# Patient Record
Sex: FEMALE | Race: WHITE | HISPANIC OR LATINO | Employment: FULL TIME | URBAN - METROPOLITAN AREA
[De-identification: names, ages, dates, MRNs, and addresses within clinical notes are randomized per-mention and may not be internally consistent; named-entity substitution may affect disease eponyms.]

---

## 2017-01-10 ENCOUNTER — TRANSCRIBE ORDERS (OUTPATIENT)
Dept: URGENT CARE | Facility: MEDICAL CENTER | Age: 27
End: 2017-01-10

## 2017-01-10 ENCOUNTER — APPOINTMENT (OUTPATIENT)
Dept: URGENT CARE | Facility: MEDICAL CENTER | Age: 27
End: 2017-01-10
Payer: OTHER MISCELLANEOUS

## 2017-01-10 ENCOUNTER — HOSPITAL ENCOUNTER (OUTPATIENT)
Dept: RADIOLOGY | Facility: MEDICAL CENTER | Age: 27
Discharge: HOME | End: 2017-01-10
Payer: OTHER MISCELLANEOUS

## 2017-01-10 DIAGNOSIS — M25.521 RIGHT ELBOW PAIN: ICD-10-CM

## 2017-01-10 DIAGNOSIS — M25.521 RIGHT ELBOW PAIN: Primary | ICD-10-CM

## 2017-01-10 PROCEDURE — 73080 X-RAY EXAM OF ELBOW: CPT

## 2017-01-10 PROCEDURE — 99283 EMERGENCY DEPT VISIT LOW MDM: CPT

## 2017-01-10 PROCEDURE — G0382 LEV 3 HOSP TYPE B ED VISIT: HCPCS

## 2017-01-12 ENCOUNTER — APPOINTMENT (OUTPATIENT)
Dept: URGENT CARE | Facility: MEDICAL CENTER | Age: 27
End: 2017-01-12
Payer: OTHER MISCELLANEOUS

## 2017-01-12 PROCEDURE — 99213 OFFICE O/P EST LOW 20 MIN: CPT

## 2017-01-19 ENCOUNTER — APPOINTMENT (OUTPATIENT)
Dept: URGENT CARE | Facility: MEDICAL CENTER | Age: 27
End: 2017-01-19
Payer: OTHER MISCELLANEOUS

## 2017-01-19 PROCEDURE — 99213 OFFICE O/P EST LOW 20 MIN: CPT

## 2018-06-26 PROBLEM — F17.210 MODERATE SMOKER (20 OR LESS PER DAY): Status: ACTIVE | Noted: 2017-03-06

## 2018-06-28 ENCOUNTER — OFFICE VISIT (OUTPATIENT)
Dept: FAMILY MEDICINE CLINIC | Facility: CLINIC | Age: 28
End: 2018-06-28
Payer: COMMERCIAL

## 2018-06-28 VITALS
RESPIRATION RATE: 16 BRPM | HEIGHT: 64 IN | WEIGHT: 98 LBS | SYSTOLIC BLOOD PRESSURE: 98 MMHG | BODY MASS INDEX: 16.73 KG/M2 | HEART RATE: 82 BPM | DIASTOLIC BLOOD PRESSURE: 78 MMHG | OXYGEN SATURATION: 98 % | TEMPERATURE: 99.3 F

## 2018-06-28 DIAGNOSIS — Z00.00 WELL ADULT EXAM: Primary | ICD-10-CM

## 2018-06-28 DIAGNOSIS — F17.210 MODERATE SMOKER (20 OR LESS PER DAY): ICD-10-CM

## 2018-06-28 PROCEDURE — 3725F SCREEN DEPRESSION PERFORMED: CPT | Performed by: FAMILY MEDICINE

## 2018-06-28 PROCEDURE — 99395 PREV VISIT EST AGE 18-39: CPT | Performed by: FAMILY MEDICINE

## 2018-06-28 RX ORDER — NORETHINDRONE ACETATE AND ETHINYL ESTRADIOL AND FERROUS FUMARATE 1MG-20(24)
20 KIT ORAL DAILY
Refills: 9 | COMMUNITY
Start: 2018-06-12 | End: 2018-10-16 | Stop reason: SDUPTHER

## 2018-06-28 RX ORDER — QUETIAPINE FUMARATE 100 MG/1
100 TABLET, FILM COATED ORAL DAILY
Refills: 1 | COMMUNITY
Start: 2018-05-29 | End: 2019-07-11 | Stop reason: ALTCHOICE

## 2018-06-28 NOTE — PROGRESS NOTES
FAMILY PRACTICE HEALTH MAINTENANCE OFFICE VISIT  [facility] [place] [facility]    NAME: Oliver Brito  AGE: 32 y o  SEX: female  : 1990     DATE: 2018    Assessment and Plan     Problem List Items Addressed This Visit     Moderate smoker (20 or less per day)      Other Visit Diagnoses     Well adult exam    -  Primary            · Patient Counseling:   · Nutrition: Stressed importance of a well balanced diet, moderation of sodium/saturated fat, caloric balance and sufficient intake of fiber  · Exercise: Stressed the importance of regular exercise with a goal of 150 minutes per week  · Dental Health: Discussed daily flossing and brushing and regular dental visits     · Immunizations reviewed patient is up-to-date with immunization  · Discussed benefits of screening patient is up-to-date with her Pap smear  · Discussed the patient's BMI with her  The BMI is in the acceptable range   · Patient spoke and a half to 1 pack a day for more than 5 years we discussed with the patient smoking cessation on patient been offered patch and she declined     No Follow-up on file          Chief Complaint     Chief Complaint   Patient presents with    Physical Exam       History of Present Illness     HPI    Well Adult Physical   Patient here for a comprehensive physical exam   Patient needs a PPD placement for her work will come back tomorrow for place PPD and under id this month a      Diet and Physical Activity  Diet: well balanced diet  Weight concerns: None, patient's BMI is between 18 5-24 9  Exercise: daily      Depression Screen  PHQ-9 Depression Screening    PHQ-9:    Frequency of the following problems over the past two weeks:       Little interest or pleasure in doing things:  0 - not at all  Feeling down, depressed, or hopeless:  0 - not at all  PHQ-2 Score:  0          General Health  Dental: regular dental visits    Reproductive Health  The patient is up-to-date with Pap smear follow-up by gyn      The following portions of the patient's history were reviewed and updated as appropriate: allergies, current medications, past family history, past medical history, past social history, past surgical history and problem list     Review of Systems     Review of Systems   Constitutional: Negative for chills, fatigue and fever  HENT: Negative for ear pain and sore throat  Respiratory: Negative for cough and shortness of breath  Cardiovascular: Negative for chest pain, palpitations and leg swelling  Gastrointestinal: Negative for abdominal pain, constipation, diarrhea, nausea and vomiting  Genitourinary: Negative for dysuria, frequency and hematuria  Musculoskeletal: Negative for back pain, gait problem and joint swelling  Neurological: Negative for dizziness         Past Medical History     Past Medical History:   Diagnosis Date    Depression        Past Surgical History     Past Surgical History:   Procedure Laterality Date    HERNIA REPAIR  2016    TONSILLECTOMY Bilateral 1994       Social History     Social History     Social History    Marital status: Single     Spouse name: N/A    Number of children: N/A    Years of education: N/A     Social History Main Topics    Smoking status: Current Every Day Smoker     Packs/day: 0 25     Years: 9 00     Types: Cigarettes    Smokeless tobacco: Never Used    Alcohol use No    Drug use: No    Sexual activity: Yes     Partners: Male     Birth control/ protection: OCP     Other Topics Concern    Not on file     Social History Narrative    No narrative on file       Family History     Family History   Problem Relation Age of Onset    Coronary artery disease Paternal Grandmother     Hypertension Paternal Grandfather         TYPE 2    Diabetes Paternal Grandfather     Pancreatic cancer Paternal Grandfather     Lung cancer Paternal Grandfather        Current Medications       Current Outpatient Prescriptions:    JUNEL FE 24 1-20 MG-MCG(24) per tablet, Take 20 mg by mouth daily, Disp: , Rfl: 9    QUEtiapine (SEROquel) 100 mg tablet, Take 100 mg by mouth daily, Disp: , Rfl: 1     Allergies     Allergies   Allergen Reactions    No Active Allergies        Objective     BP 98/78 (BP Location: Left arm, Patient Position: Sitting, Cuff Size: Standard)   Pulse 82   Temp 99 3 °F (37 4 °C) (Oral)   Resp 16   Ht 5' 4" (1 626 m)   Wt 44 5 kg (98 lb)   LMP 05/18/2018 (Approximate)   SpO2 98%   BMI 16 82 kg/m²      Physical Exam   Constitutional: She is oriented to person, place, and time  She appears well-developed and well-nourished  No distress  HENT:   Head: Normocephalic and atraumatic  Neck: No JVD present  Cardiovascular: Normal rate, regular rhythm and normal heart sounds  Exam reveals no gallop and no friction rub  No murmur heard  Pulmonary/Chest: Effort normal and breath sounds normal  No respiratory distress  She has no wheezes  She has no rales  She exhibits no tenderness  Abdominal: Soft  Bowel sounds are normal  There is no tenderness  Musculoskeletal: She exhibits no edema  Neurological: She is oriented to person, place, and time           No exam data present    Health Maintenance     Health Maintenance   Topic Date Due    HIV SCREENING  1990    Depression Screening PHQ-9  1990    DTaP,Tdap,and Td Vaccines (1 - Tdap) 12/20/2011    INFLUENZA VACCINE  09/01/2018     Immunization History   Administered Date(s) Administered    DTaP 02/18/1991, 03/19/1991, 05/19/1991, 06/17/1992, 12/09/2000    HPV Quadrivalent 03/14/2007    Hep B, Adolescent or Pediatric 12/09/2000, 01/20/2001, 02/03/2001    IPV 04/09/1991, 09/12/1991, 06/17/1992, 03/14/1995    Influenza 11/16/1996, 03/06/2017, 03/06/2017    MMR 03/06/1992, 03/14/1995    Td (adult), adsorbed 03/14/1995    Tdap 03/14/2007    Varicella 12/18/2006, 03/14/2007       Mitchel Blanco MD  [address] HEART

## 2018-06-28 NOTE — PATIENT INSTRUCTIONS

## 2018-06-29 ENCOUNTER — CLINICAL SUPPORT (OUTPATIENT)
Dept: FAMILY MEDICINE CLINIC | Facility: CLINIC | Age: 28
End: 2018-06-29
Payer: COMMERCIAL

## 2018-06-29 DIAGNOSIS — Z11.1 SCREENING EXAMINATION FOR PULMONARY TUBERCULOSIS: Primary | ICD-10-CM

## 2018-06-29 PROCEDURE — 86580 TB INTRADERMAL TEST: CPT | Performed by: FAMILY MEDICINE

## 2018-07-02 LAB
INDURATION: 0 MM
TB SKIN TEST: NEGATIVE

## 2018-08-08 ENCOUNTER — HOSPITAL ENCOUNTER (EMERGENCY)
Facility: HOSPITAL | Age: 28
Discharge: HOME | End: 2018-08-08
Attending: EMERGENCY MEDICINE
Payer: COMMERCIAL

## 2018-08-08 VITALS
OXYGEN SATURATION: 99 % | SYSTOLIC BLOOD PRESSURE: 121 MMHG | RESPIRATION RATE: 16 BRPM | DIASTOLIC BLOOD PRESSURE: 80 MMHG | WEIGHT: 99 LBS | TEMPERATURE: 98.1 F | HEART RATE: 84 BPM | BODY MASS INDEX: 16.99 KG/M2

## 2018-08-08 DIAGNOSIS — M62.838 MUSCLE SPASM: Primary | ICD-10-CM

## 2018-08-08 PROCEDURE — 99283 EMERGENCY DEPT VISIT LOW MDM: CPT

## 2018-08-08 PROCEDURE — 96372 THER/PROPH/DIAG INJ SC/IM: CPT

## 2018-08-08 RX ORDER — KETOROLAC TROMETHAMINE 30 MG/ML
30 INJECTION, SOLUTION INTRAMUSCULAR; INTRAVENOUS ONCE
Status: COMPLETED | OUTPATIENT
Start: 2018-08-08 | End: 2018-08-08

## 2018-08-08 RX ORDER — KETOROLAC TROMETHAMINE 30 MG/ML
INJECTION, SOLUTION INTRAMUSCULAR; INTRAVENOUS
Status: COMPLETED
Start: 2018-08-08 | End: 2018-08-08

## 2018-08-08 RX ORDER — IBUPROFEN 600 MG/1
600 TABLET ORAL EVERY 6 HOURS PRN
Qty: 30 TABLET | Refills: 0 | Status: SHIPPED | OUTPATIENT
Start: 2018-08-08 | End: 2019-02-05 | Stop reason: ALTCHOICE

## 2018-08-08 RX ORDER — METHOCARBAMOL 500 MG/1
500 TABLET, FILM COATED ORAL ONCE
Status: DISCONTINUED | OUTPATIENT
Start: 2018-08-08 | End: 2018-08-08 | Stop reason: HOSPADM

## 2018-08-08 RX ORDER — METHOCARBAMOL 500 MG/1
500 TABLET, FILM COATED ORAL 2 TIMES DAILY
Qty: 20 TABLET | Refills: 0 | Status: SHIPPED | OUTPATIENT
Start: 2018-08-08 | End: 2019-02-05 | Stop reason: ALTCHOICE

## 2018-08-08 RX ADMIN — KETOROLAC TROMETHAMINE 30 MG: 30 INJECTION, SOLUTION INTRAMUSCULAR; INTRAVENOUS at 08:55

## 2018-08-08 NOTE — ED PROVIDER NOTES
History  Chief Complaint   Patient presents with    Back Pain     had back injury on 7/30/18 - work related injury - has been following with [facility] - [employer] works - is scheduled to return for a recheck in August 15- requesting pain medication - has paper work from [employer] - was told it was muscle spasms and placed on light duty        History provided by:  Patient   used: No    Medical Problem   Location:  Pt with neck and back pain since end of july since moving a piano   pt seeing worker's comp doc for same ,  meds are not helping  Severity:  Mild  Onset quality:  Gradual  Duration:  2 weeks  Timing:  Constant  Progression:  Unchanged  Chronicity:  New  Associated symptoms: no abdominal pain, no chest pain, no congestion, no cough, no diarrhea, no ear pain, no fatigue, no fever, no headaches, no loss of consciousness, no myalgias, no nausea, no rash, no rhinorrhea, no shortness of breath, no sore throat, no vomiting and no wheezing        Prior to Admission Medications   Prescriptions Last Dose Informant Patient Reported? Taking? JUNEL FE 24 1-20 MG-MCG(24) per tablet   Yes No   Sig: Take 20 mg by mouth daily   QUEtiapine (SEROquel) 100 mg tablet   Yes Yes   Sig: Take 100 mg by mouth daily      Facility-Administered Medications: None       Past Medical History:   Diagnosis Date    Depression        Past Surgical History:   Procedure Laterality Date    HERNIA REPAIR  2016    TONSILLECTOMY Bilateral 1994       Family History   Problem Relation Age of Onset    Coronary artery disease Paternal Grandmother     Hypertension Paternal Grandfather         TYPE 2    Diabetes Paternal Grandfather     Pancreatic cancer Paternal Grandfather     Lung cancer Paternal Grandfather      I have reviewed and agree with the history as documented      Social History   Substance Use Topics    Smoking status: Current Every Day Smoker     Packs/day: 0 25     Years: 9 00     Types: Cigarettes    Smokeless tobacco: Never Used    Alcohol use No        Review of Systems   Constitutional: Negative  Negative for fatigue and fever  HENT: Negative  Negative for congestion, ear pain, rhinorrhea and sore throat  Eyes: Negative  Respiratory: Negative  Negative for cough, shortness of breath and wheezing  Cardiovascular: Negative  Negative for chest pain  Gastrointestinal: Negative  Negative for abdominal pain, diarrhea, nausea and vomiting  Endocrine: Negative  Genitourinary: Negative  Musculoskeletal: Positive for back pain  Negative for myalgias  Skin: Negative  Negative for rash  Allergic/Immunologic: Negative  Neurological: Negative  Negative for loss of consciousness and headaches  Hematological: Negative  Psychiatric/Behavioral: Negative  All other systems reviewed and are negative  Physical Exam  Physical Exam   Constitutional: She is oriented to person, place, and time  She appears well-developed and well-nourished  Pt declines xrays  Of neck upper and lower back    HENT:   Head: Normocephalic and atraumatic  Right Ear: External ear normal    Left Ear: External ear normal    Nose: Nose normal    Mouth/Throat: Oropharynx is clear and moist    Eyes: Conjunctivae and EOM are normal  Pupils are equal, round, and reactive to light  Neck: Normal range of motion  Neck supple  bilat trapezius tender  No erythema no swelling  No warmth    Cardiovascular: Normal rate, regular rhythm, normal heart sounds and intact distal pulses  Pulmonary/Chest: Effort normal and breath sounds normal    Abdominal: Soft  Bowel sounds are normal    Musculoskeletal: Normal range of motion  bilat paraspinal thoracic and lumbar tender bilat sciatic notch pain     Neurological: She is alert and oriented to person, place, and time  Skin: Skin is warm  Psychiatric: She has a normal mood and affect   Her behavior is normal  Judgment and thought content normal    Nursing note and vitals reviewed  Vital Signs  ED Triage Vitals [08/08/18 0829]   Temperature Pulse Respirations Blood Pressure SpO2   98 1 °F (36 7 °C) 84 16 121/80 99 %      Temp Source Heart Rate Source Patient Position - Orthostatic VS BP Location FiO2 (%)   Temporal Monitor Sitting Left arm --      Pain Score       --           Vitals:    08/08/18 0829   BP: 121/80   Pulse: 84   Patient Position - Orthostatic VS: Sitting       Visual Acuity      ED Medications  Medications   ketorolac (TORADOL) injection 30 mg (30 mg Intramuscular Given 8/8/18 0855)       Diagnostic Studies  Results Reviewed     None                 No orders to display              Procedures  Procedures       Phone Contacts  ED Phone Contact    ED Course                               MDM  CritCare Time    Disposition  Final diagnoses:   Muscle spasm     Time reflects when diagnosis was documented in both MDM as applicable and the Disposition within this note     Time User Action Codes Description Comment    8/8/2018  8:54 AM Susan April Add [G81 [id number]] Muscle spasm       ED Disposition     ED Disposition Condition Comment    Discharge  Lacie Zepeda 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  Condition at discharge: Good        Follow-up Information     Follow up With Specialties Details Why [address]   [address], [facility] [place] [id number] [phone]          Discharge Medication List as of 8/8/2018  8:56 AM      CONTINUE these medications which have NOT CHANGED    Details   QUEtiapine (SEROquel) 100 mg tablet Take 100 mg by mouth daily, Starting Tue 5/29/2018, Historical Med      JUNEL FE 24 1-20 MG-MCG(24) per tablet Take 20 mg by mouth daily, Starting Tue 6/12/2018, Historical Med           No discharge procedures on file      ED Provider  Electronically Signed by           Araseli Shukla PA-C  08/08/18 7333

## 2018-10-16 DIAGNOSIS — Z30.41 ENCOUNTER FOR SURVEILLANCE OF CONTRACEPTIVE PILLS: Primary | ICD-10-CM

## 2018-10-16 RX ORDER — NORETHINDRONE ACETATE AND ETHINYL ESTRADIOL AND FERROUS FUMARATE 1MG-20(24)
1 KIT ORAL DAILY
Qty: 28 TABLET | Refills: 3 | Status: SHIPPED | OUTPATIENT
Start: 2018-10-16 | End: 2019-02-05 | Stop reason: SDUPTHER

## 2018-10-30 ENCOUNTER — TELEPHONE (OUTPATIENT)
Dept: FAMILY MEDICINE CLINIC | Facility: CLINIC | Age: 28
End: 2018-10-30

## 2018-10-30 DIAGNOSIS — Z11.1 SCREENING FOR TUBERCULOSIS: Primary | ICD-10-CM

## 2018-10-30 NOTE — TELEPHONE ENCOUNTER
Spoke with patient had a pe pn 6/28 and had a 1 step ppd done her new employer is remonse either a 2 step or the lab to be done script placed

## 2018-12-09 ENCOUNTER — HOSPITAL ENCOUNTER (EMERGENCY)
Facility: HOSPITAL | Age: 28
Discharge: HOME | End: 2018-12-09
Attending: EMERGENCY MEDICINE | Admitting: EMERGENCY MEDICINE
Payer: COMMERCIAL

## 2018-12-09 VITALS
DIASTOLIC BLOOD PRESSURE: 88 MMHG | RESPIRATION RATE: 18 BRPM | SYSTOLIC BLOOD PRESSURE: 123 MMHG | BODY MASS INDEX: 17.52 KG/M2 | WEIGHT: 102.06 LBS | TEMPERATURE: 96.5 F | HEART RATE: 108 BPM | OXYGEN SATURATION: 98 %

## 2018-12-09 DIAGNOSIS — R19.7 NAUSEA VOMITING AND DIARRHEA: Primary | ICD-10-CM

## 2018-12-09 DIAGNOSIS — K42.9 UMBILICAL HERNIA: ICD-10-CM

## 2018-12-09 DIAGNOSIS — J06.9 URI (UPPER RESPIRATORY INFECTION): ICD-10-CM

## 2018-12-09 DIAGNOSIS — R11.2 NAUSEA VOMITING AND DIARRHEA: Primary | ICD-10-CM

## 2018-12-09 PROCEDURE — 99283 EMERGENCY DEPT VISIT LOW MDM: CPT

## 2018-12-09 RX ORDER — FLUTICASONE PROPIONATE 50 MCG
2 SPRAY, SUSPENSION (ML) NASAL DAILY
Qty: 16 G | Refills: 0 | Status: SHIPPED | OUTPATIENT
Start: 2018-12-09 | End: 2019-02-05 | Stop reason: ALTCHOICE

## 2018-12-09 RX ORDER — DICYCLOMINE HCL 20 MG
20 TABLET ORAL EVERY 6 HOURS PRN
Qty: 20 TABLET | Refills: 0 | Status: SHIPPED | OUTPATIENT
Start: 2018-12-09 | End: 2019-02-05 | Stop reason: ALTCHOICE

## 2018-12-09 RX ORDER — DICYCLOMINE HCL 20 MG
20 TABLET ORAL ONCE
Status: COMPLETED | OUTPATIENT
Start: 2018-12-09 | End: 2018-12-09

## 2018-12-09 RX ADMIN — DICYCLOMINE HYDROCHLORIDE 20 MG: 20 TABLET ORAL at 12:04

## 2018-12-09 NOTE — DISCHARGE INSTRUCTIONS
Acute Nausea and Vomiting   WHAT YOU NEED TO KNOW:   Acute nausea and vomiting start suddenly, worsen quickly, and last a short time  DISCHARGE INSTRUCTIONS:   Return to the emergency department if:   · You see blood in your vomit or your bowel movements  · You have sudden, severe pain in your chest and upper abdomen after hard vomiting or retching  · You have swelling in your neck and chest      · You are dizzy, cold, and thirsty and your eyes and mouth are dry  · You are urinating very little or not at all  · You have muscle weakness, leg cramps, and trouble breathing  · Your heart is beating much faster than normal      · You continue to vomit for more than 48 hours  Contact your healthcare provider if:   · You have frequent dry heaves (vomiting but nothing comes out)  · Your nausea and vomiting does not get better or go away after you use medicine  · You have questions or concerns about your condition or treatment  Medicines: You may need any of the following:  · Medicines  may be given to calm your stomach and stop your vomiting  You may also need medicines to help you feel more relaxed or to stop nausea and vomiting caused by motion sickness  · Gastrointestinal stimulants  are used to help empty your stomach and bowels  This may help decrease nausea and vomiting  · Take your medicine as directed  Contact your healthcare provider if you think your medicine is not helping or if you have side effects  Tell him or her if you are allergic to any medicine  Keep a list of the medicines, vitamins, and herbs you take  Include the amounts, and when and why you take them  Bring the list or the pill bottles to follow-up visits  Carry your medicine list with you in case of an emergency  Prevent or manage acute nausea and vomiting:   · Do not drink alcohol  Alcohol may upset or irritate your stomach  Too much alcohol can also cause acute nausea and vomiting  · Control stress  Headaches due to stress may cause nausea and vomiting  Find ways to relax and manage your stress  Get more rest and sleep  · Drink more liquids as directed  Vomiting can lead to dehydration  It is important to drink more liquids to help replace lost body fluids  Ask your healthcare provider how much liquid to drink each day and which liquids are best for you  Your provider may recommend that you drink an oral rehydration solution (ORS)  ORS contains water, salts, and sugar that are needed to replace the lost body fluids  Ask what kind of ORS to use, how much to drink, and where to get it  · Eat smaller meals, more often  Eat small amounts of food every 2 to 3 hours, even if you are not hungry  Food in your stomach may decrease your nausea  · Talk to your healthcare provider before you take over-the-counter (OTC) medicines  These medicines can cause serious problems if you use certain other medicines, or you have a medical condition  You may have problems if you use too much or use them for longer than the label says  Follow directions on the label carefully  Follow up with your healthcare provider as directed:  Write down your questions so you remember to ask them during your follow-up visits  © 2017 [address] [employer] Information is for End User's use only and may not be sold, redistributed or otherwise used for commercial purposes  All illustrations and images included in CareNotes® are the copyrighted property of [employer]  or [employer]atch  The above information is an  only  It is not intended as medical advice for individual conditions or treatments  Talk to your doctor, nurse or pharmacist before following any medical regimen to see if it is safe and effective for you  Umbilical Hernia   WHAT YOU NEED TO KNOW:   An umbilical hernia is a bulge through the abdominal muscles in the area of the navel (belly button)   The hernia may contain tissue from the abdomen, part of an organ (such as the intestine), or fluid  Umbilical hernias usually happen because of a hole or a weak area in the muscles of the abdominal wall  DISCHARGE INSTRUCTIONS:   Medicines:  · Ibuprofen or acetaminophen:  These medicines decrease pain  They are available without a doctor's order  Ask your healthcare provider which medicine is right for you  Ask how much to take and how often to take it  Follow directions  These medicines can cause stomach bleeding if not taken correctly  Ibuprofen can cause kidney damage  Do not take ibuprofen if you have kidney disease, an ulcer, or allergies to aspirin  Acetaminophen can cause liver damage  Do not drink alcohol if you take acetaminophen  · Take your medicine as directed  Contact your healthcare provider if you think your medicine is not helping or if you have side effects  Tell him of her if you are allergic to any medicine  Keep a list of the medicines, vitamins, and herbs you take  Include the amounts, and when and why you take them  Bring the list or the pill bottles to follow-up visits  Carry your medicine list with you in case of an emergency  Follow up with your healthcare provider as directed:  Write down your questions so you remember to ask them during your visits  Self care:   · Activity:  Avoid lifting, bending, and straining  Try not to stand for long periods of time  If you have to cough, try to cough gently  Support the hernia by holding your hand or a pillow over it when coughing  Ask your healthcare provider if it is okay for you to have sexual intercourse  · Prevent constipation:  Straining to have a bowel movement may make your hernia worse  Eat foods that are high in fiber and drink at least 8 glasses of water a day  A high-fiber diet includes whole grains, bran, cereals, and uncooked fruit and vegetables  Walking or other exercise can also help   Your healthcare provider may give you fiber medicine or a stool softener to help make your bowel movements softer and more regular  Do not use an enema or a laxative unless your healthcare provider says it is okay  · What to wear:  Do not wear anything tight over your hernia  Ask your healthcare provider if you should wear support belt or girdle to keep the hernia in place  · Ask your healthcare provider how to reduce your hernia:  Sometimes your hernia will slip back into your abdomen if you lie flat for a while  Ask your healthcare provider if you should try to gently push your hernia back into place if lying flat does not work  If your hernia does not slide back into your abdomen easily, stop pushing on it and call your healthcare provider  Do not try to push the hernia back into place if it is painful or tender  Contact your healthcare provider if:   · You have nausea or vomiting  · You cannot gently push your hernia back into your abdomen  (Do this only if your healthcare provider has shown you how to do it )     · You are constipated or have blood in your bowel movements  · Your hernia is getting bigger  · You have questions or concerns about your condition or care  Return to the emergency department if:   · You have a fever  · Your hernia is stuck outside your abdomen and is painful, swollen, or feels hard  · You completely stop having bowel movements and stop passing gas  · Your abdominal pain is bad or getting worse  © 2017 [employer]  Information is for End User's use only and may not be sold, redistributed or otherwise used for commercial purposes  All illustrations and images included in CareNotes® are the copyrighted property of [employer]  or [employer]atch  The above information is an  only  It is not intended as medical advice for individual conditions or treatments  Talk to your doctor, nurse or pharmacist before following any medical regimen to see if it is safe and effective for you

## 2018-12-09 NOTE — ED PROVIDER NOTES
History  Chief Complaint   Patient presents with    Vomiting     vomiting and diarrhea since last ngiht; daughter also has the vomiting  History provided by:  Patient  Vomiting   Severity:  Moderate  Duration:  12 hours  Timing:  Constant  Number of daily episodes:  3  Quality:  Stomach contents  Progression:  Unchanged  Chronicity:  New  Recent urination:  Normal  Context: not post-tussive and not self-induced    Relieved by:  Nothing  Worsened by:  Nothing  Ineffective treatments:  None tried  Associated symptoms: abdominal pain, diarrhea and fever    Associated symptoms: no arthralgias, no chills, no headaches and no URI        Prior to Admission Medications   Prescriptions Last Dose Informant Patient Reported? Taking? JUNEL FE 24 1-20 MG-MCG(24) per tablet   No No   Sig: Take 1 tablet by mouth daily   QUEtiapine (SEROquel) 100 mg tablet   Yes No   Sig: Take 100 mg by mouth daily   ibuprofen (MOTRIN) 600 mg tablet   No No   Sig: Take 1 tablet (600 mg total) by mouth every 6 (six) hours as needed (pain)   methocarbamol (ROBAXIN) 500 mg tablet   No No   Sig: Take 1 tablet (500 mg total) by mouth 2 (two) times a day      Facility-Administered Medications: None       Past Medical History:   Diagnosis Date    Asthma     Depression        Past Surgical History:   Procedure Laterality Date    HERNIA REPAIR  2016    TONSILLECTOMY Bilateral 1994       Family History   Problem Relation Age of Onset    Coronary artery disease Paternal Grandmother     Hypertension Paternal Grandfather         TYPE 2    Diabetes Paternal Grandfather     Pancreatic cancer Paternal Grandfather     Lung cancer Paternal Grandfather      I have reviewed and agree with the history as documented      Social History   Substance Use Topics    Smoking status: Current Every Day Smoker     Packs/day: 0 25     Years: 9 00     Types: Cigarettes    Smokeless tobacco: Never Used    Alcohol use No        Review of Systems Constitutional: Positive for fever  Negative for chills  Gastrointestinal: Positive for abdominal pain, diarrhea and vomiting  Recurrent umbilical hernia   Musculoskeletal: Negative for arthralgias  Skin: Negative for rash  Neurological: Negative for dizziness and headaches  All other systems reviewed and are negative  Physical Exam  Physical Exam   Constitutional: She is oriented to person, place, and time  She appears well-developed and well-nourished  No distress  HENT:   Head: Normocephalic  Right Ear: External ear normal    Left Ear: External ear normal    Eyes: Pupils are equal, round, and reactive to light  Conjunctivae are normal    Neck: Normal range of motion  Cardiovascular: Normal rate, regular rhythm, normal heart sounds and intact distal pulses  Exam reveals no friction rub  No murmur heard  Pulmonary/Chest: Effort normal and breath sounds normal    Abdominal: Soft  Bowel sounds are normal  There is tenderness  Small umbilical hernia noted  No redness  No bowel sounds appreciated   Musculoskeletal: Normal range of motion  Neurological: She is alert and oriented to person, place, and time  Skin: She is not diaphoretic  Nursing note and vitals reviewed        Vital Signs  ED Triage Vitals [12/09/18 1115]   Temperature Pulse Respirations Blood Pressure SpO2   (!) 96 5 °F (35 8 °C) (!) 108 18 123/88 98 %      Temp Source Heart Rate Source Patient Position - Orthostatic VS BP Location FiO2 (%)   Tymp Core Monitor Standing Left arm --      Pain Score       No Pain           Vitals:    12/09/18 1115   BP: 123/88   Pulse: (!) 108   Patient Position - Orthostatic VS: Standing       Visual Acuity      ED Medications  Medications   dicyclomine (BENTYL) tablet 20 mg (20 mg Oral Given 12/9/18 1204)       Diagnostic Studies  Results Reviewed     None                 No orders to display              Procedures  Procedures       Phone Contacts  ED Phone Contact    ED Course MDM  Number of Diagnoses or Management Options  Nausea vomiting and diarrhea: new and does not require workup  Umbilical hernia: new and does not require workup  URI (upper respiratory infection): new and does not require workup  Diagnosis management comments: Patient is a 26-year-old female with history of umbilical hernia repair presents emergency department for evaluation of reason history of fevers, vomiting, possible hernia  Patient states that last night her younger daughter started having fever and vomiting  She states 3 hours later she started having very similar symptoms  States that also overnight she started with diarrhea  States that vomiting has improved mostly resolved this morning  States that she still had persistent diarrhea with abdominal cramping  Patient also states that she her  Started this morning as well which could be leading to cramping symptoms  Patient mostly concerned because she also started coughing last night and states that she might have developed a new hernia  On exam patient does have a small umbilical hernia noted  Easily reduced by myself  Otherwise no other abdominal tenderness  Patient reports much improved abdominal pain/shortness once hernia was reduced  Lungs clear throughout  Plan will be to give dose of Bentyl help with abdominal cramping    Otherwise Flonase for congestion, surgery follow-up for recurrent hernia, PCP follow-up    Risk of Complications, Morbidity, and/or Mortality  Presenting problems: low  Diagnostic procedures: minimal  Management options: low    Patient Progress  Patient progress: stable    CritCare Time    Disposition  Final diagnoses:   Nausea vomiting and diarrhea   Umbilical hernia   URI (upper respiratory infection)     Time reflects when diagnosis was documented in both MDM as applicable and the Disposition within this note     Time User Action Codes Description Comment    12/9/2018 12:25 PM Carmel Adelita Schmidt [R11 2,  R19 7] Nausea vomiting and diarrhea     12/9/2018 12:25 PM Radha Alves Add [G53 3] Umbilical hernia     14/7/4606 12:25 PM [email or web address] ro Add [J06 9] URI (upper respiratory infection)       ED Disposition     ED Disposition Condition Comment    Discharge  Verline Reasons 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  Condition at discharge: Stable        Follow-up Information     Follow up With Specialties Details Why Donnetta Fleischer, MD [facility] Medicine Schedule an appointment as soon as possible for a visit  [address] [facility] [address] [facility]      Mariella Cardoza MD General Surgery Call As needed [address] [place] [address]  [phone]      [facility] Emergency Department Emergency Medicine  If symptoms worsen [address] [address] [phone]          Patient's Medications   Discharge Prescriptions    DICYCLOMINE (BENTYL) 20 MG TABLET    Take 1 tablet (20 mg total) by mouth every 6 (six) hours as needed (cramping)       Start Date: 12/9/2018 End Date: --       Order Dose: 20 mg       Quantity: 20 tablet    Refills: 0    FLUTICASONE (FLONASE) 50 MCG/ACT NASAL SPRAY    2 sprays into each nostril daily       Start Date: 12/9/2018 End Date: --       Order Dose: 2 sprays       Quantity: 16 g    Refills: 0     No discharge procedures on file      ED Provider  Electronically Signed by           Cinthya Moyer PA-C  12/09/18 5903

## 2018-12-13 ENCOUNTER — APPOINTMENT (OUTPATIENT)
Dept: LAB | Facility: HOSPITAL | Age: 28
End: 2018-12-13
Payer: COMMERCIAL

## 2018-12-13 DIAGNOSIS — Z11.1 SCREENING FOR TUBERCULOSIS: ICD-10-CM

## 2018-12-13 PROCEDURE — 86480 TB TEST CELL IMMUN MEASURE: CPT

## 2018-12-13 PROCEDURE — 36415 COLL VENOUS BLD VENIPUNCTURE: CPT

## 2018-12-14 LAB
GAMMA INTERFERON BACKGROUND BLD IA-ACNC: 0.04 IU/ML
M TB IFN-G BLD-IMP: NEGATIVE
M TB IFN-G CD4+ BCKGRND COR BLD-ACNC: 0.01 IU/ML
M TB IFN-G CD4+ BCKGRND COR BLD-ACNC: 0.01 IU/ML
MITOGEN IGNF BCKGRD COR BLD-ACNC: >10 IU/ML

## 2019-01-09 ENCOUNTER — HOSPITAL ENCOUNTER (EMERGENCY)
Facility: HOSPITAL | Age: 29
Discharge: HOME | End: 2019-01-09
Attending: EMERGENCY MEDICINE
Payer: COMMERCIAL

## 2019-01-09 ENCOUNTER — TELEPHONE (OUTPATIENT)
Dept: FAMILY MEDICINE CLINIC | Facility: CLINIC | Age: 29
End: 2019-01-09

## 2019-01-09 ENCOUNTER — APPOINTMENT (EMERGENCY)
Dept: CT IMAGING | Facility: HOSPITAL | Age: 29
End: 2019-01-09
Payer: COMMERCIAL

## 2019-01-09 ENCOUNTER — HOSPITAL ENCOUNTER (EMERGENCY)
Facility: HOSPITAL | Age: 29
Discharge: LEFT BEFORE DISCHARGE | End: 2019-01-09
Attending: EMERGENCY MEDICINE
Payer: COMMERCIAL

## 2019-01-09 VITALS
WEIGHT: 105 LBS | DIASTOLIC BLOOD PRESSURE: 82 MMHG | HEIGHT: 64 IN | HEART RATE: 97 BPM | BODY MASS INDEX: 17.93 KG/M2 | SYSTOLIC BLOOD PRESSURE: 134 MMHG | OXYGEN SATURATION: 97 % | TEMPERATURE: 98.4 F | RESPIRATION RATE: 16 BRPM

## 2019-01-09 VITALS
DIASTOLIC BLOOD PRESSURE: 85 MMHG | RESPIRATION RATE: 20 BRPM | SYSTOLIC BLOOD PRESSURE: 122 MMHG | HEART RATE: 71 BPM | OXYGEN SATURATION: 100 % | BODY MASS INDEX: 17.75 KG/M2 | WEIGHT: 103.4 LBS | TEMPERATURE: 97.8 F

## 2019-01-09 DIAGNOSIS — R55 NEAR SYNCOPE: Primary | ICD-10-CM

## 2019-01-09 DIAGNOSIS — E16.2 HYPOGLYCEMIA: Primary | ICD-10-CM

## 2019-01-09 DIAGNOSIS — R53.1 WEAKNESS: ICD-10-CM

## 2019-01-09 DIAGNOSIS — J40 BRONCHITIS: ICD-10-CM

## 2019-01-09 DIAGNOSIS — R42 DIZZINESS: ICD-10-CM

## 2019-01-09 LAB
ALBUMIN SERPL BCP-MCNC: 4.1 G/DL (ref 3–5.2)
ALBUMIN SERPL BCP-MCNC: 4.2 G/DL (ref 3–5.2)
ALP SERPL-CCNC: 55 U/L (ref 43–122)
ALP SERPL-CCNC: 55 U/L (ref 43–122)
ALT SERPL W P-5'-P-CCNC: 22 U/L (ref 9–52)
ALT SERPL W P-5'-P-CCNC: 23 U/L (ref 9–52)
AMPHETAMINES SERPL QL SCN: NEGATIVE
ANION GAP SERPL CALCULATED.3IONS-SCNC: 10 MMOL/L (ref 5–14)
ANION GAP SERPL CALCULATED.3IONS-SCNC: 9 MMOL/L (ref 5–14)
AST SERPL W P-5'-P-CCNC: 18 U/L (ref 14–36)
AST SERPL W P-5'-P-CCNC: 34 U/L (ref 14–36)
ATRIAL RATE: 71 BPM
ATRIAL RATE: 81 BPM
BACTERIA UR QL AUTO: ABNORMAL /HPF
BARBITURATES UR QL: NEGATIVE
BASOPHILS # BLD AUTO: 0.1 THOUSANDS/ΜL (ref 0–0.1)
BASOPHILS # BLD AUTO: 0.1 THOUSANDS/ΜL (ref 0–0.1)
BASOPHILS NFR BLD AUTO: 1 % (ref 0–1)
BASOPHILS NFR BLD AUTO: 1 % (ref 0–1)
BENZODIAZ UR QL: NEGATIVE
BILIRUB SERPL-MCNC: 0.2 MG/DL
BILIRUB SERPL-MCNC: 0.6 MG/DL
BILIRUB UR QL STRIP: NEGATIVE
BUN SERPL-MCNC: 13 MG/DL (ref 5–25)
BUN SERPL-MCNC: 14 MG/DL (ref 5–25)
CALCIUM SERPL-MCNC: 8.8 MG/DL (ref 8.4–10.2)
CALCIUM SERPL-MCNC: 9.4 MG/DL (ref 8.4–10.2)
CHLORIDE SERPL-SCNC: 105 MMOL/L (ref 97–108)
CHLORIDE SERPL-SCNC: 106 MMOL/L (ref 97–108)
CK SERPL-CCNC: 57 U/L (ref 30–135)
CLARITY UR: CLEAR
CO2 SERPL-SCNC: 22 MMOL/L (ref 22–30)
CO2 SERPL-SCNC: 23 MMOL/L (ref 22–30)
COCAINE UR QL: NEGATIVE
COLOR UR: YELLOW
CREAT SERPL-MCNC: 0.66 MG/DL (ref 0.6–1.2)
CREAT SERPL-MCNC: 0.7 MG/DL (ref 0.6–1.2)
EOSINOPHIL # BLD AUTO: 0.1 THOUSAND/ΜL (ref 0–0.4)
EOSINOPHIL # BLD AUTO: 0.1 THOUSAND/ΜL (ref 0–0.4)
EOSINOPHIL NFR BLD AUTO: 2 % (ref 0–6)
EOSINOPHIL NFR BLD AUTO: 2 % (ref 0–6)
ERYTHROCYTE [DISTWIDTH] IN BLOOD BY AUTOMATED COUNT: 12.4 %
ERYTHROCYTE [DISTWIDTH] IN BLOOD BY AUTOMATED COUNT: 12.5 %
EXT PREG TEST URINE: NEGATIVE
GFR SERPL CREATININE-BSD FRML MDRD: 118 ML/MIN/1.73SQ M
GFR SERPL CREATININE-BSD FRML MDRD: 121 ML/MIN/1.73SQ M
GLUCOSE SERPL-MCNC: 79 MG/DL (ref 70–99)
GLUCOSE SERPL-MCNC: 80 MG/DL (ref 70–99)
GLUCOSE SERPL-MCNC: 81 MG/DL (ref 70–99)
GLUCOSE SERPL-MCNC: 82 MG/DL (ref 70–99)
GLUCOSE UR STRIP-MCNC: NEGATIVE MG/DL
HCT VFR BLD AUTO: 42.3 % (ref 36–46)
HCT VFR BLD AUTO: 42.4 % (ref 36–46)
HGB BLD-MCNC: 14.1 G/DL (ref 12–16)
HGB BLD-MCNC: 14.1 G/DL (ref 12–16)
HGB UR QL STRIP.AUTO: 10
KETONES UR STRIP-MCNC: NEGATIVE MG/DL
LEUKOCYTE ESTERASE UR QL STRIP: 100
LIPASE SERPL-CCNC: 54 U/L (ref 23–300)
LYMPHOCYTES # BLD AUTO: 2.3 THOUSANDS/ΜL (ref 0.5–4)
LYMPHOCYTES # BLD AUTO: 3.1 THOUSANDS/ΜL (ref 0.5–4)
LYMPHOCYTES NFR BLD AUTO: 28 % (ref 25–45)
LYMPHOCYTES NFR BLD AUTO: 36 % (ref 25–45)
MAGNESIUM SERPL-MCNC: 2 MG/DL (ref 1.6–2.3)
MCH RBC QN AUTO: 30.9 PG (ref 26–34)
MCH RBC QN AUTO: 31.5 PG (ref 26–34)
MCHC RBC AUTO-ENTMCNC: 33.3 G/DL (ref 31–36)
MCHC RBC AUTO-ENTMCNC: 33.5 G/DL (ref 31–36)
MCV RBC AUTO: 93 FL (ref 80–100)
MCV RBC AUTO: 94 FL (ref 80–100)
METHADONE UR QL: NEGATIVE
MONOCYTES # BLD AUTO: 0.6 THOUSAND/ΜL (ref 0.2–0.9)
MONOCYTES # BLD AUTO: 0.7 THOUSAND/ΜL (ref 0.2–0.9)
MONOCYTES NFR BLD AUTO: 8 % (ref 1–10)
MONOCYTES NFR BLD AUTO: 8 % (ref 1–10)
MUCOUS THREADS UR QL AUTO: ABNORMAL
NEUTROPHILS # BLD AUTO: 4.7 THOUSANDS/ΜL (ref 1.8–7.8)
NEUTROPHILS # BLD AUTO: 5 THOUSANDS/ΜL (ref 1.8–7.8)
NEUTS SEG NFR BLD AUTO: 54 % (ref 45–65)
NEUTS SEG NFR BLD AUTO: 62 % (ref 45–65)
NITRITE UR QL STRIP: NEGATIVE
NON-SQ EPI CELLS URNS QL MICRO: ABNORMAL /HPF
OPIATES UR QL SCN: NEGATIVE
P AXIS: 74 DEGREES
P AXIS: 79 DEGREES
PCP UR QL: NEGATIVE
PH UR STRIP.AUTO: 6 [PH] (ref 4.5–8)
PLATELET # BLD AUTO: 235 THOUSANDS/UL (ref 150–450)
PLATELET # BLD AUTO: 235 THOUSANDS/UL (ref 150–450)
PMV BLD AUTO: 7.7 FL (ref 8.9–12.7)
PMV BLD AUTO: 7.8 FL (ref 8.9–12.7)
POTASSIUM SERPL-SCNC: 3.4 MMOL/L (ref 3.6–5)
POTASSIUM SERPL-SCNC: 4 MMOL/L (ref 3.6–5)
PR INTERVAL: 130 MS
PR INTERVAL: 144 MS
PROT SERPL-MCNC: 7.4 G/DL (ref 5.9–8.4)
PROT SERPL-MCNC: 7.5 G/DL (ref 5.9–8.4)
PROT UR STRIP-MCNC: NEGATIVE MG/DL
QRS AXIS: 69 DEGREES
QRS AXIS: 78 DEGREES
QRSD INTERVAL: 106 MS
QRSD INTERVAL: 94 MS
QT INTERVAL: 388 MS
QT INTERVAL: 422 MS
QTC INTERVAL: 450 MS
QTC INTERVAL: 458 MS
RBC # BLD AUTO: 4.49 MILLION/UL (ref 4–5.2)
RBC # BLD AUTO: 4.57 MILLION/UL (ref 4–5.2)
RBC #/AREA URNS AUTO: ABNORMAL /HPF
SODIUM SERPL-SCNC: 137 MMOL/L (ref 137–147)
SODIUM SERPL-SCNC: 138 MMOL/L (ref 137–147)
SP GR UR STRIP.AUTO: 1.02 (ref 1–1.04)
T WAVE AXIS: 30 DEGREES
T WAVE AXIS: 8 DEGREES
THC UR QL: NEGATIVE
TROPONIN I SERPL-MCNC: <0.01 NG/ML (ref 0–0.03)
UROBILINOGEN UA: NEGATIVE MG/DL
VENTRICULAR RATE: 71 BPM
VENTRICULAR RATE: 81 BPM
WBC # BLD AUTO: 8.1 THOUSAND/UL (ref 4.5–11)
WBC # BLD AUTO: 8.7 THOUSAND/UL (ref 4.5–11)
WBC #/AREA URNS AUTO: ABNORMAL /HPF

## 2019-01-09 PROCEDURE — 80307 DRUG TEST PRSMV CHEM ANLYZR: CPT | Performed by: PHYSICIAN ASSISTANT

## 2019-01-09 PROCEDURE — 93005 ELECTROCARDIOGRAM TRACING: CPT

## 2019-01-09 PROCEDURE — 83690 ASSAY OF LIPASE: CPT | Performed by: PHYSICIAN ASSISTANT

## 2019-01-09 PROCEDURE — 99285 EMERGENCY DEPT VISIT HI MDM: CPT

## 2019-01-09 PROCEDURE — 81003 URINALYSIS AUTO W/O SCOPE: CPT | Performed by: PHYSICIAN ASSISTANT

## 2019-01-09 PROCEDURE — 81025 URINE PREGNANCY TEST: CPT | Performed by: PHYSICIAN ASSISTANT

## 2019-01-09 PROCEDURE — 82948 REAGENT STRIP/BLOOD GLUCOSE: CPT

## 2019-01-09 PROCEDURE — 82550 ASSAY OF CK (CPK): CPT | Performed by: PHYSICIAN ASSISTANT

## 2019-01-09 PROCEDURE — 70450 CT HEAD/BRAIN W/O DYE: CPT

## 2019-01-09 PROCEDURE — 96361 HYDRATE IV INFUSION ADD-ON: CPT

## 2019-01-09 PROCEDURE — 81001 URINALYSIS AUTO W/SCOPE: CPT | Performed by: PHYSICIAN ASSISTANT

## 2019-01-09 PROCEDURE — 96360 HYDRATION IV INFUSION INIT: CPT

## 2019-01-09 PROCEDURE — 85025 COMPLETE CBC W/AUTO DIFF WBC: CPT | Performed by: EMERGENCY MEDICINE

## 2019-01-09 PROCEDURE — 85025 COMPLETE CBC W/AUTO DIFF WBC: CPT | Performed by: PHYSICIAN ASSISTANT

## 2019-01-09 PROCEDURE — 84484 ASSAY OF TROPONIN QUANT: CPT | Performed by: PHYSICIAN ASSISTANT

## 2019-01-09 PROCEDURE — 36415 COLL VENOUS BLD VENIPUNCTURE: CPT | Performed by: EMERGENCY MEDICINE

## 2019-01-09 PROCEDURE — 80053 COMPREHEN METABOLIC PANEL: CPT | Performed by: PHYSICIAN ASSISTANT

## 2019-01-09 PROCEDURE — 93010 ELECTROCARDIOGRAM REPORT: CPT | Performed by: INTERNAL MEDICINE

## 2019-01-09 PROCEDURE — 80053 COMPREHEN METABOLIC PANEL: CPT | Performed by: EMERGENCY MEDICINE

## 2019-01-09 PROCEDURE — 83735 ASSAY OF MAGNESIUM: CPT | Performed by: EMERGENCY MEDICINE

## 2019-01-09 RX ORDER — MECLIZINE HYDROCHLORIDE 25 MG/1
25 TABLET ORAL 3 TIMES DAILY PRN
Qty: 30 TABLET | Refills: 0 | Status: SHIPPED | OUTPATIENT
Start: 2019-01-09 | End: 2019-07-11 | Stop reason: ALTCHOICE

## 2019-01-09 RX ORDER — MECLIZINE HCL 12.5 MG/1
25 TABLET ORAL ONCE
Status: COMPLETED | OUTPATIENT
Start: 2019-01-09 | End: 2019-01-09

## 2019-01-09 RX ORDER — SODIUM CHLORIDE 9 MG/ML
250 INJECTION, SOLUTION INTRAVENOUS CONTINUOUS
Status: DISCONTINUED | OUTPATIENT
Start: 2019-01-09 | End: 2019-01-09 | Stop reason: HOSPADM

## 2019-01-09 RX ORDER — AZITHROMYCIN 250 MG/1
TABLET, FILM COATED ORAL
Qty: 6 TABLET | Refills: 0 | Status: SHIPPED | OUTPATIENT
Start: 2019-01-09 | End: 2019-01-13

## 2019-01-09 RX ORDER — BENZONATATE 100 MG/1
100 CAPSULE ORAL EVERY 8 HOURS
Qty: 21 CAPSULE | Refills: 0 | Status: SHIPPED | OUTPATIENT
Start: 2019-01-09 | End: 2019-02-05 | Stop reason: ALTCHOICE

## 2019-01-09 RX ADMIN — SODIUM CHLORIDE 1000 ML: 9 INJECTION, SOLUTION INTRAVENOUS at 00:48

## 2019-01-09 RX ADMIN — SODIUM CHLORIDE 250 ML/HR: 9 INJECTION, SOLUTION INTRAVENOUS at 10:00

## 2019-01-09 RX ADMIN — MECLIZINE HYDROCHLORIDE 25 MG: 12.5 TABLET ORAL at 10:04

## 2019-01-09 NOTE — ED PROVIDER NOTES
History  Chief Complaint   Patient presents with    Weakness - Generalized     generalized weakness  pt here for weakness/seizure last night       History provided by:  Patient   used: No    Medical Problem   Location:  Pt returns from [address] last maura  pt with episode last maura of dizziness confusion pt with witnessed total body shaking and not responsive x 5-10 mins  ambulance called last maura    now pt with dizzy   Quality:  No incontinence last maura  Severity:  Mild  Onset quality:  Gradual  Duration:  10 minutes      Prior to Admission Medications   Prescriptions Last Dose Informant Patient Reported? Taking? Pili Finely FE 24 1-20 MG-MCG(24) per tablet   No No   Sig: Take 1 tablet by mouth daily   QUEtiapine (SEROquel) 100 mg tablet   Yes No   Sig: Take 100 mg by mouth daily   dicyclomine (BENTYL) 20 mg tablet   No No   Sig: Take 1 tablet (20 mg total) by mouth every 6 (six) hours as needed (cramping)   fluticasone (FLONASE) 50 mcg/act nasal spray   No No   Si sprays into each nostril daily   ibuprofen (MOTRIN) 600 mg tablet   No No   Sig: Take 1 tablet (600 mg total) by mouth every 6 (six) hours as needed (pain)   methocarbamol (ROBAXIN) 500 mg tablet   No No   Sig: Take 1 tablet (500 mg total) by mouth 2 (two) times a day      Facility-Administered Medications: None       Past Medical History:   Diagnosis Date    Asthma     Depression        Past Surgical History:   Procedure Laterality Date    HERNIA REPAIR  2016    TONSILLECTOMY Bilateral        Family History   Problem Relation Age of Onset    Coronary artery disease Paternal Grandmother     Hypertension Paternal Grandfather         TYPE 2    Diabetes Paternal Grandfather     Pancreatic cancer Paternal Grandfather     Lung cancer Paternal Grandfather      I have reviewed and agree with the history as documented      Social History   Substance Use Topics    Smoking status: Current Every Day Smoker     Packs/day: 0 25 Years: 9 00     Types: Cigarettes    Smokeless tobacco: Never Used    Alcohol use No        Review of Systems   Constitutional: Negative  HENT: Negative  Eyes: Negative  Respiratory: Negative  Cardiovascular: Negative  Gastrointestinal: Negative  Endocrine: Negative  Genitourinary: Negative  Musculoskeletal: Negative  Skin: Negative  Allergic/Immunologic: Negative  Neurological: Negative  Hematological: Negative  Psychiatric/Behavioral: Negative  Physical Exam  Physical Exam   Constitutional: She is oriented to person, place, and time  She appears well-developed and well-nourished  1145am pt dizziness much improved    HENT:   Head: Normocephalic  Right Ear: External ear normal    Left Ear: External ear normal    Nose: Nose normal    Mouth/Throat: Oropharynx is clear and moist    Eyes: Pupils are equal, round, and reactive to light  Conjunctivae and EOM are normal    Neck: Normal range of motion  Neck supple  Cardiovascular: Normal rate, regular rhythm and normal heart sounds  Pulmonary/Chest: Effort normal and breath sounds normal    Abdominal: Soft  Bowel sounds are normal    Musculoskeletal: Normal range of motion  Neurological: She is alert and oriented to person, place, and time  Skin: Skin is warm  Psychiatric: She has a normal mood and affect  Her behavior is normal    Nursing note and vitals reviewed        Vital Signs  ED Triage Vitals [01/09/19 0911]   Temperature Pulse Respirations Blood Pressure SpO2   97 8 °F (36 6 °C) 94 14 137/91 99 %      Temp Source Heart Rate Source Patient Position - Orthostatic VS BP Location FiO2 (%)   Tympanic Monitor Sitting Left arm --      Pain Score       No Pain           Vitals:    01/09/19 0911 01/09/19 1128 01/09/19 1155   BP: 137/91 138/95 122/85   Pulse: 94 98 71   Patient Position - Orthostatic VS: Sitting Lying Lying       Visual Acuity      ED Medications  Medications   meclizine (ANTIVERT) tablet 25 mg (25 mg Oral Given 1/9/19 1004)       Diagnostic Studies  Results Reviewed     Procedure Component Value Units Date/Time    Troponin I [[id number]]  (Normal) Collected:  01/09/19 0959    Lab Status:  Final result Specimen:  Blood from Arm, Left Updated:  01/09/19 1039     Troponin I <0 01 ng/mL     Urine Microscopic [[id number]]  (Abnormal) Collected:  01/09/19 0947    Lab Status:  Final result Specimen:  Urine from Urine, Clean Catch Updated:  01/09/19 1031     RBC, UA 2-4 (A) /hpf      WBC, UA 0-1 (A) /hpf      Epithelial Cells Moderate (A) /hpf      Bacteria, UA Occasional /hpf      MUCUS THREADS Occasional (A)    Rapid drug screen, urine [[id number]]  (Normal) Collected:  01/09/19 0947    Lab Status:  Final result Specimen:  Urine from Urine, Clean Catch Updated:  01/09/19 1028     Amph/Meth UR Negative     Barbiturate Ur Negative     Benzodiazepine Urine Negative     Cocaine Urine Negative     Methadone Urine Negative     Opiate Urine Negative     PCP Ur Negative     THC Urine Negative    Narrative:         FOR MEDICAL PURPOSES ONLY  IF CONFIRMATION NEEDED PLEASE CONTACT THE LAB WITHIN 5 DAYS      Drug Screen Cutoff Levels:  AMPHETAMINE/METHAMPHETAMINES  1000 ng/mL  BARBITURATES     200 ng/mL  BENZODIAZEPINES     200 ng/mL  COCAINE      300 ng/mL  METHADONE      300 ng/mL  OPIATES      300 ng/mL  PHENCYCLIDINE     25 ng/mL  THC       50 ng/mL    CK (with reflex to MB) [[id number]]  (Normal) Collected:  01/09/19 0959    Lab Status:  Final result Specimen:  Blood from Arm, Left Updated:  01/09/19 1026     Total CK 57 U/L     Lipase [[id number]]  (Normal) Collected:  01/09/19 0959    Lab Status:  Final result Specimen:  Blood from Arm, Left Updated:  01/09/19 1023     Lipase 54 u/L     Comprehensive metabolic panel [[id number]]  (Normal) Collected:  01/09/19 0959    Lab Status:  Final result Specimen:  Blood from Arm, Left Updated:  01/09/19 1023     Sodium 138 mmol/L      Potassium 4 0 mmol/L      Chloride 106 mmol/L CO2 23 mmol/L      ANION GAP 9 mmol/L      BUN 13 mg/dL      Creatinine 0 66 mg/dL      Glucose 81 mg/dL      Calcium 8 8 mg/dL      AST 18 U/L      ALT 23 U/L      Alkaline Phosphatase 55 U/L      Total Protein 7 5 g/dL      Albumin 4 1 g/dL      Total Bilirubin 0 60 mg/dL      eGFR 121 ml/min/1 73sq m     Narrative:         National Kidney Disease Education Program recommendations are as follows:  GFR calculation is accurate only with a steady state creatinine  Chronic Kidney disease less than 60 ml/min/1 73 sq  meters  Kidney failure less than 15 ml/min/1 73 sq  meters      UA w Reflex to Microscopic w Reflex to Culture [[id number]]  (Abnormal) Collected:  01/09/19 0947    Lab Status:  Final result Specimen:  Urine from Urine, Clean Catch Updated:  01/09/19 1021     Color, UA Yellow     Clarity, UA Clear     Specific [place], UA 1 020     pH, UA 6 0     Leukocytes,  0 (A)     Nitrite, UA Negative     Protein, UA Negative mg/dl      Glucose, UA Negative mg/dl      Ketones, UA Negative mg/dl      Bilirubin, UA Negative     Blood, UA 10 0 (A)     UROBILINOGEN UA Negative mg/dL     CBC and differential [[id number]]  (Abnormal) Collected:  01/09/19 0959    Lab Status:  Final result Specimen:  Blood from Arm, Left Updated:  01/09/19 1017     WBC 8 10 Thousand/uL      RBC 4 57 Million/uL      Hemoglobin 14 1 g/dL      Hematocrit 42 4 %      MCV 93 fL      MCH 30 9 pg      MCHC 33 3 g/dL      RDW 12 4 %      MPV 7 8 (L) fL      Platelets 438 Thousands/uL      Neutrophils Relative 62 %      Lymphocytes Relative 28 %      Monocytes Relative 8 %      Eosinophils Relative 2 %      Basophils Relative 1 %      Neutrophils Absolute 5 00 Thousands/µL      Lymphocytes Absolute 2 30 Thousands/µL      Monocytes Absolute 0 60 Thousand/µL      Eosinophils Absolute 0 10 Thousand/µL      Basophils Absolute 0 10 Thousands/µL     Fingerstick Glucose (POCT) [[id number]]  (Normal) Collected:  01/09/19 0939    Lab Status:  Final result Updated:  01/09/19 0956     POC Glucose 79 mg/dl     POCT pregnancy, urine [[id number]]  (Normal) Resulted:  01/09/19 0951    Lab Status:  Final result Updated:  01/09/19 0952     EXT PREG TEST UR (Ref: Negative) negative                 CT head without contrast   Final Result by Milvia Soriano MD (01/09 7384)      No acute intracranial abnormality  Workstation performed: [id number]                    Procedures  Procedures       Phone Contacts  ED Phone Contact    ED Course                               MDM  CritCare Time    Disposition  Final diagnoses:   Near syncope   Dizziness   Bronchitis     Time reflects when diagnosis was documented in both MDM as applicable and the Disposition within this note     Time User Action Codes Description Comment    1/9/2019 11:46 AM Madariel Pace  Add [R55] Near syncope     1/9/2019 11:46 AM Ban Rea  Add [R42] Dizziness     1/9/2019 11:47 AM Enriqueta Delgado  Add [J40] Bronchitis       ED Disposition     ED Disposition Condition Comment    Discharge  Tere Mathis 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      Condition at discharge: Good        Follow-up Information     Follow up With Specialties Details Why Contact Info    Mekhi Hallman MD Family Medicine Schedule an appointment as soon as possible for a visit  [address] [place] [address]  [phone]            Discharge Medication List as of 1/9/2019 11:48 AM      START taking these medications    Details   azithromycin (ZITHROMAX Z-PASCUAL) 250 mg tablet Take 2 tablets today then 1 tablet daily x 4 days, Print      benzonatate (TESSALON PERLES) 100 mg capsule Take 1 capsule (100 mg total) by mouth every 8 (eight) hours, Starting Wed 1/9/2019, Print      meclizine (ANTIVERT) 25 mg tablet Take 1 tablet (25 mg total) by mouth 3 (three) times a day as needed for dizziness, Starting Wed 1/9/2019, Print         CONTINUE these medications which have NOT CHANGED    Details   dicyclomine (BENTYL) 20 mg tablet Take 1 tablet (20 mg total) by mouth every 6 (six) hours as needed (cramping), Starting Sun 12/9/2018, Print      fluticasone (FLONASE) 50 mcg/act nasal spray 2 sprays into each nostril daily, Starting Sun 12/9/2018, Print      ibuprofen (MOTRIN) 600 mg tablet Take 1 tablet (600 mg total) by mouth every 6 (six) hours as needed (pain), Starting Wed 8/8/2018, Print      JUNEL FE 24 1-20 MG-MCG(24) per tablet Take 1 tablet by mouth daily, Starting Tue 10/16/2018, Normal      methocarbamol (ROBAXIN) 500 mg tablet Take 1 tablet (500 mg total) by mouth 2 (two) times a day, Starting Wed 8/8/2018, Print      QUEtiapine (SEROquel) 100 mg tablet Take 100 mg by mouth daily, Starting Tue 5/29/2018, Historical Med           No discharge procedures on file      ED Provider  Electronically Signed by           Erasmo Ramos PA-C  01/09/19 1229

## 2019-01-09 NOTE — DISCHARGE INSTRUCTIONS
You choosing to leave against medical advise, please call your primary care provider in the morning for evaluation and follow up with endocrinology for further care if symptoms worsen please return to the emergency department  Non-diabetic Hypoglycemia   WHAT YOU NEED TO KNOW:   Non-diabetic hypoglycemia is a condition that causes the sugar (glucose) in your blood to drop too low  This can happen in people who do not have diabetes  The 2 types of non-diabetic hypoglycemia are fasting hypoglycemia and reactive hypoglycemia  Fasting hypoglycemia often happens after a person goes without food for 8 hours or longer  Reactive hypoglycemia usually happens about 2 to 4 hours after a meal  When your blood sugar level is low, your muscles and brain cells do not have enough energy to work well  DISCHARGE INSTRUCTIONS:   Follow up with your healthcare provider as directed:  Write down your questions so you remember to ask them during your visits  Keep carbohydrates with you:  Carbohydrates will raise your blood sugar level when you have symptoms of hypoglycemia  Carbohydrates are found in bread, rice, cereal, fruits, juice, and milk  Prevent hypoglycemia:  You may need to change what and when you eat to prevent low blood sugar levels  Follow the meal plan that you and the dietitian have created  The following guidelines may help you keep your blood sugar levels under control  · Eat 5 to 6 small meals each day instead of 3 large meals  Eat the same amount of carbohydrate at meals and snacks each day  Most people need about 3 to 4 servings of carbohydrate at meals and 1 to 2 servings for snacks  Do not skip meals  Carbohydrate counting can be used plan your meals  Ask your healthcare provider or dietitian for information about carbohydrate counting  · Limit refined carbohydrates  Examples are white bread, pastries (pies and cakes), regular sodas, syrups, and candy      · Do not have drinks or foods that contain caffeine  Examples are coffee, tea, and certain types of sodas  Caffeine may cause you to have the same symptoms as hypoglycemia, and may cause you to feel worse  · Limit or do not drink alcohol  Women should limit alcohol to 1 drink a day  Men should limit alcohol to 2 drinks a day  A drink of alcohol is 12 ounces of beer, 5 ounces of wine, or 1½ ounces of liquor  Do not drink alcohol on an empty stomach  Drink alcohol with meals to prevent hypoglycemia  · Include protein foods and vegetables in your meals  Some foods that are high in protein include beef, pork, fish, poultry (chicken and turkey), beans, and nuts  Eat a variety of vegetables with your meals  Contact your healthcare provider if:   · You have blurred vision or vision changes  · You feel very tired and weak  · You are sweating more than usual for you  · You have a fast heartbeat  · You feel dizzy, lightheaded, and shaky  · You have questions about your condition or care  Return to the emergency department if:   · You have symptoms of hypoglycemia and cannot eat  · You have trouble thinking clearly  · You have a seizure or faint  © 2017 [address]  Information is for End User's use only and may not be sold, redistributed or otherwise used for commercial purposes  All illustrations and images included in CareNotes® are the copyrighted property of A [employer]  or [employer]  The above information is an  only  It is not intended as medical advice for individual conditions or treatments  Talk to your doctor, nurse or pharmacist before following any medical regimen to see if it is safe and effective for you

## 2019-01-09 NOTE — ED NOTES
Pt wished to sign out AMA, she felt she was spoken to as if she was a child by physician       Lex Rivera, FREDRICK  01/09/19 8566

## 2019-01-09 NOTE — DISCHARGE INSTRUCTIONS
Acute Bronchitis   WHAT YOU NEED TO KNOW:   What is acute bronchitis? Acute bronchitis is swelling and irritation in the air passages of your lungs  This irritation may cause you to cough or have other breathing problems  Acute bronchitis often starts because of another illness, such as a cold or the flu  The illness spreads from your nose and throat to your windpipe and airways  Bronchitis is often called a chest cold  Acute bronchitis lasts about 3 to 6 weeks and is usually not a serious illness  What causes acute bronchitis? · Infection  caused by a virus, bacteria, or a fungus    · Polluted air  caused by chemical fumes, dust, smoke, allergens, or pollution  What increases my risk for acute bronchitis? · Age, usually older adults    · Smoking cigarettes or being around cigarette smoke    · Chronic lung diseases or chronic sinus infections    · Weakened immune system    · Gastroesophageal reflux disease    · Allergies and environmental changes  What are the signs and symptoms of acute bronchitis? · A cough with sputum that may be clear, yellow, or green    · Feeling more tired than usual, and body aches    · A fever and chills    · Wheezing when you breathe    · A tight chest or pain when you breathe or cough  How is acute bronchitis diagnosed? Your healthcare provider may diagnose bronchitis by your symptoms  If he is not sure, you may need the following:  · Blood tests  will be done to see if your symptoms are caused by an infection  · X-ray  pictures of your lungs and heart may show signs of infection, such as pneumonia  Chest x-rays may also show fluid around your heart and lungs  How is acute bronchitis treated? Your healthcare provider will treat any condition that has caused your acute bronchitis  He may also give you any of the following:  · Ibuprofen or acetaminophen  are medicines that help lower your fever  They are available without a doctor's order   Ask your healthcare provider which medicine is right for you  Ask how much to take and how often to take it  Follow directions  These medicines can cause stomach bleeding if not taken correctly  Ibuprofen can cause kidney damage  Do not take ibuprofen if you have kidney disease, an ulcer, or allergies to aspirin  Acetaminophen can cause liver damage  Do not take more than 4,000 milligrams in 24 hours  · Decongestants  help loosen mucus in your lungs and make it easier to cough up  This can help you breathe easier  · Cough suppressants  decrease your urge to cough  If your cough produces mucus, do not take a cough suppressant unless your healthcare provider tells you to  Your healthcare provider may suggest that you take a cough suppressant at night so you can rest     · Inhalers  may be given  Your healthcare provider may give you one or more inhalers to help you breathe easier and cough less  An inhaler gives your medicine to open your airways  Ask your healthcare provider to show you how to use your inhaler correctly  How can I care for myself when I have acute bronchitis? · Get more rest   Rest helps your body to heal  Slowly start to do more each day  Rest when you feel it is needed  · Avoid irritants in the air  Avoid chemicals, fumes, and dust  Wear a face mask if you must work around dust or fumes  Stay inside on days when air pollution levels are high  If you have allergies, stay inside when pollen counts are high  Do not use aerosol products, such as spray-on deodorant, bug spray, and hair spray  · Do not smoke or be around others who smoke  Nicotine and other chemicals in cigarettes and cigars damages the cilia that move mucus out of your lungs  Ask your healthcare provider for information if you currently smoke and need help to quit  E-cigarettes or smokeless tobacco still contain nicotine  Talk to your healthcare provider before you use these products  · Drink liquids as directed    Liquids help keep your air passages moist and help you cough up mucus  You may need to drink more liquids when you have acute bronchitis  Ask how much liquid to drink each day and which liquids are best for you  · Use a humidifier or vaporizer  Use a cool mist humidifier or a vaporizer to increase air moisture in your home  This may make it easier for you to breathe and help decrease your cough  How can I decrease my risk for acute bronchitis? · Get the vaccinations you need  Ask your healthcare provider if you should get vaccinated against the flu or pneumonia  · Prevent the spread of germs  You can decrease your risk of acute bronchitis and other illnesses by doing the following:     [facility] your hands often with soap and water  Carry germ-killing hand lotion or gel with you  You can use the lotion or gel to clean your hands when soap and water are not available  ¨ Do not touch your eyes, nose, or mouth unless you have washed your hands first     ¨ Always cover your mouth when you cough to prevent the spread of germs  It is best to cough into a tissue or your shirt sleeve instead of into your hand  Ask those around you cover their mouths when they cough  ¨ Try to avoid people who have a cold or the flu  If you are sick, stay away from others as much as possible  When should I seek immediate care? · You cough up blood  · Your lips or fingernails turn blue  · You feel like you are not getting enough air when you breathe  When should I contact my healthcare provider? · You have a fever  · Your breathing problems do not go away or get worse  · Your cough does not get better within 4 weeks  · You have questions or concerns about your condition or care  CARE AGREEMENT:   You have the right to help plan your care  Learn about your health condition and how it may be treated  Discuss treatment options with your caregivers to decide what care you want to receive  You always have the right to refuse treatment  The above information is an  only  It is not intended as medical advice for individual conditions or treatments  Talk to your doctor, nurse or pharmacist before following any medical regimen to see if it is safe and effective for you  © 2017 [address] Information is for End User's use only and may not be sold, redistributed or otherwise used for commercial purposes  All illustrations and images included in CareNotes® are the copyrighted property of [email or web address]  or [employer] Hatch  Dizziness   AMBULATORY CARE:   Dizziness  is a feeling of being off balance or unsteady  Common causes of dizziness are an inner ear fluid imbalance or a lack of oxygen in your blood  Dizziness may be acute (lasts 3 days or less) or chronic (lasts longer than 3 days)  You may have dizzy spells that last from seconds to a few hours  Common symptoms that may happen with dizziness:   · A feeling that your surroundings are moving even though you are standing still    · Ringing in your ears or hearing loss     · Feeling faint or lightheaded     · Weakness or unsteadiness     · Double vision or eye movements you cannot control    · Nausea or vomiting     · Confusion  Seek care immediately if:   · You have a headache and a stiff neck  · You have shaking chills and a fever  · You vomit over and over with no relief  · Your vomit or bowel movements are red or black  · You have pain in your chest, back, or abdomen  · You have numbness, especially in your face, arms, or legs  · You have trouble moving your arms or legs  · You are confused  Contact your healthcare provider if:   · You have a fever  · Your symptoms do not get better with treatment  · You have questions or concerns about your condition or care  Treatment for dizziness  depends on the cause  Your healthcare provider may give you oxygen or medicines to decrease your dizziness and nausea   He may also refer you to a specialist  Clau Berry may need to be admitted to the hospital for treatment  Manage your symptoms:   · Do not drive  or operate heavy machinery when you are dizzy  · Get up slowly  from sitting or lying down  · Drink plenty of liquids  Liquids help prevent dehydration  Ask how much liquid to drink each day and which liquids are best for you  Follow up with your healthcare provider as directed:  Write down your questions so you remember to ask them during your visits  © 2017 [facility] Information is for End User's use only and may not be sold, redistributed or otherwise used for commercial purposes  All illustrations and images included in CareNotes® are the copyrighted property of [employer]  or [employer]atch  The above information is an  only  It is not intended as medical advice for individual conditions or treatments  Talk to your doctor, nurse or pharmacist before following any medical regimen to see if it is safe and effective for you  Syncope   WHAT YOU NEED TO KNOW:   What is syncope? Syncope is also called fainting or passing out  Syncope is a sudden, temporary loss of consciousness, followed by a fall from a standing or sitting position  A syncope episode is usually short  What causes syncope? Syncope is caused by a decrease in blood flow to the brain  When blood flow to the brain decreases, oxygen to the brain also decreases   Any of the following conditions may cause syncope:  · A heart condition, such as a narrow artery or an irregular heartbeat    · A medical condition such as severe anemia, uncontrolled diabetes, or a nerve disorder    · Dehydration    · Certain medicines, such as blood pressure medicines, heart medicines, or antidepressants    · Problems with the blood vessels of your brain    · A rapid drop in blood pressure after a body position change, such as moving from lying to sitting or standing    · Straining during bowel movements, a cough or sneeze, or a stressful or fearful situation    · A medical condition that affects your lungs, such as pneumonia or asthma, or hyperventilation (breathing too quickly)  What signs and symptoms may occur before syncope? · Cold, clammy, and sweaty skin     · Fast breathing and a racing, pounding heartbeat     · Feeling more tired than usual     · Nausea, a warm feeling, and sweating    · A headache, or feeling lightheaded or dizzy    · Tingling sensation or numbness     · Spots in front of your eyes, blurred vision, or double vision  How is the cause of syncope diagnosed? Your healthcare provider will examine you  He or she will ask if you have other medical conditions  He or she may order the following tests to find out what is causing your symptoms:  · Blood tests  may be done to check your electrolyte levels, such as sodium  A problem with your electrolytes can lead to syncope  · Telemetry  is continuous monitoring of your heart rhythm  Sticky pads placed on your skin connect to an EKG machine that records your heart rhythm  · An echocardiogram  is a type of ultrasound  Sound waves are used to show the structure and function of your heart  · A stress test  may show the changes that take place in your heart while it is under stress  Stress may be placed on your heart with exercise or medicine  Ask for more information about this test     · A tilt table test  is used to check your heart and your blood pressure when you change positions  You will lie on a table during this test  The table will move in different positions  The strength and rhythm of your heartbeat will be measured as the table moves  How is syncope treated? Treatment depends on the cause of your syncope  To prevent syncope from happening again, you may  need any of the following:  · Medicines  may be needed to help your heart pump strongly and regularly   Your healthcare provider may also make changes to any medicines that are causing syncope  · Tilt training  involves training yourself to stand for 10 to 30 minutes each day against a wall  This helps your body decrease the effects of posture changes and reduces the number of fainting spells  What can I do to manage syncope? · Keep a record of your syncope episodes  Include your symptoms and your activity before and after the episode  The record can help your healthcare provider find the cause of your syncope and help you manage episodes  · Sit or lie down when needed  This includes when you feel dizzy, your throat is getting tight, and your vision changes  Raise your legs above the level of your heart  · Take slow, deep breaths if you start to breathe faster with anxiety or fear  This can help decrease dizziness and the feeling that you might faint  · Check your blood pressure often  This is important if you take medicine to lower your blood pressure  Check your blood pressure when you are lying down and when you are standing  Ask how often to check during the day  Keep a record of your blood pressure numbers  Your healthcare provider may use the record to help plan your treatment  What can I do to prevent a syncope episode? · Move slowly and let yourself get used to one position before you move to another position  This is very important when you change from a lying or sitting position to a standing position  Take some deep breaths before you stand up from a lying position  Stand up slowly  Sudden movements may cause a fainting spell  Sit on the side of the bed or couch for a few minutes before you stand up  If you are on bedrest, try to be upright for about 2 hours each day, or as directed  Do not lock your legs if you are standing for a long period of time  Move your legs and bend your knees to keep blood flowing  · Follow your healthcare provider's recommendations    Your provider may  recommend that you drink more liquids to prevent dehydration  You may also need to have more salt to keep your blood pressure from dropping too low and causing syncope  Your provider will tell you how much liquid and sodium to have each day  · Watch for signs of low blood sugar  These include hunger, nervousness, sweating, and fast or fluttery heartbeats  Talk with your healthcare provider about ways to keep your blood sugar level steady  · Do not strain if you are constipated  You may faint if you strain to have a bowel movement  Walking is the best way to get your bowels moving  Eat foods high in fiber to make it easier to have a bowel movement  Good examples are high-fiber cereals, beans, vegetables, and whole-grain breads  Prune juice may help make bowel movements softer  · Be careful in hot weather  Heat can cause a syncope episode  Limit activity done outside on hot days  Physical activity in hot weather can lead to dehydration  This can cause an episode  When should I seek immediate care? · You are bleeding because you hit your head when you fainted  · You suddenly have double vision, difficulty speaking, numbness, and cannot move your arms or legs  · You have chest pain and trouble breathing  · You vomit blood or material that looks like coffee grounds  · You see blood in your bowel movement  When should I contact my healthcare provider? · You have new or worsening symptoms  · You have another syncope episode  · You have a headache, fast heartbeat, or feel too dizzy to stand up  · You have questions or concerns about your condition or care  CARE AGREEMENT:   You have the right to help plan your care  Learn about your health condition and how it may be treated  Discuss treatment options with your caregivers to decide what care you want to receive  You always have the right to refuse treatment  The above information is an  only   It is not intended as medical advice for individual conditions or treatments  Talk to your doctor, nurse or pharmacist before following any medical regimen to see if it is safe and effective for you  © 2017 [address] Information is for End User's use only and may not be sold, redistributed or otherwise used for commercial purposes  All illustrations and images included in CareNotes® are the copyrighted property of [employer]  or Mike Hatch

## 2019-01-09 NOTE — ED PROVIDER NOTES
History  Chief Complaint   Patient presents with    Hypoglycemia - Symptomatic     Patient was at home and did not feel well tonight, felt dizzy and week and threw herself on the sofa, ate some peanut butter, EMS did bring patient to the ED, pre-hospital accu-check was 81  Patient still feels weak and her eyes and head hurt  70-year-old female with past medical history of depression well-controlled with seroquel presents for evaluation of hypoglycemic episode  Patient states that approximately 3 weeks ago she started having symptoms of hypoglycemia which she describes as weakness, tingling bilateral hands and face, she did check her sugar at work and was in the 40s during these episodes, she ate some peanut butter with resolution of her symptoms  This evening she once again started feeling that way and ate some peanut butter however when her  came up to check on her she was laying on the ground out of it with some foaming around the mouth  There was no seizure activity and she was awake during this episodes just very slow to answer questions  However patient felt better prior to their arrival and on their arrival her blood sugar was 81  Current day she has no complaints  She states that she has never had issues with blood sugar prior to 3 weeks ago  Denies any recent infections symptoms, no new medications  She does have a family history of type 1 diabetes in her father but no personal endocrine issues  She initially accepted blood work but during initial evaluation stated that she is not planning on staying in the hospital does not want any further evaluation or admission  Patient currently GCS 15, understands risks of leaving against medical advice and does not wish to stay for further evaluation  She denies any suicidal ideation and her significant other feels that she is safe to go home at this time    Discussed with the patient that she will need to follow up with endocrinology and the number will be provided for her discharge instructions  Prior to Admission Medications   Prescriptions Last Dose Informant Patient Reported? Taking? Regina Maciel FE 24 1-20 MG-MCG(24) per tablet   No No   Sig: Take 1 tablet by mouth daily   QUEtiapine (SEROquel) 100 mg tablet   Yes No   Sig: Take 100 mg by mouth daily   dicyclomine (BENTYL) 20 mg tablet   No No   Sig: Take 1 tablet (20 mg total) by mouth every 6 (six) hours as needed (cramping)   fluticasone (FLONASE) 50 mcg/act nasal spray   No No   Si sprays into each nostril daily   ibuprofen (MOTRIN) 600 mg tablet   No No   Sig: Take 1 tablet (600 mg total) by mouth every 6 (six) hours as needed (pain)   methocarbamol (ROBAXIN) 500 mg tablet   No No   Sig: Take 1 tablet (500 mg total) by mouth 2 (two) times a day      Facility-Administered Medications: None       Past Medical History:   Diagnosis Date    Asthma     Depression        Past Surgical History:   Procedure Laterality Date    HERNIA REPAIR  2016    TONSILLECTOMY Bilateral 1994       Family History   Problem Relation Age of Onset    Coronary artery disease Paternal Grandmother     Hypertension Paternal Grandfather         TYPE 2    Diabetes Paternal Grandfather     Pancreatic cancer Paternal Grandfather     Lung cancer Paternal Grandfather      I have reviewed and agree with the history as documented  Social History   Substance Use Topics    Smoking status: Current Every Day Smoker     Packs/day: 0 25     Years: 9 00     Types: Cigarettes    Smokeless tobacco: Never Used    Alcohol use No        Review of Systems   Constitutional: Negative for appetite change and fever  HENT: Negative for rhinorrhea and sore throat  Eyes: Negative for photophobia and visual disturbance  Respiratory: Negative for cough, chest tightness and wheezing  Cardiovascular: Negative for chest pain, palpitations and leg swelling     Gastrointestinal: Negative for abdominal distention, abdominal pain, blood in stool, constipation and diarrhea  Genitourinary: Negative for dysuria, flank pain, frequency, hematuria and urgency  Musculoskeletal: Negative for back pain  Skin: Negative for rash  Neurological: Positive for weakness  Negative for dizziness and headaches  All other systems reviewed and are negative  Physical Exam  Physical Exam   Constitutional: She is oriented to person, place, and time  She appears well-developed and well-nourished  HENT:   Head: Normocephalic and atraumatic  Eyes: Pupils are equal, round, and reactive to light  EOM are normal    Neck: Normal range of motion  Neck supple  Cardiovascular: Normal rate and regular rhythm  Exam reveals no gallop and no friction rub  No murmur heard  Pulmonary/Chest: Effort normal  She has no wheezes  She has no rales  She exhibits no tenderness  Abdominal: Soft  She exhibits no distension and no mass  There is no rebound and no guarding  Neurological: She is alert and oriented to person, place, and time  Skin: Skin is warm and dry  Psychiatric: She has a normal mood and affect  Nursing note and vitals reviewed        Vital Signs  ED Triage Vitals [01/09/19 0021]   Temperature Pulse Respirations Blood Pressure SpO2   98 4 °F (36 9 °C) 97 16 134/82 97 %      Temp Source Heart Rate Source Patient Position - Orthostatic VS BP Location FiO2 (%)   Tympanic Monitor Lying Left arm --      Pain Score       --           Vitals:    01/09/19 0021   BP: 134/82   Pulse: 97   Patient Position - Orthostatic VS: Lying       Visual Acuity      ED Medications  Medications   sodium chloride 0 9 % bolus 1,000 mL (1,000 mL Intravenous New Bag 1/9/19 0048)       Diagnostic Studies  Results Reviewed     Procedure Component Value Units Date/Time    Fingerstick Glucose (POCT) [[id number]]  (Normal) Collected:  01/09/19 0113    Lab Status:  Final result Updated:  01/09/19 0124     POC Glucose 82 mg/dl     Magnesium [[id number]]  (Normal) Collected:  01/09/19 0049    Lab Status:  Final result Specimen:  Blood from Arm, Left Updated:  01/09/19 0113     Magnesium 2 0 mg/dL     Comprehensive metabolic panel [[id number]]  (Abnormal) Collected:  01/09/19 0049    Lab Status:  Final result Specimen:  Blood from Arm, Left Updated:  01/09/19 0113     Sodium 137 mmol/L      Potassium 3 4 (L) mmol/L      Chloride 105 mmol/L      CO2 22 mmol/L      ANION GAP 10 mmol/L      BUN 14 mg/dL      Creatinine 0 70 mg/dL      Glucose 80 mg/dL      Calcium 9 4 mg/dL      AST 34 U/L      ALT 22 U/L      Alkaline Phosphatase 55 U/L      Total Protein 7 4 g/dL      Albumin 4 2 g/dL      Total Bilirubin 0 20 mg/dL      eGFR 118 ml/min/1 73sq m     Narrative:         National Kidney Disease Education Program recommendations are as follows:  GFR calculation is accurate only with a steady state creatinine  Chronic Kidney disease less than 60 ml/min/1 73 sq  meters  Kidney failure less than 15 ml/min/1 73 sq  meters      CBC and differential [[id number]]  (Abnormal) Collected:  01/09/19 0049    Lab Status:  Final result Specimen:  Blood from Arm, Left Updated:  01/09/19 0106     WBC 8 70 Thousand/uL      RBC 4 49 Million/uL      Hemoglobin 14 1 g/dL      Hematocrit 42 3 %      MCV 94 fL      MCH 31 5 pg      MCHC 33 5 g/dL      RDW 12 5 %      MPV 7 7 (L) fL      Platelets 626 Thousands/uL      Neutrophils Relative 54 %      Lymphocytes Relative 36 %      Monocytes Relative 8 %      Eosinophils Relative 2 %      Basophils Relative 1 %      Neutrophils Absolute 4 70 Thousands/µL      Lymphocytes Absolute 3 10 Thousands/µL      Monocytes Absolute 0 70 Thousand/µL      Eosinophils Absolute 0 10 Thousand/µL      Basophils Absolute 0 10 Thousands/µL     TSH [[id number]]     Lab Status:  No result Specimen:  Blood     POCT pregnancy, urine [[id number]]     Lab Status:  No result     UA w Reflex to Microscopic w Reflex to Culture [[id number]]     Lab Status:  No result Specimen:  Urine No orders to display              Procedures  Procedures       Phone Contacts  ED Phone Contact    ED Course  ED Course as of Jan 09 0130 Wed Jan 09, 2019   [id number] Procedure Note: EKG  Date/Time: 01/09/19 12:47 AM   Performed by: Graciela Frausto  Authorized by: Graciela Frausto  Indications / Diagnosis: CP  ECG reviewed by me, the ED Provider: yes   The EKG demonstrates:  Rhythm: normal sinus  Intervals: normal intervals  Axis: normal axis  QRS/Blocks: Incomplete RBBB  ST Changes: No acute ST Changes, no STD/JETHRO                                     MDM  Number of Diagnoses or Management Options  Hypoglycemia:   Weakness:   Diagnosis management comments: 25-year-old female presents for evaluation of hypoglycemia, blood work ordered and patient was offered admission however she declined and chose to leave against medical advise  Paperwork signed and patient walked out of the emergency department without any assistance  CritCare Time    Disposition  Final diagnoses:   Hypoglycemia   Weakness     Time reflects when diagnosis was documented in both MDM as applicable and the Disposition within this note     Time User Action Codes Description Comment    1/9/2019  1:04 AM Aminta Pierre Add [E16 2] Hypoglycemia     1/9/2019  1:04 AM Aminta Pierer Add [R53 1] Weakness       ED Disposition     ED Disposition Condition Comment    AMA  Date: 1/9/2019  Patient: Florian Barrera  Admitted: 1/9/2019 12:12 AM  Attending Provider: Myles Tovar MD    [place] or her authorized caregiver has made the decision for the patient to leave the emergency department against the advice of her attending  physician  She or her authorized caregiver has been informed and understands the inherent risks, including death, worsening condition, loss of quality of life, coma  She or her authorized caregiver has decided to accept the responsibility for this deci lyudmila   Florian Barrera and all necessary parties have been advised that she may return for further evaluation or treatment  Her condition at time of discharge was Stable  Carlita Valente had current vital signs as follows:  /82 (BP Location: Left arm)    Pulse 97   Temp 98 4 °F (36 9 °C) (Tympanic)   Resp 16   Ht 5' 4" (1 626 m)   Wt 47 6 kg (105 lb)         Follow-up Information     Follow up With Specialties Details Why Contact Info Additional [address] [facility] Emergency Department Emergency Medicine  If symptoms worsen [address] [address]  [address] [facility], [place] Family Medicine Call  [address] [facility]  [address]  [facility] ER [address] [facility] ([facility]) Endocrinology Call  [address], [facility] [address] [phone] [facility] ([facility]), [address], [address] [place] [address], [phone]          Patient's Medications   Discharge Prescriptions    No medications on file     No discharge procedures on file      ED Provider  Electronically Signed by           Ismael Feliciano MD  01/09/19 0138

## 2019-01-09 NOTE — ED ATTENDING ATTESTATION
Randi Holden MD, saw and evaluated the patient  I have discussed the patient with the resident/non-physician practitioner and agree with the resident's/non-physician practitioner's findings, Plan of Care, and MDM as documented in the resident's/non-physician practitioner's note, except where noted  All available labs and Radiology studies were reviewed  At this point I agree with the current assessment done in the Emergency Department  I have conducted an independent evaluation of this patient a history and physical is as follows: This is a 28 y/o female that presents to the ED after syncopal episode last night  Patient felt lightheaded, felt as though she would pass out  Mother states that the patient had several seconds of shaking and then went limp  Woke up shortly afterward  Does not describe post-ictal period  No fevers or chills  No nausea or vomiting  Symptoms moderate in severity  No further episodes since then  Heart RRR, lungs CTA, abdomen soft, nontender  CN II-XII intact  5/5 strength all extremities        Critical Care Time  CritCare Time    Procedures

## 2019-01-31 ENCOUNTER — TELEPHONE (OUTPATIENT)
Dept: OBGYN CLINIC | Facility: CLINIC | Age: 29
End: 2019-01-31

## 2019-02-05 ENCOUNTER — ANNUAL EXAM (OUTPATIENT)
Dept: OBGYN CLINIC | Facility: CLINIC | Age: 29
End: 2019-02-05

## 2019-02-05 VITALS
HEIGHT: 64 IN | DIASTOLIC BLOOD PRESSURE: 80 MMHG | WEIGHT: 102 LBS | BODY MASS INDEX: 17.42 KG/M2 | SYSTOLIC BLOOD PRESSURE: 130 MMHG

## 2019-02-05 DIAGNOSIS — Z30.41 ENCOUNTER FOR SURVEILLANCE OF CONTRACEPTIVE PILLS: ICD-10-CM

## 2019-02-05 DIAGNOSIS — Z01.419 ENCOUNTER FOR GYNECOLOGICAL EXAMINATION (GENERAL) (ROUTINE) WITHOUT ABNORMAL FINDINGS: Primary | ICD-10-CM

## 2019-02-05 DIAGNOSIS — Z11.3 SCREENING EXAMINATION FOR SEXUALLY TRANSMITTED DISEASE: ICD-10-CM

## 2019-02-05 PROCEDURE — 87491 CHLMYD TRACH DNA AMP PROBE: CPT | Performed by: OBSTETRICS & GYNECOLOGY

## 2019-02-05 PROCEDURE — 99395 PREV VISIT EST AGE 18-39: CPT | Performed by: OBSTETRICS & GYNECOLOGY

## 2019-02-05 PROCEDURE — 87591 N.GONORRHOEAE DNA AMP PROB: CPT | Performed by: OBSTETRICS & GYNECOLOGY

## 2019-02-05 RX ORDER — NORETHINDRONE ACETATE AND ETHINYL ESTRADIOL AND FERROUS FUMARATE 1MG-20(24)
1 KIT ORAL DAILY
Qty: 28 TABLET | Refills: 11 | Status: SHIPPED | OUTPATIENT
Start: 2019-02-05 | End: 2019-10-08

## 2019-02-05 NOTE — PROGRESS NOTES
Assessment/Plan:             Diagnoses and all orders for this visit:    Encounter for gynecological examination (general) (routine) without abnormal findings    Screening examination for sexually transmitted disease  -     Cancel: Chlamydia/GC amplified DNA by PCR  -     Chlamydia/GC amplified DNA by PCR; Future    Encounter for surveillance of contraceptive pills  -     JUNEL FE 24 1-20 MG-MCG(24) per tablet; Take 1 tablet by mouth daily    Other orders  -     Cancel: Liquid-based pap, screening              Subjective:      Patient ID: Thompson Preciado is a 29 y o  female presents for annual exam and STD testing  She would like to continue on OCPs  She offers no complaints today  HPI    The following portions of the patient's history were reviewed and updated as appropriate: allergies, current medications, past family history, past medical history, past social history, past surgical history and problem list     Review of Systems      Objective:      /80   Ht 5' 4" (1 626 m)   Wt 46 3 kg (102 lb)   LMP 01/18/2019 (Exact Date)   BMI 17 51 kg/m²          Physical Exam   Constitutional: She is oriented to person, place, and time  She appears well-developed and well-nourished  No distress  HENT:   Head: Normocephalic  Neck: No thyromegaly present  Cardiovascular: Normal rate, regular rhythm and normal heart sounds  No murmur heard  Pulmonary/Chest: Effort normal and breath sounds normal  No respiratory distress  She has no wheezes  She has no rales  She exhibits no tenderness  Abdominal: Soft  Bowel sounds are normal  She exhibits no distension and no mass  There is no tenderness  There is no rebound and no guarding  Genitourinary: Uterus normal  Rectal exam shows no external hemorrhoid  No breast swelling, tenderness, discharge or bleeding  No labial fusion  There is no rash, tenderness, lesion or injury on the right labia  There is no rash, tenderness, lesion or injury on the left labia  Cervix exhibits no motion tenderness, no discharge and no friability  Right adnexum displays no mass, no tenderness and no fullness  Left adnexum displays no mass, no tenderness and no fullness  No erythema, tenderness or bleeding in the vagina  No foreign body in the vagina  No signs of injury around the vagina  No vaginal discharge found  Musculoskeletal: She exhibits no edema  Lymphadenopathy:        Right: No inguinal adenopathy present  Left: No inguinal adenopathy present  Neurological: She is alert and oriented to person, place, and time  Skin: Skin is warm and dry  Psychiatric: She has a normal mood and affect  Her behavior is normal  Judgment and thought content normal    Nursing note and vitals reviewed

## 2019-02-06 LAB
C TRACH DNA SPEC QL NAA+PROBE: NEGATIVE
N GONORRHOEA DNA SPEC QL NAA+PROBE: NEGATIVE

## 2019-02-07 ENCOUNTER — TELEPHONE (OUTPATIENT)
Dept: OBGYN CLINIC | Facility: CLINIC | Age: 29
End: 2019-02-07

## 2019-02-07 NOTE — TELEPHONE ENCOUNTER
----- Message from Idalmis Zamora MD sent at 2/7/2019 11:09 AM EST -----  Please inform STD cultures negative      -Marquise Calixto  ----- Message -----  From: Lab, Background User  Sent: 2/6/2019  12:40 PM  To: Idalmis Zamora MD

## 2019-05-18 ENCOUNTER — HOSPITAL ENCOUNTER (EMERGENCY)
Facility: HOSPITAL | Age: 29
Discharge: HOME | End: 2019-05-18
Attending: EMERGENCY MEDICINE
Payer: COMMERCIAL

## 2019-05-18 VITALS
RESPIRATION RATE: 16 BRPM | BODY MASS INDEX: 17.03 KG/M2 | OXYGEN SATURATION: 99 % | WEIGHT: 99.21 LBS | HEART RATE: 110 BPM | SYSTOLIC BLOOD PRESSURE: 117 MMHG | TEMPERATURE: 99.2 F | DIASTOLIC BLOOD PRESSURE: 73 MMHG

## 2019-05-18 DIAGNOSIS — J02.9 ACUTE PHARYNGITIS: Primary | ICD-10-CM

## 2019-05-18 PROCEDURE — 99282 EMERGENCY DEPT VISIT SF MDM: CPT

## 2019-05-18 PROCEDURE — 99282 EMERGENCY DEPT VISIT SF MDM: CPT | Performed by: PHYSICIAN ASSISTANT

## 2019-05-18 RX ORDER — AMOXICILLIN 500 MG/1
500 CAPSULE ORAL EVERY 12 HOURS SCHEDULED
Qty: 20 CAPSULE | Refills: 0 | Status: SHIPPED | OUTPATIENT
Start: 2019-05-18 | End: 2019-05-28

## 2019-05-18 RX ORDER — IBUPROFEN 400 MG/1
400 TABLET ORAL EVERY 6 HOURS PRN
Qty: 12 TABLET | Refills: 0 | Status: SHIPPED | OUTPATIENT
Start: 2019-05-18 | End: 2019-06-13 | Stop reason: ALTCHOICE

## 2019-06-13 ENCOUNTER — OFFICE VISIT (OUTPATIENT)
Dept: FAMILY MEDICINE CLINIC | Facility: CLINIC | Age: 29
End: 2019-06-13
Payer: COMMERCIAL

## 2019-06-13 VITALS
BODY MASS INDEX: 17.24 KG/M2 | WEIGHT: 101 LBS | HEIGHT: 64 IN | OXYGEN SATURATION: 99 % | HEART RATE: 83 BPM | DIASTOLIC BLOOD PRESSURE: 90 MMHG | SYSTOLIC BLOOD PRESSURE: 132 MMHG | RESPIRATION RATE: 16 BRPM | TEMPERATURE: 98.9 F

## 2019-06-13 DIAGNOSIS — Z13.0 SCREENING FOR IRON DEFICIENCY ANEMIA: ICD-10-CM

## 2019-06-13 DIAGNOSIS — G44.89 OTHER HEADACHE SYNDROME: ICD-10-CM

## 2019-06-13 DIAGNOSIS — Z13.1 SCREENING FOR DIABETES MELLITUS: ICD-10-CM

## 2019-06-13 DIAGNOSIS — Z13.29 SCREENING FOR THYROID DISORDER: ICD-10-CM

## 2019-06-13 DIAGNOSIS — Z13.220 SCREENING FOR LIPOID DISORDERS: ICD-10-CM

## 2019-06-13 DIAGNOSIS — F17.210 MODERATE SMOKER (20 OR LESS PER DAY): ICD-10-CM

## 2019-06-13 DIAGNOSIS — Z11.1 SCREENING FOR TUBERCULOSIS: ICD-10-CM

## 2019-06-13 DIAGNOSIS — K43.9 HERNIA OF ABDOMINAL WALL: Primary | ICD-10-CM

## 2019-06-13 DIAGNOSIS — Z13.6 SCREENING FOR HYPERTENSION: ICD-10-CM

## 2019-06-13 PROCEDURE — 3008F BODY MASS INDEX DOCD: CPT | Performed by: FAMILY MEDICINE

## 2019-06-13 PROCEDURE — 99406 BEHAV CHNG SMOKING 3-10 MIN: CPT | Performed by: FAMILY MEDICINE

## 2019-06-13 PROCEDURE — 4004F PT TOBACCO SCREEN RCVD TLK: CPT | Performed by: FAMILY MEDICINE

## 2019-06-13 PROCEDURE — 3725F SCREEN DEPRESSION PERFORMED: CPT | Performed by: FAMILY MEDICINE

## 2019-06-13 PROCEDURE — 99214 OFFICE O/P EST MOD 30 MIN: CPT | Performed by: FAMILY MEDICINE

## 2019-06-13 RX ORDER — NICOTINE 21 MG/24HR
1 PATCH, TRANSDERMAL 24 HOURS TRANSDERMAL EVERY 24 HOURS
Qty: 28 PATCH | Refills: 0 | Status: SHIPPED | OUTPATIENT
Start: 2019-06-13 | End: 2019-07-11

## 2019-06-17 ENCOUNTER — TELEPHONE (OUTPATIENT)
Dept: FAMILY MEDICINE CLINIC | Facility: CLINIC | Age: 29
End: 2019-06-17

## 2019-06-17 DIAGNOSIS — G44.89 OTHER HEADACHE SYNDROME: ICD-10-CM

## 2019-06-17 DIAGNOSIS — K42.9 UMBILICAL HERNIA WITHOUT OBSTRUCTION AND WITHOUT GANGRENE: Primary | ICD-10-CM

## 2019-06-20 ENCOUNTER — HOSPITAL ENCOUNTER (OUTPATIENT)
Dept: ULTRASOUND IMAGING | Facility: HOSPITAL | Age: 29
Discharge: HOME | End: 2019-06-20
Payer: COMMERCIAL

## 2019-06-20 ENCOUNTER — APPOINTMENT (OUTPATIENT)
Dept: LAB | Facility: HOSPITAL | Age: 29
End: 2019-06-20
Payer: COMMERCIAL

## 2019-06-20 DIAGNOSIS — Z13.0 SCREENING FOR IRON DEFICIENCY ANEMIA: ICD-10-CM

## 2019-06-20 DIAGNOSIS — Z13.29 SCREENING FOR THYROID DISORDER: ICD-10-CM

## 2019-06-20 DIAGNOSIS — Z13.1 SCREENING FOR DIABETES MELLITUS: ICD-10-CM

## 2019-06-20 DIAGNOSIS — Z13.6 SCREENING FOR HYPERTENSION: ICD-10-CM

## 2019-06-20 DIAGNOSIS — G44.89 OTHER HEADACHE SYNDROME: ICD-10-CM

## 2019-06-20 DIAGNOSIS — K42.9 UMBILICAL HERNIA WITHOUT OBSTRUCTION AND WITHOUT GANGRENE: ICD-10-CM

## 2019-06-20 DIAGNOSIS — Z13.220 SCREENING FOR LIPOID DISORDERS: ICD-10-CM

## 2019-06-20 LAB
ANION GAP SERPL CALCULATED.3IONS-SCNC: 10 MMOL/L (ref 5–14)
BASOPHILS # BLD AUTO: 0.1 THOUSANDS/ΜL (ref 0–0.1)
BASOPHILS NFR BLD AUTO: 1 % (ref 0–1)
BUN SERPL-MCNC: 11 MG/DL (ref 5–25)
CALCIUM SERPL-MCNC: 9 MG/DL (ref 8.4–10.2)
CHLORIDE SERPL-SCNC: 106 MMOL/L (ref 97–108)
CHOLEST SERPL-MCNC: 168 MG/DL
CO2 SERPL-SCNC: 22 MMOL/L (ref 22–30)
CREAT SERPL-MCNC: 0.69 MG/DL (ref 0.6–1.2)
EOSINOPHIL # BLD AUTO: 0.2 THOUSAND/ΜL (ref 0–0.4)
EOSINOPHIL NFR BLD AUTO: 3 % (ref 0–6)
ERYTHROCYTE [DISTWIDTH] IN BLOOD BY AUTOMATED COUNT: 12.7 %
GFR SERPL CREATININE-BSD FRML MDRD: 119 ML/MIN/1.73SQ M
GLUCOSE P FAST SERPL-MCNC: 79 MG/DL (ref 70–99)
HCT VFR BLD AUTO: 40.8 % (ref 36–46)
HDLC SERPL-MCNC: 55 MG/DL (ref 40–59)
HGB BLD-MCNC: 13.7 G/DL (ref 12–16)
LDLC SERPL CALC-MCNC: 87 MG/DL
LYMPHOCYTES # BLD AUTO: 2.4 THOUSANDS/ΜL (ref 0.5–4)
LYMPHOCYTES NFR BLD AUTO: 29 % (ref 25–45)
MCH RBC QN AUTO: 31.9 PG (ref 26–34)
MCHC RBC AUTO-ENTMCNC: 33.4 G/DL (ref 31–36)
MCV RBC AUTO: 95 FL (ref 80–100)
MONOCYTES # BLD AUTO: 0.5 THOUSAND/ΜL (ref 0.2–0.9)
MONOCYTES NFR BLD AUTO: 7 % (ref 1–10)
NEUTROPHILS # BLD AUTO: 5 THOUSANDS/ΜL (ref 1.8–7.8)
NEUTS SEG NFR BLD AUTO: 61 % (ref 45–65)
PLATELET # BLD AUTO: 230 THOUSANDS/UL (ref 150–450)
PMV BLD AUTO: 8.4 FL (ref 8.9–12.7)
POTASSIUM SERPL-SCNC: 3.8 MMOL/L (ref 3.6–5)
RBC # BLD AUTO: 4.28 MILLION/UL (ref 4–5.2)
SODIUM SERPL-SCNC: 138 MMOL/L (ref 137–147)
TRIGL SERPL-MCNC: 130 MG/DL
TSH SERPL DL<=0.05 MIU/L-ACNC: 3.26 UIU/ML (ref 0.47–4.68)
WBC # BLD AUTO: 8.2 THOUSAND/UL (ref 4.5–11)

## 2019-06-20 PROCEDURE — 84443 ASSAY THYROID STIM HORMONE: CPT

## 2019-06-20 PROCEDURE — 76705 ECHO EXAM OF ABDOMEN: CPT

## 2019-06-20 PROCEDURE — 85025 COMPLETE CBC W/AUTO DIFF WBC: CPT

## 2019-06-20 PROCEDURE — 80048 BASIC METABOLIC PNL TOTAL CA: CPT

## 2019-06-20 PROCEDURE — 80061 LIPID PANEL: CPT

## 2019-06-20 PROCEDURE — 36415 COLL VENOUS BLD VENIPUNCTURE: CPT

## 2019-07-11 ENCOUNTER — OFFICE VISIT (OUTPATIENT)
Dept: FAMILY MEDICINE CLINIC | Facility: CLINIC | Age: 29
End: 2019-07-11
Payer: COMMERCIAL

## 2019-07-11 VITALS
WEIGHT: 100 LBS | OXYGEN SATURATION: 97 % | HEART RATE: 90 BPM | RESPIRATION RATE: 16 BRPM | HEIGHT: 64 IN | BODY MASS INDEX: 17.07 KG/M2 | SYSTOLIC BLOOD PRESSURE: 118 MMHG | DIASTOLIC BLOOD PRESSURE: 80 MMHG | TEMPERATURE: 98 F

## 2019-07-11 DIAGNOSIS — Z00.01 ENCOUNTER FOR WELL ADULT EXAM WITH ABNORMAL FINDINGS: Primary | ICD-10-CM

## 2019-07-11 DIAGNOSIS — Z23 NEED FOR PNEUMOCOCCAL VACCINATION: ICD-10-CM

## 2019-07-11 DIAGNOSIS — K43.9 VENTRAL HERNIA WITHOUT OBSTRUCTION OR GANGRENE: ICD-10-CM

## 2019-07-11 DIAGNOSIS — F17.210 MODERATE SMOKER (20 OR LESS PER DAY): ICD-10-CM

## 2019-07-11 PROCEDURE — 99395 PREV VISIT EST AGE 18-39: CPT | Performed by: FAMILY MEDICINE

## 2019-07-11 PROCEDURE — 3725F SCREEN DEPRESSION PERFORMED: CPT | Performed by: FAMILY MEDICINE

## 2019-07-11 PROCEDURE — 90471 IMMUNIZATION ADMIN: CPT | Performed by: FAMILY MEDICINE

## 2019-07-11 PROCEDURE — 90732 PPSV23 VACC 2 YRS+ SUBQ/IM: CPT | Performed by: FAMILY MEDICINE

## 2019-07-11 NOTE — PROGRESS NOTES
FAMILY PRACTICE HEALTH MAINTENANCE OFFICE VISIT  [facility] [place] [facility] [address]  [facility]    NAME: Micheal Farah  AGE: 29 y o  SEX: female  : 1990     DATE: 2019    Assessment and Plan     Problem List Items Addressed This Visit        Other    Moderate smoker (20 or less per day)     A chronic patient had the reaction to the nicotine patch will try nicotine inhaler proper use of medication possible side effect discussed with the patient         Relevant Medications    nicotine (NICOTROL) 10 MG inhaler    Ventral hernia without obstruction or gangrene     Finding on the ultrasound of the abdomen per patient symptomatic plan to refer her to surgeon         Relevant Orders    Ambulatory referral to General Surgery    Encounter for well adult exam with abnormal findings - Primary     Advice and education were given regarding nutrition, aerobic exercises, weight bearing exercises, cardiovascular risk reduction, fall risk reduction, and age appropriate supplements  The patient was counseled regarding instructions for management, risk factor reductions, prognosis, risks and benefits of treatment options, patient and family education, and importance of compliance with treatment       Patient is due for Pap smear she will call her gyn patient is due for to see the dentist she will call for appointment  With patient history of smoking will give her PICC Pneumovax 23 possible side effect discussed with the patient           Other Visit Diagnoses     Need for pneumococcal vaccination        Relevant Orders    PNEUMOCOCCAL POLYSACCHARIDE VACCINE 23-VALENT =>1YO SQ IM (Completed)            · Patient Counseling:   · Nutrition: Stressed importance of a well balanced diet, moderation of sodium/saturated fat, caloric balance and sufficient intake of fiber  · Exercise: Stressed the importance of regular exercise with a goal of 150 minutes per week  · Dental Health: Discussed daily flossing and brushing and regular dental visits     · Immunizations reviewed due for Pneumovax 23  · Discussed benefits of screening    BMI Counseling: Body mass index is 17 16 kg/m²  Discussed with patient's BMI with her  Chief Complaint     Chief Complaint   Patient presents with    Physical Exam     Yearly physical Exam     Follow-up     review US of abdomen        History of Present Illness     Patient here for her annual physical exam deny any chest pain short of breath no palpitation no dizziness no headache no blurred vision no weakness or lateralized of the symptom no abdomen pain nausea vomiting or diarrhea no renal problem no rash no fever no change in the weight and no change in the mood patient continued to smoke and she did try the nicotine patch and she developed rash  Her recent ultrasound of the abdomen discussed with the patient there is a tree ventral hernia fat hernia was size 5 mm 6 mm and 3 mm      Well Adult Physical   Patient here for a comprehensive physical exam       Diet and Physical Activity  Diet: Regular diet  Exercise: never      Depression Screen  PHQ-9 Depression Screening    PHQ-9:    Frequency of the following problems over the past two weeks:       Little interest or pleasure in doing things:  0 - not at all  Feeling down, depressed, or hopeless:  0 - not at all  PHQ-2 Score:  0          General Health  Hearing: Normal:  bilateral  Vision: wears glasses and wears contacts  Dental: brushes teeth twice daily    Reproductive Health  LMP was 06/18/19,due for PAP       The following portions of the patient's history were reviewed and updated as appropriate: allergies, current medications, past family history, past medical history, past social history, past surgical history and problem list     Review of Systems     Review of Systems   Constitutional: Negative for fatigue and fever  HENT: Negative for ear pain, sinus pressure, sinus pain and sore throat      Eyes: Negative for pain and redness  Respiratory: Negative for cough, chest tightness and shortness of breath  Cardiovascular: Negative for chest pain, palpitations and leg swelling  Gastrointestinal: Negative for abdominal pain, blood in stool, constipation, diarrhea and nausea  Endocrine: Negative for heat intolerance and polydipsia  Genitourinary: Negative for flank pain, frequency and hematuria  Musculoskeletal: Negative for back pain and joint swelling  Skin: Negative for rash  Neurological: Negative for dizziness, numbness and headaches  Hematological: Does not bruise/bleed easily  Psychiatric/Behavioral: Negative for agitation and behavioral problems         Past Medical History     Past Medical History:   Diagnosis Date    Asthma     Depression        Past Surgical History     Past Surgical History:   Procedure Laterality Date    HERNIA REPAIR  2016    TONSILLECTOMY Bilateral 1994       Social History     Social History     Socioeconomic History    Marital status: Single     Spouse name: None    Number of children: None    Years of education: None    Highest education level: None   Occupational History    None   Social Needs    Financial resource strain: Not hard at all   [place] insecurity:     Worry: Never true     Inability: Never true    Transportation needs:     Medical: No     Non-medical: No   Tobacco Use    Smoking status: Current Every Day Smoker     Packs/day: 1 00     Years: 9 00     Pack years: 9 00     Types: Cigarettes    Smokeless tobacco: Never Used   Substance and Sexual Activity    Alcohol use: No     Frequency: Never     Binge frequency: Never    Drug use: No    Sexual activity: Yes     Partners: Male   Lifestyle    Physical activity:     Days per week: 0 days     Minutes per session: 0 min    Stress: Not at all   Relationships    Social connections:     Talks on phone: More than three times a week     Gets together: More than three times a week     Attends [employer] service: Never     Active member of club or organization: No     Attends meetings of clubs or organizations: Never     Relationship status: Never     Intimate partner violence:     Fear of current or ex partner: No     Emotionally abused: No     Physically abused: No     Forced sexual activity: No   Other Topics Concern    None   Social History Narrative    None       Family History     Family History   Problem Relation Age of Onset    Coronary artery disease Paternal Grandmother     Hypertension Paternal Grandfather         TYPE 2    Diabetes Paternal Grandfather     Pancreatic cancer Paternal Grandfather     Lung cancer Paternal Grandfather        Current Medications       Current Outpatient Medications:     JUNEL FE 24 1-20 MG-MCG(24) per tablet, Take 1 tablet by mouth daily, Disp: 28 tablet, Rfl: 11    nicotine (NICOTROL) 10 MG inhaler, Inhale 1 puff as needed for smoking cessation, Disp: 42 each, Rfl: 0     Allergies     Allergies   Allergen Reactions    Onion Anaphylaxis and Swelling     Swelling of throat   No Active Allergies     Sumatriptan Swelling       Objective     /80 (BP Location: Left arm, Patient Position: Sitting, Cuff Size: Adult)   Pulse 90   Temp 98 °F (36 7 °C)   Resp 16   Ht 5' 4" (1 626 m)   Wt 45 4 kg (100 lb)   LMP 06/18/2019 (Approximate)   SpO2 97%   BMI 17 16 kg/m²      Physical Exam   Constitutional: She is oriented to person, place, and time  She appears well-developed and well-nourished  HENT:   Head: Normocephalic  Right Ear: External ear normal    Left Ear: External ear normal    Eyes: Conjunctivae and EOM are normal  Right eye exhibits no discharge  Left eye exhibits no discharge  Neck: No JVD present  Cardiovascular: Normal rate, regular rhythm and normal heart sounds  Exam reveals no gallop  No murmur heard  Pulmonary/Chest: Effort normal  No respiratory distress  She has no wheezes  She has no rales  She exhibits no tenderness  Abdominal: She exhibits no mass  There is no tenderness  There is no rebound  Musculoskeletal: She exhibits no edema or tenderness  Neurological: She is alert and oriented to person, place, and time  Skin: No rash noted  No erythema  Psychiatric: She has a normal mood and affect   Her behavior is normal            Ulysses Chatters, MD  [address] [place] [address]

## 2019-07-11 NOTE — ASSESSMENT & PLAN NOTE
A chronic patient had the reaction to the nicotine patch will try nicotine inhaler proper use of medication possible side effect discussed with the patient

## 2019-07-11 NOTE — ASSESSMENT & PLAN NOTE
Advice and education were given regarding nutrition, aerobic exercises, weight bearing exercises, cardiovascular risk reduction, fall risk reduction, and age appropriate supplements  The patient was counseled regarding instructions for management, risk factor reductions, prognosis, risks and benefits of treatment options, patient and family education, and importance of compliance with treatment       Patient is due for Pap smear she will call her gyn patient is due for to see the dentist she will call for appointment  With patient history of smoking will give her PICC Pneumovax 23 possible side effect discussed with the patient

## 2019-10-03 ENCOUNTER — CONSULT (OUTPATIENT)
Dept: NEUROLOGY | Facility: CLINIC | Age: 29
End: 2019-10-03
Payer: COMMERCIAL

## 2019-10-03 ENCOUNTER — TELEPHONE (OUTPATIENT)
Dept: OBGYN CLINIC | Facility: CLINIC | Age: 29
End: 2019-10-03

## 2019-10-03 VITALS
HEIGHT: 64 IN | SYSTOLIC BLOOD PRESSURE: 137 MMHG | HEART RATE: 86 BPM | DIASTOLIC BLOOD PRESSURE: 90 MMHG | WEIGHT: 104.2 LBS | BODY MASS INDEX: 17.79 KG/M2 | RESPIRATION RATE: 18 BRPM

## 2019-10-03 DIAGNOSIS — R55 SYNCOPE, UNSPECIFIED SYNCOPE TYPE: ICD-10-CM

## 2019-10-03 DIAGNOSIS — G43.009 MIGRAINE WITHOUT AURA AND WITHOUT STATUS MIGRAINOSUS, NOT INTRACTABLE: Primary | ICD-10-CM

## 2019-10-03 DIAGNOSIS — G44.89 OTHER HEADACHE SYNDROME: ICD-10-CM

## 2019-10-03 PROCEDURE — 99243 OFF/OP CNSLTJ NEW/EST LOW 30: CPT | Performed by: PSYCHIATRY & NEUROLOGY

## 2019-10-03 RX ORDER — TOPIRAMATE 25 MG/1
TABLET ORAL
Qty: 60 TABLET | Refills: 2 | Status: SHIPPED | OUTPATIENT
Start: 2019-10-03 | End: 2019-10-08

## 2019-10-03 NOTE — PROGRESS NOTES
Patient ID: Karolina Mercado is a 29 y o  female  Assessment/Plan:    Migraine without aura and without status migrainosus, not intractable  Patient with history of headaches dating back to her early teen years, consistent with a diagnosis of migraine without aura  Unfortunately, although experiencing only 4-5 headaches monthly, is having headaches because of the extended nature present 8-10 days per month  Suspect her ongoing use of an estrogen based birth control pill is very likely creating an aggravation  Given the number of days during the month with headache, I do believe it appropriate to initiate an interval schedule on a daily preventative medication  --patient to contact her gynecologist regarding an alternative means of birth control   --begin topiramate (no history of kidney stones or glaucoma) using 25 mg tablets taking 1 tablet at bedtime nightly  Advised to drink extra fluids during the day while on topiramate  --also advised of the appropriateness of being very judicious in the use of Excedrin migraine and other over-the-counter analgesics so as to avoid medication overuse headaches  --asked to keep a headache diary  --given a series of handouts detail in dietary non dietary migraine triggers, over-the-counter agents with migraine, along with information regarding medication overuse headache   --asked to call the office in 3-4 weeks with a status update and to discuss possible further changes in medication  She will call before then should she have questions or concerns  --to complete her recent blood work, hepatic function profile and serum magnesium  Syncopal episode  Episode occurred on January 9th of this year  Syncopal episode versus a possible provoked seizure (attention hypoglycemia), unclear at this juncture  No further episodes reported  No prior history of seizures  Will need additional baseline study    --sleep-deprived EEG   --MRI brain, pre and post contrast       She will follow up in 10-12 weeks  Subjective:    HPI  Patient, a 29years of age and right-handed, presents for further evaluation regarding headaches and an episode which occurred in early January of this year  With regard to her headaches, these date back to her early teen years  She describes the headaches as holocranial and retro-orbital and of a pressure and pulsatile quality  Her headaches are associated with lightheadedness, light sensitivity and sound sensitivity but fortunately no nausea or vomiting  No aura  Unfortunately, her headaches when they do occur generally will persist for upwards of 2 days  In the recent past her headaches have been occurring 4-5 times per month, relating to 8-10 days per month with headache present  She does use over-the-counter Excedrin migraine for symptomatic purposes  She also reports an episode which occurred on January 9th of this year  She relates no recall for the event itself  She was taken to the emergency department  Records reviewed  She was apparently poorly responsive for an interval of time with as per records no reported overt seizure-like activity although was described as having some foaming at the mouth  CT brain at that time, without contrast, revealed no acute change  Consideration was given to a possible hypoglycemic are origin  She has had no further events  She has no past history of documented seizures      Past Medical History:   Diagnosis Date    Asthma     Depression      Past Surgical History:   Procedure Laterality Date    HERNIA REPAIR  2016    TONSILLECTOMY Bilateral 1994     Social History     Socioeconomic History    Marital status: Single     Spouse name: None    Number of children: None    Years of education: None    Highest education level: None   Occupational History    None   Social Needs    Financial resource strain: Not hard at all   Zilliant insecurity:     Worry: Never true     Inability: Never true   "[employer] needs: Medical: No     Non-medical: No   Tobacco Use    Smoking status: Current Every Day Smoker     Packs/day: 1 00     Years: 9 00     Pack years: 9 00     Types: Cigarettes    Smokeless tobacco: Never Used   Substance and Sexual Activity    Alcohol use: No     Frequency: Never     Binge frequency: Never    Drug use: No    Sexual activity: Yes     Partners: Male   Lifestyle    Physical activity:     Days per week: 0 days     Minutes per session: 0 min    Stress: Not at all   Relationships    Social connections:     Talks on phone: More than three times a week     Gets together: More than three times a week     Attends Episcopal service: Never     Active member of club or organization: No     Attends meetings of clubs or organizations: Never     Relationship status: Never     Intimate partner violence:     Fear of current or ex partner: No     Emotionally abused: No     Physically abused: No     Forced sexual activity: No   Other Topics Concern    None   Social History Narrative    None     Family History   Problem Relation Age of Onset    Coronary artery disease Paternal Grandmother     Hypertension Paternal Grandfather         TYPE 2    Diabetes Paternal Grandfather     Pancreatic cancer Paternal Grandfather     Lung cancer Paternal Grandfather      Allergies   Allergen Reactions    Onion Anaphylaxis and Swelling     Swelling of throat   Sumatriptan Swelling       Current Outpatient Medications:     JUNEL FE 24 1-20 MG-MCG(24) per tablet, Take 1 tablet by mouth daily, Disp: 28 tablet, Rfl: 11    nicotine (NICOTROL) 10 MG inhaler, Inhale 1 puff as needed for smoking cessation (Patient not taking: Reported on 10/3/2019), Disp: 42 each, Rfl: 0    topiramate (TOPAMAX) 25 mg tablet, By mouth take 1 tablet twice daily or as directed , Disp: 60 tablet, Rfl: 2    Objective:    Blood pressure 137/90, pulse 86, resp   rate 18, height 5' 4" (1 626 m), weight 47 3 kg (104 lb 3 2 oz), not currently breastfeeding  Physical Exam  Head normocephalic  Eyes nonicteric  No audible head, ocular or anterior neck bruits  Lungs clear to auscultation  Rhythm regular  GI (abdomen) soft nontender  Bowel sounds present  No significant lower extremity edema  Neurological Exam  Alert  Pleasantly interactive  Fully oriented  No dysarthria  Unremarkable spontaneous gait  Able to tandem walk  Romberg maneuver performed unremarkably  Cranial Nerves:   I: Olfactory sense intact bilaterally  II: Visual fields full to confrontation  Pupils equal, round, reactive to light with normal accomodation  Fundus: with bilaterally marginated discs and spontaneous venous pulsations  III,IV,VI: Extraocular muscles EOMI, no nystagmus  Mild bilateral ptosis  V: Masseter and pterygoid strength  Sensation in the V1 through V3 distributions intact to pinprick and light touch bilaterally  VII:  Mild left lower facial asymmetry present  VIII: Audition intact to finger rub bilaterally  IX/X: Uvula midline  Soft palate elevation symmetric  XI: Trapezius and SCM strength 5/5 bilaterally  XII: Tongue midline with no atrophy or fasciculations with appropriate movement  Accurate with finger-to-nose and heel-to-shin maneuvers bilaterally  Full symmetrical strength throughout the 4 extremities with no upper extremity drift  Sensory testing grossly intact to pin, position and vibration throughout  Muscle stretch reflexes bilaterally 2 throughout the upper extremities, at the knees and at the ankles  Toe response downgoing bilaterally  ROS:    Review of Systems   Constitutional: Negative  Negative for appetite change and fever  HENT: Negative  Negative for hearing loss, tinnitus, trouble swallowing and voice change  Eyes: Negative  Negative for photophobia and pain  Respiratory: Positive for cough, chest tightness, shortness of breath and wheezing  Cardiovascular: Positive for palpitations  Gastrointestinal: Negative  Negative for nausea and vomiting  Endocrine: Negative  Negative for cold intolerance and heat intolerance  Genitourinary: Positive for frequency and urgency  Negative for dysuria  Musculoskeletal: Positive for back pain (lower back)  Negative for myalgias and neck pain  Skin: Negative  Negative for rash  Neurological: Positive for light-headedness and headaches  Negative for dizziness, tremors, seizures, syncope, facial asymmetry, speech difficulty, weakness and numbness  Hematological: Negative  Does not bruise/bleed easily  Psychiatric/Behavioral: Negative for confusion, hallucinations and sleep disturbance  The patient is nervous/anxious  I personally reviewed the ROS that was entered by the medical assistant  *Please note this document was created using voice recognition software and may contain sound-alike word errors  *

## 2019-10-03 NOTE — PATIENT INSTRUCTIONS
Please contact her gynecologist regarding alternative means of birth control  Begin topiramate using 25 mg tablets taking 1 tablet at bedtime nightly  While on topiramate drink extra fluids during the day  Please be very judicious in using your Excedrin migraine so as not to end up with overuse headaches  Please keep a headache diary  Please review the handouts detail a dietary and non dietary migraine triggers, over-the-counter assist measures, and medication overuse headache  Call in 3-4 weeks with a status update and to discuss possible further change in the topiramate  Call before then if you have any questions or concerns

## 2019-10-03 NOTE — ASSESSMENT & PLAN NOTE
Episode occurred on January 9th of this year  Syncopal episode versus a possible provoked seizure (attention hypoglycemia), unclear at this juncture  No further episodes reported  No prior history of seizures  Will need additional baseline study    --sleep-deprived EEG   --MRI brain, pre and post contrast

## 2019-10-03 NOTE — ASSESSMENT & PLAN NOTE
Patient with history of headaches dating back to her early teen years, consistent with a diagnosis of migraine without aura  Unfortunately, although experiencing only 4-5 headaches monthly, is having headaches because of the extended nature present 8-10 days per month  Suspect her ongoing use of an estrogen based birth control pill is very likely creating an aggravation  Given the number of days during the month with headache, I do believe it appropriate to initiate an interval schedule on a daily preventative medication  --patient to contact her gynecologist regarding an alternative means of birth control   --begin topiramate (no history of kidney stones or glaucoma) using 25 mg tablets taking 1 tablet at bedtime nightly  Advised to drink extra fluids during the day while on topiramate  --also advised of the appropriateness of being very judicious in the use of Excedrin migraine and other over-the-counter analgesics so as to avoid medication overuse headaches  --asked to keep a headache diary  --given a series of handouts detail in dietary non dietary migraine triggers, over-the-counter agents with migraine, along with information regarding medication overuse headache   --asked to call the office in 3-4 weeks with a status update and to discuss possible further changes in medication  She will call before then should she have questions or concerns  --to complete her recent blood work, hepatic function profile and serum magnesium

## 2019-10-03 NOTE — LETTER
October 3, 2019     Janes Garcia MD  [address] [facility]  [address]    Patient: Jimmie Rees   YOB: 1990   Date of Visit: 10/3/2019       Dear Dr Alfonso Galvez: Thank you for referring Jimmie Rees to me for evaluation  Below are my notes for this consultation  If you have questions, please do not hesitate to call me  I look forward to following your patient along with you  Sincerely,        Duane Marley MD        CC: No Recipients  Duane Marley MD  10/3/2019  9:48 AM  Sign at close encounter  Patient ID: Jimmie Rees is a 29 y o  female  Assessment/Plan:    Migraine without aura and without status migrainosus, not intractable  Patient with history of headaches dating back to her early teen years, consistent with a diagnosis of migraine without aura  Unfortunately, although experiencing only 4-5 headaches monthly, is having headaches because of the extended nature present 8-10 days per month  Suspect her ongoing use of an estrogen based birth control pill is very likely creating an aggravation  Given the number of days during the month with headache, I do believe it appropriate to initiate an interval schedule on a daily preventative medication  --patient to contact her gynecologist regarding an alternative means of birth control   --begin topiramate (no history of kidney stones or glaucoma) using 25 mg tablets taking 1 tablet at bedtime nightly  Advised to drink extra fluids during the day while on topiramate  --also advised of the appropriateness of being very judicious in the use of Excedrin migraine and other over-the-counter analgesics so as to avoid medication overuse headaches  --asked to keep a headache diary    --given a series of handouts detail in dietary non dietary migraine triggers, over-the-counter agents with migraine, along with information regarding medication overuse headache   --asked to call the office in 3-4 weeks with a status update and to discuss possible further changes in medication  She will call before then should she have questions or concerns  --to complete her recent blood work, hepatic function profile and serum magnesium  Syncopal episode  Episode occurred on January 9th of this year  Syncopal episode versus a possible provoked seizure (attention hypoglycemia), unclear at this juncture  No further episodes reported  No prior history of seizures  Will need additional baseline study  --sleep-deprived EEG   --MRI brain, pre and post contrast       She will follow up in 10-12 weeks  Subjective:    HPI  Patient, a 29years of age and right-handed, presents for further evaluation regarding headaches and an episode which occurred in early January of this year  With regard to her headaches, these date back to her early teen years  She describes the headaches as holocranial and retro-orbital and of a pressure and pulsatile quality  Her headaches are associated with lightheadedness, light sensitivity and sound sensitivity but fortunately no nausea or vomiting  No aura  Unfortunately, her headaches when they do occur generally will persist for upwards of 2 days  In the recent past her headaches have been occurring 4-5 times per month, relating to 8-10 days per month with headache present  She does use over-the-counter Excedrin migraine for symptomatic purposes  She also reports an episode which occurred on January 9th of this year  She relates no recall for the event itself  She was taken to the emergency department  Records reviewed  She was apparently poorly responsive for an interval of time with as per records no reported overt seizure-like activity although was described as having some foaming at the mouth  CT brain at that time, without contrast, revealed no acute change  Consideration was given to a possible hypoglycemic are origin  She has had no further events    She has no past history of documented seizures      Past Medical History:   Diagnosis Date    Asthma     Depression      Past Surgical History:   Procedure Laterality Date    HERNIA REPAIR  2016    TONSILLECTOMY Bilateral 1994     Social History     Socioeconomic History    Marital status: Single     Spouse name: None    Number of children: None    Years of education: None    Highest education level: None   Occupational History    None   Social Needs    Financial resource strain: Not hard at all   [place] insecurity:     Worry: Never true     Inability: Never true    Transportation needs:     Medical: No     Non-medical: No   Tobacco Use    Smoking status: Current Every Day Smoker     Packs/day: 1 00     Years: 9 00     Pack years: 9 00     Types: Cigarettes    Smokeless tobacco: Never Used   Substance and Sexual Activity    Alcohol use: No     Frequency: Never     Binge frequency: Never    Drug use: No    Sexual activity: Yes     Partners: Male   Lifestyle    Physical activity:     Days per week: 0 days     Minutes per session: 0 min    Stress: Not at all   Relationships    Social connections:     Talks on phone: More than three times a week     Gets together: More than three times a week     Attends Lutheran service: Never     Active member of club or organization: No     Attends meetings of clubs or organizations: Never     Relationship status: Never     Intimate partner violence:     Fear of current or ex partner: No     Emotionally abused: No     Physically abused: No     Forced sexual activity: No   Other Topics Concern    None   Social History Narrative    None     Family History   Problem Relation Age of Onset    Coronary artery disease Paternal Grandmother     Hypertension Paternal Grandfather         TYPE 2    Diabetes Paternal Grandfather     Pancreatic cancer Paternal Grandfather     Lung cancer Paternal Grandfather      Allergies   Allergen Reactions    Onion Anaphylaxis and Swelling     Swelling of throat   Sumatriptan Swelling       Current Outpatient Medications:     JUNEL FE 24 1-20 MG-MCG(24) per tablet, Take 1 tablet by mouth daily, Disp: 28 tablet, Rfl: 11    nicotine (NICOTROL) 10 MG inhaler, Inhale 1 puff as needed for smoking cessation (Patient not taking: Reported on 10/3/2019), Disp: 42 each, Rfl: 0    topiramate (TOPAMAX) 25 mg tablet, By mouth take 1 tablet twice daily or as directed , Disp: 60 tablet, Rfl: 2    Objective:    Blood pressure 137/90, pulse 86, resp  rate 18, height 5' 4" (1 626 m), weight 47 3 kg (104 lb 3 2 oz), not currently breastfeeding  Physical Exam  Head normocephalic  Eyes nonicteric  No audible head, ocular or anterior neck bruits  Lungs clear to auscultation  Rhythm regular  GI (abdomen) soft nontender  Bowel sounds present  No significant lower extremity edema  Neurological Exam  Alert  Pleasantly interactive  Fully oriented  No dysarthria  Unremarkable spontaneous gait  Able to tandem walk  Romberg maneuver performed unremarkably  Cranial Nerves:   I: Olfactory sense intact bilaterally  II: Visual fields full to confrontation  Pupils equal, round, reactive to light with normal accomodation  Fundus: with bilaterally marginated discs and spontaneous venous pulsations  III,IV,VI: Extraocular muscles EOMI, no nystagmus  Mild bilateral ptosis  V: Masseter and pterygoid strength  Sensation in the V1 through V3 distributions intact to pinprick and light touch bilaterally  VII:  Mild left lower facial asymmetry present  VIII: Audition intact to finger rub bilaterally  IX/X: Uvula midline  Soft palate elevation symmetric  XI: Trapezius and SCM strength 5/5 bilaterally  XII: Tongue midline with no atrophy or fasciculations with appropriate movement  Accurate with finger-to-nose and heel-to-shin maneuvers bilaterally  Full symmetrical strength throughout the 4 extremities with no upper extremity drift    Sensory testing grossly intact to pin, position and vibration throughout  Muscle stretch reflexes bilaterally 2 throughout the upper extremities, at the knees and at the ankles  Toe response downgoing bilaterally  ROS:    Review of Systems   Constitutional: Negative  Negative for appetite change and fever  HENT: Negative  Negative for hearing loss, tinnitus, trouble swallowing and voice change  Eyes: Negative  Negative for photophobia and pain  Respiratory: Positive for cough, chest tightness, shortness of breath and wheezing  Cardiovascular: Positive for palpitations  Gastrointestinal: Negative  Negative for nausea and vomiting  Endocrine: Negative  Negative for cold intolerance and heat intolerance  Genitourinary: Positive for frequency and urgency  Negative for dysuria  Musculoskeletal: Positive for back pain (lower back)  Negative for myalgias and neck pain  Skin: Negative  Negative for rash  Neurological: Positive for light-headedness and headaches  Negative for dizziness, tremors, seizures, syncope, facial asymmetry, speech difficulty, weakness and numbness  Hematological: Negative  Does not bruise/bleed easily  Psychiatric/Behavioral: Negative for confusion, hallucinations and sleep disturbance  The patient is nervous/anxious  I personally reviewed the ROS that was entered by the medical assistant  *Please note this document was created using voice recognition software and may contain sound-alike word errors  *

## 2019-10-05 ENCOUNTER — TRANSCRIBE ORDERS (OUTPATIENT)
Dept: ADMINISTRATIVE | Facility: HOSPITAL | Age: 29
End: 2019-10-05

## 2019-10-05 ENCOUNTER — APPOINTMENT (OUTPATIENT)
Dept: LAB | Facility: HOSPITAL | Age: 29
End: 2019-10-05
Payer: COMMERCIAL

## 2019-10-05 DIAGNOSIS — G43.009 MIGRAINE WITHOUT AURA AND WITHOUT STATUS MIGRAINOSUS, NOT INTRACTABLE: ICD-10-CM

## 2019-10-05 LAB
ALBUMIN SERPL BCP-MCNC: 4.2 G/DL (ref 3–5.2)
ALP SERPL-CCNC: 65 U/L (ref 43–122)
ALT SERPL W P-5'-P-CCNC: 18 U/L (ref 9–52)
AST SERPL W P-5'-P-CCNC: 16 U/L (ref 14–36)
BILIRUB DIRECT SERPL-MCNC: 0.1 MG/DL
BILIRUB SERPL-MCNC: 0.6 MG/DL
MAGNESIUM SERPL-MCNC: 2 MG/DL (ref 1.6–2.3)
PROT SERPL-MCNC: 7.6 G/DL (ref 5.9–8.4)

## 2019-10-05 PROCEDURE — 36415 COLL VENOUS BLD VENIPUNCTURE: CPT

## 2019-10-05 PROCEDURE — 83735 ASSAY OF MAGNESIUM: CPT

## 2019-10-05 PROCEDURE — 80076 HEPATIC FUNCTION PANEL: CPT

## 2019-10-08 ENCOUNTER — OFFICE VISIT (OUTPATIENT)
Dept: OBGYN CLINIC | Facility: CLINIC | Age: 29
End: 2019-10-08

## 2019-10-08 VITALS
HEIGHT: 64 IN | DIASTOLIC BLOOD PRESSURE: 70 MMHG | BODY MASS INDEX: 17.83 KG/M2 | WEIGHT: 104.4 LBS | SYSTOLIC BLOOD PRESSURE: 120 MMHG

## 2019-10-08 DIAGNOSIS — G43.009 MIGRAINE WITHOUT AURA AND WITHOUT STATUS MIGRAINOSUS, NOT INTRACTABLE: ICD-10-CM

## 2019-10-08 DIAGNOSIS — Z30.013 ENCOUNTER FOR INITIAL PRESCRIPTION OF INJECTABLE CONTRACEPTIVE: Primary | ICD-10-CM

## 2019-10-08 PROCEDURE — 96372 THER/PROPH/DIAG INJ SC/IM: CPT

## 2019-10-08 PROCEDURE — 99213 OFFICE O/P EST LOW 20 MIN: CPT | Performed by: OBSTETRICS & GYNECOLOGY

## 2019-10-08 RX ORDER — TOPIRAMATE 25 MG/1
25 CAPSULE, COATED PELLETS ORAL DAILY
Qty: 30 CAPSULE | Refills: 2 | Status: SHIPPED | OUTPATIENT
Start: 2019-10-08 | End: 2019-12-19 | Stop reason: ALTCHOICE

## 2019-10-08 RX ORDER — MEDROXYPROGESTERONE ACETATE 150 MG/ML
150 INJECTION, SUSPENSION INTRAMUSCULAR
Qty: 1 ML | Refills: 3 | Status: SHIPPED | OUTPATIENT
Start: 2019-10-08 | End: 2020-06-19 | Stop reason: SDUPTHER

## 2019-10-08 RX ORDER — MEDROXYPROGESTERONE ACETATE 150 MG/ML
150 INJECTION, SUSPENSION INTRAMUSCULAR ONCE
Status: COMPLETED | OUTPATIENT
Start: 2019-10-08 | End: 2019-10-08

## 2019-10-08 RX ADMIN — MEDROXYPROGESTERONE ACETATE 150 MG: 150 INJECTION, SUSPENSION INTRAMUSCULAR at 10:13

## 2019-10-08 NOTE — PROGRESS NOTES
Assessment/Plan:     No problem-specific Assessment & Plan notes found for this encounter  Diagnoses and all orders for this visit:    Encounter for initial prescription of injectable contraceptive  -     medroxyPROGESTERone (DEPO-PROVERA) 150 mg/mL injection; Inject 1 mL (150 mg total) into a muscle every 3 (three) months    Migraine without aura and without status migrainosus, not intractable  -     topiramate (TOPAMAX) 25 mg sprinkle capsule; Take 1 capsule (25 mg total) by mouth daily    RTO for DMPA today  Subjective:      Patient ID: Aileen Crawford is a 29 y o  female presents to change birth control  She is experiencing migraines and her neurologist feels combined OCPs are exacerbating the problem  She has been on Depo Provera in the past and would like to resume this  HPI    The following portions of the patient's history were reviewed and updated as appropriate: allergies, current medications, past family history, past medical history, past social history, past surgical history and problem list     Review of Systems      Objective:      /70   Ht 5' 4" (1 626 m)   Wt 47 4 kg (104 lb 6 4 oz)   LMP 09/18/2019 (Exact Date)   BMI 17 92 kg/m²          Physical Exam   Constitutional: She is oriented to person, place, and time  She appears well-developed and well-nourished  No distress  Pulmonary/Chest: Effort normal    Neurological: She is alert and oriented to person, place, and time  Psychiatric: She has a normal mood and affect  Her behavior is normal    Nursing note and vitals reviewed

## 2019-10-17 ENCOUNTER — HOSPITAL ENCOUNTER (OUTPATIENT)
Dept: MRI IMAGING | Facility: HOSPITAL | Age: 29
Discharge: HOME | End: 2019-10-17
Payer: COMMERCIAL

## 2019-10-17 ENCOUNTER — HOSPITAL ENCOUNTER (OUTPATIENT)
Dept: NEUROLOGY | Facility: CLINIC | Age: 29
Discharge: HOME | End: 2019-10-17
Payer: COMMERCIAL

## 2019-10-17 DIAGNOSIS — R55 SYNCOPE, UNSPECIFIED SYNCOPE TYPE: ICD-10-CM

## 2019-10-17 DIAGNOSIS — G43.009 MIGRAINE WITHOUT AURA AND WITHOUT STATUS MIGRAINOSUS, NOT INTRACTABLE: ICD-10-CM

## 2019-10-17 PROCEDURE — 95816 EEG AWAKE AND DROWSY: CPT | Performed by: PSYCHIATRY & NEUROLOGY

## 2019-10-17 PROCEDURE — 95816 EEG AWAKE AND DROWSY: CPT

## 2019-10-17 PROCEDURE — 70553 MRI BRAIN STEM W/O & W/DYE: CPT

## 2019-10-17 PROCEDURE — A9585 GADOBUTROL INJECTION: HCPCS | Performed by: PSYCHIATRY & NEUROLOGY

## 2019-10-17 RX ADMIN — GADOBUTROL 5 ML: 604.72 INJECTION INTRAVENOUS at 12:57

## 2019-10-18 ENCOUNTER — TELEPHONE (OUTPATIENT)
Dept: NEUROLOGY | Facility: CLINIC | Age: 29
End: 2019-10-18

## 2019-10-23 ENCOUNTER — TELEPHONE (OUTPATIENT)
Dept: NEUROLOGY | Facility: CLINIC | Age: 29
End: 2019-10-23

## 2019-10-23 NOTE — TELEPHONE ENCOUNTER
Left message for patient to call back so we can move her appointment up sooner  If patient calls back please transfer call to me

## 2019-10-24 NOTE — TELEPHONE ENCOUNTER
Spoke to patient to move appointment sooner to review results of her MRI and she advised she needed two weeks notice to provide to her employer  Was able to move her appointment from Dec to Monday 11/11/19 at 8 am at the [facility] office

## 2019-10-25 ENCOUNTER — TELEPHONE (OUTPATIENT)
Dept: NEUROLOGY | Facility: CLINIC | Age: 29
End: 2019-10-25

## 2019-10-25 NOTE — TELEPHONE ENCOUNTER
Spoke by phone with patient  Reviewed the findings on her brain MRI scan and made patient aware that she will be seen in follow-up by Dr Edward Daigle

## 2019-10-25 NOTE — TELEPHONE ENCOUNTER
Yes   Cancel the appointment with me  I already spoke with patient by phone and she is aware that she will be seeing Dr Silvia Stuart  Thanks

## 2019-10-25 NOTE — TELEPHONE ENCOUNTER
Appointment with Dr Asmita Garcia has been cancelled by Junie Sal- patient was scheduled for 11/29, see below

## 2019-10-25 NOTE — TELEPHONE ENCOUNTER
Patient could not make today's (10/25) appt with Dr Marci Ferguson as she was already at work - I scheduled her in for the next available which was 11/29 in [place]  Reminder card and directions to the office sent

## 2019-10-25 NOTE — TELEPHONE ENCOUNTER
Patient transferred to [facility] to schedule appointment with Dr Harrold Corti Dr Pearlie Peabody- patient would like to confirm that she should cancel her appointment with you as well  Please advise

## 2019-10-25 NOTE — TELEPHONE ENCOUNTER
Please help with KINSEY from Dr Luis Blair to me for CNS demyelination as case was personally reviewed and accepted  NP 60 min required

## 2019-10-31 NOTE — TELEPHONE ENCOUNTER
LMOM for patient to call back, We have 2 NEW PT appts for 11/1 @ 2 and 4 - please schedule pt in if she calls back

## 2019-11-20 ENCOUNTER — TELEPHONE (OUTPATIENT)
Dept: FAMILY MEDICINE CLINIC | Facility: CLINIC | Age: 29
End: 2019-11-20

## 2019-11-20 DIAGNOSIS — Z11.1 SCREENING-PULMONARY TB: Primary | ICD-10-CM

## 2019-11-20 NOTE — TELEPHONE ENCOUNTER
Pt needs lab order for Lorena Rush for tb screen for her employment not due til 7/11/2020 for physical she is scheduled

## 2019-11-21 ENCOUNTER — APPOINTMENT (OUTPATIENT)
Dept: LAB | Facility: HOSPITAL | Age: 29
End: 2019-11-21
Payer: COMMERCIAL

## 2019-11-21 DIAGNOSIS — Z11.1 SCREENING-PULMONARY TB: ICD-10-CM

## 2019-11-21 PROCEDURE — 36415 COLL VENOUS BLD VENIPUNCTURE: CPT

## 2019-11-21 PROCEDURE — 86480 TB TEST CELL IMMUN MEASURE: CPT

## 2019-11-22 LAB
GAMMA INTERFERON BACKGROUND BLD IA-ACNC: 0.4 IU/ML
M TB IFN-G BLD-IMP: NEGATIVE
M TB IFN-G CD4+ BCKGRND COR BLD-ACNC: -0.33 IU/ML
M TB IFN-G CD4+ BCKGRND COR BLD-ACNC: -0.33 IU/ML
MITOGEN IGNF BCKGRD COR BLD-ACNC: >10 IU/ML

## 2019-11-25 ENCOUNTER — TELEPHONE (OUTPATIENT)
Dept: NEUROLOGY | Facility: CLINIC | Age: 29
End: 2019-11-25

## 2019-11-27 ENCOUNTER — TELEPHONE (OUTPATIENT)
Dept: FAMILY MEDICINE CLINIC | Facility: CLINIC | Age: 29
End: 2019-11-27

## 2019-11-27 NOTE — TELEPHONE ENCOUNTER
----- Message from Akila Moore sent at 11/27/2019  9:21 AM EST -----  Regarding: FW: Non-Urgent Medical Question  Contact: [phone]      ----- Message -----  From: Yesy Campbell MD  Sent: 11/27/2019   9:16 AM EST  To: Akila Moore  Subject: FW: Non-Urgent Medical Question                  patietn need to be evaluated for Palpitation  ----- Message -----  From: Andrea Pedroza MA  Sent: 11/27/2019   7:32 AM EST  To: Yesy Campbell MD  Subject: FW: Non-Urgent Medical Question                      ----- Message -----  From: Natalie Penn  Sent: 11/26/2019   6:45 PM EST  To: [facility] Manri[facility]  Subject: Non-Urgent Medical Question                      I am currently waiting to see another neurologist because of the swelling in my brain and some white spots that were seen  I was wondering other then my migraines and vision problems, is it normal that my heart rate is very fast for the past four days my heart has been racing and it doesnt feel to good

## 2019-11-29 ENCOUNTER — OFFICE VISIT (OUTPATIENT)
Dept: NEUROLOGY | Facility: CLINIC | Age: 29
End: 2019-11-29
Payer: COMMERCIAL

## 2019-11-29 VITALS
DIASTOLIC BLOOD PRESSURE: 94 MMHG | HEART RATE: 80 BPM | WEIGHT: 105.8 LBS | BODY MASS INDEX: 18.16 KG/M2 | SYSTOLIC BLOOD PRESSURE: 138 MMHG

## 2019-11-29 DIAGNOSIS — R55 SYNCOPE, UNSPECIFIED SYNCOPE TYPE: ICD-10-CM

## 2019-11-29 DIAGNOSIS — R42 VERTIGO: ICD-10-CM

## 2019-11-29 DIAGNOSIS — G37.9 CNS DEMYELINATION (HCC): ICD-10-CM

## 2019-11-29 DIAGNOSIS — N31.9 NEUROGENIC BLADDER: ICD-10-CM

## 2019-11-29 DIAGNOSIS — G43.009 MIGRAINE WITHOUT AURA AND WITHOUT STATUS MIGRAINOSUS, NOT INTRACTABLE: Primary | ICD-10-CM

## 2019-11-29 PROCEDURE — 99215 OFFICE O/P EST HI 40 MIN: CPT | Performed by: PSYCHIATRY & NEUROLOGY

## 2019-11-29 RX ORDER — PROCHLORPERAZINE MALEATE 10 MG
TABLET ORAL
Qty: 30 TABLET | Refills: 0 | Status: SHIPPED | OUTPATIENT
Start: 2019-11-29 | End: 2019-12-10

## 2019-11-29 RX ORDER — ERGOCALCIFEROL 1.25 MG/1
50000 CAPSULE ORAL WEEKLY
Qty: 12 CAPSULE | Refills: 0 | Status: SHIPPED | OUTPATIENT
Start: 2019-11-29 | End: 2019-12-19 | Stop reason: ALTCHOICE

## 2019-11-29 RX ORDER — DEXAMETHASONE 2 MG/1
2 TABLET ORAL
Qty: 6 TABLET | Refills: 0 | Status: SHIPPED | OUTPATIENT
Start: 2019-11-29 | End: 2019-12-10

## 2019-11-29 RX ORDER — MECLIZINE HYDROCHLORIDE 25 MG/1
25 TABLET ORAL EVERY 8 HOURS PRN
Qty: 90 TABLET | Refills: 1 | Status: SHIPPED | OUTPATIENT
Start: 2019-11-29 | End: 2020-07-06 | Stop reason: ALTCHOICE

## 2019-11-29 NOTE — PROGRESS NOTES
[facility]  PATIENT:  Yokasta Cisneros  MRN:  [id number]  :  1990  DATE OF SERVICE:  2019    Assessment/Plan:     Problem List Items Addressed This Visit        Cardiovascular and Mediastinum    Migraine without aura and without status migrainosus, not intractable - Primary    Relevant Medications    meclizine (ANTIVERT) 25 mg tablet    dexamethasone (DECADRON) 2 mg tablet    prochlorperazine (COMPAZINE) 10 mg tablet    Other Relevant Orders    MRI cervical spine with and without contrast    MRI thoracic spine with and without contrast    Syncopal episode       Nervous and Auditory    CNS demyelination (HCC)    Relevant Medications    ergocalciferol (VITAMIN D2) 50,000 units    Other Relevant Orders    MRI cervical spine with and without contrast    MRI thoracic spine with and without contrast    TSH, 3rd generation with Free T4 reflex (Completed)    JESSE Screen w/ Reflex to Titer/Pattern    Sedimentation rate, automated (Completed)    MISCELLANEOUS LAB TEST    BUN (Completed)    Creatinine, serum (Completed)    Vitamin B12    FL lumbar puncture    STRATIFY JCV ANTIBODY    Varicella zoster antibody, IgG       Other    Neurogenic bladder    Vertigo    Relevant Medications    meclizine (ANTIVERT) 25 mg tablet         Mrs Floresita Borrego has presented for evaluation of abnormal brain imaging in the setting of longstanding disbalance and migraine headaches  Patient MRI was noted to have multiple classic MS demyelinating plaques, involving periventricular white matter and left cerebellum with most of them has corresponding T1W black holes  Patient has no focal neurologic deficit, but vertigo due to cerebellar lesion and bladder urgency/incontinence has been reported  MRI cervical and thoracic region as well as LP were advised; blood work was also recommended       Patient was advised Decadron 2 mg with compazine 10 mg for severe headaches, and to continue topamax 25 mg for headache prevention with meclizine 10 mg for her disbalance and vertigo  EEG was done and normal - patient does have single are of cortical demyelination     Patient will start Vit D 2 50,000 IU weekly  If patient is JCV negative- would favor Tysabri  Patient is to follow within 6-8 weeks  Greater than 50% of the 60 minutes evaluation was a face-to-face discussion regarding  the pathophysiology of her current symptoms and further plan, as well as counseling, educating, and coordinating the patient's care  Subjective: CNS demyelination and migraines     HPI History of Present Illness  Mrs Amin Stage was referred to [address] [place] [address] by Dr Jason Diaz for evaluation of abnormal brain MRI findings with concern for CNS demyelination  Patient has no focal neurologic deficit on today's evaluation  Patient had single event of vertigo in January 2019 with meclizine provides a good response  Patient had 1 single event of convulsion, with hypoglycemia reported  Patient started having migraines more then 12 years, with mother ha severe vertigo  Patient has migraines twice a  Months but last up to 1 week, with photo- and phono- phobia, with severe headaches and pressure in vertex and occipital headaches  Patient described bladder urgency  The following portions of the patient's history were reviewed and updated as appropriate:   She  has a past medical history of Asthma and Depression    She   Patient Active Problem List    Diagnosis Date Noted    Neurogenic bladder 11/29/2019    CNS demyelination ([facility] [address] ) 11/29/2019    Vertigo 11/29/2019    Migraine without aura and without status migrainosus, not intractable 10/03/2019    Syncopal episode 10/03/2019    Encounter for well adult exam with abnormal findings 07/11/2019    Ventral hernia without obstruction or gangrene 06/13/2019    Other headache syndrome 06/13/2019    Moderate smoker (20 or less per day) 39/08/7489    Umbilical hernia without obstruction and without gangrene 03/28/2016    Asthma, mild intermittent 04/25/2012     She  has a past surgical history that includes Tonsillectomy (Bilateral, 1994) and Hernia repair (2016)  Her family history includes Coronary artery disease in her paternal grandmother; Diabetes in her paternal grandfather; Hypertension in her paternal grandfather; Lung cancer in her paternal grandfather; Pancreatic cancer in her paternal grandfather  She  reports that she has been smoking cigarettes  She has a 9 00 pack-year smoking history  She has never used smokeless tobacco  She reports that she does not drink alcohol or use drugs  Current Outpatient Medications   Medication Sig Dispense Refill    medroxyPROGESTERone (DEPO-PROVERA) 150 mg/mL injection Inject 1 mL (150 mg total) into a muscle every 3 (three) months 1 mL 3    topiramate (TOPAMAX) 25 mg sprinkle capsule Take 1 capsule (25 mg total) by mouth daily 30 capsule 2    dexamethasone (DECADRON) 2 mg tablet Take 1 tablet (2 mg total) by mouth daily with breakfast 6 tablet 0    ergocalciferol (VITAMIN D2) 50,000 units Take 1 capsule (50,000 Units total) by mouth once a week 12 capsule 0    meclizine (ANTIVERT) 25 mg tablet Take 1 tablet (25 mg total) by mouth every 8 (eight) hours as needed for dizziness 90 tablet 1    prochlorperazine (COMPAZINE) 10 mg tablet Take 1 tab with Decadron for severe headaches, q 12h 30 tablet 0     No current facility-administered medications for this visit  Current Outpatient Medications on File Prior to Visit   Medication Sig    medroxyPROGESTERone (DEPO-PROVERA) 150 mg/mL injection Inject 1 mL (150 mg total) into a muscle every 3 (three) months    topiramate (TOPAMAX) 25 mg sprinkle capsule Take 1 capsule (25 mg total) by mouth daily     No current facility-administered medications on file prior to visit  She is allergic to onion and sumatriptan            Objective:    Blood pressure 138/94, pulse 80, weight 48 kg (105 lb 12 8 oz), not currently breastfeeding  Physical Exam/Neurological Exam  CONSTITUTIONAL: NAD, pleasant  NECK: supple, no lymphadenopathy, no thyromegaly, no JVD  CARDIOVASCULAR: RRR, normal S1S2, no murmurs, no rubs  RESP: clear to auscultation bilaterally, no wheezes/rhonchi/rales  ABDOMEN: soft, non tender, non distended  SKIN: no rash or skin lesions  EXTREMITIES: no edema, pulses 2+bilaterally  PSYCH: appropriate mood and affect  NEUROLOGIC COMPREHENSIVE EXAM: Patient is oriented to person, place and time, NAD; appropriate affect  CN II, III, IV, V, VI, VII,VIII,IX,X,XI-XII intact with EOMI, PERRLA, OKN intact, VF grossly intact, fundi poorly visualized secondary to pupillary constriction; symmetric face noted  Motor: 5/5 UE/LE bilateral symmetric; Sensory: intact to light touch and pinprick bilaterally; normal vibration sensation feet bilaterally; Coordination within normal limits on FTN and BENJI testing; DTR: 2/4 through, no Babinski, no clonus  Tandem gait is intact  Romberg: negative  ROS:  12 points of review of system was reviewed with the patient and was unremarkable with exception: see HPI  Review of Systems   Constitutional: Positive for fatigue and unexpected weight change  Negative for appetite change and fever  HENT: Negative  Negative for hearing loss, tinnitus, trouble swallowing and voice change  Eyes: Positive for visual disturbance  Negative for photophobia and pain  Respiratory: Positive for cough and shortness of breath  Cardiovascular: Positive for palpitations  Gastrointestinal: Negative  Negative for nausea and vomiting  Endocrine: Negative  Negative for cold intolerance and heat intolerance  Genitourinary: Positive for frequency and urgency  Negative for dysuria  Loss of bladder control   Musculoskeletal: Positive for gait problem  Negative for myalgias and neck pain  Skin: Negative  Negative for rash     Neurological: Positive for dizziness, tremors, light-headedness and headaches  Negative for seizures, syncope, facial asymmetry, speech difficulty, weakness and numbness  Hematological: Negative  Does not bruise/bleed easily  Psychiatric/Behavioral: Positive for confusion  Negative for hallucinations and sleep disturbance  The patient is nervous/anxious

## 2019-11-30 ENCOUNTER — APPOINTMENT (OUTPATIENT)
Dept: LAB | Facility: HOSPITAL | Age: 29
End: 2019-11-30
Attending: PSYCHIATRY & NEUROLOGY
Payer: COMMERCIAL

## 2019-11-30 DIAGNOSIS — E53.8 LOW SERUM VITAMIN B12: Primary | ICD-10-CM

## 2019-11-30 DIAGNOSIS — G37.9 CNS DEMYELINATION (HCC): ICD-10-CM

## 2019-11-30 LAB
BUN SERPL-MCNC: 11 MG/DL (ref 5–25)
CREAT SERPL-MCNC: 0.86 MG/DL (ref 0.6–1.2)
ERYTHROCYTE [SEDIMENTATION RATE] IN BLOOD: 28 MM/HOUR (ref 1–20)
GFR SERPL CREATININE-BSD FRML MDRD: 92 ML/MIN/1.73SQ M
TSH SERPL DL<=0.05 MIU/L-ACNC: 2.47 UIU/ML (ref 0.47–4.68)

## 2019-11-30 PROCEDURE — 84520 ASSAY OF UREA NITROGEN: CPT

## 2019-11-30 PROCEDURE — 82607 VITAMIN B-12: CPT

## 2019-11-30 PROCEDURE — 86038 ANTINUCLEAR ANTIBODIES: CPT

## 2019-11-30 PROCEDURE — 86255 FLUORESCENT ANTIBODY SCREEN: CPT

## 2019-11-30 PROCEDURE — 85652 RBC SED RATE AUTOMATED: CPT

## 2019-11-30 PROCEDURE — 86711 JOHN CUNNINGHAM ANTIBODY: CPT | Performed by: PSYCHIATRY & NEUROLOGY

## 2019-11-30 PROCEDURE — 84443 ASSAY THYROID STIM HORMONE: CPT

## 2019-11-30 PROCEDURE — 86787 VARICELLA-ZOSTER ANTIBODY: CPT | Performed by: PSYCHIATRY & NEUROLOGY

## 2019-11-30 PROCEDURE — 36415 COLL VENOUS BLD VENIPUNCTURE: CPT | Performed by: PSYCHIATRY & NEUROLOGY

## 2019-11-30 PROCEDURE — 82565 ASSAY OF CREATININE: CPT

## 2019-12-01 LAB — VIT B12 SERPL-MCNC: 277 PG/ML (ref 100–900)

## 2019-12-02 ENCOUNTER — TELEPHONE (OUTPATIENT)
Dept: NEUROLOGY | Facility: CLINIC | Age: 29
End: 2019-12-02

## 2019-12-02 ENCOUNTER — OFFICE VISIT (OUTPATIENT)
Dept: FAMILY MEDICINE CLINIC | Facility: CLINIC | Age: 29
End: 2019-12-02
Payer: COMMERCIAL

## 2019-12-02 VITALS
TEMPERATURE: 98.4 F | HEIGHT: 64 IN | WEIGHT: 103 LBS | BODY MASS INDEX: 17.58 KG/M2 | OXYGEN SATURATION: 97 % | RESPIRATION RATE: 16 BRPM | HEART RATE: 98 BPM | DIASTOLIC BLOOD PRESSURE: 90 MMHG | SYSTOLIC BLOOD PRESSURE: 118 MMHG

## 2019-12-02 DIAGNOSIS — R00.2 PALPITATION: Primary | ICD-10-CM

## 2019-12-02 LAB — RYE IGE QN: NEGATIVE

## 2019-12-02 PROCEDURE — 99214 OFFICE O/P EST MOD 30 MIN: CPT | Performed by: FAMILY MEDICINE

## 2019-12-02 PROCEDURE — 93000 ELECTROCARDIOGRAM COMPLETE: CPT | Performed by: FAMILY MEDICINE

## 2019-12-02 NOTE — ASSESSMENT & PLAN NOTE
New diagnosis symptomatic EKG in the office no significant  finding recently had a blood work TSH is normal no anemia plan to do echocardiogram to rule out valvular disease and plan to do Holter monitor to rule out arrhythmia a discussed with the patient

## 2019-12-02 NOTE — TELEPHONE ENCOUNTER
----- Message from Fatmata Alonzo RN sent at 12/2/2019  7:32 AM EST -----      ----- Message -----  From: Denise Parker MD  Sent: 12/1/2019   5:23 PM EST  To: [facility] [place] [facility]    Please review abnormal low B12 with the patient- she is to start B12 1000 mcg sent to her pharmacy

## 2019-12-02 NOTE — PROGRESS NOTES
Tobacco Cessation Counseling: Tobacco cessation counseling was provided  The patient is sincerely urged to quit consumption of tobacco  She is not ready to quit tobacco        Subjective:   Chief Complaint   Patient presents with    Palpitations     onging 2 weeks  states she feels her heart racing like she has been running ofelia         Patient ID: Ariadna Cantrell is a 29 y o  female  Palpitations   This is a new problem  The current episode started 1 to 4 weeks ago  The problem occurs intermittently  The problem has been unchanged  Pertinent negatives include no abdominal pain, anorexia, arthralgias, chest pain, chills, congestion, coughing, diaphoresis, fatigue, fever, headaches, joint swelling, nausea, numbness, rash, sore throat, urinary symptoms, vertigo, visual change, vomiting or weakness  Nothing aggravates the symptoms  She has tried nothing for the symptoms  The following portions of the patient's history were reviewed and updated as appropriate: allergies, current medications, past family history, past medical history, past social history, past surgical history and problem list     Review of Systems   Constitutional: Negative for chills, diaphoresis, fatigue and fever  HENT: Negative for congestion, ear pain, sinus pressure, sinus pain and sore throat  Eyes: Negative for pain and redness  Respiratory: Negative for cough, chest tightness and shortness of breath  Cardiovascular: Positive for palpitations  Negative for chest pain and leg swelling  Gastrointestinal: Negative for abdominal pain, anorexia, blood in stool, constipation, diarrhea, nausea and vomiting  Genitourinary: Negative for flank pain, frequency and hematuria  Musculoskeletal: Negative for arthralgias, back pain and joint swelling  Skin: Negative for rash  Neurological: Negative for dizziness, vertigo, weakness, numbness and headaches  Hematological: Does not bruise/bleed easily  Objective:  Vitals:    12/02/19 0817   BP: 118/90   BP Location: Left arm   Patient Position: Sitting   Cuff Size: Adult   Pulse: 98   Resp: 16   Temp: 98 4 °F (36 9 °C)   TempSrc: Tympanic   SpO2: 97%   Weight: 46 7 kg (103 lb)   Height: 5' 4" (1 626 m)      Physical Exam   Constitutional: She is oriented to person, place, and time  She appears well-developed and well-nourished  HENT:   Head: Normocephalic  Right Ear: External ear normal    Left Ear: External ear normal    Eyes: Conjunctivae and EOM are normal  Right eye exhibits no discharge  Left eye exhibits no discharge  Neck: No JVD present  Cardiovascular: Normal rate, regular rhythm and normal heart sounds  Exam reveals no gallop  No murmur heard  Pulmonary/Chest: Effort normal  No respiratory distress  She has no wheezes  She has no rales  She exhibits no tenderness  Abdominal: She exhibits no mass  There is no tenderness  There is no rebound  Musculoskeletal: She exhibits no edema or tenderness  Neurological: She is alert and oriented to person, place, and time  Skin: No rash noted  No erythema  Assessment/Plan:    Palpitation  New diagnosis symptomatic EKG in the office no significant  finding recently had a blood work TSH is normal no anemia plan to do echocardiogram to rule out valvular disease and plan to do Holter monitor to rule out arrhythmia a discussed with the patient       Diagnoses and all orders for this visit:    Palpitation  -     Holter monitor - 48 hour; Future  -     Echo complete with contrast if indicated; Future  -     POCT ECG    Body mass index (BMI) less than 19 in adult          BMI Counseling: Body mass index is 17 68 kg/m²  The BMI is below normal  Patient was advised to gain weight

## 2019-12-02 NOTE — TELEPHONE ENCOUNTER
Patient returned call  Informed her of B12 results and that script was sent to the pharmacy  No further questions

## 2019-12-03 DIAGNOSIS — G37.9 CNS DEMYELINATION (HCC): Primary | ICD-10-CM

## 2019-12-03 LAB — VZV IGG SER IA-ACNC: NORMAL

## 2019-12-03 RX ORDER — LORAZEPAM 1 MG/1
TABLET ORAL
Qty: 2 TABLET | Refills: 0 | Status: SHIPPED | OUTPATIENT
Start: 2019-12-03 | End: 2019-12-10

## 2019-12-04 ENCOUNTER — TELEPHONE (OUTPATIENT)
Dept: NEUROLOGY | Facility: CLINIC | Age: 29
End: 2019-12-04

## 2019-12-04 NOTE — TELEPHONE ENCOUNTER
Letter generated and sent to the patient via [address]  Left message for patient to call office to make aware

## 2019-12-04 NOTE — LETTER
December 4, 2019     Patient: Yokasta Cisneros   YOB: 1990       To Whom It May Concern:    Yokasta Cisneros is under my care for her medical condition  She will be undergoing an outpatient procedure for her newly diagnosed neurologic condition on 12/05/2019  Please excuse Rosalia Myers from work on 12/05/2019 for the procedure and also 12/06/2019 in order to adequately recover  Please feel free to call the office if you have any questions or concerns at [phone]       Sincerely,    Ezequiel Mendez MD

## 2019-12-04 NOTE — TELEPHONE ENCOUNTER
----- Message from Tej Banerjee MD sent at 12/3/2019  3:47 PM EST -----  Regarding: FW: Pre-Op/Post-Op Question  Contact: [phone]  Please help with a formal letter for 2 following days she should be excused, as she is having LP scheduled for her newly diagnosed neurologic condition  Thank you  ----- Message -----  From: Indra Barragan MA  Sent: 12/3/2019   2:45 PM EST  To: Tej Banerjee MD  Subject: FW: Pre-Op/Post-Op Question                          ----- Message -----  From: Sonia Bernal  Sent: 12/3/2019   2:41 PM EST  To: [facility] [place] [facility]  Subject: RE: Pre-Op/Post-Op Question                      Okay great thank you yes i will need an excuse note and a note stating what is being done for me and why i need the day after the procedure is done off  If i can get that emailed to myself ahead of hands so that way it can be forwarded to my company   ----- Message -----  From: Tej Banerjee MD  Sent: 12/3/2019  1:08 PM EST  To: Sonia Bernal  Subject: RE: Pre-Op/Post-Op Question  Mrs Radha De La Rosa -    For anxiety which comes with spinal tap - I sent lorazepam 1 mg 2 tabs, with instruction on how to take it  For you work related question - you would preferably be off on the day of the procedure and following day after the procedure  Please let us know if you need any supporting documents from our office  Thank you,    BENJAMIN Schuler         ----- Message -----     From: Sonia Bernal     Sent: 12/2/2019 11:48 PM EST       To: Tej Banerjee MD  Subject: Pre-Op/Post-Op Question    Is there any way possible that i can go under for the procedure because i been thinking about it and i am kind of scared for it and after this will i be able to go to work the next day i know the day of i can not drive  Thank you an answer asap to both my questions would be helpful for myself and my job   Thank you

## 2019-12-05 ENCOUNTER — HOSPITAL ENCOUNTER (OUTPATIENT)
Dept: RADIOLOGY | Facility: HOSPITAL | Age: 29
Discharge: HOME | End: 2019-12-05
Attending: PSYCHIATRY & NEUROLOGY
Payer: COMMERCIAL

## 2019-12-05 ENCOUNTER — PREP FOR PROCEDURE (OUTPATIENT)
Dept: NEUROLOGY | Facility: CLINIC | Age: 29
End: 2019-12-05

## 2019-12-05 VITALS
OXYGEN SATURATION: 98 % | BODY MASS INDEX: 17.68 KG/M2 | RESPIRATION RATE: 16 BRPM | DIASTOLIC BLOOD PRESSURE: 99 MMHG | SYSTOLIC BLOOD PRESSURE: 140 MMHG | HEIGHT: 64 IN | TEMPERATURE: 98.6 F | HEART RATE: 109 BPM

## 2019-12-05 DIAGNOSIS — G37.9 CNS DEMYELINATION (HCC): ICD-10-CM

## 2019-12-05 LAB
APPEARANCE CSF: CLEAR
C GATTII+NEOFOR DNA CSF QL NAA+NON-PROBE: NOT DETECTED
CMV DNA CSF QL NAA+NON-PROBE: NOT DETECTED
E COLI K1 DNA CSF QL NAA+NON-PROBE: NOT DETECTED
EV RNA CSF QL NAA+NON-PROBE: NOT DETECTED
EXT PREGNANCY TEST URINE: NEGATIVE
EXT. CONTROL: NORMAL
GLUCOSE CSF-MCNC: 60 MG/DL (ref 50–80)
GP B STREP DNA CSF QL NAA+NON-PROBE: NOT DETECTED
HAEM INFLU DNA CSF QL NAA+NON-PROBE: NOT DETECTED
HHV6 DNA CSF QL NAA+NON-PROBE: NOT DETECTED
HSV1 DNA CSF QL NAA+NON-PROBE: NOT DETECTED
HSV2 DNA CSF QL NAA+NON-PROBE: NOT DETECTED
INR PPP: 1.01 (ref 0.84–1.19)
L MONOCYTOG DNA CSF QL NAA+NON-PROBE: NOT DETECTED
LYMPHOCYTES NFR CSF MANUAL: 93 %
MONOS+MACROS CSF MANUAL: 2 %
N MEN DNA CSF QL NAA+NON-PROBE: NOT DETECTED
NEUTROPHILS NFR CSF MANUAL: 5 %
PARECHOVIRUS A RNA CSF QL NAA+NON-PROBE: NOT DETECTED
PLATELET # BLD AUTO: 265 THOUSANDS/UL (ref 149–390)
PMV BLD AUTO: 9.6 FL (ref 8.9–12.7)
PROT CSF-MCNC: 42 MG/DL (ref 15–45)
PROTHROMBIN TIME: 12.9 SECONDS (ref 11.6–14.5)
RBC # CSF MANUAL: 118 UL (ref 0–10)
S PNEUM DNA CSF QL NAA+NON-PROBE: NOT DETECTED
TOTAL CELLS COUNTED BLD: NO
TOTAL CELLS COUNTED SPEC: 58
TUBE # CSF: 4
VZV DNA CSF QL NAA+NON-PROBE: NOT DETECTED
WBC # CSF AUTO: 5 /UL (ref 0–5)

## 2019-12-05 PROCEDURE — 84157 ASSAY OF PROTEIN OTHER: CPT | Performed by: PSYCHIATRY & NEUROLOGY

## 2019-12-05 PROCEDURE — 85049 AUTOMATED PLATELET COUNT: CPT | Performed by: PSYCHIATRY & NEUROLOGY

## 2019-12-05 PROCEDURE — 83916 OLIGOCLONAL BANDS: CPT | Performed by: PSYCHIATRY & NEUROLOGY

## 2019-12-05 PROCEDURE — 87476 LYME DIS DNA AMP PROBE: CPT | Performed by: PSYCHIATRY & NEUROLOGY

## 2019-12-05 PROCEDURE — 83873 ASSAY OF CSF PROTEIN: CPT | Performed by: PSYCHIATRY & NEUROLOGY

## 2019-12-05 PROCEDURE — 85610 PROTHROMBIN TIME: CPT | Performed by: PSYCHIATRY & NEUROLOGY

## 2019-12-05 PROCEDURE — 89051 BODY FLUID CELL COUNT: CPT | Performed by: PSYCHIATRY & NEUROLOGY

## 2019-12-05 PROCEDURE — 89050 BODY FLUID CELL COUNT: CPT | Performed by: PSYCHIATRY & NEUROLOGY

## 2019-12-05 PROCEDURE — 87483 CNS DNA AMP PROBE TYPE 12-25: CPT | Performed by: PSYCHIATRY & NEUROLOGY

## 2019-12-05 PROCEDURE — 86592 SYPHILIS TEST NON-TREP QUAL: CPT | Performed by: PSYCHIATRY & NEUROLOGY

## 2019-12-05 PROCEDURE — 62270 DX LMBR SPI PNXR: CPT

## 2019-12-05 PROCEDURE — 82040 ASSAY OF SERUM ALBUMIN: CPT | Performed by: PSYCHIATRY & NEUROLOGY

## 2019-12-05 PROCEDURE — 82945 GLUCOSE OTHER FLUID: CPT | Performed by: PSYCHIATRY & NEUROLOGY

## 2019-12-05 PROCEDURE — 82164 ANGIOTENSIN I ENZYME TEST: CPT | Performed by: PSYCHIATRY & NEUROLOGY

## 2019-12-05 PROCEDURE — 82784 ASSAY IGA/IGD/IGG/IGM EACH: CPT | Performed by: PSYCHIATRY & NEUROLOGY

## 2019-12-05 PROCEDURE — 87070 CULTURE OTHR SPECIMN AEROBIC: CPT | Performed by: PSYCHIATRY & NEUROLOGY

## 2019-12-05 PROCEDURE — 86617 LYME DISEASE ANTIBODY: CPT | Performed by: PSYCHIATRY & NEUROLOGY

## 2019-12-05 PROCEDURE — 81025 URINE PREGNANCY TEST: CPT | Performed by: PSYCHIATRY & NEUROLOGY

## 2019-12-05 PROCEDURE — 82042 OTHER SOURCE ALBUMIN QUAN EA: CPT | Performed by: PSYCHIATRY & NEUROLOGY

## 2019-12-05 RX ORDER — ACETAMINOPHEN 325 MG/1
975 TABLET ORAL ONCE
Status: COMPLETED | OUTPATIENT
Start: 2019-12-05 | End: 2019-12-05

## 2019-12-05 RX ORDER — LIDOCAINE HYDROCHLORIDE 10 MG/ML
5 INJECTION, SOLUTION INFILTRATION; PERINEURAL
Status: COMPLETED | OUTPATIENT
Start: 2019-12-05 | End: 2019-12-05

## 2019-12-05 RX ADMIN — ACETAMINOPHEN 975 MG: 325 TABLET ORAL at 16:04

## 2019-12-05 RX ADMIN — LIDOCAINE HYDROCHLORIDE 3 ML: 10 INJECTION, SOLUTION INFILTRATION; PERINEURAL at 15:15

## 2019-12-05 NOTE — DISCHARGE INSTRUCTIONS
Lumbar Puncture   WHAT YOU NEED TO KNOW:   Lumbar puncture (LP) is a procedure in which a needle is inserted in your back and into your spinal canal  This is usually done to collect cerebrospinal fluid (CSF) to check for an infection, inflammation, bleeding, or other conditions that affect the brain  CSF is a clear, protective fluid that flows around the brain and inside the spinal canal  LP may also be done to remove CSF to reduce pressure in the brain  DISCHARGE INSTRUCTIONS:   Medicines:   · Acetaminophen: This medicine decreases pain and lowers a fever  It is available without a doctor's order  Ask how much to take and how often to take it  Follow directions  Acetaminophen can cause liver damage  · NSAIDs:  These medicines decrease swelling, pain, and fever  NSAIDs are available without a doctor's order  Ask your healthcare provider which medicine is right for you and how much to take  Take as directed  NSAIDs can cause stomach bleeding or kidney problems if not taken correctly  · Pain medicine: You may be given a prescription medicine to decrease severe pain  Do not wait until the pain is severe before you take more pain medicine  · Take your medicine as directed  Contact your healthcare provider if you think your medicine is not helping or if you have side effects  Tell him or her if you are allergic to any medicine  Keep a list of the medicines, vitamins, and herbs you take  Include the amounts, and when and why you take them  Bring the list or the pill bottles to follow-up visits  Carry your medicine list with you in case of an emergency  Follow up with your healthcare provider as directed:  Write down your questions so you remember to ask them during your visits  Post-lumbar puncture headache: You may develop a headache during the first few hours after your LP that may last for several days  The headache may be mild to severe and may get worse when you sit or stand   The following may help ease a post-lumbar puncture headache:  · Drink plenty of liquids: You should drink more liquid than usual after your LP  Ask how much liquid is right for you  Caffeine may be used to treat a headache  Drinks, such as coffee, tea, or some sodas, have caffeine  Caffeine is also available over the counter in tablet form  Ask about using caffeine to treat your headache  Do not drink alcohol  · Lie down: If you have a headache after your lumbar puncture, it may be helpful to lie down and rest   Contact your healthcare provider if:   · You have questions or concerns about your condition or care  Seek care immediately or call 911 if:   · You have a severe headache that does not get better after you lie down  · You have a fever  · You have a stiff neck or have trouble thinking clearly  · Your legs, feet, or other parts below the waist feel numb, tingly, or weak  · You have bleeding or a discharge coming from the area where the needle was put into your back  · You have severe pain in your back or neck  © 2017 [facility] Information is for End User's use only and may not be sold, redistributed or otherwise used for commercial purposes  All illustrations and images included in CareNotes® are the copyrighted property of A D A M [employer] Inc  or [employer] Hatch  The above information is an  only  It is not intended as medical advice for individual conditions or treatments  Talk to your doctor, nurse or pharmacist before following any medical regimen to see if it is safe and effective for you

## 2019-12-06 LAB — GRAM STN SPEC: NORMAL

## 2019-12-08 LAB — BACTERIA CSF CULT: NO GROWTH

## 2019-12-09 ENCOUNTER — TELEPHONE (OUTPATIENT)
Dept: NEUROLOGY | Facility: CLINIC | Age: 29
End: 2019-12-09

## 2019-12-09 DIAGNOSIS — G37.9 CNS DEMYELINATION (HCC): Primary | ICD-10-CM

## 2019-12-09 DIAGNOSIS — G43.009 MIGRAINE WITHOUT AURA AND WITHOUT STATUS MIGRAINOSUS, NOT INTRACTABLE: ICD-10-CM

## 2019-12-09 LAB
ACE CSF-CCNC: <1.5 U/L
GALACTOMANNAN AG SERPL IA-ACNC: 0.16
JCPYV AB SERPL QL IA: NEGATIVE
JCPYV AB SERPL QL IA: NORMAL
REAGIN AB CSF QL: NON REACTIVE

## 2019-12-09 NOTE — TELEPHONE ENCOUNTER
pt called back and states that she is taking decadron 2mg daily for the last 3 days and prochlorperazine 10mg 1 tab every 12 hours also for the last 3 days  + light sensitivity, eyes are sore, to/back of her head is sore, head feels hot, +dizziness when she moves to quick  topiramate 25mg 1 tab at hs  only taking meclizine prn and has not taken this in the last 3 days  she has not taken fioricet in the past    states that she has only taken tylenol or aleve in the past   she states that headache feels better when she is laying down and comes back as soon as she sits up  she states that she had a LP done on 12/5      please advise  [phone] to leave a detailed message

## 2019-12-09 NOTE — TELEPHONE ENCOUNTER
Sounds like she is having a post-LP headache    Would refer to the ER so they can try to abort it, may need blood patch

## 2019-12-09 NOTE — TELEPHONE ENCOUNTER
Please confirm if she means the decadron and compazine that Dr James Araujo prescribed are not working  I have not seen her before  Unsure what else she has tried  I see Imitrex is on her allergy list   Has she taken Fioricet ever?

## 2019-12-09 NOTE — TELEPHONE ENCOUNTER
----- Message from Nina Delarosa MA sent at 12/9/2019  1:34 PM EST -----  Regarding: Prescription Question  Contact: [phone]  Please review patient's My Chart message  Thank you     ----- Message -----  From: Miller Montero  Sent: 12/9/2019   1:17 PM EST  To: [facility] [place] [facility]  Subject: Prescription Question                            Is there another medication that i can be perscribed for my migraines  Ivonne had a migraine two days in a row, have not been able to get out of bed  The two pills i am suppose to take are not working  It seems to make it worse  Thank you

## 2019-12-10 ENCOUNTER — HOSPITAL ENCOUNTER (EMERGENCY)
Facility: HOSPITAL | Age: 29
Discharge: HOME | End: 2019-12-10
Attending: EMERGENCY MEDICINE | Admitting: EMERGENCY MEDICINE
Payer: COMMERCIAL

## 2019-12-10 VITALS
RESPIRATION RATE: 16 BRPM | WEIGHT: 104 LBS | TEMPERATURE: 99 F | HEART RATE: 90 BPM | DIASTOLIC BLOOD PRESSURE: 101 MMHG | SYSTOLIC BLOOD PRESSURE: 125 MMHG | OXYGEN SATURATION: 100 % | BODY MASS INDEX: 17.85 KG/M2

## 2019-12-10 DIAGNOSIS — G44.209 ACUTE TENSION HEADACHE: Primary | ICD-10-CM

## 2019-12-10 LAB
ALB CSF/SERPL: 4 {RATIO} (ref 0–8)
ALBUMIN CSF-MCNC: 20 MG/DL (ref 11–48)
ALBUMIN SERPL-MCNC: 4.8 G/DL (ref 3.5–5.5)
B BURGDOR DNA SPEC QL NAA+PROBE: NEGATIVE
B BURGDOR IGG PATRN CSF IB-IMP: NEGATIVE
B BURGDOR IGM PATRN CSF IB-IMP: NEGATIVE
B BURGDOR18KD IGG CSF QL IB: NORMAL
B BURGDOR23KD IGG CSF QL IB: NORMAL
B BURGDOR23KD IGM CSF QL IB: NORMAL
B BURGDOR28KD IGG CSF QL IB: NORMAL
B BURGDOR30KD IGG CSF QL IB: NORMAL
B BURGDOR39KD IGG CSF QL IB: NORMAL
B BURGDOR39KD IGM CSF QL IB: NORMAL
B BURGDOR41KD IGG CSF QL IB: NORMAL
B BURGDOR41KD IGM CSF QL IB: NORMAL
B BURGDOR45KD IGG CSF QL IB: NORMAL
B BURGDOR58KD IGG CSF QL IB: NORMAL
B BURGDOR66KD IGG CSF QL IB: NORMAL
B BURGDOR93KD IGG CSF QL IB: NORMAL
IGG CSF-MCNC: 5.7 MG/DL (ref 0–8.6)
IGG SERPL-MCNC: 1119 MG/DL (ref 700–1600)
IGG SYNTH RATE SER+CSF CALC-MRATE: 13.8 MG/DAY
IGG/ALB CLEAR SER+CSF-RTO: 1.2 (ref 0–0.7)
IGG/ALB CSF: 0.29 {RATIO} (ref 0–0.25)
MBP CSF-MCNC: 4.1 NG/ML (ref 0–1.2)
OLIGOCLONAL BANDS.IT SER+CSF QL: ABNORMAL

## 2019-12-10 PROCEDURE — 99284 EMERGENCY DEPT VISIT MOD MDM: CPT | Performed by: EMERGENCY MEDICINE

## 2019-12-10 PROCEDURE — 99283 EMERGENCY DEPT VISIT LOW MDM: CPT

## 2019-12-10 PROCEDURE — 64450 NJX AA&/STRD OTHER PN/BRANCH: CPT | Performed by: EMERGENCY MEDICINE

## 2019-12-10 RX ORDER — BUPIVACAINE HYDROCHLORIDE 5 MG/ML
20 INJECTION, SOLUTION EPIDURAL; INTRACAUDAL ONCE
Status: COMPLETED | OUTPATIENT
Start: 2019-12-10 | End: 2019-12-10

## 2019-12-10 RX ADMIN — BUPIVACAINE HYDROCHLORIDE 20 ML: 5 INJECTION, SOLUTION EPIDURAL; INTRACAUDAL; PERINEURAL at 10:16

## 2019-12-10 NOTE — ED PROVIDER NOTES
History  Chief Complaint   Patient presents with    Back Pain     here for a "blood patch after a spinal tap"     notes periodic back pain with movement      25-year-old female referred to the ER for blood patch after a lumbar puncture 6 days ago  Her symptoms are primarily back pain worse with flexion as well as a "migraine" which is localized to the occiput and neck as well as bilateral supraorbital regions  Symptoms are not particularly worse when she sits or stands  They are improved when she lies supine, however these do not seem to be consistent with postdural puncture headache  Symptoms rated as moderately severe  Constant since lumbar puncture  But primarily localized to her back  No other alleviating or exacerbating factors noted  Prior to Admission Medications   Prescriptions Last Dose Informant Patient Reported? Taking?    Cyanocobalamin 1000 MCG SUBL 12/9/2019 at Unknown time Self No Yes   Sig: Place 1 tablet (1,000 mcg total) under the tongue daily   ergocalciferol (VITAMIN D2) 50,000 units 12/9/2019 at Unknown time Self No Yes   Sig: Take 1 capsule (50,000 Units total) by mouth once a week   meclizine (ANTIVERT) 25 mg tablet 12/9/2019 at Unknown time Self No Yes   Sig: Take 1 tablet (25 mg total) by mouth every 8 (eight) hours as needed for dizziness   medroxyPROGESTERone (DEPO-PROVERA) 150 mg/mL injection More than a month at Unknown time Self No No   Sig: Inject 1 mL (150 mg total) into a muscle every 3 (three) months   topiramate (TOPAMAX) 25 mg sprinkle capsule 12/9/2019 at Unknown time Self No Yes   Sig: Take 1 capsule (25 mg total) by mouth daily      Facility-Administered Medications: None       Past Medical History:   Diagnosis Date    Asthma     Depression        Past Surgical History:   Procedure Laterality Date    [place] LUMBAR PUNCTURE  12/5/2019    HERNIA REPAIR  2016    TONSILLECTOMY Bilateral 1994       Family History   Problem Relation Age of Onset    Coronary artery disease Paternal Grandmother     Hypertension Paternal Grandfather         TYPE 2    Diabetes Paternal Grandfather     Pancreatic cancer Paternal Grandfather     Lung cancer Paternal Grandfather      I have reviewed and agree with the history as documented  Social History     Tobacco Use    Smoking status: Current Every Day Smoker     Packs/day: 1 00     Years: 9 00     Pack years: 9 00     Types: Cigarettes    Smokeless tobacco: Never Used   Substance Use Topics    Alcohol use: No     Frequency: Never     Binge frequency: Never    Drug use: No        Review of Systems   Constitutional: Negative for chills, diaphoresis, fatigue and fever  HENT: Negative for sore throat  Respiratory: Negative for cough and shortness of breath  Cardiovascular: Negative for chest pain and leg swelling  Gastrointestinal: Negative for abdominal pain, nausea and vomiting  Musculoskeletal: Positive for back pain  Negative for gait problem, neck pain and neck stiffness  Skin: Negative for rash and wound  Neurological: Positive for headaches  Negative for syncope, weakness, light-headedness and numbness  All other systems reviewed and are negative  Physical Exam  Physical Exam   Constitutional: She is oriented to person, place, and time  She appears well-developed and well-nourished  No distress  HENT:   Head: Normocephalic and atraumatic  Nose: Nose normal    Mouth/Throat: Oropharynx is clear and moist    Eyes: Pupils are equal, round, and reactive to light  Conjunctivae and EOM are normal  Right eye exhibits no discharge  Left eye exhibits no discharge  Neck: Normal range of motion  Neck supple  Cardiovascular: Normal rate, regular rhythm and normal heart sounds  Exam reveals no friction rub  No murmur heard  Pulmonary/Chest: Effort normal and breath sounds normal  No respiratory distress  She has no wheezes  Abdominal: Soft  Bowel sounds are normal  She exhibits no distension   There is no tenderness  Musculoskeletal: Normal range of motion  She exhibits tenderness  She exhibits no edema or deformity  Mild tenderness palpation in the low back paraspinal muscles bilaterally  Neurological: She is alert and oriented to person, place, and time  No sensory deficit  She exhibits normal muscle tone  Skin: Skin is warm and dry  Capillary refill takes less than 2 seconds  No rash noted  She is not diaphoretic  Psychiatric: She has a normal mood and affect  Nursing note and vitals reviewed  Vital Signs  ED Triage Vitals [12/10/19 0848]   Temperature Pulse Respirations Blood Pressure SpO2   99 °F (37 2 °C) 90 16 (!) 125/101 100 %      Temp src Heart Rate Source Patient Position - Orthostatic VS BP Location FiO2 (%)   -- -- -- -- --      Pain Score       6           Vitals:    12/10/19 0848   BP: (!) 125/101   Pulse: 90         Visual Acuity  Visual Acuity      Most Recent Value   L Pupil Size (mm)  3   R Pupil Size (mm)  3          ED Medications  Medications   bupivacaine (PF) (MARCAINE) 0 5 % injection 20 mL (20 mL Injection Given 12/10/19 1016)       Diagnostic Studies  Results Reviewed     None                 No orders to display              Procedures  Nerve block  Date/Time: 12/10/2019 10:30 AM  Performed by: Jenniffer Kyle DO  Authorized by: Jenniffer Kyle DO     Patient location:  ED  Consent:     Consent obtained:  Verbal  Location:     Body area:  Head    Head nerve:  Greater occipital    Nerve type:  Peripheral    Laterality:  Bilateral  Pre-procedure details:     Skin preparation:  Alcohol  Procedure details (see MAR for exact dosages):      Block needle gauge:  27 G    Anesthetic injected:  Bupivacaine 0 5% w/o epi    Steroid injected:  None    Additive injected:  None    Injection procedure:  Anatomic landmarks palpated, anatomic landmarks identified, incremental injection, introduced needle and negative aspiration for blood  Post-procedure details:     Dressing: None    Outcome:  Anesthesia achieved    Patient tolerance of procedure: Tolerated well, no immediate complications  Comments:      Patient had complete relief of headache after greater occipital nerve block request discharge  ED Course  ED Course as of Dec 10 1619   Tue Dec 10, 2019   1032 B GONB                                  MDM      Disposition  Final diagnoses:   Acute tension headache     Time reflects when diagnosis was documented in both MDM as applicable and the Disposition within this note     Time User Action Codes Description Comment    12/10/2019 11:13 AM Hilaria Mary Add [R01 [id number]] Acute tension headache       ED Disposition     ED Disposition Condition Date/Time Comment    Discharge Stable Tue Dec 10, 2019 11:13 AM Valeria Eisenmenger 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  Follow-up Information     Follow up With Specialties Details Why Pepe Echeverria MD Family Medicine  As needed [address]  [address]            Discharge Medication List as of 12/10/2019 11:13 AM      CONTINUE these medications which have NOT CHANGED    Details   Cyanocobalamin 1000 MCG SUBL Place 1 tablet (1,000 mcg total) under the tongue daily, Starting Sun 12/1/2019, Normal      ergocalciferol (VITAMIN D2) 50,000 units Take 1 capsule (50,000 Units total) by mouth once a week, Starting Fri 11/29/2019, Normal      meclizine (ANTIVERT) 25 mg tablet Take 1 tablet (25 mg total) by mouth every 8 (eight) hours as needed for dizziness, Starting Fri 11/29/2019, Normal      topiramate (TOPAMAX) 25 mg sprinkle capsule Take 1 capsule (25 mg total) by mouth daily, Starting Tue 10/8/2019, Normal      medroxyPROGESTERone (DEPO-PROVERA) 150 mg/mL injection Inject 1 mL (150 mg total) into a muscle every 3 (three) months, Starting Tue 10/8/2019, Normal           No discharge procedures on file      ED Provider  Electronically Signed by           Emerald Beckett DO  12/10/19 1449 [facility]

## 2019-12-11 RX ORDER — DIVALPROEX SODIUM 250 MG/1
250 TABLET, DELAYED RELEASE ORAL 2 TIMES DAILY
Qty: 14 TABLET | Refills: 0 | Status: SHIPPED | OUTPATIENT
Start: 2019-12-11 | End: 2019-12-19 | Stop reason: ALTCHOICE

## 2019-12-11 NOTE — TELEPHONE ENCOUNTER
Pt called w/ an update  Pt states that she went to  ed yesterday  States that blood patch was not done bec she is not showing any signs that she needed a blood patch  She was given 1x bupivacaine which helped for few minutes  ER notes chief complaint is backpain  Pt denies backpain-unsure why this was written on the ED notes  Pt states that her main complaint is worsening migraine  Pls see ed notes  Still c/o worsening migraine,"my brain is sore like a bad hangover" +sensitive to light  +vomited x 4 last night  Decadron and compazine did not help  Requesting alt meds be sent to [address] on file          [phone] ok to leave detailed message

## 2019-12-11 NOTE — TELEPHONE ENCOUNTER
Called patient  It is unfortunate she did not get treatment for her HA in the ER  They gave her an occipital nerve block, nothing else  She rates the HA as 4-5 when laying down, then 8-9 when standing up with pounding and pressure  She had nausea and vomiting last night  At this time, I want her to try medication before attempting to set up a blood patch  I will start depakote 250mg BID and have her stop topamax for right now  She will take a depakote tonight, and then tomorrow AM and call me tomorrow morning/afternoon and let me know how she is doing      I actually spoke with her early this AM after her initial call, and then again just now at 3pm and she was starting to feel a bit better with significant hydration

## 2019-12-12 ENCOUNTER — HOSPITAL ENCOUNTER (OUTPATIENT)
Dept: NON INVASIVE DIAGNOSTICS | Facility: HOSPITAL | Age: 29
Discharge: HOME | End: 2019-12-12
Payer: COMMERCIAL

## 2019-12-12 DIAGNOSIS — R00.2 PALPITATION: ICD-10-CM

## 2019-12-12 PROCEDURE — 93226 XTRNL ECG REC<48 HR SCAN A/R: CPT

## 2019-12-12 PROCEDURE — 93225 XTRNL ECG REC<48 HRS REC: CPT

## 2019-12-12 NOTE — TELEPHONE ENCOUNTER
I am glad she is feeling better! I want her to take the Depakote 250mg BID x 3 days (she likely took one last night, so I would have her take BID today, tomorrow and Sat) and then stop    She can call on Monday and update how she is doing, and of course call before that if any changes/worsening symptoms

## 2019-12-12 NOTE — TELEPHONE ENCOUNTER
Patient calling to let you know that the depakote is working  She states that she is finally feeling good  Her headache broke and the soreness is gone  She wants to thank you  This is the first time in 6 days that she is feeling relief  She will continue with this  Please advise if you want her to do anything else

## 2019-12-15 LAB — MISCELLANEOUS LAB TEST RESULT: NORMAL

## 2019-12-16 ENCOUNTER — TELEPHONE (OUTPATIENT)
Dept: NEUROLOGY | Facility: CLINIC | Age: 29
End: 2019-12-16

## 2019-12-16 DIAGNOSIS — G44.89 OTHER HEADACHE SYNDROME: Primary | ICD-10-CM

## 2019-12-16 NOTE — TELEPHONE ENCOUNTER
(more information in encounter 12/9/10)    Pt returns calls to state that she is still having migraine however she does not have relief with position changes  She states that migraine pain is consistent and throbbing located in the back of head and top  7/10 pain  Pt have nausea, throbbing, light sensitive and dizziness  Pt done now with depakote  Pt has been trying Aleve without relief       Okay to leave detailed message [phone]

## 2019-12-16 NOTE — TELEPHONE ENCOUNTER
Please call patient to see how she is today  If still have horrible headache that improves with laying down then she will need to go back to ER for possible blood patch  ----- Message from Cyndi Witt MA sent at 12/16/2019  7:14 AM EST -----  Regarding: FW: Non-Urgent Medical Question  Contact: [phone]      ----- Message -----  From: Marylen Agee  Sent: 12/15/2019  11:31 AM EST  To: [facility] [place] [facility]  Subject: Non-Urgent Medical Question                      The medications that were given to me were working  Saturday night i had a pounding headache took the pill before bed, woke up sunday morning with a horrible migraine making me nauseated  Did my body get immune to the medication so quickly and it has stopped working?

## 2019-12-16 NOTE — TELEPHONE ENCOUNTER
Attempted to call the patient  No answer but did leave a detailed message and to give our office a call back

## 2019-12-17 PROCEDURE — 93227 XTRNL ECG REC<48 HR R&I: CPT | Performed by: INTERNAL MEDICINE

## 2019-12-17 NOTE — TELEPHONE ENCOUNTER
Called patient and states that the headache is still at a 7/10  Did state that it was a 9/10 this morning  The only thing that is making the headache better is drinking ice cold water and an ice pack on her head  This makes it somewhat manageable  Patient is agitated because she is not getting any answers and she keeps having to explain herself  I explained that you are figuring out what kind of headache she has in order to treat the headache  Patient understands      Please Advise

## 2019-12-18 ENCOUNTER — HOSPITAL ENCOUNTER (EMERGENCY)
Facility: HOSPITAL | Age: 29
Discharge: HOME | End: 2019-12-18
Attending: EMERGENCY MEDICINE | Admitting: EMERGENCY MEDICINE
Payer: COMMERCIAL

## 2019-12-18 VITALS
TEMPERATURE: 98.4 F | OXYGEN SATURATION: 99 % | RESPIRATION RATE: 18 BRPM | BODY MASS INDEX: 17.85 KG/M2 | HEART RATE: 98 BPM | SYSTOLIC BLOOD PRESSURE: 151 MMHG | DIASTOLIC BLOOD PRESSURE: 105 MMHG | WEIGHT: 104 LBS

## 2019-12-18 DIAGNOSIS — R51.9 RECURRENT OCCIPITAL HEADACHE: Primary | ICD-10-CM

## 2019-12-18 PROCEDURE — 99283 EMERGENCY DEPT VISIT LOW MDM: CPT

## 2019-12-18 PROCEDURE — 99284 EMERGENCY DEPT VISIT MOD MDM: CPT | Performed by: EMERGENCY MEDICINE

## 2019-12-18 RX ORDER — METOCLOPRAMIDE HYDROCHLORIDE 5 MG/ML
10 INJECTION INTRAMUSCULAR; INTRAVENOUS ONCE
Status: DISCONTINUED | OUTPATIENT
Start: 2019-12-18 | End: 2019-12-18

## 2019-12-18 RX ORDER — KETOROLAC TROMETHAMINE 30 MG/ML
15 INJECTION, SOLUTION INTRAMUSCULAR; INTRAVENOUS ONCE
Status: DISCONTINUED | OUTPATIENT
Start: 2019-12-18 | End: 2019-12-18

## 2019-12-18 RX ORDER — AMITRIPTYLINE HYDROCHLORIDE 10 MG/1
10 TABLET, FILM COATED ORAL
Qty: 30 TABLET | Refills: 0 | Status: SHIPPED | OUTPATIENT
Start: 2019-12-18 | End: 2020-01-16 | Stop reason: ALTCHOICE

## 2019-12-18 NOTE — TELEPHONE ENCOUNTER
Patient states she is unable to go to ER today as she absolutely cannot leave work early  She is agreeable to going to ER after work  She gets off at 5pm  She will go to [facility]  ADT21 order entered

## 2019-12-19 ENCOUNTER — OFFICE VISIT (OUTPATIENT)
Dept: FAMILY MEDICINE CLINIC | Facility: CLINIC | Age: 29
End: 2019-12-19
Payer: COMMERCIAL

## 2019-12-19 ENCOUNTER — TELEPHONE (OUTPATIENT)
Dept: NEUROLOGY | Facility: CLINIC | Age: 29
End: 2019-12-19

## 2019-12-19 VITALS
DIASTOLIC BLOOD PRESSURE: 80 MMHG | BODY MASS INDEX: 19.14 KG/M2 | SYSTOLIC BLOOD PRESSURE: 120 MMHG | TEMPERATURE: 99.4 F | HEIGHT: 62 IN | WEIGHT: 104 LBS | OXYGEN SATURATION: 98 % | HEART RATE: 88 BPM

## 2019-12-19 DIAGNOSIS — R20.2 NUMBNESS AND TINGLING OF BOTH LEGS: Primary | ICD-10-CM

## 2019-12-19 DIAGNOSIS — G35 MS (MULTIPLE SCLEROSIS) (HCC): Primary | ICD-10-CM

## 2019-12-19 DIAGNOSIS — R20.0 NUMBNESS AND TINGLING OF BOTH LEGS: Primary | ICD-10-CM

## 2019-12-19 PROCEDURE — 99214 OFFICE O/P EST MOD 30 MIN: CPT | Performed by: FAMILY MEDICINE

## 2019-12-19 PROCEDURE — 3008F BODY MASS INDEX DOCD: CPT | Performed by: FAMILY MEDICINE

## 2019-12-19 PROCEDURE — 4004F PT TOBACCO SCREEN RCVD TLK: CPT | Performed by: FAMILY MEDICINE

## 2019-12-19 NOTE — TELEPHONE ENCOUNTER
pt called for LP results, asking if she has MS  she states that she saw pcp as she is having symptoms of MS   she states that her legs are giving out, numbness and tingling from her knees to her feet  they ordered [facility]   please review LP results and advise  [phone] to leave a detailed message    she also states that 2 dr's and the supervisor said that she can not get a blood patch for her headaches becuase there are no signs of LP failure and LP was done so long ago

## 2019-12-19 NOTE — ED PROVIDER NOTES
History  Chief Complaint   Patient presents with    Headache     here with HA  "I'm here for a blood patch " +N -v +D  Saturday started with migraine type HA pain  19-year-old female with past medical history of multiple sclerosis and chronic headaches who is presenting due to frequent headaches  Patient states that she had a lumbar puncture performed on December 5th to evaluate her MS  The procedure was performed this facility and went well without any evident complications  Patient was seen on December 10th for headache  The physician who evaluated her did not believe that the headache was secondary to the lumbar puncture and treated her with a greater occipital nerve block  Patient reports that this gave her complete relief of symptoms for 3 days when the headache returned  The patient states that she has had headaches every couple of days for many years  She states that the headaches that she has been having recently are typical of her usual headache  Patient states that her usual headaches are typically located near the top part of her neck and into the occipital region with a burning sensation near the vertex of her head  Patient states that the headaches will occur every couple days without a clear precipitating factor  She denies any aura  Patient denies any photophobia or phonophobia associated with headaches  The headaches are not positional   She denies any nausea or vomiting associated with the headaches  Patient has seen Neurology regarding her headaches  She was diagnosed with possible migraine  Patient states that her Neurologist placed her on both Depakote and Topamax which were ineffective for headache prophylaxis  She is not currently taking any medications for headache prophylaxis  Patient reports that she has been using Excedrin, cold compresses, and cold fluids to treat her headaches with significant relief    Patient had a headache this morning but denies headache at this time   She is coming to the ER because she desires a medication for headache prophylaxis  Patient denies any neck pain or neck stiffness  No fever, chills, vision changes, focal numbness or weakness, chest pain or pressure, shortness of breath, nausea, vomiting, abdominal pain, or any other complaints at this time  Urine pregnancy on December 5th was negative  Prior to Admission Medications   Prescriptions Last Dose Informant Patient Reported? Taking? Cyanocobalamin 1000 MCG SUBL  Self No No   Sig: Place 1 tablet (1,000 mcg total) under the tongue daily   divalproex sodium (DEPAKOTE) 250 mg EC tablet 12/18/2019 at Unknown time  No Yes   Sig: Take 1 tablet (250 mg total) by mouth 2 (two) times a day   ergocalciferol (VITAMIN D2) 50,000 units  Self No No   Sig: Take 1 capsule (50,000 Units total) by mouth once a week   meclizine (ANTIVERT) 25 mg tablet  Self No No   Sig: Take 1 tablet (25 mg total) by mouth every 8 (eight) hours as needed for dizziness   medroxyPROGESTERone (DEPO-PROVERA) 150 mg/mL injection More than a month at Unknown time Self No No   Sig: Inject 1 mL (150 mg total) into a muscle every 3 (three) months   topiramate (TOPAMAX) 25 mg sprinkle capsule  Self No No   Sig: Take 1 capsule (25 mg total) by mouth daily      Facility-Administered Medications: None       Past Medical History:   Diagnosis Date    Asthma     Depression        Past Surgical History:   Procedure Laterality Date    [place] LUMBAR PUNCTURE  12/5/2019    HERNIA REPAIR  2016    TONSILLECTOMY Bilateral 1994       Family History   Problem Relation Age of Onset    Coronary artery disease Paternal Grandmother     Hypertension Paternal Grandfather         TYPE 2    Diabetes Paternal Grandfather     Pancreatic cancer Paternal Grandfather     Lung cancer Paternal Grandfather      I have reviewed and agree with the history as documented      Social History     Tobacco Use    Smoking status: Current Every Day Smoker Packs/day: 1 00     Years: 9 00     Pack years: 9 00     Types: Cigarettes    Smokeless tobacco: Never Used   Substance Use Topics    Alcohol use: No     Frequency: Never     Binge frequency: Never    Drug use: No        Review of Systems   Constitutional: Negative for diaphoresis, fever and unexpected weight change  HENT: Negative for congestion, rhinorrhea and sore throat  Eyes: Negative for pain, discharge and visual disturbance  Respiratory: Negative for cough, shortness of breath and wheezing  Cardiovascular: Negative for chest pain, palpitations and leg swelling  Gastrointestinal: Negative for abdominal pain, blood in stool, constipation, diarrhea, nausea and vomiting  Genitourinary: Negative for dysuria, flank pain and hematuria  Musculoskeletal: Negative for arthralgias and joint swelling  Skin: Negative for rash and wound  Allergic/Immunologic: Negative for environmental allergies and food allergies  Neurological: Positive for headaches  Negative for dizziness, seizures, weakness and numbness  Hematological: Negative for adenopathy  Psychiatric/Behavioral: Negative for confusion and hallucinations  Physical Exam  ED Triage Vitals [12/18/19 1909]   Temperature Pulse Respirations Blood Pressure SpO2   98 4 °F (36 9 °C) 98 18 (!) 151/105 99 %      Temp src Heart Rate Source Patient Position - Orthostatic VS BP Location FiO2 (%)   -- -- -- -- --      Pain Score       4             Orthostatic Vital Signs  Vitals:    12/18/19 1909   BP: (!) 151/105   Pulse: 98       Physical Exam   Constitutional: She is oriented to person, place, and time  She appears well-developed and well-nourished  No distress  HENT:   Head: Normocephalic and atraumatic  Right Ear: External ear normal    Left Ear: External ear normal    Eyes: Pupils are equal, round, and reactive to light  Conjunctivae and EOM are normal    Neck: Normal range of motion  Neck supple  No palpable neck muscle spasms  Patient has full neck range of motion without pain  She does have tenderness to palpation in the upper neck near the junction with the occiput  She has some tenderness over the greater and lesser occipital nerves bilaterally  Palpation of these nerves does not reproduce the burning sensation at the vertex of her head  Cardiovascular: Normal rate, regular rhythm and normal heart sounds  No murmur heard  Pulmonary/Chest: Effort normal and breath sounds normal  No respiratory distress  She has no wheezes  She has no rales  Abdominal: Soft  Bowel sounds are normal  She exhibits no distension  There is no tenderness  There is no guarding  Musculoskeletal: Normal range of motion  She exhibits no deformity  Neurological: She is alert and oriented to person, place, and time  Patient is alert and oriented to time, person, place, and situation  Speech is fluent with no aphasia or dysarthria  CN II-XII are intact  Strength is 5/5 in the upper and lower extremities bilaterally  Sensation grossly intact  No dysmetria on finger to nose testing  No pronator drift  Skin: Skin is warm and dry  Capillary refill takes less than 2 seconds  Psychiatric: She has a normal mood and affect  Her behavior is normal    Nursing note and vitals reviewed  ED Medications  Medications - No data to display    Diagnostic Studies  Results Reviewed     None                 No orders to display         Procedures  Procedures      ED Course  ED Course as of Dec 18 2220   Wed Dec 18, 2019   2031 Neurology paged to discuss case  2033 Patient states that she no longer wants to wait for me to discuss treatment options with Neurology  Will discharge patient per her request   She is medically stable at this time  2051 Spoke with Dr Karla Cole from Neurology  We discussed the case    Given that the patient had tenderness to palpation at the base of the occiput with burning pain at the vertex suggestive of possible occipital neuralgia, we decided to try amitriptyline for treatment of occipital neuralgia and possible migraines  Will start patient on low-dose  Discussed possible side effects with the patient including lightheadedness, dizziness, drowsiness, urinary retention, and orthostasis  Patient was agreeable to try medication  MDM  Number of Diagnoses or Management Options  Recurrent occipital headache: established and worsening  Diagnosis management comments:     Patient presented with frequent headaches as described above  The headaches had been ongoing for several years and would occur every couple days  Patient reported that the pain primarily was located in the lower occipital region/upper neck with a burning sensation at the vertex of her head  Patient had no headache on presentation  The headaches were not typical for migraine  Patient did recently undergo lumbar puncture 13 days prior to presentation, but the headache was not suggestive of post lumbar puncture headache  Patient had been on Topamax and Depakote for headache prophylaxis but these were ineffective  Physical examination was completely unremarkable  As above, patient was completely asymptomatic on presentation  Overall, her headaches were suggestive of occipital neuralgia or cervicogenic headache  They were atypical for migraine although migraine still was felt to be possible  Case was discussed with Neurology for additional recommendations  They agreed with our suggestion of starting amitriptyline as a prophylactic agent for possible migraine  This would also treat possible occipital neuralgia  Patient was counseled regarding possible side effects of amitriptyline  I advised patient to follow up with a Neurologist for additional treatment  Also provided her with contact information for Pain Management  Discussed return precautions  Patient verbalized understanding         Amount and/or Complexity of Data Reviewed  Decide to obtain previous medical records or to obtain history from someone other than the patient: yes  Review and summarize past medical records: yes  Discuss the patient with other providers: yes    Risk of Complications, Morbidity, and/or Mortality  Presenting problems: low  Diagnostic procedures: minimal  Management options: minimal    Patient Progress  Patient progress: improved        Disposition  Final diagnoses:   Recurrent occipital headache     Time reflects when diagnosis was documented in both MDM as applicable and the Disposition within this note     Time User Action Codes Description Comment    12/18/2019  8:53 PM Mi Ponce [R51] Recurrent occipital headache       ED Disposition     ED Disposition Condition Date/Time Comment    Discharge Good Wed Dec 18, 2019  8:53 PM Gage Connell 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  Follow-up Information     Follow up With Specialties Details Why Contact Info Additional Information    Shreya Stinson MD Family Medicine Call  As needed  [address]  [address]        Gloria Rojas MD Neurology Call in 1 day Please make a follow-up appointment for within 1 week  [address] [facility] [address] [facility] 702 Radiology Call in 1 day Please call tomorrow and make a follow-up appointment for within 1 week   [address] [place] [address], [address] [place] [address], [facility]          Discharge Medication List as of 12/18/2019  8:56 PM      START taking these medications    Details   amitriptyline (ELAVIL) 10 mg tablet Take 1 tablet (10 mg total) by mouth daily at bedtime, Starting Wed 12/18/2019, Until Fri 1/17/2020, Print         CONTINUE these medications which have NOT CHANGED    Details   Cyanocobalamin 1000 MCG SUBL Place 1 tablet (1,000 mcg total) under the tongue daily, Starting Sun 12/1/2019, Normal      divalproex sodium (DEPAKOTE) 250 mg EC tablet Take 1 tablet (250 mg total) by mouth 2 (two) times a day, Starting Wed 12/11/2019, Normal      ergocalciferol (VITAMIN D2) 50,000 units Take 1 capsule (50,000 Units total) by mouth once a week, Starting Fri 11/29/2019, Normal      meclizine (ANTIVERT) 25 mg tablet Take 1 tablet (25 mg total) by mouth every 8 (eight) hours as needed for dizziness, Starting Fri 11/29/2019, Normal      medroxyPROGESTERone (DEPO-PROVERA) 150 mg/mL injection Inject 1 mL (150 mg total) into a muscle every 3 (three) months, Starting Tue 10/8/2019, Normal      topiramate (TOPAMAX) 25 mg sprinkle capsule Take 1 capsule (25 mg total) by mouth daily, Starting Tue 10/8/2019, Normal           No discharge procedures on file  ED Provider  Attending physically available and evaluated Ana Laura Aguilaranyi NAVARRETE managed the patient along with the ED Attending      Electronically Signed by         Aidan Marina MD  12/19/19 8633

## 2019-12-19 NOTE — PROGRESS NOTES
Subjective:   Chief Complaint   Patient presents with    Tingling     R knee    Other     feet cramping        Patient ID: Staci Leslie is a 29 y o  female  The patient and office concerned about the numbness and tingling in bilateral lower extremity and sporadic from peripheral to the center it has been going on and off for the last couple week no recent fall or trauma no back pain no fever no lose control of the urine or stool no rash and no muscle pain no history of upper story infection patient who been having the recently neurological symptom including dizziness and numbness and sometimes a urinary problem and she did have MRI of the brain and suspicion 40 minutes in disease patient been seen by Neurology who been doing some workup she did already order MRI of the cervical and thoracic spine and she did order spinal tap      The following portions of the patient's history were reviewed and updated as appropriate: allergies, current medications, past family history, past medical history, past social history, past surgical history and problem list     Review of Systems   Constitutional: Negative for fatigue and fever  HENT: Negative for ear pain, sinus pressure, sinus pain and sore throat  Eyes: Negative for pain and redness  Respiratory: Negative for cough, chest tightness and shortness of breath  Cardiovascular: Negative for chest pain, palpitations and leg swelling  Gastrointestinal: Negative for abdominal pain, blood in stool, constipation, diarrhea and nausea  Genitourinary: Negative for flank pain, frequency and hematuria  Musculoskeletal: Negative for back pain and joint swelling  Skin: Negative for rash  Neurological: Positive for numbness  Negative for dizziness, tremors, syncope, speech difficulty, weakness and headaches  Hematological: Does not bruise/bleed easily               Objective:  Vitals:    12/19/19 1251   BP: 120/80   Pulse: 88   Temp: 99 4 °F (37 4 °C) TempSrc: Tympanic   SpO2: 98%   Weight: 47 2 kg (104 lb)   Height: 5' 2" (1 575 m)      Physical Exam   Constitutional: She is oriented to person, place, and time  She appears well-developed and well-nourished  HENT:   Head: Normocephalic  Right Ear: External ear normal    Left Ear: External ear normal    Eyes: Conjunctivae and EOM are normal  Right eye exhibits no discharge  Left eye exhibits no discharge  Neck: No JVD present  Cardiovascular: Normal rate, regular rhythm and normal heart sounds  Exam reveals no gallop  No murmur heard  Pulmonary/Chest: Effort normal  No respiratory distress  She has no wheezes  She has no rales  She exhibits no tenderness  Abdominal: She exhibits no mass  There is no tenderness  There is no rebound  Musculoskeletal: She exhibits no edema or tenderness  Neurological: She is alert and oriented to person, place, and time  She displays normal reflexes  She exhibits normal muscle tone  Coordination normal    Skin: No rash noted  No erythema  Assessment/Plan:    Numbness and tingling of both legs  New diagnosis symptomatic ,patient recently had MRI of Brain possible demyelination disease ,discuss with patient present with numbness patient already seen by Neurology who order MRI of thoracic and cervical spine   patient already had spinal tap   Also plan for EMG  I did recommend to the patient to contact the Neurology office       Diagnoses and all orders for this visit:    Numbness and tingling of both legs  -     EMG 1 Limb; Future  -     EMG 2 limb lower extremity;  Future

## 2019-12-19 NOTE — ED ATTENDING ATTESTATION
12/18/2019  IVirgil DO, saw and evaluated the patient  I have discussed the patient with the resident/non-physician practitioner and agree with the resident's/non-physician practitioner's findings, Plan of Care, and MDM as documented in the resident's/non-physician practitioner's note, except where noted  All available labs and Radiology studies were reviewed  I was present for key portions of any procedure(s) performed by the resident/non-physician practitioner and I was immediately available to provide assistance  At this point I agree with the current assessment done in the Emergency Department  I have conducted an independent evaluation of this patient a history and physical is as follows:    49-year-old female presents with headache  Patient has been having migraine-type headaches as she describes some for some time  Her neurologist recommended she come to emergency department for a blood patch  Patient had a spinal tap done almost 2 weeks ago  Patient does not describe the headache is improving when she lies flat she said it is more of a constant headache  She does state that the headache is not there right now     She is requesting medication to prevent headache from recurring  No focal neurologic complaints at this time, no fevers  Headache is similar to previous headaches    On exam-no acute distress, appears nontoxic moving all extremities, moving neck easily in the room, heart regular, no respiratory distress, speech is normal   Plan-will discuss with Neurology regarding recommendations    ED Course         Critical Care Time  Procedures

## 2019-12-19 NOTE — DISCHARGE INSTRUCTIONS
Take amitriptyline as prescribed  This can cause lightheadedness, dizziness, and drowsiness  Take it at night  Please be careful when driving or operating machinery  Follow up with Neurology and Pain Medicine  Return to the ER with uncontrolled headache pain, worsening or changing headache, new weakness or numbness, difficulty speaking, or any other concerning symptoms

## 2019-12-20 DIAGNOSIS — G43.009 MIGRAINE WITHOUT AURA AND WITHOUT STATUS MIGRAINOSUS, NOT INTRACTABLE: Primary | ICD-10-CM

## 2019-12-20 RX ORDER — PROCHLORPERAZINE MALEATE 10 MG
10 TABLET ORAL EVERY 6 HOURS PRN
Qty: 10 TABLET | Refills: 0 | Status: SHIPPED | OUTPATIENT
Start: 2019-12-20 | End: 2020-01-16 | Stop reason: ALTCHOICE

## 2019-12-20 RX ORDER — FAMOTIDINE 20 MG/1
20 TABLET, FILM COATED ORAL 2 TIMES DAILY
Qty: 20 TABLET | Refills: 0 | Status: SHIPPED | OUTPATIENT
Start: 2019-12-20 | End: 2020-01-16 | Stop reason: ALTCHOICE

## 2019-12-20 RX ORDER — SODIUM CHLORIDE 9 MG/ML
20 INJECTION, SOLUTION INTRAVENOUS ONCE
Status: CANCELLED | OUTPATIENT
Start: 2019-12-21

## 2019-12-20 NOTE — TELEPHONE ENCOUNTER
Pt made aware of med sent to pharmacy  Pt denies HA at this time  Pt agreeable to steroids and willing to go to any location  Pt verbalized understanding of Pepcid directions  SLB has tomorrow 11:30am as well Mon and Tues avail  Order entered  Please sign off ASAP so I can schedule pt

## 2019-12-20 NOTE — TELEPHONE ENCOUNTER
Dr VELASQUEZ will review all her testing with her at her upcoming appointment  How is her headache? She was started on amitriptyline in the ER  For acute headache, may take prochlorperzine 10 mg as needed  Limit of 10 a month    Sent to [facility]

## 2019-12-20 NOTE — ASSESSMENT & PLAN NOTE
New diagnosis symptomatic ,patient recently had MRI of Brain possible demyelination disease ,discuss with patient present with numbness patient already seen by Neurology who order MRI of thoracic and cervical spine   patient already had spinal tap   Also plan for EMG  I did recommend to the patient to contact the Neurology office

## 2019-12-20 NOTE — TELEPHONE ENCOUNTER
Pt has been scheduled at [facility]    12/21 11:30am  12/23 2pm   12/24 12:30pm    No PA needed for   Based on medical necessity  Pt made aware of appts

## 2019-12-20 NOTE — TELEPHONE ENCOUNTER
Patient has clinically definite MS, with positive CSF MS panel and negative other markers for MS mimickers  Patient provides complaints of lower extremities motor dysfunction - please offer the patient 3 days of Solumedrol 1000 mg IV along with Famotidine 20 mg bid  Patient is JCV negative - note review was consistent with considering Tysabri  Please mail her handout and form to be signed prior to her follow up visit       I will sign her orders, if agreed

## 2019-12-21 ENCOUNTER — HOSPITAL ENCOUNTER (OUTPATIENT)
Dept: INFUSION CENTER | Facility: HOSPITAL | Age: 29
Discharge: HOME | End: 2019-12-21
Attending: PSYCHIATRY & NEUROLOGY
Payer: COMMERCIAL

## 2019-12-21 VITALS
BODY MASS INDEX: 17.33 KG/M2 | WEIGHT: 104 LBS | DIASTOLIC BLOOD PRESSURE: 72 MMHG | SYSTOLIC BLOOD PRESSURE: 118 MMHG | HEART RATE: 66 BPM | TEMPERATURE: 96 F | RESPIRATION RATE: 20 BRPM | HEIGHT: 65 IN

## 2019-12-21 DIAGNOSIS — G37.9 CNS DEMYELINATION (HCC): Primary | ICD-10-CM

## 2019-12-21 PROCEDURE — 96365 THER/PROPH/DIAG IV INF INIT: CPT

## 2019-12-21 RX ORDER — SODIUM CHLORIDE 9 MG/ML
20 INJECTION, SOLUTION INTRAVENOUS ONCE
Status: COMPLETED | OUTPATIENT
Start: 2019-12-21 | End: 2019-12-21

## 2019-12-21 RX ORDER — SODIUM CHLORIDE 9 MG/ML
20 INJECTION, SOLUTION INTRAVENOUS ONCE
Status: CANCELLED | OUTPATIENT
Start: 2019-12-22

## 2019-12-21 RX ADMIN — SODIUM CHLORIDE 20 ML/HR: 0.9 INJECTION, SOLUTION INTRAVENOUS at 11:28

## 2019-12-21 RX ADMIN — SODIUM CHLORIDE 1000 MG: 0.9 INJECTION, SOLUTION INTRAVENOUS at 11:28

## 2019-12-23 ENCOUNTER — HOSPITAL ENCOUNTER (OUTPATIENT)
Dept: INFUSION CENTER | Facility: HOSPITAL | Age: 29
Discharge: HOME | End: 2019-12-23
Attending: PSYCHIATRY & NEUROLOGY
Payer: COMMERCIAL

## 2019-12-23 VITALS
TEMPERATURE: 96.8 F | RESPIRATION RATE: 18 BRPM | DIASTOLIC BLOOD PRESSURE: 89 MMHG | SYSTOLIC BLOOD PRESSURE: 131 MMHG | HEART RATE: 74 BPM

## 2019-12-23 DIAGNOSIS — G37.9 CNS DEMYELINATION (HCC): Primary | ICD-10-CM

## 2019-12-23 PROCEDURE — 96365 THER/PROPH/DIAG IV INF INIT: CPT

## 2019-12-23 RX ORDER — SODIUM CHLORIDE 9 MG/ML
20 INJECTION, SOLUTION INTRAVENOUS ONCE
Status: COMPLETED | OUTPATIENT
Start: 2019-12-23 | End: 2019-12-23

## 2019-12-23 RX ORDER — SODIUM CHLORIDE 9 MG/ML
20 INJECTION, SOLUTION INTRAVENOUS ONCE
Status: CANCELLED | OUTPATIENT
Start: 2019-12-24

## 2019-12-23 RX ADMIN — SODIUM CHLORIDE 20 ML/HR: 0.9 INJECTION, SOLUTION INTRAVENOUS at 14:40

## 2019-12-23 RX ADMIN — SODIUM CHLORIDE 1000 MG: 0.9 INJECTION, SOLUTION INTRAVENOUS at 15:34

## 2019-12-23 NOTE — PLAN OF CARE
Problem: Potential for Falls  Goal: Patient will remain free of falls  Description  INTERVENTIONS:  - Assess patient frequently for physical needs  -  Identify cognitive and physical deficits and behaviors that affect risk of falls    -  [place] fall precautions as indicated by assessment   - Educate patient/family on patient safety including physical limitations  - Instruct patient to call for assistance with activity based on assessment  - Modify environment to reduce risk of injury  - Consider OT/PT consult to assist with strengthening/mobility  Outcome: Progressing

## 2019-12-24 ENCOUNTER — HOSPITAL ENCOUNTER (OUTPATIENT)
Dept: INFUSION CENTER | Facility: HOSPITAL | Age: 29
Discharge: HOME | End: 2019-12-24
Attending: PSYCHIATRY & NEUROLOGY
Payer: COMMERCIAL

## 2019-12-24 VITALS
HEART RATE: 76 BPM | TEMPERATURE: 97 F | RESPIRATION RATE: 18 BRPM | SYSTOLIC BLOOD PRESSURE: 138 MMHG | DIASTOLIC BLOOD PRESSURE: 72 MMHG

## 2019-12-24 DIAGNOSIS — G35 MS (MULTIPLE SCLEROSIS) (HCC): Primary | ICD-10-CM

## 2019-12-24 DIAGNOSIS — G37.9 CNS DEMYELINATION (HCC): Primary | ICD-10-CM

## 2019-12-24 PROCEDURE — 96365 THER/PROPH/DIAG IV INF INIT: CPT

## 2019-12-24 RX ORDER — GABAPENTIN 100 MG/1
100 CAPSULE ORAL
Qty: 30 CAPSULE | Refills: 1 | Status: SHIPPED | OUTPATIENT
Start: 2019-12-24 | End: 2020-01-16 | Stop reason: ALTCHOICE

## 2019-12-24 RX ORDER — SODIUM CHLORIDE 9 MG/ML
20 INJECTION, SOLUTION INTRAVENOUS ONCE
Status: COMPLETED | OUTPATIENT
Start: 2019-12-24 | End: 2019-12-24

## 2019-12-24 RX ORDER — SODIUM CHLORIDE 9 MG/ML
20 INJECTION, SOLUTION INTRAVENOUS ONCE
Status: CANCELLED | OUTPATIENT
Start: 2019-12-24

## 2019-12-24 RX ADMIN — SODIUM CHLORIDE 20 ML/HR: 0.9 INJECTION, SOLUTION INTRAVENOUS at 11:57

## 2019-12-24 RX ADMIN — SODIUM CHLORIDE 1000 MG: 0.9 INJECTION, SOLUTION INTRAVENOUS at 11:57

## 2019-12-24 NOTE — PLAN OF CARE
Problem: Potential for Falls  Goal: Patient will remain free of falls  Description  INTERVENTIONS:  - Assess patient frequently for physical needs  -  Identify cognitive and physical deficits and behaviors that affect risk of falls    -  [place] fall precautions as indicated by assessment   - Educate patient/family on patient safety including physical limitations  - Instruct patient to call for assistance with activity based on assessment  - Modify environment to reduce risk of injury  - Consider OT/PT consult to assist with strengthening/mobility  Outcome: Progressing

## 2019-12-26 ENCOUNTER — TELEPHONE (OUTPATIENT)
Dept: FAMILY MEDICINE CLINIC | Facility: CLINIC | Age: 29
End: 2019-12-26

## 2019-12-27 DIAGNOSIS — J45.20 MILD INTERMITTENT ASTHMA, UNSPECIFIED WHETHER COMPLICATED: Primary | ICD-10-CM

## 2019-12-27 RX ORDER — ALBUTEROL SULFATE 90 UG/1
2 AEROSOL, METERED RESPIRATORY (INHALATION) EVERY 4 HOURS PRN
Qty: 1 INHALER | Refills: 2 | Status: SHIPPED | OUTPATIENT
Start: 2019-12-27 | End: 2020-01-11 | Stop reason: SDUPTHER

## 2019-12-27 NOTE — TELEPHONE ENCOUNTER
Patient had diagnosis mild intermittent asthma will give her Ventolin HFA 2 puffs q 4-6 hours p r n

## 2019-12-29 ENCOUNTER — HOSPITAL ENCOUNTER (OUTPATIENT)
Dept: MRI IMAGING | Facility: HOSPITAL | Age: 29
Discharge: HOME | End: 2019-12-29
Attending: PSYCHIATRY & NEUROLOGY
Payer: COMMERCIAL

## 2019-12-29 DIAGNOSIS — G43.009 MIGRAINE WITHOUT AURA AND WITHOUT STATUS MIGRAINOSUS, NOT INTRACTABLE: ICD-10-CM

## 2019-12-29 DIAGNOSIS — G37.9 CNS DEMYELINATION (HCC): ICD-10-CM

## 2019-12-29 PROCEDURE — A9585 GADOBUTROL INJECTION: HCPCS | Performed by: RADIOLOGY

## 2019-12-29 PROCEDURE — 72157 MRI CHEST SPINE W/O & W/DYE: CPT

## 2019-12-29 PROCEDURE — 72156 MRI NECK SPINE W/O & W/DYE: CPT

## 2019-12-29 RX ADMIN — GADOBUTROL 5 ML: 604.72 INJECTION INTRAVENOUS at 11:06

## 2019-12-30 ENCOUNTER — CLINICAL SUPPORT (OUTPATIENT)
Dept: OBGYN CLINIC | Facility: CLINIC | Age: 29
End: 2019-12-30

## 2019-12-30 VITALS
WEIGHT: 106 LBS | SYSTOLIC BLOOD PRESSURE: 128 MMHG | HEART RATE: 101 BPM | BODY MASS INDEX: 17.77 KG/M2 | DIASTOLIC BLOOD PRESSURE: 89 MMHG

## 2019-12-30 DIAGNOSIS — Z30.42 ENCOUNTER FOR SURVEILLANCE OF INJECTABLE CONTRACEPTIVE: Primary | ICD-10-CM

## 2019-12-30 LAB — SL AMB POCT URINE HCG: NEGATIVE

## 2019-12-30 PROCEDURE — 96372 THER/PROPH/DIAG INJ SC/IM: CPT

## 2019-12-30 PROCEDURE — 81025 URINE PREGNANCY TEST: CPT

## 2019-12-30 RX ORDER — MEDROXYPROGESTERONE ACETATE 150 MG/ML
150 INJECTION, SUSPENSION INTRAMUSCULAR ONCE
Status: COMPLETED | OUTPATIENT
Start: 2019-12-30 | End: 2019-12-30

## 2019-12-30 RX ADMIN — MEDROXYPROGESTERONE ACETATE 150 MG: 150 INJECTION, SUSPENSION INTRAMUSCULAR at 12:30

## 2019-12-30 NOTE — PROGRESS NOTES
Depo-Provera    Last Depo date: 10/8/2019   Side effects: None   HCG: Negative   Given by: Marilynn Meckel, MA   Site: Left deltoid   Calcium supplement daily teaching, condoms for 2 weeks following first injection dose

## 2020-01-08 ENCOUNTER — TELEPHONE (OUTPATIENT)
Dept: NEUROLOGY | Facility: CLINIC | Age: 30
End: 2020-01-08

## 2020-01-09 ENCOUNTER — OFFICE VISIT (OUTPATIENT)
Dept: NEUROLOGY | Facility: CLINIC | Age: 30
End: 2020-01-09
Payer: COMMERCIAL

## 2020-01-09 ENCOUNTER — TELEPHONE (OUTPATIENT)
Dept: NEUROLOGY | Facility: CLINIC | Age: 30
End: 2020-01-09

## 2020-01-09 VITALS
HEART RATE: 83 BPM | DIASTOLIC BLOOD PRESSURE: 82 MMHG | SYSTOLIC BLOOD PRESSURE: 124 MMHG | BODY MASS INDEX: 18.61 KG/M2 | WEIGHT: 109 LBS | HEIGHT: 64 IN

## 2020-01-09 DIAGNOSIS — G43.009 MIGRAINE WITHOUT AURA AND WITHOUT STATUS MIGRAINOSUS, NOT INTRACTABLE: Primary | ICD-10-CM

## 2020-01-09 DIAGNOSIS — G35 MS (MULTIPLE SCLEROSIS) (HCC): ICD-10-CM

## 2020-01-09 PROCEDURE — 99215 OFFICE O/P EST HI 40 MIN: CPT | Performed by: PSYCHIATRY & NEUROLOGY

## 2020-01-09 NOTE — PROGRESS NOTES
[facility]LE [facility]  PATIENT:  Ana Laura Moseley  MRN:  [id number]  :  1990  DATE OF SERVICE:  2020    Assessment/Plan:     Problem List Items Addressed This Visit        Cardiovascular and Mediastinum    Migraine without aura and without status migrainosus, not intractable - Primary      Other Visit Diagnoses     MS (multiple sclerosis) ([facility] [address] )               Mrs Aakash Schmidt has presented for follow up on RRMS and related issues  Patient has no new focal neurologic deficit since last seen in the office  Patient has moderate degree demyelination in her brain AND spine, with ambulatory dysfunction has been noted  Patient has demyelination at lower thoracic region with intermittent events of worsening weakness and falls already reported  CSF MS panel was positive as well  Based on advanced disease and due to recent guidelines, patient was advised to start Tysabri to prevent from fulminant disability progression  Patient is JCV negative  Tysabri form was filled out during the visit, after risk and benefits discussed and agreed  PML issue was also discussed with the patient  Allergies to GABAPENTIN, will avoid it by any means  May consider nortriptyline if sensory disturbances continues despite initiating Tysabri therapy  Patient was asked to bring FMLA to be filled out in our office  Follow up in 2 -3 months  Greater than 50% of the 40 minutes evaluation was a face-to-face discussion regarding  the pathophysiology of her current symptoms and further plan, as well as counseling, educating, and coordinating the patient's care  Subjective: MS and related issues  HPI History of Present Illness    Mrs Aakash Schmidt was referred to [address] [place] [address] by Dr Layla Lopez for evaluation of abnormal brain MRI findings with concern for CNS demyelination  Patient has no focal neurologic deficit on today's evaluation     Prior evaluation was consistent with MRI and was noted to have multiple classic MS demyelinating plaques, involving periventricular white matter and left cerebellum with most of them has corresponding T1W black holes  MRI C-spine 12/2019: Small focus of suspected MS involving the spinal cord at the C2-3 level  No pathologic enhancement  No additional spinal cord lesions     No evidence of disc herniation, central canal or foraminal stenosis  MRI T-spine: T2 hyperintense foci suspicious for MS involving the spinal cord at T5 and T11  No pathologic enhancement     No evidence of disc herniation, central canal or foraminal stenosis  Swollen feet and skin rash in the face after patient initiated gabapentin, improvement noted after gabapentin was discontinued  The following portions of the patient's history were reviewed and updated as appropriate: She  has a past medical history of Asthma and Depression  She   Patient Active Problem List    Diagnosis Date Noted    Numbness and tingling of both legs 12/19/2019    Palpitation 12/02/2019    Body mass index (BMI) less than 19 in adult 12/02/2019    Neurogenic bladder 11/29/2019    CNS demyelination ([facility] [address] ) 11/29/2019    Vertigo 11/29/2019    Migraine without aura and without status migrainosus, not intractable 10/03/2019    Syncopal episode 10/03/2019    Encounter for well adult exam with abnormal findings 07/11/2019    Ventral hernia without obstruction or gangrene 06/13/2019    Other headache syndrome 06/13/2019    Moderate smoker (20 or less per day) 68/34/1172    Umbilical hernia without obstruction and without gangrene 03/28/2016    Asthma, mild intermittent 04/25/2012     She  has a past surgical history that includes Tonsillectomy (Bilateral, 1994); Hernia repair (2016); and [place] lumbar puncture diagnostic (12/5/2019)    Her family history includes Coronary artery disease in her paternal grandmother; Diabetes in her paternal grandfather; Hypertension in her paternal grandfather; Lung cancer in her paternal grandfather; Pancreatic cancer in her paternal grandfather  She  reports that she has been smoking cigarettes  She has a 9 00 pack-year smoking history  She uses smokeless tobacco  She reports that she does not drink alcohol or use drugs  Current Outpatient Medications   Medication Sig Dispense Refill    albuterol (VENTOLIN HFA) 90 mcg/act inhaler Inhale 2 puffs every 4 (four) hours as needed for wheezing 1 Inhaler 2    medroxyPROGESTERone (DEPO-PROVERA) 150 mg/mL injection Inject 1 mL (150 mg total) into a muscle every 3 (three) months 1 mL 3    amitriptyline (ELAVIL) 10 mg tablet Take 1 tablet (10 mg total) by mouth daily at bedtime (Patient not taking: Reported on 1/9/2020) 30 tablet 0    Cyanocobalamin 1000 MCG SUBL Place 1 tablet (1,000 mcg total) under the tongue daily (Patient not taking: Reported on 1/9/2020) 90 tablet 0    famotidine (PEPCID) 20 mg tablet Take 1 tablet (20 mg total) by mouth 2 (two) times a day (Patient not taking: Reported on 1/9/2020) 20 tablet 0    gabapentin (NEURONTIN) 100 mg capsule Take 1 capsule (100 mg total) by mouth daily at bedtime (Patient not taking: Reported on 1/9/2020) 30 capsule 1    meclizine (ANTIVERT) 25 mg tablet Take 1 tablet (25 mg total) by mouth every 8 (eight) hours as needed for dizziness (Patient not taking: Reported on 1/9/2020) 90 tablet 1    prochlorperazine (COMPAZINE) 10 mg tablet Take 1 tablet (10 mg total) by mouth every 6 (six) hours as needed (migraine) (Patient not taking: Reported on 1/9/2020) 10 tablet 0     No current facility-administered medications for this visit        Current Outpatient Medications on File Prior to Visit   Medication Sig    albuterol (VENTOLIN HFA) 90 mcg/act inhaler Inhale 2 puffs every 4 (four) hours as needed for wheezing    medroxyPROGESTERone (DEPO-PROVERA) 150 mg/mL injection Inject 1 mL (150 mg total) into a muscle every 3 (three) months    amitriptyline (ELAVIL) 10 mg tablet Take 1 tablet (10 mg total) by mouth daily at bedtime (Patient not taking: Reported on 1/9/2020)    Cyanocobalamin 1000 MCG SUBL Place 1 tablet (1,000 mcg total) under the tongue daily (Patient not taking: Reported on 1/9/2020)    famotidine (PEPCID) 20 mg tablet Take 1 tablet (20 mg total) by mouth 2 (two) times a day (Patient not taking: Reported on 1/9/2020)    gabapentin (NEURONTIN) 100 mg capsule Take 1 capsule (100 mg total) by mouth daily at bedtime (Patient not taking: Reported on 1/9/2020)    meclizine (ANTIVERT) 25 mg tablet Take 1 tablet (25 mg total) by mouth every 8 (eight) hours as needed for dizziness (Patient not taking: Reported on 1/9/2020)    prochlorperazine (COMPAZINE) 10 mg tablet Take 1 tablet (10 mg total) by mouth every 6 (six) hours as needed (migraine) (Patient not taking: Reported on 1/9/2020)     No current facility-administered medications on file prior to visit  She is allergic to onion and sumatriptan            Objective:    Blood pressure 124/82, pulse 83, height 5' 4" (1 626 m), weight 49 4 kg (109 lb), not currently breastfeeding  Physical Exam    Neurological Exam    Gait  T25FW;6 67sec   CONSTITUTIONAL: NAD, pleasant  NECK: supple, no lymphadenopathy, no thyromegaly, no JVD  CARDIOVASCULAR: RRR, normal S1S2, no murmurs, no rubs  RESP: clear to auscultation bilaterally, no wheezes/rhonchi/rales  ABDOMEN: soft, non tender, non distended  SKIN: no rash or skin lesions  EXTREMITIES: no edema, pulses 2+bilaterally  PSYCH: appropriate mood and affect  NEUROLOGIC COMPREHENSIVE EXAM: Patient is oriented to person, place and time, NAD; appropriate affect  CN II, III, IV, V, VI, VII,VIII,IX,X,XI-XII intact with EOMI, PERRLA, OKN intact, VF grossly intact, fundi poorly visualized secondary to pupillary constriction; symmetric face noted  Motor: 5/5 UE/LE bilateral symmetric; Sensory: intact to light touch and pinprick bilaterally; normal vibration sensation feet bilaterally;  Coordination within normal limits on FTN and BENJI testing; DTR: 2/4 through, no Babinski, no clonus  Tandem gait is abnormal  Romberg: negative  ROS:  12 points of review of system was reviewed with the patient and was unremarkable with exception: see HPI  Review of Systems   Constitutional: Negative  Negative for appetite change and fever  HENT: Negative  Negative for hearing loss, tinnitus, trouble swallowing and voice change  Eyes: Positive for visual disturbance (blurry )  Negative for photophobia and pain  Respiratory: Negative  Negative for shortness of breath  Cardiovascular: Negative  Negative for palpitations  Gastrointestinal: Negative  Negative for nausea and vomiting  Endocrine: Negative  Negative for cold intolerance and heat intolerance  Genitourinary: Negative  Negative for dysuria, frequency and urgency  Musculoskeletal: Negative  Negative for myalgias and neck pain  Balance issues and falling    Skin: Negative  Negative for rash  Neurological: Positive for weakness (legs )  Negative for dizziness, tremors, seizures, syncope, facial asymmetry, speech difficulty, light-headedness, numbness and headaches  Hematological: Negative  Does not bruise/bleed easily  Psychiatric/Behavioral: Negative  Negative for confusion, hallucinations and sleep disturbance          Memory issues

## 2020-01-11 DIAGNOSIS — J45.20 MILD INTERMITTENT ASTHMA, UNSPECIFIED WHETHER COMPLICATED: ICD-10-CM

## 2020-01-13 RX ORDER — ALBUTEROL SULFATE 90 UG/1
2 AEROSOL, METERED RESPIRATORY (INHALATION) EVERY 4 HOURS PRN
Qty: 1 INHALER | Refills: 0 | Status: SHIPPED | OUTPATIENT
Start: 2020-01-13 | End: 2020-07-06 | Stop reason: ALTCHOICE

## 2020-01-13 NOTE — TELEPHONE ENCOUNTER
Patient already had the refer on the albuterol 3 weeks ago if the patient has been using her albuterol inhaler more often need to be evaluated in the office for maintenance  treatment

## 2020-01-16 ENCOUNTER — OFFICE VISIT (OUTPATIENT)
Dept: FAMILY MEDICINE CLINIC | Facility: CLINIC | Age: 30
End: 2020-01-16
Payer: COMMERCIAL

## 2020-01-16 VITALS
HEIGHT: 64 IN | WEIGHT: 106 LBS | SYSTOLIC BLOOD PRESSURE: 120 MMHG | OXYGEN SATURATION: 97 % | BODY MASS INDEX: 18.1 KG/M2 | TEMPERATURE: 99.1 F | DIASTOLIC BLOOD PRESSURE: 82 MMHG | HEART RATE: 93 BPM | RESPIRATION RATE: 16 BRPM

## 2020-01-16 DIAGNOSIS — J45.30 MILD PERSISTENT ASTHMA WITHOUT COMPLICATION: Primary | ICD-10-CM

## 2020-01-16 PROCEDURE — 99214 OFFICE O/P EST MOD 30 MIN: CPT | Performed by: FAMILY MEDICINE

## 2020-01-16 PROCEDURE — 3008F BODY MASS INDEX DOCD: CPT | Performed by: FAMILY MEDICINE

## 2020-01-16 RX ORDER — FLUTICASONE FUROATE AND VILANTEROL 100; 25 UG/1; UG/1
1 POWDER RESPIRATORY (INHALATION) DAILY
Qty: 1 INHALER | Refills: 2 | Status: SHIPPED | OUTPATIENT
Start: 2020-01-16 | End: 2020-01-23 | Stop reason: CLARIF

## 2020-01-16 NOTE — PROGRESS NOTES
BMI Counseling: Body mass index is 18 19 kg/m²  The BMI is below normal  Patient advised to gain weight  Subjective:   Chief Complaint   Patient presents with    Asthma     patient is here today to discuss a  maintenance treatment for her asthma states she hs been using her inhaler every morning and sometimes even in the middle of the night            Patient ID: Bryant Domingo is a 34 y o  female  Patient who known to have history of mild intermittent asthma and she been using albuterol inhaler patient recently she been notice she been using it more often almost the every day and she does have a cough no wheezing no hematosis no short of breath no chest pain no palpitation no dyspnea on exertion no lower extremity edema patient is smoker and she recently diagnosed with multiple sclerosis and she is going in the loss of stress with affect the breathing      The following portions of the patient's history were reviewed and updated as appropriate: allergies, current medications, past family history, past medical history, past social history, past surgical history and problem list     Review of Systems   Constitutional: Negative for fatigue and fever  HENT: Negative for ear pain, sinus pressure, sinus pain and sore throat  Eyes: Negative for pain and redness  Respiratory: Positive for cough  Negative for chest tightness and shortness of breath  Cardiovascular: Negative for chest pain, palpitations and leg swelling  Gastrointestinal: Negative for abdominal pain, blood in stool, constipation, diarrhea and nausea  Genitourinary: Negative for flank pain, frequency and hematuria  Musculoskeletal: Negative for back pain and joint swelling  Skin: Negative for rash  Neurological: Negative for dizziness, numbness and headaches  Hematological: Does not bruise/bleed easily               Objective:  Vitals:    01/16/20 1448   BP: 120/82   BP Location: Left arm   Patient Position: Sitting   Cuff Size: Adult   Pulse: 93   Resp: 16   Temp: 99 1 °F (37 3 °C)   TempSrc: Tympanic   SpO2: 97%   Weight: 48 1 kg (106 lb)   Height: 5' 4" (1 626 m)      Physical Exam   Constitutional: She is oriented to person, place, and time  She appears well-developed and well-nourished  HENT:   Head: Normocephalic  Right Ear: External ear normal    Left Ear: External ear normal    Eyes: Conjunctivae and EOM are normal  Right eye exhibits no discharge  Left eye exhibits no discharge  Neck: No JVD present  Cardiovascular: Normal rate, regular rhythm and normal heart sounds  Exam reveals no gallop  No murmur heard  Pulmonary/Chest: Effort normal  No respiratory distress  She has no wheezes  She has no rales  She exhibits no tenderness  Abdominal: She exhibits no mass  There is no tenderness  There is no rebound  Musculoskeletal: She exhibits no edema or tenderness  Neurological: She is alert and oriented to person, place, and time  Skin: No rash noted  No erythema  Assessment/Plan:    Mild persistent asthma without complication  A chronic symptomatic increased frequency use albuterol inhaler prior to start the patient on the Breo 1 inhaler once a day proper use discussed with the patient and asthma plan discussed with the patient    Body mass index (BMI) less than 19 in adult  The BMI below 19 in adult patient discussed the patient increase the calorie intake important gain weight       Diagnoses and all orders for this visit:    Mild persistent asthma without complication  -     fluticasone-vilanterol (BREO ELLIPTA) 100-25 mcg/inh inhaler; Inhale 1 puff daily Rinse mouth after use  Body mass index (BMI) less than 19 in adult          BMI Counseling: Body mass index is 18 19 kg/m²  The BMI is below normal  Patient was advised to gain weight

## 2020-01-19 NOTE — ASSESSMENT & PLAN NOTE
The BMI below 19 in adult patient discussed the patient increase the calorie intake important gain weight

## 2020-01-19 NOTE — ASSESSMENT & PLAN NOTE
A chronic symptomatic increased frequency use albuterol inhaler prior to start the patient on the Breo 1 inhaler once a day proper use discussed with the patient and asthma plan discussed with the patient

## 2020-01-20 ENCOUNTER — TELEPHONE (OUTPATIENT)
Dept: FAMILY MEDICINE CLINIC | Facility: CLINIC | Age: 30
End: 2020-01-20

## 2020-01-23 DIAGNOSIS — J45.30 MILD PERSISTENT ASTHMA WITHOUT COMPLICATION: Primary | ICD-10-CM

## 2020-01-23 RX ORDER — BUDESONIDE AND FORMOTEROL FUMARATE DIHYDRATE 160; 4.5 UG/1; UG/1
2 AEROSOL RESPIRATORY (INHALATION) 2 TIMES DAILY
Qty: 1 INHALER | Refills: 2 | Status: SHIPPED | OUTPATIENT
Start: 2020-01-23 | End: 2020-02-26

## 2020-01-23 NOTE — TELEPHONE ENCOUNTER
I spoke with Maira at [employer]  Benefit investigation completed 1/16  Requires PA Marilee Major and [employer]  They have not faxed BI as they have not been able to speak with pt directly  LMOM for pt to return call  I will work on PA once pt calls us  Furthermore, infusion order will be entered once I have an authorization on file

## 2020-01-23 NOTE — TELEPHONE ENCOUNTER
Patient states she received a call from the [employer] prescribing program stating she has been approved for Tysabri  She is asking for orders to be entered so she can scheduled her infusion  [phone]  Okay to leave detailed message

## 2020-01-23 NOTE — TELEPHONE ENCOUNTER
Pt has not been approved through insurance as I have not received a benefit investigation from [employer]  Nor have I submitted a PA  I will contact [employer] and submit PA based on their info

## 2020-01-29 DIAGNOSIS — G35 MS (MULTIPLE SCLEROSIS) (HCC): Primary | ICD-10-CM

## 2020-01-29 RX ORDER — SODIUM CHLORIDE 9 MG/ML
20 INJECTION, SOLUTION INTRAVENOUS ONCE
Status: CANCELLED | OUTPATIENT
Start: 2020-02-06

## 2020-01-29 RX ORDER — ACETAMINOPHEN 325 MG/1
650 TABLET ORAL ONCE
Status: CANCELLED | OUTPATIENT
Start: 2020-02-06

## 2020-01-29 NOTE — TELEPHONE ENCOUNTER
Approved  [place] [id number]  Tysabri 300mg Q28 days 1/28/20-7/28/20    Dr Michelle Soto, order entered  Please add MS dx code to order as this is approved for MS through insurance  I am unable to add a new dx to chart  [facility]

## 2020-02-06 ENCOUNTER — HOSPITAL ENCOUNTER (OUTPATIENT)
Dept: INFUSION CENTER | Facility: CLINIC | Age: 30
Discharge: HOME | End: 2020-02-06
Payer: COMMERCIAL

## 2020-02-06 VITALS
SYSTOLIC BLOOD PRESSURE: 119 MMHG | HEART RATE: 78 BPM | RESPIRATION RATE: 18 BRPM | TEMPERATURE: 98.7 F | DIASTOLIC BLOOD PRESSURE: 83 MMHG

## 2020-02-06 DIAGNOSIS — G35 MS (MULTIPLE SCLEROSIS) (HCC): Primary | ICD-10-CM

## 2020-02-06 LAB
ALBUMIN SERPL BCP-MCNC: 3.6 G/DL (ref 3.5–5.7)
ALP SERPL-CCNC: 71 U/L (ref 46–116)
ALT SERPL W P-5'-P-CCNC: 19 U/L (ref 12–78)
ANION GAP SERPL CALCULATED.3IONS-SCNC: 6 MMOL/L (ref 4–13)
AST SERPL W P-5'-P-CCNC: 26 U/L (ref 5–45)
BASOPHILS # BLD AUTO: 0.02 THOUSANDS/ΜL (ref 0–0.1)
BASOPHILS NFR BLD AUTO: 0 % (ref 0–1)
BILIRUB SERPL-MCNC: 0.4 MG/DL (ref 0.2–1)
BUN SERPL-MCNC: 9 MG/DL (ref 5–25)
CALCIUM SERPL-MCNC: 9.7 MG/DL (ref 8.3–10.1)
CHLORIDE SERPL-SCNC: 112 MMOL/L (ref 98–108)
CO2 SERPL-SCNC: 23 MMOL/L (ref 21–32)
CREAT SERPL-MCNC: 1 MG/DL (ref 0.6–1.3)
EOSINOPHIL # BLD AUTO: 0.2 THOUSAND/ΜL (ref 0–0.61)
EOSINOPHIL NFR BLD AUTO: 3 % (ref 0–6)
ERYTHROCYTE [DISTWIDTH] IN BLOOD BY AUTOMATED COUNT: 12.2 % (ref 11.6–15.1)
GFR SERPL CREATININE-BSD FRML MDRD: 76 ML/MIN/1.73SQ M
GLUCOSE P FAST SERPL-MCNC: 87 MG/DL (ref 65–99)
GLUCOSE SERPL-MCNC: 87 MG/DL (ref 65–140)
HCT VFR BLD AUTO: 41.9 % (ref 34.8–46.1)
HGB BLD-MCNC: 13.8 G/DL (ref 11.5–15.4)
LYMPHOCYTES # BLD AUTO: 1.46 THOUSANDS/ΜL (ref 0.6–4.47)
LYMPHOCYTES NFR BLD AUTO: 21 % (ref 14–44)
MCH RBC QN AUTO: 31.3 PG (ref 26.8–34.3)
MCHC RBC AUTO-ENTMCNC: 32.9 G/DL (ref 31.4–37.4)
MCV RBC AUTO: 95 FL (ref 82–98)
MONOCYTES # BLD AUTO: 0.73 THOUSAND/ΜL (ref 0.17–1.22)
MONOCYTES NFR BLD AUTO: 11 % (ref 4–12)
NEUTROPHILS # BLD AUTO: 4.54 THOUSANDS/ΜL (ref 1.85–7.62)
NEUTS SEG NFR BLD AUTO: 65 % (ref 43–75)
PLATELET # BLD AUTO: 240 THOUSANDS/UL (ref 149–390)
PMV BLD AUTO: 10.2 FL (ref 8.9–12.7)
POTASSIUM SERPL-SCNC: 3.4 MMOL/L (ref 3.5–5.3)
PROT SERPL-MCNC: 7 G/DL (ref 6.4–8.2)
RBC # BLD AUTO: 4.41 MILLION/UL (ref 3.81–5.12)
SODIUM SERPL-SCNC: 141 MMOL/L (ref 136–145)
WBC # BLD AUTO: 6.95 THOUSAND/UL (ref 4.31–10.16)

## 2020-02-06 PROCEDURE — 85025 COMPLETE CBC W/AUTO DIFF WBC: CPT | Performed by: PSYCHIATRY & NEUROLOGY

## 2020-02-06 PROCEDURE — 96413 CHEMO IV INFUSION 1 HR: CPT

## 2020-02-06 PROCEDURE — 96367 TX/PROPH/DG ADDL SEQ IV INF: CPT

## 2020-02-06 PROCEDURE — 80053 COMPREHEN METABOLIC PANEL: CPT | Performed by: PSYCHIATRY & NEUROLOGY

## 2020-02-06 RX ORDER — ACETAMINOPHEN 325 MG/1
650 TABLET ORAL ONCE
Status: CANCELLED | OUTPATIENT
Start: 2020-03-05

## 2020-02-06 RX ORDER — SODIUM CHLORIDE 9 MG/ML
20 INJECTION, SOLUTION INTRAVENOUS ONCE
Status: CANCELLED | OUTPATIENT
Start: 2020-03-05

## 2020-02-06 RX ORDER — SODIUM CHLORIDE 9 MG/ML
20 INJECTION, SOLUTION INTRAVENOUS ONCE
Status: COMPLETED | OUTPATIENT
Start: 2020-02-06 | End: 2020-02-06

## 2020-02-06 RX ORDER — ACETAMINOPHEN 325 MG/1
650 TABLET ORAL ONCE
Status: COMPLETED | OUTPATIENT
Start: 2020-02-06 | End: 2020-02-06

## 2020-02-06 RX ADMIN — DIPHENHYDRAMINE HYDROCHLORIDE 50 MG: 50 INJECTION, SOLUTION INTRAMUSCULAR; INTRAVENOUS at 14:06

## 2020-02-06 RX ADMIN — NATALIZUMAB 300 MG: 300 INJECTION INTRAVENOUS at 14:30

## 2020-02-06 RX ADMIN — ACETAMINOPHEN 650 MG: 325 TABLET, FILM COATED ORAL at 14:04

## 2020-02-06 RX ADMIN — SODIUM CHLORIDE 20 ML/HR: 0.9 INJECTION, SOLUTION INTRAVENOUS at 14:05

## 2020-02-06 NOTE — PROGRESS NOTES
Patient to the unit for Tysabri infusion  Confirmed that patient read and understands the Patient Medication Guide  Patient voices no recent infections or fevers  Tolerated Tysabri infusion without adverse reaction  Patient remained for a 1 hour observation period  AVS given, aware of future appointment  Voices no questions or concerns at discharge

## 2020-02-06 NOTE — PLAN OF CARE
Problem: Potential for Falls  Goal: Patient will remain free of falls  Description  INTERVENTIONS:  - Assess patient frequently for physical needs  -  Identify cognitive and physical deficits and behaviors that affect risk of falls    -  [place] fall precautions as indicated by assessment   - Educate patient/family on patient safety including physical limitations  - Instruct patient to call for assistance with activity based on assessment  - Modify environment to reduce risk of injury  - Consider OT/PT consult to assist with strengthening/mobility  Outcome: Progressing     Problem: PAIN - ADULT  Goal: Verbalizes/displays adequate comfort level or baseline comfort level  Description  Interventions:  - Encourage patient to monitor pain and request assistance  - Assess pain using appropriate pain scale  - Administer analgesics based on type and severity of pain and evaluate response  - Implement non-pharmacological measures as appropriate and evaluate response  - Consider cultural and social influences on pain and pain management  - Notify physician/advanced practitioner if interventions unsuccessful or patient reports new pain  Outcome: Progressing     Problem: INFECTION - ADULT  Goal: Absence or prevention of progression during hospitalization  Description  INTERVENTIONS:  - Assess and monitor for signs and symptoms of infection  - Monitor lab/diagnostic results  - Monitor all insertion sites, i e  indwelling lines, tubes, and drains  - Monitor endotracheal if appropriate and nasal secretions for changes in amount and color  - [place] appropriate cooling/warming therapies per order  - Administer medications as ordered  - Instruct and encourage patient and family to use good hand hygiene technique  - Identify and instruct in appropriate isolation precautions for identified infection/condition  Outcome: Progressing     Problem: SAFETY ADULT  Goal: Patient will remain free of falls  Description  INTERVENTIONS:  - Assess patient frequently for physical needs  -  Identify cognitive and physical deficits and behaviors that affect risk of falls  -  [place] fall precautions as indicated by assessment   - Educate patient/family on patient safety including physical limitations  - Instruct patient to call for assistance with activity based on assessment  - Modify environment to reduce risk of injury  - Consider OT/PT consult to assist with strengthening/mobility  Outcome: Progressing     Problem: DISCHARGE PLANNING  Goal: Discharge to home or other facility with appropriate resources  Description  INTERVENTIONS:  - Identify barriers to discharge w/patient and caregiver  - Arrange for needed discharge resources and transportation as appropriate  - Identify discharge learning needs (meds, wound care, etc )  - Arrange for interpretive services to assist at discharge as needed  - Refer to Case Management Department for coordinating discharge planning if the patient needs post-hospital services based on physician/advanced practitioner order or complex needs related to functional status, cognitive ability, or social support system  Outcome: Progressing     Problem: Knowledge Deficit  Goal: Patient/family/caregiver demonstrates understanding of disease process, treatment plan, medications, and discharge instructions  Description  Complete learning assessment and assess knowledge base    Interventions:  - Provide teaching at level of understanding  - Provide teaching via preferred learning methods  Outcome: Progressing

## 2020-02-21 ENCOUNTER — TELEPHONE (OUTPATIENT)
Dept: NEUROLOGY | Facility: CLINIC | Age: 30
End: 2020-02-21

## 2020-02-21 ENCOUNTER — HOSPITAL ENCOUNTER (EMERGENCY)
Facility: HOSPITAL | Age: 30
Discharge: HOME | End: 2020-02-21
Attending: EMERGENCY MEDICINE | Admitting: EMERGENCY MEDICINE
Payer: COMMERCIAL

## 2020-02-21 VITALS
SYSTOLIC BLOOD PRESSURE: 130 MMHG | RESPIRATION RATE: 16 BRPM | DIASTOLIC BLOOD PRESSURE: 94 MMHG | OXYGEN SATURATION: 100 % | WEIGHT: 105.82 LBS | BODY MASS INDEX: 18.16 KG/M2 | HEART RATE: 101 BPM | TEMPERATURE: 99.6 F

## 2020-02-21 DIAGNOSIS — J02.9 PHARYNGITIS: Primary | ICD-10-CM

## 2020-02-21 PROCEDURE — 99284 EMERGENCY DEPT VISIT MOD MDM: CPT

## 2020-02-21 PROCEDURE — 99284 EMERGENCY DEPT VISIT MOD MDM: CPT | Performed by: EMERGENCY MEDICINE

## 2020-02-21 RX ORDER — AMOXICILLIN 500 MG/1
500 CAPSULE ORAL EVERY 12 HOURS SCHEDULED
Qty: 20 CAPSULE | Refills: 0 | Status: SHIPPED | OUTPATIENT
Start: 2020-02-21 | End: 2020-03-02

## 2020-02-21 RX ORDER — AMOXICILLIN 500 MG/1
500 CAPSULE ORAL ONCE
Status: COMPLETED | OUTPATIENT
Start: 2020-02-21 | End: 2020-02-21

## 2020-02-21 RX ADMIN — AMOXICILLIN 500 MG: 500 CAPSULE ORAL at 17:28

## 2020-02-21 NOTE — TELEPHONE ENCOUNTER
I called pt who stated that she hasn't gone to a dr yet, she has an appt later today, but she has a small headache, throat is red and swollen, and a slight non-productive cough  States that this woke her up overnight, she has been trying to drink a lot of water  She had some penicillin left over from when she was sick before, so she took this and it has not helped yet  She just wanted to make you aware  Pt stated that we can respond to her by calling her because she is home today

## 2020-02-21 NOTE — TELEPHONE ENCOUNTER
----- Message from Yany Filter sent at 2/21/2020 11:18 AM EST -----  Regarding: Non-Urgent Medical Question  Contact: [phone]  Good morning just wanted to let you know that i have had a throat infection for a little over 6 days now i know it is a side effect from the tysabri i read the book given to me  But it says to let the doctor know of anything and i am just letting you know   Thank you

## 2020-02-21 NOTE — TELEPHONE ENCOUNTER
Patient must be seeing at [facility] or PCP office asap - with her neurologic condition such as MS, treatment should be started immediately, avoiding MS relapse

## 2020-02-21 NOTE — ED PROVIDER NOTES
History  Chief Complaint   Patient presents with    Weakness - Generalized     pt has MS and has felt weak and has had a sore throat  pt had a tx on feb 6th of "Clairebry" and a common rxn to this med is sore throat  pt spoke to he neurologist and was told to come for tx of the throat  33 yo female with asthma, depression, and MS presents to the ED complaining of URI symptoms x 5 days  The patient reports nasal congestion, rhinorrhea, a sore throat, and a mild nonproductive cough  No fevers/chills  (+) Pain with swallowing  No difficulty swallowing, voice change, or drooling  The patient took 3 doses of "old penicillin" earlier this week without improvement in her symptoms  She recently started a new medication for her MS (Tysabri) and read online that one of the common side effects was a sore throat  However when she called her neurologist to confirm this side effect they asked her to go to an Urgent Care to get "checked for strep"  (+) Secondary complaint of generalized fatigue since onset of the illness  No other complaints  Prior to Admission Medications   Prescriptions Last Dose Informant Patient Reported? Taking? albuterol (VENTOLIN HFA) 90 mcg/act inhaler  Self No No   Sig: Inhale 2 puffs every 4 (four) hours as needed for wheezing   budesonide-formoterol (SYMBICORT) 160-4 5 mcg/act inhaler   No No   Sig: Inhale 2 puffs 2 (two) times a day Rinse mouth after use     meclizine (ANTIVERT) 25 mg tablet  Self No No   Sig: Take 1 tablet (25 mg total) by mouth every 8 (eight) hours as needed for dizziness   medroxyPROGESTERone (DEPO-PROVERA) 150 mg/mL injection  Self No No   Sig: Inject 1 mL (150 mg total) into a muscle every 3 (three) months   natalizumab (TYSABRI) 300 mg/15 mL   No No   Sig: Infuse 15 mL (300 mg total) into a venous catheter every 28 days      Facility-Administered Medications: None       Past Medical History:   Diagnosis Date    Asthma     Depression        Past Surgical History: Procedure Laterality Date    CLOSED REDUCTION FINGER FRACTURE Left     FL LUMBAR PUNCTURE DIAGNOSTIC  12/5/2019    HERNIA REPAIR  2016    TONSILLECTOMY Bilateral 1994       Family History   Problem Relation Age of Onset    Coronary artery disease Paternal Grandmother     Hypertension Paternal Grandfather         TYPE 2    Diabetes Paternal Grandfather     Pancreatic cancer Paternal Grandfather     Lung cancer Paternal Grandfather      I have reviewed and agree with the history as documented  Social History     Tobacco Use    Smoking status: Current Every Day Smoker     Packs/day: 0 25     Years: 9 00     Pack years: 2 25     Types: Cigarettes    Smokeless tobacco: Current User   Substance Use Topics    Alcohol use: No     Frequency: Never     Binge frequency: Never    Drug use: No       Review of Systems   Constitutional: Positive for fatigue  Negative for chills and fever  HENT: Positive for congestion, postnasal drip, rhinorrhea and sore throat  Negative for sinus pressure, sinus pain, trouble swallowing and voice change  Eyes: Negative for visual disturbance  Respiratory: Positive for cough  Negative for shortness of breath, wheezing and stridor  Cardiovascular: Negative for chest pain  Gastrointestinal: Negative for abdominal pain, diarrhea and vomiting  Endocrine: Negative for cold intolerance and heat intolerance  Genitourinary: Negative for dysuria and frequency  Musculoskeletal: Negative for back pain  Skin: Negative for rash  Allergic/Immunologic: Negative for immunocompromised state  Neurological: Negative for dizziness, weakness, light-headedness, numbness and headaches  Hematological: Negative for adenopathy  Psychiatric/Behavioral: Negative for self-injury  Physical Exam  Physical Exam   Constitutional: She is oriented to person, place, and time  She appears well-developed and well-nourished  No distress  HENT:   Head: Normocephalic and atraumatic  Mouth/Throat: Uvula is midline and mucous membranes are normal  No trismus in the jaw  Uvula swelling present  Posterior oropharyngeal edema and posterior oropharyngeal erythema present  No tonsillar abscesses  Tonsils are 0 on the right  Tonsils are 0 on the left  Eyes: Pupils are equal, round, and reactive to light  EOM are normal    Neck: Normal range of motion  Neck supple  Cardiovascular: Normal rate and regular rhythm  Pulmonary/Chest: Effort normal and breath sounds normal    Abdominal: Soft  She exhibits no distension  There is no tenderness  Musculoskeletal: Normal range of motion  She exhibits no edema  Lymphadenopathy:     She has cervical adenopathy  Neurological: She is alert and oriented to person, place, and time  Skin: Skin is warm and dry  Psychiatric: She has a normal mood and affect  Vital Signs  ED Triage Vitals [02/21/20 1646]   Temperature Pulse Respirations Blood Pressure SpO2   99 6 °F (37 6 °C) 101 16 130/94 100 %      Temp Source Heart Rate Source Patient Position - Orthostatic VS BP Location FiO2 (%)   Tympanic Monitor Sitting Left arm --      Pain Score       7           Vitals:    02/21/20 1646   BP: 130/94   Pulse: 101   Patient Position - Orthostatic VS: Sitting         Visual Acuity      ED Medications  Medications   amoxicillin (AMOXIL) capsule 500 mg (500 mg Oral Given 2/21/20 1728)       Diagnostic Studies  Results Reviewed     None                 No orders to display              Procedures  Procedures         ED Course                               MDM  Number of Diagnoses or Management Options  Pharyngitis:   Diagnosis management comments: The patient is comfortable appearing with stable vital signs  (+) Posterior pharyngeal erythema/edema on exam  Tonsils are surgically absent  The patient says she has been diagnosed with strep pharyngitis after similar presentations   Strep testing was offered but the patient declined, stating "that takes 45 minutes and I don't have the time"  Will treat presumptively with a course of amoxicillin and discharge to home  The patient was instructed to follow up with her PCP next week for reassessment  She is agreeable to this plan  Strict return precautions provided  Patient Progress  Patient progress: stable        Disposition  Final diagnoses:   Pharyngitis     Time reflects when diagnosis was documented in both MDM as applicable and the Disposition within this note     Time User Action Codes Description Comment    2/21/2020  5:12 PM Gaston Aly Add [J02 0] Strep pharyngitis     2/21/2020  5:12 PM Teo Laughlin Remove [J02 0] Strep pharyngitis     2/21/2020  5:13 PM Gaston Mosleye Add [J02 9] Pharyngitis       ED Disposition     ED Disposition Condition Date/Time Comment    Discharge Stable Fri Feb 21, 2020  5:12 PM Aileen Crawford 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  Follow-up Information     Follow up With Specialties Details Why Dontrell Dietrich MD Family Medicine Schedule an appointment as soon as possible for a visit   [address]    [address]             Discharge Medication List as of 2/21/2020  5:13 PM      START taking these medications    Details   amoxicillin (AMOXIL) 500 mg capsule Take 1 capsule (500 mg total) by mouth every 12 (twelve) hours for 10 days, Starting Fri 2/21/2020, Until Mon 3/2/2020, Print         CONTINUE these medications which have NOT CHANGED    Details   albuterol (VENTOLIN HFA) 90 mcg/act inhaler Inhale 2 puffs every 4 (four) hours as needed for wheezing, Starting Mon 1/13/2020, Normal      budesonide-formoterol (SYMBICORT) 160-4 5 mcg/act inhaler Inhale 2 puffs 2 (two) times a day Rinse mouth after use , Starting Thu 1/23/2020, Normal      meclizine (ANTIVERT) 25 mg tablet Take 1 tablet (25 mg total) by mouth every 8 (eight) hours as needed for dizziness, Starting Fri 11/29/2019, Normal      medroxyPROGESTERone (DEPO-PROVERA) 150 mg/mL injection Inject 1 mL (150 mg total) into a muscle every 3 (three) months, Starting Tue 10/8/2019, Normal      natalizumab (TYSABRI) 300 mg/15 mL Infuse 15 mL (300 mg total) into a venous catheter every 28 days, Starting Wed 1/29/2020, Phone In           No discharge procedures on file      PDMP Review     None          ED Provider  Electronically Signed by           Irene James MD  02/21/20 7386

## 2020-02-21 NOTE — TELEPHONE ENCOUNTER
Pt called back and states that she received my message  She was asking if there was a specific UC that she should go to  I made her aware that she can go to any UC  When she stated earlier that she had an appt later today, she said that she was going to go to a walk in clinic      She will go to [facility]

## 2020-02-26 ENCOUNTER — TELEPHONE (OUTPATIENT)
Dept: NEUROLOGY | Facility: CLINIC | Age: 30
End: 2020-02-26

## 2020-02-26 ENCOUNTER — ANNUAL EXAM (OUTPATIENT)
Dept: OBGYN CLINIC | Facility: CLINIC | Age: 30
End: 2020-02-26

## 2020-02-26 ENCOUNTER — APPOINTMENT (OUTPATIENT)
Dept: LAB | Facility: CLINIC | Age: 30
End: 2020-02-26
Payer: COMMERCIAL

## 2020-02-26 VITALS
BODY MASS INDEX: 17.93 KG/M2 | DIASTOLIC BLOOD PRESSURE: 88 MMHG | HEIGHT: 64 IN | SYSTOLIC BLOOD PRESSURE: 130 MMHG | HEART RATE: 100 BPM | WEIGHT: 105 LBS

## 2020-02-26 DIAGNOSIS — Z12.4 SCREENING FOR CERVICAL CANCER: ICD-10-CM

## 2020-02-26 DIAGNOSIS — Z12.39 SCREENING FOR BREAST CANCER: ICD-10-CM

## 2020-02-26 DIAGNOSIS — Z11.3 SCREEN FOR STD (SEXUALLY TRANSMITTED DISEASE): ICD-10-CM

## 2020-02-26 DIAGNOSIS — Z01.419 ENCOUNTER FOR GYNECOLOGICAL EXAMINATION WITHOUT ABNORMAL FINDING: Primary | ICD-10-CM

## 2020-02-26 LAB
HBV SURFACE AG SER QL: NORMAL
HCV AB SER QL: NORMAL

## 2020-02-26 PROCEDURE — 87491 CHLMYD TRACH DNA AMP PROBE: CPT | Performed by: NURSE PRACTITIONER

## 2020-02-26 PROCEDURE — 87591 N.GONORRHOEAE DNA AMP PROB: CPT | Performed by: NURSE PRACTITIONER

## 2020-02-26 PROCEDURE — 86592 SYPHILIS TEST NON-TREP QUAL: CPT

## 2020-02-26 PROCEDURE — G0145 SCR C/V CYTO,THINLAYER,RESCR: HCPCS | Performed by: NURSE PRACTITIONER

## 2020-02-26 PROCEDURE — 99395 PREV VISIT EST AGE 18-39: CPT | Performed by: NURSE PRACTITIONER

## 2020-02-26 PROCEDURE — 87389 HIV-1 AG W/HIV-1&-2 AB AG IA: CPT

## 2020-02-26 PROCEDURE — 87340 HEPATITIS B SURFACE AG IA: CPT

## 2020-02-26 PROCEDURE — 36415 COLL VENOUS BLD VENIPUNCTURE: CPT

## 2020-02-26 PROCEDURE — 86803 HEPATITIS C AB TEST: CPT

## 2020-02-26 NOTE — LETTER
3/2/2020    To Bharathi Higgins YOB: 1990      This letter is to advise you that your recent PAP SMEAR results were reviewed by me and are NORMAL    We will see you in 1 year for your annual exam     DANIEL Baugh

## 2020-02-26 NOTE — LETTER
2020    To Nan Hyman YOB: 1990      This letter is to advise you that your recent CULTURE results were reviewed by me and are NORMAL  Please contact the office for an appointment if you have any additional concerns      Merlin Oto, CRNP

## 2020-02-26 NOTE — LETTER
2020    To Harlan Hurtado  : 1990      This letter is to advise you that your recent BLOODWORK results were reviewed by me and are NORMAL  Please contact our office for an appointment if you have any additional concerns      Elna Meigs, CRNP

## 2020-02-26 NOTE — TELEPHONE ENCOUNTER
Tysabri scheduled 3/5  Per notes, pt Rx'd ABX for 10 days  LMOM for pt to return call  Pt will need to reschedule Javad 2 wks after last day of tx

## 2020-02-26 NOTE — PROGRESS NOTES
ANNUAL GYNECOLOGICAL EXAMINATION    Harlan Hurtado is a 34 y o  female who presents today for annual GYN exam   Her last pap smear was performed several years ago and result was WNL per patient  She reports no history of abnormal pap smears in her past   She reports menses as absent due to Depoprovera therapy  Her contraceptive method is Depoprovera  Her general medical history has been reviewed and she reports it as follows:    Past Medical History:   Diagnosis Date    Asthma     albuterol prn    Depression     hasn't required treatment since 2018    Multiple sclerosis [facility])      Past Surgical History:   Procedure Laterality Date    CLOSED REDUCTION FINGER FRACTURE Left     FL LUMBAR PUNCTURE DIAGNOSTIC  2019    HERNIA REPAIR  2016    TONSILLECTOMY Bilateral      OB History        3    Para   2    Term   1       1    AB   1    Living   2       SAB   1    TAB   0    Ectopic   0    Multiple   0    Live Births   2               Social History     Tobacco Use    Smoking status: Current Every Day Smoker     Packs/day: 0 10     Years: 9 00     Pack years: 0 90     Types: Cigarettes    Smokeless tobacco: Never Used   Substance Use Topics    Alcohol use: Yes     Frequency: Monthly or less     Binge frequency: Never    Drug use: Not Currently     Types: Marijuana, Cocaine, MDMA (ecstacy)     Comment: hasn't used any since      Cancer-related family history includes Cancer in her mother and paternal grandfather  There is no history of Breast cancer      Current Outpatient Medications:     albuterol (VENTOLIN HFA) 90 mcg/act inhaler, Inhale 2 puffs every 4 (four) hours as needed for wheezing, Disp: 1 Inhaler, Rfl: 0    amoxicillin (AMOXIL) 500 mg capsule, Take 1 capsule (500 mg total) by mouth every 12 (twelve) hours for 10 days, Disp: 20 capsule, Rfl: 0    meclizine (ANTIVERT) 25 mg tablet, Take 1 tablet (25 mg total) by mouth every 8 (eight) hours as needed for dizziness, Disp: 90 tablet, Rfl: 1    medroxyPROGESTERone (DEPO-PROVERA) 150 mg/mL injection, Inject 1 mL (150 mg total) into a muscle every 3 (three) months, Disp: 1 mL, Rfl: 3    natalizumab (TYSABRI) 300 mg/15 mL, Infuse 15 mL (300 mg total) into a venous catheter every 28 days, Disp: 15 mL, Rfl: 11    Review of Systems:  Review of Systems   Constitutional: Negative  Gastrointestinal: Negative  Genitourinary: Negative for difficulty urinating, menstrual problem, pelvic pain and vaginal discharge  Skin: Negative  Physical Exam:  /88   Pulse 100   Ht 5' 4" (1 626 m)   Wt 47 6 kg (105 lb)   BMI 18 02 kg/m²   Physical Exam   Constitutional: She is oriented to person, place, and time  She appears well-developed and well-nourished  Genitourinary: Vagina normal and uterus normal  There is no lesion on the right labia  There is no lesion on the left labia  Vagina exhibits no lesion and no rugosity  No vaginal discharge found  Right adnexum does not display mass and does not display tenderness  Left adnexum does not display mass and does not display tenderness  Cervix does not exhibit motion tenderness, lesion or pinkness  Uterus is not tender  Neck: Neck supple  No thyromegaly present  Cardiovascular: Normal rate and regular rhythm  Pulmonary/Chest: Effort normal and breath sounds normal  Right breast exhibits no mass, no nipple discharge, no skin change and no tenderness  Left breast exhibits no mass, no nipple discharge, no skin change and no tenderness  Abdominal: Soft  Bowel sounds are normal    Neurological: She is alert and oriented to person, place, and time  Skin: Skin is warm and dry  Assessment/Plan:   1  Normal well-woman GYN exam   2  Cervical cancer screening:  Normal pelvic exam   Pap smear done with HPV reflex  3  STD screening:  Orders placed for vaginal GC/CT cultures    Orders placed for serum anti-HIV, anti-HCV, HbsAg, RPR    4  Breast cancer screening:  Normal breast exam  Reviewed breast self-awareness  5  Depression Screening: Patient's depression screening was assessed with a PHQ-2 score of 0  Their PHQ-9 score was 3  Clinically patient does not have depression  No treatment is required  6  BMI Counseling: Body mass index is 18 02 kg/m²  No intervention indicated  7  Tobacco Cessation Counseling: Tobacco cessation counseling and education was provided  The patient is sincerely urged to quit consumption of tobacco  She is ready to quit tobacco  The numerous health risks of tobacco consumption were discussed  She is already weaning herself slowly off cigarettes     8  Return to office in 1 year for annual GYN exam

## 2020-02-26 NOTE — PATIENT INSTRUCTIONS
Cigarette Smoking and Your Health     What are the risks to my health if I smoke tobacco?  Nicotine and other chemicals found in tobacco damage every cell in your body  Even if you are a light smoker, you have an increased risk for cancer, heart disease, and lung disease  If you are pregnant or have diabetes, smoking increases your risk for complications  What are the benefits to my health if I stop smoking? · You decrease respiratory symptoms such as coughing, wheezing, and shortness of breath  · You reduce your risk for cancers of the lung, mouth, throat, kidney, bladder, pancreas, stomach, and cervix  If you already have cancer, you increase the benefits of chemotherapy  You also reduce your risk for cancer returning or a second cancer from developing  · You reduce your risk for heart disease, blood clots, heart attack, and stroke  · You reduce your risk for lung infections, and diseases such as pneumonia, asthma, chronic bronchitis, and emphysema  · Your circulation improves  More oxygen can be delivered to your body  If you have diabetes, you lower your risk for complications, such as kidney, artery, and eye diseases  You also lower your risk for nerve damage  Nerve damage can lead to amputations, poor vision, and blindness  · You improve your body's ability to heal and to fight infections  What are the health benefits to others if I stop smoking? Tobacco is harmful to nonsmokers who breathe in your secondhand smoke  The following are ways the health of others around you may improve when you stop smoking:  · You lower the risks for lung cancer and heart disease in nonsmoking adults  · If you are pregnant, you lower the risk for miscarriage, early delivery, low birth weight, and stillbirth  You also lower your baby's risk for SIDS, obesity, developmental delay, and neurobehavioral problems, such as ADHD       · If you have children, you lower their risk for ear infections, colds, pneumonia, bronchitis, and asthma  How to Stop Smoking     You will improve your health and the health of others around you  if you stop smoking  Your risk for heart and lung disease, cancer, stroke, heart attack, and vision problems will also decrease  You can benefit from quitting no matter how long you have smoked  PREPARE to stop smoking  Nicotine is a highly addictive drug found in cigarettes  Withdrawal symptoms can happen when you stop smoking and make it hard to quit  These include anxiety, depression, irritability, trouble sleeping, and increased appetite  You increase your chances of success if you PREPARE to quit  · Set a quit date  Lorie Jj a date that is within the next 2 weeks  Do not pick a day that you think may be stressful or busy  Write down the day or [place] it on your calender  · Tell friends and family that you plan to quit  Explain that you may have withdrawal symptoms when you try to quit  Ask them to support you  They may be able to encourage you and help reduce your stress to make it easier for you to quit  · Make a list of your reasons for quitting  Put the list somewhere you will see it every day, such as your refrigerator  You can look at the list when you have a craving  · Remove all tobacco and nicotine products from your home, car, and workplace  Also, remove anything else that will tempt you to smoke, such as lighters, matches, or ashtrays  Clean your car, home, and places at work that smell like smoke  The smell of smoke can trigger a craving  · Identify triggers that make you want to smoke  This may include activities, feelings, or people  Also write down 1 way you can deal with each of your triggers  For example, if you want to smoke as soon as you wake up, plan another activity during this time, such as exercise  · Make a plan for how you will quit    Learn about the tools that can help you quit, such as medicine, counseling, or nicotine replacement therapy  Choose at least 2 options to help you quit  Tools to help you stop smoking:     · Counseling  from a trained healthcare provider can provide you with support and skills to quit smoking  The provider will also teach you to manage your withdrawal symptoms and cravings  You may receive counseling from one counselor, in group therapy, or through phone therapy called a quit line  · Nicotine replacement therapy (NRT)  such as nicotine patches, gum, or lozenges may help reduce your nicotine cravings  You may get these without a doctor's order  Do not use e-cigarettes or smokeless tobacco in place of cigarettes or to help you quit  They still contain nicotine  · Prescription medicines  such as nasal sprays or nicotine inhalers may help reduce your withdrawal symptoms  Other medicines may also be used to reduce your urge to smoke  Ask your healthcare provider about these medicines  You may need to start certain medicines 2 weeks before your quit date for them to work well  · Hypnosis  is a practice that helps guide you through thoughts and feelings  Hypnosis may help decrease your cravings and make you more willing to quit  · Acupuncture therapy  uses very thin needles to balance energy channels in the body  This is thought to help decrease cravings and symptoms of nicotine withdrawal      · Support groups  let you talk to others who are trying to quit or have already quit  It may be helpful to speak with others about how they quit  Manage your cravings:     · Avoid situations, people, and places that tempt you to smoke  Go to nonsmoking places, such as libraries or restaurants  Understand what tempts you and try to avoid these things  · Keep your hands busy  Hold things such as a stress ball or pen  · Put candy or toothpicks in your mouth  Keep lollipops, sugarless gum, or toothpicks with you at all times  · Do not have alcohol or caffeine    These drinks may tempt you to smoke  Drink healthy liquids such as water or juice instead  · Reward yourself when you resist your cravings  Rewards will motivate you and help you stay positive  · Do an activity that distracts you from your craving  Examples include going for a walk, exercising, or cleaning  Prevent weight gain after you quit:  You may gain a few pounds after you quit smoking  It is healthier for you to gain a few pounds than to continue to smoke  The following can help you prevent weight gain:    · Eat healthy foods  These include fruits, vegetables, whole-grain breads, low-fat dairy products, beans, lean meats, and fish  Eat healthy snacks, such as low-fat yogurt, if you get hungry between meals  · Drink water before, during, and between meals  This will make your stomach feel full and help prevent you from overeating  Ask your healthcare provider how much liquid to drink each day and which liquids are best for you  · Exercise  Take a walk or do some kind of exercise every day  Ask your healthcare provider what exercise is right for you  This may help reduce your cravings and reduce stress

## 2020-02-27 LAB
C TRACH DNA SPEC QL NAA+PROBE: NEGATIVE
HIV 1+2 AB+HIV1 P24 AG SERPL QL IA: NORMAL
N GONORRHOEA DNA SPEC QL NAA+PROBE: NEGATIVE
RPR SER QL: NORMAL

## 2020-03-02 ENCOUNTER — PATIENT MESSAGE (OUTPATIENT)
Dept: NEUROLOGY | Facility: CLINIC | Age: 30
End: 2020-03-02

## 2020-03-02 LAB
LAB AP GYN PRIMARY INTERPRETATION: NORMAL
Lab: NORMAL

## 2020-03-02 NOTE — TELEPHONE ENCOUNTER
See pt's [employer] message  I left pt detailed message making her aware appt will be cancelled and she needs to reschedule 2wks after abx completed  Also sent response via [facility]

## 2020-03-04 ENCOUNTER — TELEPHONE (OUTPATIENT)
Dept: NEUROLOGY | Facility: CLINIC | Age: 30
End: 2020-03-04

## 2020-03-04 RX ORDER — ACETAMINOPHEN 325 MG/1
650 TABLET ORAL ONCE
Status: CANCELLED | OUTPATIENT
Start: 2020-03-05

## 2020-03-04 RX ORDER — SODIUM CHLORIDE 9 MG/ML
20 INJECTION, SOLUTION INTRAVENOUS ONCE
Status: CANCELLED | OUTPATIENT
Start: 2020-03-05

## 2020-03-04 NOTE — TELEPHONE ENCOUNTER
See 2/26 encounter  Tysabri rescheduled to 3/19  Order date changed and sent to provider for signature

## 2020-03-05 ENCOUNTER — HOSPITAL ENCOUNTER (OUTPATIENT)
Dept: INFUSION CENTER | Facility: CLINIC | Age: 30
End: 2020-03-05

## 2020-03-09 RX ORDER — ACETAMINOPHEN 325 MG/1
650 TABLET ORAL ONCE
Status: CANCELLED | OUTPATIENT
Start: 2020-03-19

## 2020-03-09 RX ORDER — SODIUM CHLORIDE 9 MG/ML
20 INJECTION, SOLUTION INTRAVENOUS ONCE
Status: CANCELLED | OUTPATIENT
Start: 2020-03-19

## 2020-03-09 NOTE — TELEPHONE ENCOUNTER
If patient has no signs of infection and she is 10 days past her last antibiotic dose- she is cleared for Tysabri infusion on 3/19/2020

## 2020-03-09 NOTE — TELEPHONE ENCOUNTER
Date changed on plan to 3/19/20  Patient's last infusion was 2/6/20  JCV negative 11/30/19  MRI's completed in December 2019  Frequency of every 28 days  Infusion initially delayed due to ABX therapy

## 2020-03-11 ENCOUNTER — HOSPITAL ENCOUNTER (EMERGENCY)
Facility: HOSPITAL | Age: 30
Discharge: HOME | End: 2020-03-11
Attending: EMERGENCY MEDICINE | Admitting: EMERGENCY MEDICINE
Payer: COMMERCIAL

## 2020-03-11 ENCOUNTER — TELEPHONE (OUTPATIENT)
Dept: INFUSION CENTER | Facility: CLINIC | Age: 30
End: 2020-03-11

## 2020-03-11 ENCOUNTER — TELEPHONE (OUTPATIENT)
Dept: NEUROLOGY | Facility: CLINIC | Age: 30
End: 2020-03-11

## 2020-03-11 VITALS
DIASTOLIC BLOOD PRESSURE: 96 MMHG | OXYGEN SATURATION: 98 % | TEMPERATURE: 98.2 F | RESPIRATION RATE: 14 BRPM | HEART RATE: 92 BPM | BODY MASS INDEX: 18.21 KG/M2 | SYSTOLIC BLOOD PRESSURE: 136 MMHG | WEIGHT: 106.1 LBS

## 2020-03-11 DIAGNOSIS — J40 BRONCHITIS: Primary | ICD-10-CM

## 2020-03-11 PROCEDURE — 99283 EMERGENCY DEPT VISIT LOW MDM: CPT

## 2020-03-11 PROCEDURE — 99284 EMERGENCY DEPT VISIT MOD MDM: CPT | Performed by: EMERGENCY MEDICINE

## 2020-03-11 RX ORDER — AZITHROMYCIN 250 MG/1
TABLET, FILM COATED ORAL
Qty: 6 TABLET | Refills: 0 | Status: SHIPPED | OUTPATIENT
Start: 2020-03-11 | End: 2020-03-15

## 2020-03-11 NOTE — TELEPHONE ENCOUNTER
----- Message from Wilbur Juan MD sent at 3/11/2020 10:27 AM EDT -----  Regarding: FW: Non-Urgent Medical Question  Contact: [phone]  Please cancel Tysabri infusion again - lease reschedule it in 10 days if possible  Please follow with the patient - she has sore throat and she is in ER now       ----- Message -----  From: Rhoda Bedoya MA  Sent: 3/11/2020   7:18 AM EDT  To: Wilbur Juan MD  Subject: FW: Non-Urgent Medical Question                      ----- Message -----  From: Peg Rich  Sent: 3/10/2020   9:28 PM EDT  To: [facility]  Subject: Non-Urgent Medical Question                      I will be doing a walk in at a clinic tomorrow my throat is bothering me again and now my right ear  I have been taking theraflu and vicks cough drops  Will give an update

## 2020-03-11 NOTE — ED PROVIDER NOTES
History  Chief Complaint   Patient presents with    Sore Throat     along with a cough x2 weeks     HPI    This is a very pleasant 80-year-old female presents the emergency department with a history of depression, asthma and tobacco abuse with a 2 week history with a cough congestion eye drainage right ear pain and sore throat  Patient does consume tobacco products on a regular basis  Prior to Admission Medications   Prescriptions Last Dose Informant Patient Reported? Taking? albuterol (VENTOLIN HFA) 90 mcg/act inhaler  Self No No   Sig: Inhale 2 puffs every 4 (four) hours as needed for wheezing   meclizine (ANTIVERT) 25 mg tablet  Self No No   Sig: Take 1 tablet (25 mg total) by mouth every 8 (eight) hours as needed for dizziness   medroxyPROGESTERone (DEPO-PROVERA) 150 mg/mL injection  Self No No   Sig: Inject 1 mL (150 mg total) into a muscle every 3 (three) months   natalizumab (TYSABRI) 300 mg/15 mL   No No   Sig: Infuse 15 mL (300 mg total) into a venous catheter every 28 days      Facility-Administered Medications: None       Past Medical History:   Diagnosis Date    Asthma     albuterol prn    Depression     hasn't required treatment since 2018    Multiple sclerosis [facility])        Past Surgical History:   Procedure Laterality Date    CLOSED REDUCTION FINGER FRACTURE Left     FL LUMBAR PUNCTURE DIAGNOSTIC  12/5/2019    HERNIA REPAIR  2016    TONSILLECTOMY Bilateral 1994       Family History   Problem Relation Age of Onset    Coronary artery disease Paternal Grandmother     Hypertension Paternal Grandfather         TYPE 2    Diabetes Paternal Grandfather     Cancer Paternal Grandfather         pancreatic, lung    Cancer Mother         thyroid    Diabetes Father     Hypertension Father     Breast cancer Neg Hx      I have reviewed and agree with the history as documented      E-Cigarette/Vaping     E-Cigarette/Vaping Substances     Social History     Tobacco Use    Smoking status: Current Every Day Smoker     Packs/day: 0 25     Years: 9 00     Pack years: 2 25     Types: Cigarettes    Smokeless tobacco: Never Used   Substance Use Topics    Alcohol use: Yes     Frequency: Monthly or less     Binge frequency: Never    Drug use: Not Currently     Types: Marijuana, Cocaine, MDMA (ecstacy)     Comment: hasn't used any since 2010       Review of Systems   Constitutional: Negative  HENT: Negative  Eyes: Negative  Respiratory: Positive for cough, chest tightness and shortness of breath  Cardiovascular: Negative  Gastrointestinal: Negative  Endocrine: Negative  Genitourinary: Negative  Musculoskeletal: Negative  Skin: Negative  Allergic/Immunologic: Negative  Neurological: Negative  Hematological: Negative  Psychiatric/Behavioral: Negative  Physical Exam  Physical Exam   Constitutional: She is oriented to person, place, and time  She appears well-developed and well-nourished  HENT:   Head: Normocephalic and atraumatic  Right Ear: External ear normal    Left Ear: External ear normal    Nose: Nose normal    Mouth/Throat: Oropharynx is clear and moist    Eyes: Pupils are equal, round, and reactive to light  Conjunctivae and EOM are normal    Neck: Normal range of motion  Neck supple  Cardiovascular: Normal rate, regular rhythm, normal heart sounds and intact distal pulses  Pulmonary/Chest: Effort normal and breath sounds normal    Abdominal: Soft  Bowel sounds are normal    Genitourinary:   Genitourinary Comments: Exam deferred  Musculoskeletal: Normal range of motion  Neurological: She is alert and oriented to person, place, and time  Skin: Skin is warm and dry  Capillary refill takes less than 2 seconds  Psychiatric: She has a normal mood and affect  Her behavior is normal    Nursing note and vitals reviewed        Vital Signs  ED Triage Vitals [03/11/20 0924]   Temperature Pulse Respirations Blood Pressure SpO2   98 2 °F (36 8 °C) 101 16 145/84 97 %      Temp Source Heart Rate Source Patient Position - Orthostatic VS BP Location FiO2 (%)   Tympanic Monitor Sitting Left arm --      Pain Score       --           Vitals:    03/11/20 0924 03/11/20 1035   BP: 145/84 136/96   Pulse: 101 92   Patient Position - Orthostatic VS: Sitting Sitting         Visual Acuity      ED Medications  Medications - No data to display    Diagnostic Studies  Results Reviewed     None                 No orders to display              Procedures  Procedures         ED Course                                 MDM  Number of Diagnoses or Management Options  Bronchitis:   Diagnosis management comments: This is a very pleasant 55-year-old female with a history of asthma presents with cough congestion x2 weeks  Patient noted that she had some purulent discharge from her eyes right ear pain cough and a sore throat that is worse with coughing  Patient's lungs are clear  Patient O2 saturation stable  Patient was recently seen and given amoxicillin for her sinus infection  No diarrhea from the use of recent antibiotics  No history of flu  No history of recent travel outside the geographical area  Differential diagnosis patient is as follows:  Bronchitis versus upper respiratory infection  Portions of the record may have been created with voice recognition software  Occasional wrong word or "sound a like" substitutions may have occurred due to the inherent limitations of voice recognition software  Read the chart carefully and recognize, using context, where substitutions have occurred  Counseling: I had a detailed discussion with the patient and/or guardian regarding: the historical points, exam findings, and any diagnostic results supporting the discharge diagnosis, lab results, radiology results, discharge instructions reviewed with patient and/or family/caregiver and understanding was verbalized   Instructions given to return to the emergency department if symptoms worsen or persist, or if there are any questions or concerns that arise at home              Disposition  Final diagnoses:   Bronchitis     Time reflects when diagnosis was documented in both MDM as applicable and the Disposition within this note     Time User Action Codes Description Comment    3/11/2020 11:47 AM Fernandez, [facility] Bronchitis       ED Disposition     ED Disposition Condition Date/Time Comment    Discharge Stable Wed Mar 11, 2020 11:47 AM [place] 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  Follow-up Information     Follow up With Specialties Details Why [address] [facility]   [address]  [address]             Discharge Medication List as of 3/11/2020 11:47 AM      START taking these medications    Details   azithromycin (Zithromax Z-Sascha) 250 mg tablet Take 2 tablets today then 1 tablet daily x 4 days, Print         CONTINUE these medications which have NOT CHANGED    Details   albuterol (VENTOLIN HFA) 90 mcg/act inhaler Inhale 2 puffs every 4 (four) hours as needed for wheezing, Starting Mon 1/13/2020, Normal      meclizine (ANTIVERT) 25 mg tablet Take 1 tablet (25 mg total) by mouth every 8 (eight) hours as needed for dizziness, Starting Fri 11/29/2019, Normal      medroxyPROGESTERone (DEPO-PROVERA) 150 mg/mL injection Inject 1 mL (150 mg total) into a muscle every 3 (three) months, Starting Tue 10/8/2019, Normal      natalizumab (TYSABRI) 300 mg/15 mL Infuse 15 mL (300 mg total) into a venous catheter every 28 days, Starting Wed 1/29/2020, Phone In           No discharge procedures on file      PDMP Review     None          ED Provider  Electronically Signed by           Citlaly Randall III, DO  03/11/20 7087

## 2020-03-11 NOTE — TELEPHONE ENCOUNTER
Patient calling to see if the 3/24 reschedule was too soon  Informed her that Dr Serena Dey wanted to reschedule in 10 days and this date would meet this requirement  No further questions

## 2020-03-16 NOTE — TELEPHONE ENCOUNTER
See encounter from 3/10  Pt states that she works 3/24, but can do 3/25   i called [facility] infusion [facility] and rescheduled your infusion  It is now scheduled for 3/25/20 at 12:30pm  Sent this info to pt via my chart  Updated date in [facility] orders   Please sign off

## 2020-03-16 NOTE — TELEPHONE ENCOUNTER
Daksha Maurice - will be happy to accommodate her infusion - considering she is just recovering from URI with ABx required and current pandemic COVID-19 situation, please offer her Tysabri infusion re-scheduled one more time to 2nd week of April, anytime she is available on that week

## 2020-03-17 ENCOUNTER — TELEPHONE (OUTPATIENT)
Dept: NEUROLOGY | Facility: CLINIC | Age: 30
End: 2020-03-17

## 2020-03-17 NOTE — TELEPHONE ENCOUNTER
Received fax from [employer] for adverse event report of Tysabri  Is strep throat and nausea an adverse event of this medication?     Will send back to [facility] [employer] at fax: [phone]

## 2020-03-18 NOTE — TELEPHONE ENCOUNTER
Message from dr gomez from 3/18-  Please offer the patient re-scheduling to April 8, 2020; Tysabri itself is not making the patient to be at the higher risk of acquiring COVID-19, and if she insist , she still may proceed with her original infusion date on 3/25/2020 if she takes the risk during pandemic COVID-19 situation  Pt made aware of this yesterday, see below  She will call to reschedule infusion

## 2020-03-19 ENCOUNTER — HOSPITAL ENCOUNTER (OUTPATIENT)
Dept: INFUSION CENTER | Facility: CLINIC | Age: 30
Discharge: HOME | End: 2020-03-19

## 2020-03-23 ENCOUNTER — TELEPHONE (OUTPATIENT)
Dept: NEUROLOGY | Facility: CLINIC | Age: 30
End: 2020-03-23

## 2020-03-24 RX ORDER — ACETAMINOPHEN 325 MG/1
650 TABLET ORAL ONCE
Status: CANCELLED | OUTPATIENT
Start: 2020-04-06

## 2020-03-24 RX ORDER — SODIUM CHLORIDE 9 MG/ML
20 INJECTION, SOLUTION INTRAVENOUS ONCE
Status: CANCELLED | OUTPATIENT
Start: 2020-04-06

## 2020-03-24 RX ORDER — SODIUM CHLORIDE 9 MG/ML
20 INJECTION, SOLUTION INTRAVENOUS ONCE
Status: CANCELLED | OUTPATIENT
Start: 2020-03-25

## 2020-03-24 RX ORDER — ACETAMINOPHEN 325 MG/1
650 TABLET ORAL ONCE
Status: CANCELLED | OUTPATIENT
Start: 2020-03-25

## 2020-03-24 NOTE — TELEPHONE ENCOUNTER
Your message from 3/11 was to have patient reschedule in 10 days  At that time the tysbari was rescheduled to 3/25  Patient was treated in the ED 3/11 (see ER noted in [facility]) for Bronchitis and was treated with Azithromycin 250mg x 4 days  This would have been completed on 3/15  Do you want pt to reschedule again? Or ok to proceed?

## 2020-03-24 NOTE — TELEPHONE ENCOUNTER
Called and left a message for pt to call the office back  I did state in the message we needed to speak to her before her infusion tomorrow   Will try patient again before leaving today if she does not call back

## 2020-03-24 NOTE — TELEPHONE ENCOUNTER
Please see 3/16/2020 phone encounter - patient has ongoing URI and required clearance from PCP office, with recommendations provided at that time to re-schedyled the patient to any day during the week April 6-10, 2020  Please advised if patient agreed with re-scheduling, OR if PCP team clearance completed for tomorrows [facility] visit

## 2020-03-24 NOTE — TELEPHONE ENCOUNTER
Alisha Hurtado from [facility] infusion center calling in to have order for Tysabri signed for tomorrow's infusion  Dr VELASQUEZ - please sign off

## 2020-03-24 NOTE — TELEPHONE ENCOUNTER
I gave the patient the phone number so she can call to reschedule herself  I also updated orders, so she will not be able to go tomorrow   Orders updated to be for 2 weeks from now as you requested

## 2020-03-24 NOTE — TELEPHONE ENCOUNTER
Decision on re-scheduling should be made by the patient - if she is well and she has no concern or re-activating her infection causing bronchitis, she may proceed with Tysabri tomorrow; if patient believes she is not completely well - I can advise safely re-schedule the patient to April 2020  If patient can not receive clearance from PCP team, as was previously advised - she will take the risk and decide if she is coming tomorrow or would like to wait 1-2 weeks, and I will be signing her orders

## 2020-03-25 ENCOUNTER — HOSPITAL ENCOUNTER (OUTPATIENT)
Dept: INFUSION CENTER | Facility: CLINIC | Age: 30
Discharge: HOME | End: 2020-03-25

## 2020-03-26 ENCOUNTER — TELEPHONE (OUTPATIENT)
Dept: NEUROLOGY | Facility: CLINIC | Age: 30
End: 2020-03-26

## 2020-03-26 NOTE — TELEPHONE ENCOUNTER
Eddie and offered video visit as an option for pt to call back and let us know if she wants to do this  Pt cancelled appt with Dr VELASQUEZ for 3/27/20

## 2020-03-27 ENCOUNTER — CLINICAL SUPPORT (OUTPATIENT)
Dept: OBGYN CLINIC | Facility: CLINIC | Age: 30
End: 2020-03-27

## 2020-03-27 VITALS
HEART RATE: 85 BPM | DIASTOLIC BLOOD PRESSURE: 70 MMHG | SYSTOLIC BLOOD PRESSURE: 120 MMHG | BODY MASS INDEX: 17.62 KG/M2 | WEIGHT: 103.2 LBS | HEIGHT: 64 IN

## 2020-03-27 DIAGNOSIS — Z30.09 UNWANTED FERTILITY: Primary | ICD-10-CM

## 2020-03-27 PROCEDURE — 96372 THER/PROPH/DIAG INJ SC/IM: CPT

## 2020-03-27 RX ORDER — MEDROXYPROGESTERONE ACETATE 150 MG/ML
150 INJECTION, SUSPENSION INTRAMUSCULAR ONCE
Status: COMPLETED | OUTPATIENT
Start: 2020-03-27 | End: 2020-03-27

## 2020-03-27 RX ADMIN — MEDROXYPROGESTERONE ACETATE 150 MG: 150 INJECTION, SUSPENSION INTRAMUSCULAR at 08:57

## 2020-03-30 ENCOUNTER — TELEPHONE (OUTPATIENT)
Dept: NEUROLOGY | Facility: CLINIC | Age: 30
End: 2020-03-30

## 2020-04-02 ENCOUNTER — TELEMEDICINE (OUTPATIENT)
Dept: FAMILY MEDICINE CLINIC | Facility: CLINIC | Age: 30
End: 2020-04-02
Payer: COMMERCIAL

## 2020-04-02 VITALS — WEIGHT: 106 LBS | BODY MASS INDEX: 18.19 KG/M2 | TEMPERATURE: 97.8 F

## 2020-04-02 DIAGNOSIS — R00.2 PALPITATION: Primary | ICD-10-CM

## 2020-04-02 DIAGNOSIS — G35 MS (MULTIPLE SCLEROSIS) (HCC): ICD-10-CM

## 2020-04-02 PROCEDURE — G2012 BRIEF CHECK IN BY MD/QHP: HCPCS | Performed by: FAMILY MEDICINE

## 2020-04-08 ENCOUNTER — HOSPITAL ENCOUNTER (OUTPATIENT)
Dept: INFUSION CENTER | Facility: CLINIC | Age: 30
End: 2020-04-08

## 2020-05-21 ENCOUNTER — TELEPHONE (OUTPATIENT)
Dept: NEUROLOGY | Facility: CLINIC | Age: 30
End: 2020-05-21

## 2020-05-24 ENCOUNTER — HOSPITAL ENCOUNTER (EMERGENCY)
Facility: HOSPITAL | Age: 30
Discharge: HOME | End: 2020-05-25
Attending: EMERGENCY MEDICINE | Admitting: EMERGENCY MEDICINE
Payer: COMMERCIAL

## 2020-05-24 VITALS
BODY MASS INDEX: 18.38 KG/M2 | SYSTOLIC BLOOD PRESSURE: 144 MMHG | DIASTOLIC BLOOD PRESSURE: 98 MMHG | TEMPERATURE: 97.8 F | RESPIRATION RATE: 18 BRPM | OXYGEN SATURATION: 100 % | HEART RATE: 130 BPM | WEIGHT: 107.1 LBS

## 2020-05-24 DIAGNOSIS — IMO0002 SELF-INFLICTED INJURY: ICD-10-CM

## 2020-05-24 DIAGNOSIS — F10.929 ALCOHOL INTOXICATION (HCC): ICD-10-CM

## 2020-05-24 DIAGNOSIS — F32.A DEPRESSION: Primary | ICD-10-CM

## 2020-05-24 LAB
ETHANOL EXG-MCNC: 0.19 MG/DL
EXT PREG TEST URINE: NEGATIVE
EXT. CONTROL ED NAV: NORMAL

## 2020-05-24 PROCEDURE — 99284 EMERGENCY DEPT VISIT MOD MDM: CPT | Performed by: EMERGENCY MEDICINE

## 2020-05-24 PROCEDURE — 80307 DRUG TEST PRSMV CHEM ANLYZR: CPT | Performed by: EMERGENCY MEDICINE

## 2020-05-24 PROCEDURE — 81001 URINALYSIS AUTO W/SCOPE: CPT | Performed by: EMERGENCY MEDICINE

## 2020-05-24 PROCEDURE — 81025 URINE PREGNANCY TEST: CPT | Performed by: EMERGENCY MEDICINE

## 2020-05-24 PROCEDURE — 99284 EMERGENCY DEPT VISIT MOD MDM: CPT

## 2020-05-24 PROCEDURE — 82075 ASSAY OF BREATH ETHANOL: CPT | Performed by: EMERGENCY MEDICINE

## 2020-05-25 LAB
AMPHETAMINES SERPL QL SCN: NEGATIVE
BACTERIA UR QL AUTO: ABNORMAL /HPF
BARBITURATES UR QL: NEGATIVE
BENZODIAZ UR QL: NEGATIVE
BILIRUB UR QL STRIP: NEGATIVE
CLARITY UR: CLEAR
COARSE GRAN CASTS URNS QL MICRO: ABNORMAL /LPF
COCAINE UR QL: NEGATIVE
COLOR UR: ABNORMAL
FINE GRAN CASTS URNS QL MICRO: ABNORMAL /LPF
GLUCOSE UR STRIP-MCNC: NEGATIVE MG/DL
HGB UR QL STRIP.AUTO: NEGATIVE
HYALINE CASTS #/AREA URNS LPF: ABNORMAL /LPF
KETONES UR STRIP-MCNC: NEGATIVE MG/DL
LEUKOCYTE ESTERASE UR QL STRIP: 25
METHADONE UR QL: NEGATIVE
MUCOUS THREADS UR QL AUTO: ABNORMAL
NITRITE UR QL STRIP: NEGATIVE
NON-SQ EPI CELLS URNS QL MICRO: ABNORMAL /HPF
OPIATES UR QL SCN: NEGATIVE
PCP UR QL: NEGATIVE
PH UR STRIP.AUTO: 5 [PH]
PROT UR STRIP-MCNC: ABNORMAL MG/DL
RBC #/AREA URNS AUTO: ABNORMAL /HPF
SP GR UR STRIP.AUTO: 1.01 (ref 1–1.04)
THC UR QL: NEGATIVE
UROBILINOGEN UA: NEGATIVE MG/DL
WBC #/AREA URNS AUTO: ABNORMAL /HPF

## 2020-06-15 ENCOUNTER — PATIENT MESSAGE (OUTPATIENT)
Dept: NEUROLOGY | Facility: CLINIC | Age: 30
End: 2020-06-15

## 2020-06-16 ENCOUNTER — TELEPHONE (OUTPATIENT)
Dept: NEUROLOGY | Facility: CLINIC | Age: 30
End: 2020-06-16

## 2020-06-16 NOTE — TELEPHONE ENCOUNTER
Concerning that patient has not had Tysabri in nearly 3 months it looks like (from what I can see her last infusion was 3/25)  This puts her at risk for disease progression/exacerbations  My recommendation is that she resume Tysabri infusions  If she would like to discuss in more detail, she can see me or Dr Devante Richard in the office to discuss  She should be reminded that the medications are not to help with existing symptoms, but to prevent further areas of demyelination that could lead to NEW symptoms and disability  We were not recommending that any of our Tysabri patients cancel/postpone infusions due to COVID-19  It is safe for her to get her monthly infusions  However she would need updated JCV, as last one was >6 months ago  MRI brain also >6 months ago, so would suggest that as well prior to re-initiation  We do not prescribe pain medication  Is her issue with spasms? Need more info on what kind of pain she is having  She has no upcoming visits scheduled and last seen in January

## 2020-06-16 NOTE — TELEPHONE ENCOUNTER
LAVELLEOM for pt to return call  Also sent pt message via [employer] for callback

## 2020-06-16 NOTE — TELEPHONE ENCOUNTER
----- Message from [facility], RN sent at 6/16/2020 11:26 AM EDT -----  Regarding: FW: Non-Urgent Medical Question  Contact: [phone]  Please advise  ----- Message -----  From: Jack Dietrich MA  Sent: 6/16/2020  11:19 AM EDT  To: [facility] [place] [facility]  Subject: FW: Non-Urgent Medical Question                      ----- Message -----  From: Ahsan Duran  Sent: 6/15/2020   5:18 PM EDT  To: [facility]rology [place] [facility]  Subject: RE: Non-Urgent Medical Question                  So then i dont want tysabri and when someone did contact me from the infusion center i told them i wasnt going to go in until the pandemic was over to ease my worries of getting sick  I would like something for my pain then my muscles ache if i am not working really bad thank you have a good day   ----- Message -----  From: Nurse Luke He  Sent: 6/15/2020  3:58 PM EDT  To: Ahsan Duran  Subject: RE: Non-Urgent Medical Question  Thank you for contacting [facility],    We have attempted to reach you several times  You are over-due for your Tysabri infusion  We did not indicate for you to discontinue your treatments  Please call the infusion center and reschedule as soon as you are able to  Please be aware however that Tysabri, is not for assistance with your pain but is rather to prevent further MS relapses  Please do not hesitate to call our office at [phone]  Thank you for choosing [facility] for your care      ----- Message -----     From: Ahsan Duran     Sent: 6/15/2020 12:05 AM EDT       To: Isidro Epps MD  Subject: Non-Urgent Medical Question    I have been holding off my pain because of the pandemic but i cant hold my pain anymore and need treatment as soon as possible i have been using this cream called bio freeze from work it helps for a little while but my back legs and arms hurt bad and it is causing me emotional and mental issues  When can i have my next infusion appointment   I am off thursday Saturday and sunday from work  Please help me

## 2020-06-18 ENCOUNTER — TELEPHONE (OUTPATIENT)
Dept: OBGYN CLINIC | Facility: CLINIC | Age: 30
End: 2020-06-18

## 2020-06-18 NOTE — TELEPHONE ENCOUNTER
Patient reached us in March and she had agreement with me, she will not proceed with Tysabri infusion during pandemic COVID19 outbreak  We agreed to proceed with observational approach, no letters required  Patient is to reach our office back when she believes we can help her with her chronic condition

## 2020-06-19 ENCOUNTER — CLINICAL SUPPORT (OUTPATIENT)
Dept: OBGYN CLINIC | Facility: CLINIC | Age: 30
End: 2020-06-19

## 2020-06-19 VITALS
BODY MASS INDEX: 17.68 KG/M2 | TEMPERATURE: 98.7 F | HEART RATE: 80 BPM | WEIGHT: 103 LBS | SYSTOLIC BLOOD PRESSURE: 139 MMHG | DIASTOLIC BLOOD PRESSURE: 95 MMHG

## 2020-06-19 DIAGNOSIS — Z30.09 UNWANTED FERTILITY: ICD-10-CM

## 2020-06-19 DIAGNOSIS — Z30.013 ENCOUNTER FOR INITIAL PRESCRIPTION OF INJECTABLE CONTRACEPTIVE: ICD-10-CM

## 2020-06-19 DIAGNOSIS — Z30.42 DEPO-PROVERA CONTRACEPTIVE STATUS: Primary | ICD-10-CM

## 2020-06-19 PROCEDURE — 96372 THER/PROPH/DIAG INJ SC/IM: CPT

## 2020-06-19 RX ORDER — MEDROXYPROGESTERONE ACETATE 150 MG/ML
150 INJECTION, SUSPENSION INTRAMUSCULAR ONCE
Status: COMPLETED | OUTPATIENT
Start: 2020-06-19 | End: 2020-06-19

## 2020-06-19 RX ORDER — MEDROXYPROGESTERONE ACETATE 150 MG/ML
150 INJECTION, SUSPENSION INTRAMUSCULAR
Qty: 1 ML | Refills: 3 | Status: SHIPPED | OUTPATIENT
Start: 2020-06-19 | End: 2020-09-03 | Stop reason: SDUPTHER

## 2020-06-19 RX ADMIN — MEDROXYPROGESTERONE ACETATE 150 MG: 150 INJECTION, SUSPENSION INTRAMUSCULAR at 08:14

## 2020-06-29 NOTE — TELEPHONE ENCOUNTER
Dr Devante Richard, please also see 5/21 encounter  Pt Kenia Silverman 5107 her last neuro appt  There are no Tysabri appts scheduled in the system as order dc'd  I tried to call pt again to discuss  LMOM  Dr Devante Richard, please review below message and advise  Any further recommendations re sx or do you want to see pt in office?

## 2020-06-29 NOTE — TELEPHONE ENCOUNTER
Patient has advanced MS with extensive cord demyelination discussed with the patient already  Patient has been describing muscle spasms and neuropathic pain due to her chronic devastating conditions, and probably misinterpreting Tysabri as symptomatic therapy, and not long-term treatment to preclude deterioration  Please d/c Tysabri as we agreed with the patient some times ago  Please offer FU with Jose Juan Phillips to review symptoms in detail and provide symptomatic treatment only

## 2020-06-29 NOTE — TELEPHONE ENCOUNTER
----- Message -----   From: Carlita Valente   Sent: 6/27/2020  12:34 AM EDT   To: [facility] [place] [facility]   Subject: RE: Non-Urgent Medical Question                   Can you please stop making me appointments for the tysabri i dont want it anymore i havent been falling or feeling a relapse since i started working physically  I am just concerned on getting help with the pain in my arm muscles that i have been experiencing  I have been taking aleve for my pain but it helps but so much  If i cant be helped for the pain  I will seek medical attention somewhere else  I have been through so many tests that have affected with so much from allergic reactions on medications and emotional destress on not getting the help i need which is just the massive pain in my muscles  I feel as if my blood circulation is being stopped by rubber bands  I have been using this cream called biofreeze that helps for a little but but not much for long term pain   I need help and all im getting is appointment s for the tysabri i need some understanding as to what can really help me its very frustrating

## 2020-06-30 NOTE — TELEPHONE ENCOUNTER
Dr Brijesh Morse order was discontinued in May  2 nd attempt to reach pt  LMOM  I also sent her message via [employer]

## 2020-07-06 ENCOUNTER — HOSPITAL ENCOUNTER (EMERGENCY)
Facility: HOSPITAL | Age: 30
Discharge: HOME | End: 2020-07-06
Attending: EMERGENCY MEDICINE | Admitting: EMERGENCY MEDICINE
Payer: COMMERCIAL

## 2020-07-06 VITALS
HEART RATE: 89 BPM | TEMPERATURE: 97.5 F | DIASTOLIC BLOOD PRESSURE: 105 MMHG | BODY MASS INDEX: 18.01 KG/M2 | OXYGEN SATURATION: 99 % | RESPIRATION RATE: 18 BRPM | WEIGHT: 104.9 LBS | SYSTOLIC BLOOD PRESSURE: 145 MMHG

## 2020-07-06 DIAGNOSIS — R21 RASH: Primary | ICD-10-CM

## 2020-07-06 PROCEDURE — 99284 EMERGENCY DEPT VISIT MOD MDM: CPT | Performed by: EMERGENCY MEDICINE

## 2020-07-06 PROCEDURE — 99283 EMERGENCY DEPT VISIT LOW MDM: CPT

## 2020-07-06 RX ORDER — DEXAMETHASONE 4 MG/1
4 TABLET ORAL ONCE
Qty: 1 TABLET | Refills: 0 | Status: SHIPPED | OUTPATIENT
Start: 2020-07-06 | End: 2020-07-06

## 2020-07-06 RX ORDER — BETAMETHASONE DIPROPIONATE 0.05 %
OINTMENT (GRAM) TOPICAL 2 TIMES DAILY
Qty: 30 G | Refills: 0 | Status: SHIPPED | OUTPATIENT
Start: 2020-07-06 | End: 2020-09-15 | Stop reason: ALTCHOICE

## 2020-07-06 RX ADMIN — DEXAMETHASONE SODIUM PHOSPHATE 10 MG: 10 INJECTION, SOLUTION INTRAMUSCULAR; INTRAVENOUS at 06:45

## 2020-07-06 NOTE — ED PROVIDER NOTES
History  Chief Complaint   Patient presents with    Rash     pt states that she started a few days ago with a rash  pt states that the rash started on her hand  pt sattes that the rash is now on back, legs and thigh area     Patient confirms documented chief complaint  History provided by:  Patient   used: No        Prior to Admission Medications   Prescriptions Last Dose Informant Patient Reported? Taking? medroxyPROGESTERone (DEPO-PROVERA) 150 mg/mL injection   No No   Sig: Inject 1 mL (150 mg total) into a muscle every 3 (three) months      Facility-Administered Medications: None       Past Medical History:   Diagnosis Date    Asthma     albuterol prn    Depression     hasn't required treatment since 2018    Multiple sclerosis [facility])        Past Surgical History:   Procedure Laterality Date    CLOSED REDUCTION FINGER FRACTURE Left     FL LUMBAR PUNCTURE DIAGNOSTIC  12/5/2019    HERNIA REPAIR  2016    TONSILLECTOMY Bilateral 1994       Family History   Problem Relation Age of Onset    Coronary artery disease Paternal Grandmother     Hypertension Paternal Grandfather         TYPE 2    Diabetes Paternal Grandfather     Cancer Paternal Grandfather         pancreatic, lung    Cancer Mother         thyroid    Diabetes Father     Hypertension Father     Breast cancer Neg Hx      I have reviewed and agree with the history as documented  E-Cigarette/Vaping    E-Cigarette Use Never User      E-Cigarette/Vaping Substances     Social History     Tobacco Use    Smoking status: Current Every Day Smoker     Packs/day: 0 25     Years: 9 00     Pack years: 2 25     Types: Cigarettes    Smokeless tobacco: Never Used   Substance Use Topics    Alcohol use: Yes     Frequency: Monthly or less     Binge frequency: Never    Drug use: Not Currently     Types: Marijuana, Cocaine, MDMA (ecstacy)     Comment: hasn't used any since 2010       Review of Systems   Constitutional: Negative  Negative for chills and fever  HENT: Negative  Negative for rhinorrhea, sore throat, trouble swallowing and voice change  Eyes: Negative  Negative for pain and visual disturbance  Respiratory: Negative  Negative for cough, shortness of breath and wheezing  Cardiovascular: Negative  Negative for chest pain and palpitations  Gastrointestinal: Negative for abdominal pain, diarrhea, nausea and vomiting  Genitourinary: Negative  Negative for dysuria and frequency  Musculoskeletal: Negative  Negative for neck pain and neck stiffness  Skin: Positive for rash  Neurological: Negative  Negative for dizziness, speech difficulty, weakness, light-headedness and numbness  Physical Exam  Physical Exam   Constitutional: She is oriented to person, place, and time  She appears well-developed and well-nourished  No distress  HENT:   Head: Normocephalic and atraumatic  Mouth/Throat: Oropharynx is clear and moist    Eyes: Pupils are equal, round, and reactive to light  Conjunctivae and EOM are normal    Neck: Normal range of motion  Neck supple  No tracheal deviation present  Cardiovascular: Normal rate, regular rhythm and intact distal pulses  Pulmonary/Chest: Effort normal and breath sounds normal  No respiratory distress  She has no wheezes  She has no rales  Abdominal: Soft  Bowel sounds are normal  She exhibits no distension  There is no tenderness  There is no rebound and no guarding  Musculoskeletal: Normal range of motion  She exhibits no tenderness or deformity  Neurological: She is alert and oriented to person, place, and time  Skin: Skin is warm and dry  Capillary refill takes less than 2 seconds  Rash noted  Psychiatric: She has a normal mood and affect  Her behavior is normal    Nursing note and vitals reviewed        Vital Signs  ED Triage Vitals [07/06/20 0625]   Temperature Pulse Respirations Blood Pressure SpO2   97 5 °F (36 4 °C) 89 18 (!) 145/105 99 %      Temp Source Heart Rate Source Patient Position - Orthostatic VS BP Location FiO2 (%)   Tympanic Monitor Sitting Left arm --      Pain Score       --           Vitals:    07/06/20 0625   BP: (!) 145/105   Pulse: 89   Patient Position - Orthostatic VS: Sitting         Visual Acuity      ED Medications  Medications   dexamethasone 10 mg/mL oral liquid 10 mg 1 mL (10 mg Oral Given 7/6/20 0645)       Diagnostic Studies  Results Reviewed     None                 No orders to display              Procedures  Procedures         ED Course       US AUDIT      Most Recent Value   Initial Alcohol Screen: US AUDIT-C    1  How often do you have a drink containing alcohol?  0 Filed at: 07/06/2020 0645   2  How many drinks containing alcohol do you have on a typical day you are drinking? 0 Filed at: 07/06/2020 0645   3b  FEMALE Any Age, or MALE 65+: How often do you have 4 or more drinks on one occassion? 0 Filed at: 07/06/2020 0645   Audit-C Score  0 Filed at: 07/06/2020 0645                  WESLEY/DAST-10      Most Recent Value   How many times in the past year have you    Used an illegal drug or used a prescription medication for non-medical reasons? Never Filed at: 07/06/2020 0645                                MDM  Number of Diagnoses or Management Options  Rash:   Diagnosis management comments: Well-appearing 68-year-old female who presents for rash to her body  She states that she was recently working in her garden  She has multiple areas of small vesicle lesions without pustular discharge underlying erythema  There itchy, consistent with exposure to something similar to poison ivy causing contact dermatitis  She has no signs of anaphylaxis  Will start her on systemic steroids here in the emergency room as well as prescribing topical steroids  Patient was advised the need for follow-up care and to see Dermatology if her symptoms persist   Strict return precautions were discussed          Disposition  Final diagnoses:   Rash Time reflects when diagnosis was documented in both MDM as applicable and the Disposition within this note     Time User Action Codes Description Comment    2020  6:41 AM Carmen Poisson Add [R21] Rash       ED Disposition     ED Disposition Condition Date/Time Comment    Discharge Stable   6:41 AM Annia Fountain 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  Follow-up Information     Follow up With Specialties Details Why [address] Mary Ellen Holden 151, MD Family Medicine   [address] [facility]  [address] [facility] Dermatology [facility] D[facility]at[facility]  As needed [address] [facility] [address]  [phone] [facility] Dermatology [facility], [address] [place] [facility] [place] [id number], [address] [place] [address], [id number] [phone]          Discharge Medication List as of 2020  6:43 AM      START taking these medications    Details   betamethasone dipropionate (DIPROSONE) 0 05 % ointment Apply topically 2 (two) times a day, Starting 2020, Normal      dexamethasone (DECADRON) 4 mg tablet Take 1 tablet (4 mg total) by mouth once for 1 dose, Starting 2020, Normal         CONTINUE these medications which have NOT CHANGED    Details   medroxyPROGESTERone (DEPO-PROVERA) 150 mg/mL injection Inject 1 mL (150 mg total) into a muscle every 3 (three) months, Starting 2020, Normal           No discharge procedures on file      PDMP Review     None          ED Provider  Electronically Signed by           Kadie Melgoza DO  20 [id number]

## 2020-07-08 ENCOUNTER — TELEPHONE (OUTPATIENT)
Dept: FAMILY MEDICINE CLINIC | Facility: CLINIC | Age: 30
End: 2020-07-08

## 2020-07-08 NOTE — TELEPHONE ENCOUNTER
----- Message from Susanna Ling sent at 7/7/2020  3:55 PM EDT -----  Regarding: Non-Urgent Medical Question  Contact: [phone]  Hello and good afternoon i need a follow up appointment because of my visit to the er with this weird rash i have all over my body

## 2020-07-09 ENCOUNTER — TELEPHONE (OUTPATIENT)
Dept: NEUROLOGY | Facility: CLINIC | Age: 30
End: 2020-07-09

## 2020-07-09 NOTE — TELEPHONE ENCOUNTER
See encounter 7/9/20  Scheduled patient 7/24 in [facility] with Kilo Ang advising pt of such and to CB if this date/time doesn't work for her

## 2020-07-09 NOTE — TELEPHONE ENCOUNTER
See below for full details  Scheduled patient an apt for Friday afternoon as requested by patient in message below  Patient scheduled for  @ 230 with Amrita Meredith in [facility]  Called patient DINESH advising her we received her message that she wanted to schedule a Friday apt  Advised patient I have scheduled her for  @ 230 with Amrita Meredith in [facility], advised patient if this date/time doesn't work to please call the office back  Also mailed apt card to patients home address on file       ----- Message from [facility], RN sent at 2020  5:02 PM EDT -----  Regarding: FW: Non-Urgent Medical Question  Contact: [phone]      ----- Message -----  From: Jai Michael MA  Sent: 2020   4:30 PM EDT  To: [facility]  Subject: FW: Non-Urgent Medical Question                      ----- Message -----  From: Sin Hoskins  Sent: 2020   3:53 PM EDT  To: [facility]  Subject: RE: Non-Urgent Medical Question                  I can not call to schedule an appointment by the time i come out your office is closed you can try to set me up an appointment for any friday afternoon thank you      ----- Message -----  From: Nurse Tonya Dent  Sent: 2020  3:49 PM EDT  To: Sin Hoskins  Subject: RE: Non-Urgent Medical Question  Thank you for contacting [facility],    Please call our office to schedule an appointment  Thank you for choosing [facility] for your care      ----- Message -----     From: Sin Hoskins     Sent: 2020 11:49 AM EDT       To: Crawford Apgar, MD  Subject: RE: Non-Urgent Medical Question    Very sorry i am working 12 hour shifts at my job and it is very hard to catch up on anything  We can communicate through here   ----- Message -----  From: Nurse Yesy Bailey: 2020  8:32 AM EDT  To: Sin Hoskins  Subject: RE: Non-Urgent Medical Question  Dena Avalos,     We have been trying to reach you   You are not scheduled for any Tysabri infusions  Dr Ernie Adam would like you to make an appointmtent as she feels your symptoms have to be assessed  Please call [phone]  Thank you,    Manisha REYNOLDS, RN  MS Nurse Navigator      ----- Message -----     From: Kriss Jeffery     Sent: 2020 12:34 AM EDT       To: Rico Apgar, MD  Subject: RE: Non-Urgent Medical Question    Can you please stop making me appointments for the tysabri i dont want it anymore i havent been falling or feeling a relapse since i started working physically  I am just concerned on getting help with the pain in my arm muscles that i have been experiencing  I have been taking aleve for my pain but it helps but so much  If i cant be helped for the pain  I will seek medical attention somewhere else  I have been through so many tests that have affected with so much from allergic reactions on medications and emotional destress on not getting the help i need which is just the massive pain in my muscles  I feel as if my blood circulation is being stopped by rubber bands  I have been using this cream called biofreeze that helps for a little but but not much for long term pain  I need help and all im getting is appointment s for the tysabri i need some understanding as to what can really help me its very frustrating    ----- Message -----  From: Nurse Garcia Lines: 2020 12:24 PM EDT  To: [facility]  Subject: RE: Non-Urgent Medical Question  Marylen Beavers,    I left a message on your voicemail just now  Please call our office to discuss further  GAIL Cartwright, RN  MS Nurse Navigator  [phone]    ----- Message -----     From: Kriss Jeffery     Sent: 6/15/2020  5:18 PM EDT       To: Rico Apgar, MD  Subject: RE: Non-Urgent Medical Question    So then i dont want tysabri and when someone did contact me from the infusion center i told them i wasnt going to go in until the pandemic was over to ease my worries of getting sick   I would like something for my pain then my muscles ache if i am not working really bad thank you have a good day     ----- Message -----  From: Nurse Sharon Cabreras: 6/15/2020  3:58 PM EDT  To: Thompson Preciado  Subject: RE: Non-Urgent Medical Question  Thank you for contacting [facility],    We have attempted to reach you several times  You are over-due for your Tysabri infusion  We did not indicate for you to discontinue your treatments  Please call the infusion center and reschedule as soon as you are able to  Please be aware however that Tysabri, is not for assistance with your pain but is rather to prevent further MS relapses  Please do not hesitate to call our office at [phone]  Thank you for choosing [facility] for your care      ----- Message -----     From: Thompson Preciado     Sent: 6/15/2020 12:05 AM EDT       To: She Barbosa MD  Subject: Non-Urgent Medical Question    I have been holding off my pain because of the pandemic but i cant hold my pain anymore and need treatment as soon as possible i have been using this cream called bio freeze from work it helps for a little while but my back legs and arms hurt bad and it is causing me emotional and mental issues  When can i have my next infusion appointment  I am off thursday Saturday and sunday from work  Please help me

## 2020-07-20 ENCOUNTER — OFFICE VISIT (OUTPATIENT)
Dept: FAMILY MEDICINE CLINIC | Facility: CLINIC | Age: 30
End: 2020-07-20
Payer: COMMERCIAL

## 2020-07-20 VITALS
WEIGHT: 104 LBS | HEIGHT: 64 IN | TEMPERATURE: 97.7 F | OXYGEN SATURATION: 97 % | BODY MASS INDEX: 17.75 KG/M2 | DIASTOLIC BLOOD PRESSURE: 88 MMHG | SYSTOLIC BLOOD PRESSURE: 128 MMHG | HEART RATE: 100 BPM

## 2020-07-20 DIAGNOSIS — Z00.01 ENCOUNTER FOR WELL ADULT EXAM WITH ABNORMAL FINDINGS: Primary | ICD-10-CM

## 2020-07-20 DIAGNOSIS — Z13.29 SCREENING FOR THYROID DISORDER: ICD-10-CM

## 2020-07-20 DIAGNOSIS — Z13.1 SCREENING FOR DIABETES MELLITUS: ICD-10-CM

## 2020-07-20 DIAGNOSIS — Z13.0 SCREENING FOR IRON DEFICIENCY ANEMIA: ICD-10-CM

## 2020-07-20 DIAGNOSIS — F17.210 MODERATE SMOKER (20 OR LESS PER DAY): ICD-10-CM

## 2020-07-20 DIAGNOSIS — Z13.6 SCREENING FOR HYPERTENSION: ICD-10-CM

## 2020-07-20 DIAGNOSIS — Z13.220 SCREENING FOR LIPOID DISORDERS: ICD-10-CM

## 2020-07-20 PROCEDURE — 3008F BODY MASS INDEX DOCD: CPT | Performed by: NURSE PRACTITIONER

## 2020-07-20 PROCEDURE — 3008F BODY MASS INDEX DOCD: CPT | Performed by: FAMILY MEDICINE

## 2020-07-20 PROCEDURE — 99395 PREV VISIT EST AGE 18-39: CPT | Performed by: FAMILY MEDICINE

## 2020-07-20 RX ORDER — IBUPROFEN 600 MG/1
600 TABLET ORAL EVERY 6 HOURS PRN
COMMUNITY
Start: 2020-07-12 | End: 2020-09-15 | Stop reason: ALTCHOICE

## 2020-07-20 RX ORDER — METHOCARBAMOL 750 MG/1
750 TABLET, FILM COATED ORAL 4 TIMES DAILY PRN
COMMUNITY
Start: 2020-07-12 | End: 2020-07-22 | Stop reason: ALTCHOICE

## 2020-07-20 NOTE — PROGRESS NOTES
FAMILY PRACTICE HEALTH MAINTENANCE OFFICE VISIT  [facility] [place]    NAME: Elmo Granados  AGE: 34 y o  SEX: female  : 1990     DATE: 2020    Assessment and Plan     1  Encounter for well adult exam with abnormal findings  Assessment & Plan:  Advice and education were given regarding nutrition, aerobic exercises, weight bearing exercises, cardiovascular risk reduction, fall risk reduction, and age appropriate supplements  The patient was counseled regarding instructions for management, risk factor reductions, prognosis, risks and benefits of treatment options, patient and family education, and importance of compliance with treatment  Patient due for HPV vaccine she will hold on it for now      2  Body mass index (BMI) less than 19 in adult  Assessment & Plan:  Discussed with the patient BMI below 19 she has and the weight and that increase her risk for osteoporosis the recommend increase calorie intake and important gain weight    Orders:  -     Endomysial antibody, IgA; Future    3  Screening for lipoid disorders  -     Lipid panel; Future    4  Screening for diabetes mellitus  -     Basic metabolic panel; Future    5  Screening for iron deficiency anemia  -     CBC and differential; Future  -     Basic metabolic panel; Future  -     Lipid panel; Future  -     TSH, 3rd generation with Free T4 reflex; Future    6  Screening for hypertension  -     Lipid panel; Future    7  Screening for thyroid disorder  -     TSH, 3rd generation with Free T4 reflex; Future    8   Moderate smoker (20 or less per day)  Assessment & Plan:  Chronic patient aware of the complication off smoking patient had her reaction to the nicotine patch and nicotine inhaler was very expensive with the insurance she will try to cut down on the number of the cigarette        · Patient Counseling:   · Nutrition: Stressed importance of a well balanced diet, moderation of sodium/saturated fat, caloric balance and sufficient intake of fiber  · Exercise: Stressed the importance of regular exercise with a goal of 150 minutes per week  · Dental Health: Discussed daily flossing and brushing and regular dental visits     · Immunizations reviewed: Declined recommended vaccinations  · Discussed benefits of:  Cervical Cancer screening   BMI Counseling: Body mass index is 17 85 kg/m²  Discussed with patient's BMI with her       Chief Complaint     Chief Complaint   Patient presents with    Physical Exam     patient is here today for her yearly physical exam        History of Present Illness     The patient here for annual physical exam she deny any chest pain short of breath no palpitation no cough no wheezing no hematosis deny any headache blurred vision no weakness or lateralized of the symptom no abdomen pain nausea vomiting or diarrhea no renal problem no rash no fever no change in the weight and no change in the mood the she does not do any special diet does not do exercise regularly and she does smoke and she smoke less than half pack a day and she been smoking for almost 9-10 years      Well Adult Physical   Patient here for a comprehensive physical exam       Diet and Physical Activity  Diet: Regular diet  Exercise: infrequently      Depression Screen  PHQ-9 Depression Screening    PHQ-9:    Frequency of the following problems over the past two weeks:       Little interest or pleasure in doing things:  0 - not at all  Feeling down, depressed, or hopeless:  0 - not at all  PHQ-2 Score:  0          General Health  Hearing: Normal:  bilateral  Vision: wears glasses  Dental: regular dental visits    Reproductive Health  Follows with gynecologist      The following portions of the patient's history were reviewed and updated as appropriate: allergies, current medications, past family history, past medical history, past social history, past surgical history and problem list     Review of Systems     Review of Systems   Constitutional: Negative for activity change, appetite change, fatigue and fever  HENT: Negative for congestion, ear pain, sinus pressure, sinus pain and sore throat  Eyes: Negative for pain, discharge, redness and itching  Respiratory: Negative for cough, chest tightness, shortness of breath and stridor  Cardiovascular: Negative for chest pain, palpitations and leg swelling  Gastrointestinal: Negative for abdominal pain, blood in stool, constipation, diarrhea and nausea  Endocrine: Negative for heat intolerance and polydipsia  Genitourinary: Negative for dysuria, flank pain, frequency and hematuria  Musculoskeletal: Negative for back pain, joint swelling and neck pain  Skin: Negative for pallor and rash  Neurological: Negative for dizziness, tremors, weakness, numbness and headaches  Hematological: Does not bruise/bleed easily  Psychiatric/Behavioral: Negative for agitation and behavioral problems         Past Medical History     Past Medical History:   Diagnosis Date    Asthma     albuterol prn    Depression     hasn't required treatment since 2018    Multiple sclerosis [facility])        Past Surgical History     Past Surgical History:   Procedure Laterality Date    CLOSED REDUCTION FINGER FRACTURE Left     FL LUMBAR PUNCTURE DIAGNOSTIC  12/5/2019    HERNIA REPAIR  2016    TONSILLECTOMY Bilateral 1994       Social History     Social History     Socioeconomic History    Marital status: Single     Spouse name: None    Number of children: None    Years of education: None    Highest education level: None   Occupational History    None   Social Needs    Financial resource strain: Not hard at all   Ar-Alannah insecurity:     Worry: Never true     Inability: Never true    Transportation needs:     Medical: No     Non-medical: No   Tobacco Use    Smoking status: Current Every Day Smoker     Packs/day: 0 25     Years: 9 00     Pack years: 2 25     Types: Cigarettes    Smokeless tobacco: Never Used   Substance and Sexual Activity    Alcohol use: Yes     Frequency: Monthly or less     Drinks per session: 1 or 2     Binge frequency: Never    Drug use: Not Currently     Types: Marijuana, Cocaine, MDMA (ecstacy)     Comment: hasn't used any since 2010    Sexual activity: Yes     Partners: Male     Birth control/protection: Injection   Lifestyle    Physical activity:     Days per week: 5 days     Minutes per session: 60 min    Stress: Not at all   Relationships    Social connections:     Talks on phone: More than three times a week     Gets together: More than three times a week     Attends Yazdanism service: Never     Active member of club or organization: No     Attends meetings of clubs or organizations: Never     Relationship status: Never     Intimate partner violence:     Fear of current or ex partner: No     Emotionally abused: No     Physically abused: No     Forced sexual activity: No   Other Topics Concern    None   Social History Narrative    None       Family History     Family History   Problem Relation Age of Onset    Coronary artery disease Paternal Grandmother     Hypertension Paternal Grandfather         TYPE 2    Diabetes Paternal Grandfather     Cancer Paternal Grandfather         pancreatic, lung    Cancer Mother         thyroid    Diabetes Father     Hypertension Father     Breast cancer Neg Hx        Current Medications       Current Outpatient Medications:     betamethasone dipropionate (DIPROSONE) 0 05 % ointment, Apply topically 2 (two) times a day, Disp: 30 g, Rfl: 0    ibuprofen (MOTRIN) 600 mg tablet, Take 600 mg by mouth every 6 (six) hours as needed, Disp: , Rfl:     medroxyPROGESTERone (DEPO-PROVERA) 150 mg/mL injection, Inject 1 mL (150 mg total) into a muscle every 3 (three) months, Disp: 1 mL, Rfl: 3    methocarbamol (ROBAXIN) 750 mg tablet, Take 750 mg by mouth 4 (four) times a day as needed, Disp: , Rfl: Allergies     Allergies   Allergen Reactions    Onion Anaphylaxis and Swelling     Swelling of throat   Gabapentin Diarrhea, Hives and Itching    Sumatriptan Swelling       Objective     /88 (BP Location: Left arm, Patient Position: Sitting, Cuff Size: Adult)   Pulse 100   Temp 97 7 °F (36 5 °C) (Tympanic)   Ht 5' 4" (1 626 m)   Wt 47 2 kg (104 lb)   SpO2 97%   BMI 17 85 kg/m²      Physical Exam   Constitutional: She is oriented to person, place, and time  She appears well-developed and well-nourished  No distress  HENT:   Head: Normocephalic  Right Ear: External ear normal    Left Ear: External ear normal    Eyes: Conjunctivae and EOM are normal  Right eye exhibits no discharge  Left eye exhibits no discharge  Neck: Normal range of motion  Neck supple  No JVD present  Cardiovascular: Normal rate, regular rhythm and normal heart sounds  Exam reveals no gallop  No murmur heard  Pulmonary/Chest: Effort normal and breath sounds normal  No stridor  No respiratory distress  She has no wheezes  She has no rales  She exhibits no tenderness  Abdominal: Soft  She exhibits no distension and no mass  There is no tenderness  There is no rebound  Musculoskeletal: She exhibits no edema or tenderness  Lymphadenopathy:     She has no cervical adenopathy  Neurological: She is alert and oriented to person, place, and time  Skin: Skin is warm  No rash noted  She is not diaphoretic  No erythema  Psychiatric: She has a normal mood and affect  Her behavior is normal            Depression Screening and Follow-up Plan: Patient's depression screening was positive with a PHQ-2 score of 0  Clincally patient does not have depression  No treatment is required  Tobacco Cessation Counseling: Tobacco cessation counseling was provided   The patient is sincerely urged to quit consumption of tobacco  She is not ready to quit tobacco      Tania Rutherford MD  [address] HEART  BMI Counseling: Body mass index is 17 85 kg/m²  The BMI is below normal  Patient was advised to gain weight

## 2020-07-20 NOTE — PATIENT INSTRUCTIONS

## 2020-07-20 NOTE — ASSESSMENT & PLAN NOTE
Advice and education were given regarding nutrition, aerobic exercises, weight bearing exercises, cardiovascular risk reduction, fall risk reduction, and age appropriate supplements  The patient was counseled regarding instructions for management, risk factor reductions, prognosis, risks and benefits of treatment options, patient and family education, and importance of compliance with treatment       Patient due for HPV vaccine she will hold on it for now

## 2020-07-20 NOTE — ASSESSMENT & PLAN NOTE
Chronic patient aware of the complication off smoking patient had her reaction to the nicotine patch and nicotine inhaler was very expensive with the insurance she will try to cut down on the number of the cigarette

## 2020-07-20 NOTE — LETTER
July 20, 2020     Patient: Maynor Epperson   YOB: 1990   Date of Visit: 7/20/2020       To Whom it May Concern:    Maynor Epperson is under my professional care  She was seen in my office on 7/20/2020  She may return to work on 07/21/2020  If you have any questions or concerns, please don't hesitate to call           Sincerely,   David Venegas MD   Regional Medical Director

## 2020-07-23 ENCOUNTER — TELEPHONE (OUTPATIENT)
Dept: NEUROLOGY | Facility: CLINIC | Age: 30
End: 2020-07-23

## 2020-07-23 NOTE — TELEPHONE ENCOUNTER
Tysabri touch reauthorization due  Per chart review, patient does not want to continue Tysabri at this time and plan has been discontinued  Per 6/16 encounter, Dr Anabelle Reardon agreed to observational approach  Dr Anabelle Reardon- okay to complete Tysabri discontinuation questionnaire?

## 2020-07-28 ENCOUNTER — TRANSCRIBE ORDERS (OUTPATIENT)
Dept: ADMINISTRATIVE | Facility: HOSPITAL | Age: 30
End: 2020-07-28

## 2020-07-28 ENCOUNTER — HOSPITAL ENCOUNTER (OUTPATIENT)
Dept: RADIOLOGY | Facility: HOSPITAL | Age: 30
Discharge: HOME | End: 2020-07-28
Payer: COMMERCIAL

## 2020-07-28 DIAGNOSIS — G44.309 POST-TRAUMATIC HEADACHE, NOT INTRACTABLE, UNSPECIFIED CHRONICITY PATTERN: ICD-10-CM

## 2020-07-28 DIAGNOSIS — S16.1XXA STRAIN OF NECK MUSCLE, INITIAL ENCOUNTER: ICD-10-CM

## 2020-07-28 DIAGNOSIS — S06.0X0A CONCUSSION WITHOUT LOSS OF CONSCIOUSNESS, INITIAL ENCOUNTER: Primary | ICD-10-CM

## 2020-07-28 DIAGNOSIS — S06.0X0A CONCUSSION WITHOUT LOSS OF CONSCIOUSNESS, INITIAL ENCOUNTER: ICD-10-CM

## 2020-07-28 PROCEDURE — 72050 X-RAY EXAM NECK SPINE 4/5VWS: CPT

## 2020-08-16 ENCOUNTER — PATIENT MESSAGE (OUTPATIENT)
Dept: NEUROLOGY | Facility: CLINIC | Age: 30
End: 2020-08-16

## 2020-08-17 ENCOUNTER — TELEPHONE (OUTPATIENT)
Dept: NEUROLOGY | Facility: CLINIC | Age: 30
End: 2020-08-17

## 2020-08-17 DIAGNOSIS — G35 MS (MULTIPLE SCLEROSIS) (HCC): Primary | ICD-10-CM

## 2020-08-17 RX ORDER — METHOCARBAMOL 750 MG/1
750 TABLET, FILM COATED ORAL EVERY 8 HOURS SCHEDULED
Qty: 90 TABLET | Refills: 3 | Status: SHIPPED | OUTPATIENT
Start: 2020-08-17 | End: 2020-10-20

## 2020-08-17 NOTE — TELEPHONE ENCOUNTER
Left detailed voicemail making her aware that we have further questions regarding her message, asked her to call back  Awaiting call back  Okay to leave detailed message per communication consent

## 2020-08-17 NOTE — TELEPHONE ENCOUNTER
----- Message from Simone Leiva MD sent at 8/17/2020  8:17 AM EDT -----  Regarding: FW: Non-Urgent Medical Question  Contact: [phone]  [place] -  Please reach the patient with MS relapse questionarie, as patient is off Tysabri for some time now, with very active disease  If MS relapse is of concern - I would advise steroid treatment, otherwise, please discuss gabapentin/lyrica and muscle relaxer treatment for symptomatic management  Thank you      ----- Message -----  From: Nael Ruiz MA  Sent: 8/17/2020   7:14 AM EDT  To: Simone Leiva MD  Subject: FW: Non-Urgent Medical Question                    ----- Message -----  From: Marie Mclaughlin  Sent: 8/16/2020   9:02 PM EDT  To: Neu[facility]  Subject: Non-Urgent Medical Question                      I need something to help ease my pains from my MS i have been in bed all day due to muscle pain in my legs arms and back  Is there something that can be done to help me

## 2020-08-17 NOTE — TELEPHONE ENCOUNTER
New numbness/tingling? No    New Weakness/muscle spasms? Muscle spasms in her arms and legs  Pain was so bad yesterday that she was in bed all day  Bowel/Bladder Changes? No    UTI Sx? (burning, itching, painful urination, retention, urgency etc ) No    Vision changes? (blurred/double vision, painful vision, decreased vision)   Still has blurred vision that has been unchanged for 5 months, reports eye exam has been normal     Recent illness (cough, cold, chills, fever, sinus infection, URI, UTI, etc )? No    Taking any disease modifying medication   No    New or worsening fatigue? No     Date/Type of Last MRI (brain/Tspine/Cspine)? 10/17/19 MRI brain  12/29/19 MRI c-spine and t-spine    History of IV steroids? No    Diabetes or psych Hx? Denies    Increase in stress? (new/lost job, family death, divorce etc )  Yes - pt reports increased confusion, forgetfulness and stuttering starting 2 weeks ago  States this is very frustrating for her  Any drug use (including prescribed medical marijuana, cannabis, and/or CBD, illicit street drugs etc)  No      Patient would be agreeable to steroid infusion if Dr Nathan Subramanian feels this would be indicated  Would prefer [facility]  Main complaint is pain  States she cannot take gabapentin as she is allergic  Reports she was recently taking a muscle relaxer for a work injury (unsure of name but believes there was an X in it, does not think it is metaxalone)  States this medication was very helpful for her pain and she was able to function well  Please advise

## 2020-08-17 NOTE — TELEPHONE ENCOUNTER
Patient made aware  She accepted appt with [facility] tomorrow at 2:45pm  Keron Esquivel made aware

## 2020-08-17 NOTE — TELEPHONE ENCOUNTER
Patient had suffered traumatic head injury on 7/21/2020 and she has been working with Occupational medicine at this point, with chart review suggest she takes muscle relaxer  Patient has no FU scheduled with our practice - please offer FU with Luis M Kaminski on first available matter to make decision on steroids, as patient actually declined treatment with Tysabri already  Symptoms she described sudden forgetfulness and stuttering with confusional state is likely her TBI related symptoms

## 2020-08-21 ENCOUNTER — TELEPHONE (OUTPATIENT)
Dept: NEUROLOGY | Facility: CLINIC | Age: 30
End: 2020-08-21

## 2020-08-21 NOTE — TELEPHONE ENCOUNTER
LMOM for pt to call back to schedule missed appointment  Dr Salbador Gomez has availability 9/29/2020 at 1:30pm in hospitals office

## 2020-08-21 NOTE — TELEPHONE ENCOUNTER
----- Message from Danny Zambrano sent at 8/20/2020  9:06 AM EDT -----  Regarding: RE: Non-Urgent Medical Question  Contact: [phone]  I missed my appointment not because i wanted to but rico been in so much pain i couldnt get out of bed I've been sleeping the pain away was wondering if I can reschedule for further date I've missed work and haven't been able to focus right

## 2020-09-01 ENCOUNTER — HOSPITAL ENCOUNTER (EMERGENCY)
Facility: HOSPITAL | Age: 30
Discharge: HOME | End: 2020-09-01
Attending: EMERGENCY MEDICINE
Payer: COMMERCIAL

## 2020-09-01 VITALS
RESPIRATION RATE: 16 BRPM | SYSTOLIC BLOOD PRESSURE: 127 MMHG | WEIGHT: 110.23 LBS | TEMPERATURE: 97.2 F | OXYGEN SATURATION: 99 % | HEART RATE: 77 BPM | BODY MASS INDEX: 18.92 KG/M2 | DIASTOLIC BLOOD PRESSURE: 75 MMHG

## 2020-09-01 DIAGNOSIS — R56.9 SEIZURE (HCC): Primary | ICD-10-CM

## 2020-09-01 DIAGNOSIS — G35 MULTIPLE SCLEROSIS EXACERBATION (HCC): ICD-10-CM

## 2020-09-01 LAB
ALBUMIN SERPL BCP-MCNC: 4.1 G/DL (ref 3–5.2)
ALP SERPL-CCNC: 61 U/L (ref 43–122)
ALT SERPL W P-5'-P-CCNC: 21 U/L (ref 9–52)
AMPHETAMINES SERPL QL SCN: NEGATIVE
ANION GAP SERPL CALCULATED.3IONS-SCNC: 9 MMOL/L (ref 5–14)
AST SERPL W P-5'-P-CCNC: 19 U/L (ref 14–36)
BACTERIA UR QL AUTO: ABNORMAL /HPF
BARBITURATES UR QL: NEGATIVE
BASOPHILS # BLD AUTO: 0.1 THOUSANDS/ΜL (ref 0–0.1)
BASOPHILS NFR BLD AUTO: 1 % (ref 0–1)
BENZODIAZ UR QL: NEGATIVE
BILIRUB SERPL-MCNC: 0.5 MG/DL
BILIRUB UR QL STRIP: NEGATIVE
BUN SERPL-MCNC: 11 MG/DL (ref 5–25)
CALCIUM SERPL-MCNC: 9.3 MG/DL (ref 8.4–10.2)
CHLORIDE SERPL-SCNC: 107 MMOL/L (ref 97–108)
CLARITY UR: CLEAR
CO2 SERPL-SCNC: 22 MMOL/L (ref 22–30)
COCAINE UR QL: NEGATIVE
COLOR UR: YELLOW
CREAT SERPL-MCNC: 0.63 MG/DL (ref 0.6–1.2)
EOSINOPHIL # BLD AUTO: 0.3 THOUSAND/ΜL (ref 0–0.4)
EOSINOPHIL NFR BLD AUTO: 6 % (ref 0–6)
ERYTHROCYTE [DISTWIDTH] IN BLOOD BY AUTOMATED COUNT: 13.2 %
EXT PREG TEST URINE: NEGATIVE
EXT. CONTROL ED NAV: NORMAL
GFR SERPL CREATININE-BSD FRML MDRD: 122 ML/MIN/1.73SQ M
GLUCOSE SERPL-MCNC: 93 MG/DL (ref 70–99)
GLUCOSE UR STRIP-MCNC: NEGATIVE MG/DL
HCT VFR BLD AUTO: 40.4 % (ref 36–46)
HGB BLD-MCNC: 13.8 G/DL (ref 12–16)
HGB UR QL STRIP.AUTO: NEGATIVE
KETONES UR STRIP-MCNC: NEGATIVE MG/DL
LEUKOCYTE ESTERASE UR QL STRIP: 500
LYMPHOCYTES # BLD AUTO: 2.4 THOUSANDS/ΜL (ref 0.5–4)
LYMPHOCYTES NFR BLD AUTO: 41 % (ref 25–45)
MAGNESIUM SERPL-MCNC: 2.1 MG/DL (ref 1.6–2.3)
MCH RBC QN AUTO: 32.8 PG (ref 26–34)
MCHC RBC AUTO-ENTMCNC: 34.2 G/DL (ref 31–36)
MCV RBC AUTO: 96 FL (ref 80–100)
METHADONE UR QL: NEGATIVE
MONOCYTES # BLD AUTO: 0.5 THOUSAND/ΜL (ref 0.2–0.9)
MONOCYTES NFR BLD AUTO: 8 % (ref 1–10)
NEUTROPHILS # BLD AUTO: 2.6 THOUSANDS/ΜL (ref 1.8–7.8)
NEUTS SEG NFR BLD AUTO: 44 % (ref 45–65)
NITRITE UR QL STRIP: NEGATIVE
NON-SQ EPI CELLS URNS QL MICRO: ABNORMAL /HPF
OPIATES UR QL SCN: NEGATIVE
OXYCODONE+OXYMORPHONE UR QL SCN: NEGATIVE
PCP UR QL: NEGATIVE
PH UR STRIP.AUTO: 7 [PH]
PLATELET # BLD AUTO: 223 THOUSANDS/UL (ref 150–450)
PMV BLD AUTO: 7.7 FL (ref 8.9–12.7)
POTASSIUM SERPL-SCNC: 3.8 MMOL/L (ref 3.6–5)
PROT SERPL-MCNC: 7.3 G/DL (ref 5.9–8.4)
PROT UR STRIP-MCNC: NEGATIVE MG/DL
RBC # BLD AUTO: 4.21 MILLION/UL (ref 4–5.2)
RBC #/AREA URNS AUTO: ABNORMAL /HPF
SODIUM SERPL-SCNC: 138 MMOL/L (ref 137–147)
SP GR UR STRIP.AUTO: 1.01 (ref 1–1.04)
THC UR QL: NEGATIVE
TSH SERPL DL<=0.05 MIU/L-ACNC: 3.36 UIU/ML (ref 0.47–4.68)
UROBILINOGEN UA: NEGATIVE MG/DL
WBC # BLD AUTO: 5.8 THOUSAND/UL (ref 4.5–11)
WBC #/AREA URNS AUTO: ABNORMAL /HPF

## 2020-09-01 PROCEDURE — 96365 THER/PROPH/DIAG IV INF INIT: CPT

## 2020-09-01 PROCEDURE — 84443 ASSAY THYROID STIM HORMONE: CPT | Performed by: EMERGENCY MEDICINE

## 2020-09-01 PROCEDURE — 81001 URINALYSIS AUTO W/SCOPE: CPT | Performed by: EMERGENCY MEDICINE

## 2020-09-01 PROCEDURE — 99284 EMERGENCY DEPT VISIT MOD MDM: CPT | Performed by: EMERGENCY MEDICINE

## 2020-09-01 PROCEDURE — 96361 HYDRATE IV INFUSION ADD-ON: CPT

## 2020-09-01 PROCEDURE — 81025 URINE PREGNANCY TEST: CPT | Performed by: EMERGENCY MEDICINE

## 2020-09-01 PROCEDURE — 85025 COMPLETE CBC W/AUTO DIFF WBC: CPT | Performed by: EMERGENCY MEDICINE

## 2020-09-01 PROCEDURE — 80053 COMPREHEN METABOLIC PANEL: CPT | Performed by: EMERGENCY MEDICINE

## 2020-09-01 PROCEDURE — 36415 COLL VENOUS BLD VENIPUNCTURE: CPT | Performed by: EMERGENCY MEDICINE

## 2020-09-01 PROCEDURE — 99284 EMERGENCY DEPT VISIT MOD MDM: CPT

## 2020-09-01 PROCEDURE — 80307 DRUG TEST PRSMV CHEM ANLYZR: CPT | Performed by: EMERGENCY MEDICINE

## 2020-09-01 PROCEDURE — 83735 ASSAY OF MAGNESIUM: CPT | Performed by: EMERGENCY MEDICINE

## 2020-09-01 RX ORDER — METHYLPREDNISOLONE SODIUM SUCCINATE 125 MG/2ML
1000 INJECTION, POWDER, LYOPHILIZED, FOR SOLUTION INTRAMUSCULAR; INTRAVENOUS ONCE
Status: DISCONTINUED | OUTPATIENT
Start: 2020-09-01 | End: 2020-09-01

## 2020-09-01 RX ADMIN — SODIUM CHLORIDE 1000 MG: 0.9 INJECTION, SOLUTION INTRAVENOUS at 07:42

## 2020-09-01 RX ADMIN — SODIUM CHLORIDE 1000 ML: 0.9 INJECTION, SOLUTION INTRAVENOUS at 06:30

## 2020-09-01 NOTE — ED NOTES
Patient is ordered 1000mg of Solumedrol in 250ml of NS we do not have pharmacy staff at this time, but they are due to come in shortly and Dr Marily Osullivan is aware of the same and is okay waiting for patient to receive medication till pharmacy arrives       Stefani Guidry RN  09/01/20 2428

## 2020-09-01 NOTE — ED CARE HANDOFF
ED Course as of Sep 01 0851   Tue Sep 01, 2020   0720 Contaminated urine noted  labs unremarkable  Patient feeling much improved at this time      Epithelial Cells(!): Moderate   0849 Infusion finished at this point time patient requesting to be discharged home  Informed to follow-up with neurology given a referral for Neurology at this point time for outpatient management  Given strict instructions when to return back to the emergency department  0849 Pt re-examined and evaluated after testing and treatment  Spoke with the patient and feeling improved and sxs have resolved  Will discharge home with close f/u with pcp and instructed to return to the ED if sxs worsen or continue  Pt agrees with the plan for discharge and feels comfortable to go home with proper f/u  Advised to return for worsening or additional problems  Diagnostic tests were reviewed and questions answered  Diagnosis, care plan and treatment options were discussed  The patient understand instructions and will follow up as directed  Counseling: I had a detailed discussion with the patient and/or guardian regarding: the historical points, exam findings, and any diagnostic results supporting the discharge diagnosis, lab results, radiology results, discharge instructions reviewed with patient and/or family/caregiver and understanding was verbalized  Instructions given to return to the emergency department if symptoms worsen or persist, or if there are any questions or concerns that arise at home       All labs reviewed and utilized in the medical decision making process    All radiology studies independently viewed by me and interpreted by the radiologist         Procedures  MDM    Disposition  Final diagnoses:   Seizure ([facility] [address] )   Multiple sclerosis exacerbation ([facility] [address] )     Time reflects when diagnosis was documented in both MDM as applicable and the Disposition within this note     Time User Action Codes Description Comment 9/1/2020  8:50 AM Fahad Waldrop Add [R56 9] Seizure ([address] )     9/1/2020  8:50 AM Fahad Ponce [G35] Multiple sclerosis exacerbation [facility])       ED Disposition     ED Disposition Condition Date/Time Comment    Discharge Stable Tue Sep 1, 2020  8:50 AM Octavio Mendoza 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  Follow-up Information     Follow up With Specialties Details Why Yves Nolasco MD Family Medicine Schedule an appointment as soon as possible for a visit   [address]  [phone]          Patient's Medications   Discharge Prescriptions    No medications on file            ED Provider  Electronically Signed by     Mary Beth Chua DO  09/01/20 4492

## 2020-09-01 NOTE — ED PROVIDER NOTES
History  Chief Complaint   Patient presents with    Seizure - Prior Hx Of     Patient is a 63-year-old female has history of multiple sclerosis, asthma and depression  Presents emergency for concerns of seizure-like activity  Patient states he was driving and began have tunnel vision which is and or she gets prior to seizures  She cannot recall if she has appeared missing time or not  She states usually says happen with her boyfriend at the side for an is able to give more details  Patient states she is not taking any antiepileptic medications  She notes that she has been diagnosed with multiple sclerosis after being unable to find a reason why she had seizures previously  She had been on a MS medication but took herself off approximately 4 months ago because she was worried about coronavirus and coming to the hospital for treatment  Currently she denies any headache neck pain chest pain shortness of breath nausea vomiting diarrhea dysuria frequency  States she does not have any significant increased stressors, has been getting appropriate sleep  Denies alcohol recreational drug use  History provided by:  Patient   used: No    Seizure - Prior Hx Of       Prior to Admission Medications   Prescriptions Last Dose Informant Patient Reported? Taking?    betamethasone dipropionate (DIPROSONE) 0 05 % ointment Unknown at Unknown time  No No   Sig: Apply topically 2 (two) times a day   ibuprofen (MOTRIN) 600 mg tablet Unknown at Unknown time  Yes No   Sig: Take 600 mg by mouth every 6 (six) hours as needed   medroxyPROGESTERone (DEPO-PROVERA) 150 mg/mL injection Past Month at Unknown time  No Yes   Sig: Inject 1 mL (150 mg total) into a muscle every 3 (three) months   methocarbamol (ROBAXIN) 750 mg tablet 8/31/2020 at Unknown time  No Yes   Sig: Take 1 tablet (750 mg total) by mouth every 8 (eight) hours      Facility-Administered Medications: None       Past Medical History:   Diagnosis Date    Asthma     albuterol prn    Depression     hasn't required treatment since 2018    Multiple sclerosis [facility])        Past Surgical History:   Procedure Laterality Date    CLOSED REDUCTION FINGER FRACTURE Left     FL LUMBAR PUNCTURE DIAGNOSTIC  12/5/2019    HERNIA REPAIR  2016    TONSILLECTOMY Bilateral 1994       Family History   Problem Relation Age of Onset    Coronary artery disease Paternal Grandmother     Hypertension Paternal Grandfather         TYPE 2    Diabetes Paternal Grandfather     Cancer Paternal Grandfather         pancreatic, lung    Cancer Mother         thyroid    Diabetes Father     Hypertension Father     Breast cancer Neg Hx      I have reviewed and agree with the history as documented  E-Cigarette/Vaping    E-Cigarette Use Never User      E-Cigarette/Vaping Substances     Social History     Tobacco Use    Smoking status: Current Every Day Smoker     Packs/day: 0 25     Years: 9 00     Pack years: 2 25     Types: Cigarettes    Smokeless tobacco: Never Used   Substance Use Topics    Alcohol use: Yes     Frequency: Monthly or less     Drinks per session: 1 or 2     Binge frequency: Never    Drug use: Not Currently     Types: Marijuana, Cocaine, MDMA (ecstacy)     Comment: hasn't used any since 2010       Review of Systems   Constitutional: Negative  Negative for chills and fever  HENT: Negative  Negative for rhinorrhea, sore throat, trouble swallowing and voice change  Eyes: Negative  Negative for pain and visual disturbance  Respiratory: Negative  Negative for cough, shortness of breath and wheezing  Cardiovascular: Negative  Negative for chest pain and palpitations  Gastrointestinal: Negative for abdominal pain, diarrhea, nausea and vomiting  Genitourinary: Negative  Negative for dysuria and frequency  Musculoskeletal: Negative  Negative for neck pain and neck stiffness  Skin: Negative  Negative for rash     Neurological: Positive for seizures  Negative for dizziness, speech difficulty, weakness, light-headedness and numbness  Physical Exam  Physical Exam  Vitals signs and nursing note reviewed  Constitutional:       General: She is not in acute distress  Appearance: She is well-developed  HENT:      Head: Normocephalic and atraumatic  Eyes:      Conjunctiva/sclera: Conjunctivae normal       Pupils: Pupils are equal, round, and reactive to light  Neck:      Musculoskeletal: Normal range of motion and neck supple  Trachea: No tracheal deviation  Cardiovascular:      Rate and Rhythm: Normal rate and regular rhythm  Pulmonary:      Effort: Pulmonary effort is normal  No respiratory distress  Breath sounds: Normal breath sounds  No wheezing or rales  Abdominal:      General: Bowel sounds are normal  There is no distension  Palpations: Abdomen is soft  Tenderness: There is no abdominal tenderness  There is no guarding or rebound  Musculoskeletal: Normal range of motion  General: No tenderness or deformity  Skin:     General: Skin is warm and dry  Capillary Refill: Capillary refill takes less than 2 seconds  Findings: No rash  Neurological:      Mental Status: She is alert and oriented to person, place, and time     Psychiatric:         Behavior: Behavior normal          Vital Signs  ED Triage Vitals [09/01/20 0612]   Temperature Pulse Respirations Blood Pressure SpO2   (!) 97 2 °F (36 2 °C) 97 18 (!) 150/104 99 %      Temp Source Heart Rate Source Patient Position - Orthostatic VS BP Location FiO2 (%)   Tympanic Monitor Sitting Left arm --      Pain Score       --           Vitals:    09/01/20 0612 09/01/20 0745 09/01/20 0848   BP: (!) 150/104 136/89 127/75   Pulse: 97 72 77   Patient Position - Orthostatic VS: Sitting Lying Lying         Visual Acuity  Visual Acuity      Most Recent Value   L Pupil Size (mm)  3   R Pupil Size (mm)  3          ED Medications  Medications   sodium chloride 0 9 % bolus 1,000 mL (0 mL Intravenous Stopped 9/1/20 0848)   methylPREDNISolone sodium succinate (Solu-MEDROL) 1,000 mg in sodium chloride 0 9 % 250 mL IVPB (0 mg Intravenous Stopped 9/1/20 0848)       Diagnostic Studies  Results Reviewed     Procedure Component Value Units Date/Time    Rapid drug screen, urine [[id number]]  (Normal) Collected:  09/01/20 0634    Lab Status:  Final result Specimen:  Urine, Clean Catch Updated:  09/01/20 0737     Amph/Meth UR Negative     Barbiturate Ur Negative     Benzodiazepine Urine Negative     Cocaine Urine Negative     Methadone Urine Negative     Opiate Urine Negative     PCP Ur Negative     THC Urine Negative     Oxycodone Urine Negative    Narrative:       FOR MEDICAL PURPOSES ONLY  IF CONFIRMATION NEEDED PLEASE CONTACT THE LAB WITHIN 5 DAYS  Drug Screen Cutoff Levels:  AMPHETAMINE/METHAMPHETAMINES  1000 ng/mL  BARBITURATES     200 ng/mL  BENZODIAZEPINES     200 ng/mL  COCAINE      300 ng/mL  METHADONE      300 ng/mL  OPIATES      300 ng/mL  PHENCYCLIDINE     25 ng/mL  THC       50 ng/mL  OXYCODONE      100 ng/mL    TSH [[id number]]  (Normal) Collected:  09/01/20 0629    Lab Status:  Final result Specimen:  Blood from Arm, Right Updated:  09/01/20 0732     TSH 3RD GENERATON 3 360 uIU/mL     Narrative:       Patients undergoing fluorescein dye angiography may retain small amounts of fluorescein in the body for 48-72 hours post procedure  Samples containing fluorescein can produce falsely depressed TSH values  If the patient had this procedure,a specimen should be resubmitted post fluorescein clearance        Urine Microscopic [[id number]]  (Abnormal) Collected:  09/01/20 0634    Lab Status:  Final result Specimen:  Urine, Clean Catch Updated:  09/01/20 0703     RBC, UA None Seen /hpf      WBC, UA 4-10 /hpf      Epithelial Cells Moderate /hpf      Bacteria, UA Occasional /hpf     Magnesium [[id number]]  (Normal) Collected:  09/01/20 0629    Lab Status:  Final result Specimen:  Blood from Arm, Right Updated:  09/01/20 0656     Magnesium 2 1 mg/dL     Comprehensive metabolic panel [[id number]]  (Normal) Collected:  09/01/20 0629    Lab Status:  Final result Specimen:  Blood from Arm, Right Updated:  09/01/20 0656     Sodium 138 mmol/L      Potassium 3 8 mmol/L      Chloride 107 mmol/L      CO2 22 mmol/L      ANION GAP 9 mmol/L      BUN 11 mg/dL      Creatinine 0 63 mg/dL      Glucose 93 mg/dL      Calcium 9 3 mg/dL      AST 19 U/L      ALT 21 U/L      Alkaline Phosphatase 61 U/L      Total Protein 7 3 g/dL      Albumin 4 1 g/dL      Total Bilirubin 0 50 mg/dL      eGFR 122 ml/min/1 73sq m     Narrative:       National Kidney Disease Foundation guidelines for Chronic Kidney Disease (CKD):     Stage 1 with normal or high GFR (GFR > 90 mL/min/1 73 square meters)    Stage 2 Mild CKD (GFR = 60-89 mL/min/1 73 square meters)    Stage 3A Moderate CKD (GFR = 45-59 mL/min/1 73 square meters)    Stage 3B Moderate CKD (GFR = 30-44 mL/min/1 73 square meters)    Stage 4 Severe CKD (GFR = 15-29 mL/min/1 73 square meters)    Stage 5 End Stage CKD (GFR <15 mL/min/1 73 square meters)  Note: GFR calculation is accurate only with a steady state creatinine    UA (URINE) with reflex to Scope [[id number]]  (Abnormal) Collected:  09/01/20 0634    Lab Status:  Final result Specimen:  Urine, Clean Catch Updated:  09/01/20 0646     Color, UA Yellow     Clarity, UA Clear     Specific Gravity, UA 1 015     pH, UA 7 0     Leukocytes,  0     Nitrite, UA Negative     Protein, UA Negative mg/dl      Glucose, UA Negative mg/dl      Ketones, UA Negative mg/dl      Bilirubin, UA Negative     Blood, UA Negative     UROBILINOGEN UA Negative mg/dL     CBC and differential [[id number]]  (Abnormal) Collected:  09/01/20 0629    Lab Status:  Final result Specimen:  Blood from Arm, Right Updated:  09/01/20 0645     WBC 5 80 Thousand/uL      RBC 4 21 Million/uL      Hemoglobin 13 8 g/dL      Hematocrit 40 4 % MCV 96 fL      MCH 32 8 pg      MCHC 34 2 g/dL      RDW 13 2 %      MPV 7 7 fL      Platelets 244 Thousands/uL      Neutrophils Relative 44 %      Lymphocytes Relative 41 %      Monocytes Relative 8 %      Eosinophils Relative 6 %      Basophils Relative 1 %      Neutrophils Absolute 2 60 Thousands/µL      Lymphocytes Absolute 2 40 Thousands/µL      Monocytes Absolute 0 50 Thousand/µL      Eosinophils Absolute 0 30 Thousand/µL      Basophils Absolute 0 10 Thousands/µL     POCT pregnancy, urine [[id number]]  (Normal) Resulted:  09/01/20 0634    Lab Status:  Final result Updated:  09/01/20 0634     EXT PREG TEST UR (Ref: Negative) negative     Control valid                 No orders to display              Procedures  Procedures         ED Course       US AUDIT      Most Recent Value   Initial Alcohol Screen: US AUDIT-C    1  How often do you have a drink containing alcohol?  0 Filed at: 09/01/2020 0616   2  How many drinks containing alcohol do you have on a typical day you are drinking? 0 Filed at: 09/01/2020 0616   3b  FEMALE Any Age, or MALE 65+: How often do you have 4 or more drinks on one occassion? 0 Filed at: 09/01/2020 0616   Audit-C Score  0 Filed at: 09/01/2020 6654                  WESLEY/DAST-10      Most Recent Value   How many times in the past year have you    Used an illegal drug or used a prescription medication for non-medical reasons? Never Filed at: 09/01/2020 1753                                MDM  Number of Diagnoses or Management Options  Multiple sclerosis exacerbation [facility]):   Seizure [facility]):   Diagnosis management comments: Patient care was transferred to [facility] physician Dr Amparo Burnett, have initial labs returned were okay  Patient had a labs to be completed, once is done patient with be reviewed with Neurology for inpatient versus outpatient management of her symptoms today          Disposition  Final diagnoses:   Seizure ([facility] [address] )   Multiple sclerosis exacerbation ([facility] [address] )     Time reflects when diagnosis was documented in both MDM as applicable and the Disposition within this note     Time User Action Codes Description Comment    9/1/2020  8:50 AM Zack Bohr Add [R56 9] Seizure ([address] )     9/1/2020  8:50 AM Zack Bohr Add [G35] Multiple sclerosis exacerbation [facility])       ED Disposition     ED Disposition Condition Date/Time Comment    Discharge Stable Tue Sep 1, 2020  8:50 AM Marie Mclaughlin 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  Follow-up Information     Follow up With Specialties Details Why Claudio Oliver MD Family Medicine Schedule an appointment as soon as possible for a visit   [address]    [address]            Discharge Medication List as of 9/1/2020  8:51 AM      CONTINUE these medications which have NOT CHANGED    Details   betamethasone dipropionate (DIPROSONE) 0 05 % ointment Apply topically 2 (two) times a day, Starting Mon 7/6/2020, Normal      ibuprofen (MOTRIN) 600 mg tablet Take 600 mg by mouth every 6 (six) hours as needed, Starting Sun 7/12/2020, Until Mon 7/12/2021, Historical Med      medroxyPROGESTERone (DEPO-PROVERA) 150 mg/mL injection Inject 1 mL (150 mg total) into a muscle every 3 (three) months, Starting Fri 6/19/2020, Normal      methocarbamol (ROBAXIN) 750 mg tablet Take 1 tablet (750 mg total) by mouth every 8 (eight) hours, Starting Mon 8/17/2020, Normal               PDMP Review     None          ED Provider  Electronically Signed by           Carla Ellison DO  09/02/20 8688

## 2020-09-01 NOTE — Clinical Note
Stan Higgins was seen and treated in our emergency department on 9/1/2020  Diagnosis:     Dickson Knee  may return to work on return date  She may return on this date: 09/02/2020         If you have any questions or concerns, please don't hesitate to call        Ahmet Chavez, DO    ______________________________           _______________          _______________  Hospital Representative                              Date                                Time

## 2020-09-01 NOTE — ED TRIAGE NOTES
Reports she had a seizure while going to work and driving  - pulled over  Unsure of how long it lasted  No incontinence of urine or biting of tongue  States she is not on medication for this - has started happening since she was diagnosed with MS this past year

## 2020-09-01 NOTE — ED NOTES
Resting quietly     Pieter Bain, [facility]  09/01/20 6873

## 2020-09-01 NOTE — ED NOTES
Patient stated she felt like she was having an aura prior to arrival, her aura is tunnel vision, she felt like that prior to arrival is feeling okay now       Reyes Gilmore RN  09/01/20 9865

## 2020-09-03 ENCOUNTER — TELEPHONE (OUTPATIENT)
Dept: OBGYN CLINIC | Facility: CLINIC | Age: 30
End: 2020-09-03

## 2020-09-03 DIAGNOSIS — Z30.09 UNWANTED FERTILITY: ICD-10-CM

## 2020-09-03 RX ORDER — MEDROXYPROGESTERONE ACETATE 150 MG/ML
150 INJECTION, SUSPENSION INTRAMUSCULAR
Qty: 1 ML | Refills: 3 | Status: SHIPPED | OUTPATIENT
Start: 2020-09-03 | End: 2021-07-15 | Stop reason: SDUPTHER

## 2020-09-03 NOTE — TELEPHONE ENCOUNTER
----- Message from Heriberto Sorto RN sent at 9/3/2020  8:56 AM EDT -----  Contact: [phone]  Can you send her rx to [facility]  ----- Message -----  From: Ivan Chu  Sent: 9/3/2020   8:32 AM EDT  To:  [address]

## 2020-09-04 ENCOUNTER — CLINICAL SUPPORT (OUTPATIENT)
Dept: OBGYN CLINIC | Facility: CLINIC | Age: 30
End: 2020-09-04

## 2020-09-04 VITALS
WEIGHT: 105 LBS | SYSTOLIC BLOOD PRESSURE: 139 MMHG | DIASTOLIC BLOOD PRESSURE: 87 MMHG | TEMPERATURE: 98.2 F | BODY MASS INDEX: 18.02 KG/M2

## 2020-09-04 DIAGNOSIS — Z30.42 ENCOUNTER FOR SURVEILLANCE OF INJECTABLE CONTRACEPTIVE: Primary | ICD-10-CM

## 2020-09-04 PROCEDURE — 96372 THER/PROPH/DIAG INJ SC/IM: CPT

## 2020-09-04 PROCEDURE — 4004F PT TOBACCO SCREEN RCVD TLK: CPT

## 2020-09-04 PROCEDURE — 99211 OFF/OP EST MAY X REQ PHY/QHP: CPT

## 2020-09-04 RX ORDER — MEDROXYPROGESTERONE ACETATE 150 MG/ML
150 INJECTION, SUSPENSION INTRAMUSCULAR ONCE
Status: COMPLETED | OUTPATIENT
Start: 2020-09-04 | End: 2020-09-04

## 2020-09-04 RX ADMIN — MEDROXYPROGESTERONE ACETATE 150 MG: 150 INJECTION, SUSPENSION INTRAMUSCULAR at 08:36

## 2020-09-07 ENCOUNTER — APPOINTMENT (EMERGENCY)
Dept: RADIOLOGY | Facility: HOSPITAL | Age: 30
End: 2020-09-07
Payer: COMMERCIAL

## 2020-09-07 ENCOUNTER — HOSPITAL ENCOUNTER (EMERGENCY)
Facility: HOSPITAL | Age: 30
Discharge: HOME | End: 2020-09-07
Attending: EMERGENCY MEDICINE | Admitting: EMERGENCY MEDICINE
Payer: COMMERCIAL

## 2020-09-07 VITALS
HEART RATE: 87 BPM | DIASTOLIC BLOOD PRESSURE: 91 MMHG | WEIGHT: 106.92 LBS | OXYGEN SATURATION: 100 % | RESPIRATION RATE: 20 BRPM | TEMPERATURE: 98.3 F | SYSTOLIC BLOOD PRESSURE: 132 MMHG | BODY MASS INDEX: 18.35 KG/M2

## 2020-09-07 DIAGNOSIS — T14.8XXA CONTUSION: ICD-10-CM
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
## 2020-09-07 DIAGNOSIS — S32.2XXA CLOSED FRACTURE OF COCCYX, INITIAL ENCOUNTER (HCC): ICD-10-CM

## 2020-09-07 LAB
EXT PREG TEST URINE: NEGATIVE
EXT. CONTROL ED NAV: NORMAL

## 2020-09-07 PROCEDURE — 96372 THER/PROPH/DIAG INJ SC/IM: CPT

## 2020-09-07 PROCEDURE — 99284 EMERGENCY DEPT VISIT MOD MDM: CPT | Performed by: EMERGENCY MEDICINE

## 2020-09-07 PROCEDURE — 72220 X-RAY EXAM SACRUM TAILBONE: CPT

## 2020-09-07 PROCEDURE — 99284 EMERGENCY DEPT VISIT MOD MDM: CPT

## 2020-09-07 PROCEDURE — 81025 URINE PREGNANCY TEST: CPT | Performed by: EMERGENCY MEDICINE

## 2020-09-07 RX ORDER — IBUPROFEN 600 MG/1
600 TABLET ORAL EVERY 6 HOURS PRN
Qty: 30 TABLET | Refills: 0 | Status: SHIPPED | OUTPATIENT
Start: 2020-09-07 | End: 2020-09-15 | Stop reason: ALTCHOICE

## 2020-09-07 RX ORDER — KETOROLAC TROMETHAMINE 30 MG/ML
30 INJECTION, SOLUTION INTRAMUSCULAR; INTRAVENOUS ONCE
Status: COMPLETED | OUTPATIENT
Start: 2020-09-07 | End: 2020-09-07

## 2020-09-07 RX ORDER — OXYCODONE HYDROCHLORIDE AND ACETAMINOPHEN 5; 325 MG/1; MG/1
1 TABLET ORAL EVERY 6 HOURS PRN
Qty: 5 TABLET | Refills: 0 | Status: SHIPPED | OUTPATIENT
Start: 2020-09-07 | End: 2020-09-15 | Stop reason: ALTCHOICE

## 2020-09-07 RX ADMIN — KETOROLAC TROMETHAMINE 30 MG: 30 INJECTION, SOLUTION INTRAMUSCULAR; INTRAVENOUS at 07:30

## 2020-09-07 NOTE — Clinical Note
Florian Holly was seen and treated in our emergency department on 9/7/2020  modified duty for remainder of week    Diagnosis:     Nick Casillas  may return to work on return date  She may return on this date: 09/10/2020         If you have any questions or concerns, please don't hesitate to call        Adwoa Ma RN    ______________________________           _______________          _______________  Hospital Representative                              Date                                Time

## 2020-09-07 NOTE — ED NOTES
Pt states she took half of a robaxin yesterday but did not take anything for pain today        Dariusz West RN  09/07/20 0900

## 2020-09-07 NOTE — ED PROVIDER NOTES
History  Chief Complaint   Patient presents with    Fall     fall down 10 steps  denies LOC  c/o tail bone pain  MS flare up     66-year-old female presents shortly after falling down a flight of stairs  She reports that she has a history of MS and whenever begins to flare she will have weakness that often times will cause her to fall  She has pain and her bilateral forearms and is primarily complaining pain across her coccyx  She denies head injury, loss of consciousness, numbness/weakness, or other acute traumatic injuries  Fall   Mechanism of injury: fall    Incident location:  Home  Arrived directly from scene: yes    Fall:     Fall occurred:  Down stairs    Point of impact:  Buttocks  Prior to arrival data:     Patient ambulatory at scene: yes      Blood loss:  None    Responsiveness at scene:  Alert    Orientation at scene:  Person, place, situation and time    Loss of consciousness: no      Amnesic to event: no    Associated symptoms: no abdominal pain, no back pain, no blindness, no chest pain, no difficulty breathing, no headaches, no hearing loss, no loss of consciousness, no nausea, no neck pain, no seizures and no vomiting        Prior to Admission Medications   Prescriptions Last Dose Informant Patient Reported? Taking?    betamethasone dipropionate (DIPROSONE) 0 05 % ointment   No No   Sig: Apply topically 2 (two) times a day   ibuprofen (MOTRIN) 600 mg tablet   Yes No   Sig: Take 600 mg by mouth every 6 (six) hours as needed   medroxyPROGESTERone (DEPO-PROVERA) 150 mg/mL injection   No No   Sig: Inject 1 mL (150 mg total) into a muscle every 3 (three) months   methocarbamol (ROBAXIN) 750 mg tablet   No No   Sig: Take 1 tablet (750 mg total) by mouth every 8 (eight) hours      Facility-Administered Medications: None       Past Medical History:   Diagnosis Date    Asthma     albuterol prn    Depression     hasn't required treatment since 2018    Multiple sclerosis [facility])        Past Surgical History:   Procedure Laterality Date    CLOSED REDUCTION FINGER FRACTURE Left     FL LUMBAR PUNCTURE DIAGNOSTIC  12/5/2019    HERNIA REPAIR  2016    TONSILLECTOMY Bilateral 1994       Family History   Problem Relation Age of Onset    Coronary artery disease Paternal Grandmother     Hypertension Paternal Grandfather         TYPE 2    Diabetes Paternal Grandfather     Cancer Paternal Grandfather         pancreatic, lung    Cancer Mother         thyroid    Diabetes Father     Hypertension Father     Breast cancer Neg Hx      I have reviewed and agree with the history as documented  E-Cigarette/Vaping    E-Cigarette Use Never User      E-Cigarette/Vaping Substances     Social History     Tobacco Use    Smoking status: Current Every Day Smoker     Packs/day: 0 25     Years: 9 00     Pack years: 2 25     Types: Cigarettes    Smokeless tobacco: Never Used   Substance Use Topics    Alcohol use: Yes     Frequency: Monthly or less     Drinks per session: 1 or 2     Binge frequency: Never    Drug use: Not Currently     Types: Marijuana, Cocaine, MDMA (ecstacy)     Comment: hasn't used any since 2010       Review of Systems   Constitutional: Negative for appetite change, chills, fatigue and fever  HENT: Negative for hearing loss, postnasal drip, sinus pain and trouble swallowing  Eyes: Negative for blindness, redness and itching  Respiratory: Negative for chest tightness, shortness of breath and wheezing  Cardiovascular: Negative for chest pain and leg swelling  Gastrointestinal: Negative for abdominal pain, constipation, diarrhea, nausea and vomiting  Endocrine: Negative  Genitourinary: Negative for difficulty urinating and dysuria  Musculoskeletal: Negative for back pain, myalgias and neck pain  Sacrum/coccyx   Skin: Positive for color change  Negative for rash  Allergic/Immunologic: Negative      Neurological: Negative for dizziness, seizures, loss of consciousness, numbness and headaches  Hematological: Negative  Psychiatric/Behavioral: Negative  Physical Exam  Physical Exam  Vitals signs and nursing note reviewed  Constitutional:       Appearance: Normal appearance  She is well-developed  HENT:      Head: Normocephalic and atraumatic  Nose: Nose normal       Mouth/Throat:      Mouth: Mucous membranes are moist    Eyes:      Conjunctiva/sclera: Conjunctivae normal       Pupils: Pupils are equal, round, and reactive to light  Neck:      Musculoskeletal: Normal range of motion and neck supple  Cardiovascular:      Rate and Rhythm: Normal rate and regular rhythm  Heart sounds: Normal heart sounds  Pulmonary:      Effort: Pulmonary effort is normal  No respiratory distress  Breath sounds: Normal breath sounds  No wheezing  Abdominal:      General: Bowel sounds are normal       Palpations: Abdomen is soft  Tenderness: There is no abdominal tenderness  There is no guarding  Musculoskeletal:         General: No tenderness or deformity  Back:         Arms:    Skin:     General: Skin is warm and dry  Capillary Refill: Capillary refill takes less than 2 seconds  Findings: Bruising (bilateral forearms) present  No rash  Neurological:      General: No focal deficit present  Mental Status: She is alert and oriented to person, place, and time     Psychiatric:         Mood and Affect: Mood normal          Behavior: Behavior normal          Vital Signs  ED Triage Vitals [09/07/20 0643]   Temperature Pulse Respirations Blood Pressure SpO2   98 3 °F (36 8 °C) 87 20 132/91 100 %      Temp Source Heart Rate Source Patient Position - Orthostatic VS BP Location FiO2 (%)   Tympanic Monitor Sitting Left arm --      Pain Score       7           Vitals:    09/07/20 0643   BP: 132/91   Pulse: 87   Patient Position - Orthostatic VS: Sitting         Visual Acuity      ED Medications  Medications   ketorolac (TORADOL) injection 30 mg (30 mg Intramuscular Given 9/7/20 0730)       Diagnostic Studies  Results Reviewed     Procedure Component Value Units Date/Time    POCT pregnancy, urine [[id number]]  (Normal) Resulted:  09/07/20 0715    Lab Status:  Final result Updated:  09/07/20 0716     EXT PREG TEST UR (Ref: Negative) negative     Control valid                 XR sacrum and coccyx    (Results Pending)              Procedures  Procedures         ED Course                                             MDM  Number of Diagnoses or Management Options  Closed fracture of coccyx, initial encounter [facility]):   Contusion:   Fall, initial encounter:   Diagnosis management comments: 20-year-old female presents after falling down a flight of stairs  She is exquisitely tender across her coccyx  X-ray shows likely fracture  Discussed supportive care measures, treatment plan, expected clinical course, and reasons return to the ER  Amount and/or Complexity of Data Reviewed  Tests in the radiology section of CPT®: ordered and reviewed  Independent visualization of images, tracings, or specimens: yes          Disposition  Final diagnoses:   Fall, initial encounter   Contusion   Closed fracture of coccyx, initial encounter [facility])     Time reflects when diagnosis was documented in both MDM as applicable and the Disposition within this note     Time User Action Codes Description Comment    9/7/2020  7:08 AM Giuseppe Ponce [R42  IQXF] Fall, initial encounter     9/7/2020  7:08 AM Tejal, [address]  8XXA] Contusion     9/7/2020  7:44 AM Giuseppe Ponce [S32  2XXA] Closed fracture of coccyx, initial encounter [facility])       ED Disposition     ED Disposition Condition Date/Time Comment    Discharge Stable Mon Sep 7, 2020  7:44 AM Saige Forth 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  Follow-up Information     Follow up With Specialties Details Why [address] [facility] Medicine   [address] [facility] Emergency Department Emergency Medicine  If symptoms worsen [address] [address] [phone]          Discharge Medication List as of 9/7/2020  7:46 AM      START taking these medications    Details   !! ibuprofen (MOTRIN) 600 mg tablet Take 1 tablet (600 mg total) by mouth every 6 (six) hours as needed for mild pain or moderate pain, Starting Mon 9/7/2020, Normal      oxyCODONE-acetaminophen (PERCOCET) 5-325 mg per tablet Take 1 tablet by mouth every 6 (six) hours as needed for severe pain for up to 5 dosesMax Daily Amount: 4 tablets, Starting Mon 9/7/2020, Normal       !! - Potential duplicate medications found  Please discuss with provider  CONTINUE these medications which have NOT CHANGED    Details   betamethasone dipropionate (DIPROSONE) 0 05 % ointment Apply topically 2 (two) times a day, Starting Mon 7/6/2020, Normal      !! ibuprofen (MOTRIN) 600 mg tablet Take 600 mg by mouth every 6 (six) hours as needed, Starting Sun 7/12/2020, Until Mon 7/12/2021, Historical Med      medroxyPROGESTERone (DEPO-PROVERA) 150 mg/mL injection Inject 1 mL (150 mg total) into a muscle every 3 (three) months, Starting Thu 9/3/2020, Normal      methocarbamol (ROBAXIN) 750 mg tablet Take 1 tablet (750 mg total) by mouth every 8 (eight) hours, Starting Mon 8/17/2020, Normal       !! - Potential duplicate medications found  Please discuss with provider  No discharge procedures on file      PDMP Review     None          ED Provider  Electronically Signed by           Shirley Cabezas DO  09/07/20 7986

## 2020-09-15 ENCOUNTER — HOSPITAL ENCOUNTER (OUTPATIENT)
Dept: RADIOLOGY | Facility: HOSPITAL | Age: 30
Discharge: HOME | End: 2020-09-15
Payer: COMMERCIAL

## 2020-09-15 ENCOUNTER — OFFICE VISIT (OUTPATIENT)
Dept: FAMILY MEDICINE CLINIC | Facility: CLINIC | Age: 30
End: 2020-09-15
Payer: COMMERCIAL

## 2020-09-15 ENCOUNTER — TELEPHONE (OUTPATIENT)
Dept: FAMILY MEDICINE CLINIC | Facility: CLINIC | Age: 30
End: 2020-09-15

## 2020-09-15 VITALS
HEIGHT: 62 IN | WEIGHT: 111 LBS | TEMPERATURE: 99.6 F | BODY MASS INDEX: 20.43 KG/M2 | HEART RATE: 87 BPM | OXYGEN SATURATION: 99 % | SYSTOLIC BLOOD PRESSURE: 130 MMHG | DIASTOLIC BLOOD PRESSURE: 80 MMHG

## 2020-09-15 DIAGNOSIS — M54.50 ACUTE MIDLINE LOW BACK PAIN WITHOUT SCIATICA: ICD-10-CM

## 2020-09-15 DIAGNOSIS — M54.50 ACUTE MIDLINE LOW BACK PAIN WITHOUT SCIATICA: Primary | ICD-10-CM

## 2020-09-15 PROBLEM — S06.0X0A CONCUSSION WITHOUT LOSS OF CONSCIOUSNESS: Status: ACTIVE | Noted: 2020-07-24

## 2020-09-15 PROCEDURE — 3725F SCREEN DEPRESSION PERFORMED: CPT | Performed by: FAMILY MEDICINE

## 2020-09-15 PROCEDURE — 3008F BODY MASS INDEX DOCD: CPT

## 2020-09-15 PROCEDURE — 72220 X-RAY EXAM SACRUM TAILBONE: CPT

## 2020-09-15 PROCEDURE — 4004F PT TOBACCO SCREEN RCVD TLK: CPT | Performed by: FAMILY MEDICINE

## 2020-09-15 PROCEDURE — 99214 OFFICE O/P EST MOD 30 MIN: CPT | Performed by: FAMILY MEDICINE

## 2020-09-15 RX ORDER — NAPROXEN 250 MG/1
250 TABLET ORAL 2 TIMES DAILY WITH MEALS
Qty: 60 TABLET | Refills: 1 | Status: SHIPPED | OUTPATIENT
Start: 2020-09-15 | End: 2020-10-20

## 2020-09-15 NOTE — LETTER
September 15, 2020     Patient: Ahsan Duran   YOB: 1990   Date of Visit: 9/15/2020       To Whom it May Concern:    Ahsan Duran is under my professional care  She was seen in my office on 9/15/2020  She may return to work on 09/17/2020  If you have any questions or concerns, please don't hesitate to call           Sincerely,          Bridgett Abad MD        CC: No Recipients

## 2020-09-15 NOTE — TELEPHONE ENCOUNTER
----- Message from Carlita Valente sent at 9/14/2020  4:03 PM EDT -----  Regarding: Visit Follow-Up Question  Contact: [phone]  Good afternoon recently i fractured my tail bone last week i had a week off from work the er doctor provided me with 4 perks  I stretched them as much as i could  I am still in alot of pain as the doctor said itll take about 4 to 6 weeks to heal  I am back to work full time  I am bending constantly and the pain is massive  Was wondering if you can squeeze me in for a follow up and prescribe me something for my pain   Thank you

## 2020-09-17 PROBLEM — M54.50 ACUTE MIDLINE LOW BACK PAIN WITHOUT SCIATICA: Status: ACTIVE | Noted: 2020-09-17

## 2020-09-17 NOTE — PROGRESS NOTES
Subjective:   Chief Complaint   Patient presents with    Follow-up     tail bone fx        Patient ID: Tere Mathis is a 34 y o  female  Patient here concerned about the pain and lower back and September 7 patient fell at home on flight of stairs as mechanical fall and she fell down on her buttock and since then she had severe pain went to emergency room per patient she been told at the ER she had fracture at her co-worker Daniel Burgess and she been given anti-inflammatory and pain medication discharge home and the patient continued to have the pain and pain worse when she try to sit or lie down deny any numbness no muscle weakness no lose control of the urine or stool no drop on the foot  I review with the patient radiology report of the x-ray and there is no fracture  The patient pain not allow her to perform her job correct sitting or standing for long time      The following portions of the patient's history were reviewed and updated as appropriate: allergies, current medications, past family history, past medical history, past social history, past surgical history and problem list     Review of Systems   Constitutional: Negative for activity change, appetite change, fatigue and fever  HENT: Negative for congestion, ear pain, sinus pressure, sinus pain and sore throat  Eyes: Negative for pain, discharge, redness and itching  Respiratory: Negative for cough, chest tightness, shortness of breath and stridor  Cardiovascular: Negative for chest pain, palpitations and leg swelling  Gastrointestinal: Negative for abdominal pain, blood in stool, constipation, diarrhea and nausea  Genitourinary: Negative for dysuria, flank pain, frequency and hematuria  Musculoskeletal: Negative for back pain, joint swelling and neck pain  Pain in the lower back   Skin: Negative for pallor and rash  Neurological: Negative for dizziness, tremors, weakness, numbness and headaches     Hematological: Does not bruise/bleed easily  Objective:  Vitals:    09/15/20 1040   BP: 130/80   Pulse: 87   Temp: 99 6 °F (37 6 °C)   TempSrc: Tympanic   SpO2: 99%   Weight: 50 3 kg (111 lb)   Height: 5' 2" (1 575 m)      Physical Exam  Vitals signs and nursing note reviewed  Constitutional:       General: She is not in acute distress  Appearance: She is well-developed  She is not diaphoretic  HENT:      Head: Normocephalic  Right Ear: Tympanic membrane, ear canal and external ear normal       Left Ear: Tympanic membrane, ear canal and external ear normal       Nose: Nose normal  No congestion or rhinorrhea  Mouth/Throat:      Mouth: Mucous membranes are moist       Pharynx: Oropharynx is clear  No oropharyngeal exudate or posterior oropharyngeal erythema  Eyes:      General:         Right eye: No discharge  Left eye: No discharge  Conjunctiva/sclera: Conjunctivae normal       Pupils: Pupils are equal, round, and reactive to light  Neck:      Musculoskeletal: Normal range of motion and neck supple  Vascular: No JVD  Cardiovascular:      Rate and Rhythm: Normal rate and regular rhythm  Heart sounds: Normal heart sounds  No murmur  No gallop  Pulmonary:      Effort: Pulmonary effort is normal  No respiratory distress  Breath sounds: Normal breath sounds  No stridor  No wheezing or rales  Chest:      Chest wall: No tenderness  Abdominal:      General: There is no distension  Palpations: Abdomen is soft  There is no mass  Tenderness: There is no abdominal tenderness  There is no rebound  Musculoskeletal:         General: Tenderness present  No signs of injury  Comments: point tenderness at the the tailbone the sacrum area no swelling no erythema the patient was not able to lie down on her back   Lymphadenopathy:      Cervical: No cervical adenopathy  Skin:     General: Skin is warm  Findings: No erythema or rash     Neurological:      Mental Status: She is alert and oriented to person, place, and time  Motor: No weakness  Gait: Gait normal            Assessment/Plan:    Acute midline low back pain without sciatica  Acute symptomatic pain in the tailbone at the sacrum area a x-ray put no fracture plan to repeat x-ray of the area recommend to take Naprosyn 250 twice a day proper use and possible side effect discussed with the patient       Diagnoses and all orders for this visit:    Acute midline low back pain without sciatica  -     naproxen (NAPROSYN) 250 mg tablet; Take 1 tablet (250 mg total) by mouth 2 (two) times a day with meals  -     XR sacrum and coccyx;  Future

## 2020-09-17 NOTE — ASSESSMENT & PLAN NOTE
Acute symptomatic pain in the tailbone at the sacrum area a x-ray put no fracture plan to repeat x-ray of the area recommend to take Naprosyn 250 twice a day proper use and possible side effect discussed with the patient

## 2020-09-19 ENCOUNTER — TELEPHONE (OUTPATIENT)
Dept: OTHER | Facility: OTHER | Age: 30
End: 2020-09-19

## 2020-09-21 ENCOUNTER — TELEPHONE (OUTPATIENT)
Dept: FAMILY MEDICINE CLINIC | Facility: CLINIC | Age: 30
End: 2020-09-21

## 2020-09-30 ENCOUNTER — HOSPITAL ENCOUNTER (EMERGENCY)
Facility: HOSPITAL | Age: 30
Discharge: HOME | End: 2020-09-30
Attending: EMERGENCY MEDICINE | Admitting: EMERGENCY MEDICINE
Payer: COMMERCIAL

## 2020-09-30 VITALS
OXYGEN SATURATION: 98 % | BODY MASS INDEX: 19.64 KG/M2 | TEMPERATURE: 98.1 F | WEIGHT: 107.4 LBS | RESPIRATION RATE: 22 BRPM | HEART RATE: 113 BPM | SYSTOLIC BLOOD PRESSURE: 133 MMHG | DIASTOLIC BLOOD PRESSURE: 103 MMHG

## 2020-09-30 DIAGNOSIS — J06.9 URI (UPPER RESPIRATORY INFECTION): Primary | ICD-10-CM

## 2020-09-30 PROCEDURE — 99283 EMERGENCY DEPT VISIT LOW MDM: CPT

## 2020-09-30 PROCEDURE — 99284 EMERGENCY DEPT VISIT MOD MDM: CPT | Performed by: PHYSICIAN ASSISTANT

## 2020-09-30 RX ORDER — BENZONATATE 100 MG/1
100 CAPSULE ORAL EVERY 8 HOURS
Qty: 21 CAPSULE | Refills: 0 | Status: SHIPPED | OUTPATIENT
Start: 2020-09-30 | End: 2020-10-16 | Stop reason: ALTCHOICE

## 2020-09-30 RX ORDER — ONDANSETRON 4 MG/1
4 TABLET, ORALLY DISINTEGRATING ORAL ONCE
Status: COMPLETED | OUTPATIENT
Start: 2020-09-30 | End: 2020-09-30

## 2020-09-30 RX ORDER — ONDANSETRON 4 MG/1
4 TABLET, FILM COATED ORAL EVERY 6 HOURS
Qty: 12 TABLET | Refills: 0 | Status: SHIPPED | OUTPATIENT
Start: 2020-09-30 | End: 2020-10-20

## 2020-09-30 RX ADMIN — ONDANSETRON 4 MG: 4 TABLET, ORALLY DISINTEGRATING ORAL at 06:29

## 2020-10-13 ENCOUNTER — TELEPHONE (OUTPATIENT)
Dept: NEUROLOGY | Facility: CLINIC | Age: 30
End: 2020-10-13

## 2020-10-16 ENCOUNTER — HOSPITAL ENCOUNTER (EMERGENCY)
Facility: HOSPITAL | Age: 30
Discharge: HOME | End: 2020-10-17
Attending: EMERGENCY MEDICINE | Admitting: EMERGENCY MEDICINE
Payer: COMMERCIAL

## 2020-10-16 DIAGNOSIS — F41.0 PANIC ATTACK: Primary | ICD-10-CM

## 2020-10-16 LAB
EXT PREG TEST URINE: NEGATIVE
EXT. CONTROL ED NAV: NORMAL
GLUCOSE SERPL-MCNC: 72 MG/DL (ref 65–140)

## 2020-10-16 PROCEDURE — 81025 URINE PREGNANCY TEST: CPT | Performed by: EMERGENCY MEDICINE

## 2020-10-16 PROCEDURE — 99284 EMERGENCY DEPT VISIT MOD MDM: CPT

## 2020-10-16 PROCEDURE — 82948 REAGENT STRIP/BLOOD GLUCOSE: CPT

## 2020-10-16 PROCEDURE — 93005 ELECTROCARDIOGRAM TRACING: CPT

## 2020-10-17 VITALS
DIASTOLIC BLOOD PRESSURE: 81 MMHG | WEIGHT: 118.83 LBS | BODY MASS INDEX: 21.73 KG/M2 | TEMPERATURE: 97.6 F | SYSTOLIC BLOOD PRESSURE: 124 MMHG | RESPIRATION RATE: 16 BRPM | HEART RATE: 81 BPM | OXYGEN SATURATION: 99 %

## 2020-10-17 PROCEDURE — 99282 EMERGENCY DEPT VISIT SF MDM: CPT | Performed by: PHYSICIAN ASSISTANT

## 2020-10-17 RX ORDER — ACETAMINOPHEN 325 MG/1
650 TABLET ORAL ONCE
Status: COMPLETED | OUTPATIENT
Start: 2020-10-17 | End: 2020-10-17

## 2020-10-17 RX ADMIN — ACETAMINOPHEN 650 MG: 325 TABLET ORAL at 00:37

## 2020-10-18 LAB
ATRIAL RATE: 86 BPM
P AXIS: 75 DEGREES
PR INTERVAL: 140 MS
QRS AXIS: 83 DEGREES
QRSD INTERVAL: 102 MS
QT INTERVAL: 382 MS
QTC INTERVAL: 457 MS
T WAVE AXIS: 36 DEGREES
VENTRICULAR RATE: 86 BPM

## 2020-10-18 PROCEDURE — 93010 ELECTROCARDIOGRAM REPORT: CPT | Performed by: INTERNAL MEDICINE

## 2020-10-20 ENCOUNTER — HOSPITAL ENCOUNTER (EMERGENCY)
Facility: HOSPITAL | Age: 30
Discharge: HOME | End: 2020-10-20
Attending: EMERGENCY MEDICINE
Payer: COMMERCIAL

## 2020-10-20 VITALS
SYSTOLIC BLOOD PRESSURE: 122 MMHG | TEMPERATURE: 96.9 F | OXYGEN SATURATION: 98 % | BODY MASS INDEX: 19.8 KG/M2 | WEIGHT: 108.25 LBS | RESPIRATION RATE: 18 BRPM | DIASTOLIC BLOOD PRESSURE: 87 MMHG | HEART RATE: 106 BPM

## 2020-10-20 DIAGNOSIS — R11.0 NAUSEA: ICD-10-CM

## 2020-10-20 DIAGNOSIS — R19.7 DIARRHEA, UNSPECIFIED TYPE: Primary | ICD-10-CM

## 2020-10-20 LAB
ALBUMIN SERPL BCP-MCNC: 4.4 G/DL (ref 3–5.2)
ALP SERPL-CCNC: 73 U/L (ref 43–122)
ALT SERPL W P-5'-P-CCNC: 22 U/L (ref 9–52)
ANION GAP SERPL CALCULATED.3IONS-SCNC: 9 MMOL/L (ref 5–14)
AST SERPL W P-5'-P-CCNC: 20 U/L (ref 14–36)
BACTERIA UR QL AUTO: ABNORMAL /HPF
BASOPHILS # BLD AUTO: 0.1 THOUSANDS/ΜL (ref 0–0.1)
BASOPHILS NFR BLD AUTO: 1 % (ref 0–1)
BILIRUB SERPL-MCNC: 0.4 MG/DL
BILIRUB UR QL STRIP: NEGATIVE
BUN SERPL-MCNC: 13 MG/DL (ref 5–25)
CALCIUM SERPL-MCNC: 9.4 MG/DL (ref 8.4–10.2)
CHLORIDE SERPL-SCNC: 109 MMOL/L (ref 97–108)
CLARITY UR: CLEAR
CO2 SERPL-SCNC: 21 MMOL/L (ref 22–30)
COLOR UR: ABNORMAL
CREAT SERPL-MCNC: 0.71 MG/DL (ref 0.6–1.2)
EOSINOPHIL # BLD AUTO: 0.1 THOUSAND/ΜL (ref 0–0.4)
EOSINOPHIL NFR BLD AUTO: 2 % (ref 0–6)
ERYTHROCYTE [DISTWIDTH] IN BLOOD BY AUTOMATED COUNT: 12.2 %
EXT PREG TEST URINE: NEGATIVE
EXT. CONTROL ED NAV: NORMAL
GFR SERPL CREATININE-BSD FRML MDRD: 115 ML/MIN/1.73SQ M
GLUCOSE SERPL-MCNC: 85 MG/DL (ref 70–99)
GLUCOSE UR STRIP-MCNC: NEGATIVE MG/DL
HCT VFR BLD AUTO: 40.7 % (ref 36–46)
HGB BLD-MCNC: 13.9 G/DL (ref 12–16)
HGB UR QL STRIP.AUTO: NEGATIVE
KETONES UR STRIP-MCNC: NEGATIVE MG/DL
LEUKOCYTE ESTERASE UR QL STRIP: 100
LIPASE SERPL-CCNC: 87 U/L (ref 23–300)
LYMPHOCYTES # BLD AUTO: 1.8 THOUSANDS/ΜL (ref 0.5–4)
LYMPHOCYTES NFR BLD AUTO: 25 % (ref 25–45)
MCH RBC QN AUTO: 32 PG (ref 26–34)
MCHC RBC AUTO-ENTMCNC: 34.2 G/DL (ref 31–36)
MCV RBC AUTO: 94 FL (ref 80–100)
MONOCYTES # BLD AUTO: 0.6 THOUSAND/ΜL (ref 0.2–0.9)
MONOCYTES NFR BLD AUTO: 8 % (ref 1–10)
MUCOUS THREADS UR QL AUTO: ABNORMAL
NEUTROPHILS # BLD AUTO: 4.6 THOUSANDS/ΜL (ref 1.8–7.8)
NEUTS SEG NFR BLD AUTO: 64 % (ref 45–65)
NITRITE UR QL STRIP: NEGATIVE
NON-SQ EPI CELLS URNS QL MICRO: ABNORMAL /HPF
PH UR STRIP.AUTO: 6 [PH]
PLATELET # BLD AUTO: 217 THOUSANDS/UL (ref 150–450)
PMV BLD AUTO: 8.1 FL (ref 8.9–12.7)
POTASSIUM SERPL-SCNC: 3.9 MMOL/L (ref 3.6–5)
PROT SERPL-MCNC: 7.5 G/DL (ref 5.9–8.4)
PROT UR STRIP-MCNC: ABNORMAL MG/DL
RBC # BLD AUTO: 4.34 MILLION/UL (ref 4–5.2)
RBC #/AREA URNS AUTO: ABNORMAL /HPF
SODIUM SERPL-SCNC: 139 MMOL/L (ref 137–147)
SP GR UR STRIP.AUTO: 1.02 (ref 1–1.04)
UROBILINOGEN UA: 1 MG/DL
WBC # BLD AUTO: 7.1 THOUSAND/UL (ref 4.5–11)
WBC #/AREA URNS AUTO: ABNORMAL /HPF

## 2020-10-20 PROCEDURE — 99284 EMERGENCY DEPT VISIT MOD MDM: CPT

## 2020-10-20 PROCEDURE — 81025 URINE PREGNANCY TEST: CPT | Performed by: PHYSICIAN ASSISTANT

## 2020-10-20 PROCEDURE — 96374 THER/PROPH/DIAG INJ IV PUSH: CPT

## 2020-10-20 PROCEDURE — 85025 COMPLETE CBC W/AUTO DIFF WBC: CPT | Performed by: PHYSICIAN ASSISTANT

## 2020-10-20 PROCEDURE — 81001 URINALYSIS AUTO W/SCOPE: CPT | Performed by: PHYSICIAN ASSISTANT

## 2020-10-20 PROCEDURE — 80053 COMPREHEN METABOLIC PANEL: CPT | Performed by: PHYSICIAN ASSISTANT

## 2020-10-20 PROCEDURE — 83690 ASSAY OF LIPASE: CPT | Performed by: PHYSICIAN ASSISTANT

## 2020-10-20 PROCEDURE — 36415 COLL VENOUS BLD VENIPUNCTURE: CPT | Performed by: PHYSICIAN ASSISTANT

## 2020-10-20 PROCEDURE — 96361 HYDRATE IV INFUSION ADD-ON: CPT

## 2020-10-20 PROCEDURE — U0003 INFECTIOUS AGENT DETECTION BY NUCLEIC ACID (DNA OR RNA); SEVERE ACUTE RESPIRATORY SYNDROME CORONAVIRUS 2 (SARS-COV-2) (CORONAVIRUS DISEASE [COVID-19]), AMPLIFIED PROBE TECHNIQUE, MAKING USE OF HIGH THROUGHPUT TECHNOLOGIES AS DESCRIBED BY CMS-2020-01-R: HCPCS | Performed by: PHYSICIAN ASSISTANT

## 2020-10-20 PROCEDURE — 99284 EMERGENCY DEPT VISIT MOD MDM: CPT | Performed by: PHYSICIAN ASSISTANT

## 2020-10-20 RX ORDER — ONDANSETRON 2 MG/ML
4 INJECTION INTRAMUSCULAR; INTRAVENOUS ONCE
Status: COMPLETED | OUTPATIENT
Start: 2020-10-20 | End: 2020-10-20

## 2020-10-20 RX ORDER — ONDANSETRON 4 MG/1
4 TABLET, ORALLY DISINTEGRATING ORAL EVERY 8 HOURS PRN
Qty: 20 TABLET | Refills: 0 | Status: SHIPPED | OUTPATIENT
Start: 2020-10-20 | End: 2020-11-10 | Stop reason: ALTCHOICE

## 2020-10-20 RX ADMIN — ONDANSETRON 4 MG: 2 INJECTION INTRAMUSCULAR; INTRAVENOUS at 14:24

## 2020-10-20 RX ADMIN — SODIUM CHLORIDE 1000 ML: 0.9 INJECTION, SOLUTION INTRAVENOUS at 14:25

## 2020-10-21 LAB — SARS-COV-2 RNA SPEC QL NAA+PROBE: NOT DETECTED

## 2020-11-10 ENCOUNTER — OFFICE VISIT (OUTPATIENT)
Dept: FAMILY MEDICINE CLINIC | Facility: CLINIC | Age: 30
End: 2020-11-10
Payer: COMMERCIAL

## 2020-11-10 VITALS
WEIGHT: 106 LBS | DIASTOLIC BLOOD PRESSURE: 80 MMHG | OXYGEN SATURATION: 98 % | TEMPERATURE: 98.3 F | HEART RATE: 100 BPM | HEIGHT: 63 IN | SYSTOLIC BLOOD PRESSURE: 120 MMHG | BODY MASS INDEX: 18.78 KG/M2

## 2020-11-10 DIAGNOSIS — J45.30 MILD PERSISTENT ASTHMA WITHOUT COMPLICATION: ICD-10-CM

## 2020-11-10 DIAGNOSIS — R63.4 WEIGHT LOSS: ICD-10-CM

## 2020-11-10 DIAGNOSIS — R39.15 URGENCY OF URINATION: ICD-10-CM

## 2020-11-10 DIAGNOSIS — K52.9 CHRONIC DIARRHEA: Primary | ICD-10-CM

## 2020-11-10 LAB
BILIRUB UR QL STRIP: NEGATIVE
CLARITY UR: CLEAR
COLOR UR: YELLOW
GLUCOSE UR STRIP-MCNC: NEGATIVE MG/DL
HGB UR QL STRIP.AUTO: NEGATIVE
KETONES UR STRIP-MCNC: NEGATIVE MG/DL
LEUKOCYTE ESTERASE UR QL STRIP: NEGATIVE
NITRITE UR QL STRIP: NEGATIVE
PH UR STRIP.AUTO: 6.5 [PH]
PROT UR STRIP-MCNC: NEGATIVE MG/DL
SP GR UR STRIP.AUTO: 1.02 (ref 1–1.03)
UROBILINOGEN UR QL STRIP.AUTO: 0.2 E.U./DL

## 2020-11-10 PROCEDURE — 87086 URINE CULTURE/COLONY COUNT: CPT | Performed by: FAMILY MEDICINE

## 2020-11-10 PROCEDURE — 81003 URINALYSIS AUTO W/O SCOPE: CPT | Performed by: FAMILY MEDICINE

## 2020-11-10 PROCEDURE — 99214 OFFICE O/P EST MOD 30 MIN: CPT | Performed by: FAMILY MEDICINE

## 2020-11-10 RX ORDER — ALBUTEROL SULFATE 90 UG/1
2 AEROSOL, METERED RESPIRATORY (INHALATION) EVERY 6 HOURS PRN
Qty: 1 INHALER | Refills: 1 | Status: SHIPPED | OUTPATIENT
Start: 2020-11-10 | End: 2021-12-03 | Stop reason: SDUPTHER

## 2020-11-11 ENCOUNTER — TELEPHONE (OUTPATIENT)
Dept: FAMILY MEDICINE CLINIC | Facility: CLINIC | Age: 30
End: 2020-11-11

## 2020-11-11 LAB — BACTERIA UR CULT: NORMAL

## 2020-11-16 ENCOUNTER — TELEPHONE (OUTPATIENT)
Dept: FAMILY MEDICINE CLINIC | Facility: CLINIC | Age: 30
End: 2020-11-16

## 2020-11-19 ENCOUNTER — LAB (OUTPATIENT)
Dept: LAB | Facility: HOSPITAL | Age: 30
End: 2020-11-19
Payer: COMMERCIAL

## 2020-11-19 ENCOUNTER — TELEPHONE (OUTPATIENT)
Dept: OBGYN CLINIC | Facility: CLINIC | Age: 30
End: 2020-11-19

## 2020-11-19 DIAGNOSIS — R63.4 WEIGHT LOSS: ICD-10-CM

## 2020-11-19 DIAGNOSIS — K52.9 CHRONIC DIARRHEA: ICD-10-CM

## 2020-11-19 PROCEDURE — 87177 OVA AND PARASITES SMEARS: CPT

## 2020-11-19 PROCEDURE — 87045 FECES CULTURE AEROBIC BACT: CPT

## 2020-11-19 PROCEDURE — 87046 STOOL CULTR AEROBIC BACT EA: CPT

## 2020-11-19 PROCEDURE — 87329 GIARDIA AG IA: CPT

## 2020-11-19 PROCEDURE — 87209 SMEAR COMPLEX STAIN: CPT

## 2020-11-19 PROCEDURE — 87427 SHIGA-LIKE TOXIN AG IA: CPT

## 2020-11-19 PROCEDURE — 36415 COLL VENOUS BLD VENIPUNCTURE: CPT

## 2020-11-20 ENCOUNTER — CLINICAL SUPPORT (OUTPATIENT)
Dept: OBGYN CLINIC | Facility: CLINIC | Age: 30
End: 2020-11-20

## 2020-11-20 ENCOUNTER — OFFICE VISIT (OUTPATIENT)
Dept: NEUROLOGY | Facility: CLINIC | Age: 30
End: 2020-11-20
Payer: COMMERCIAL

## 2020-11-20 VITALS — BODY MASS INDEX: 18.81 KG/M2 | WEIGHT: 109.6 LBS | SYSTOLIC BLOOD PRESSURE: 130 MMHG | DIASTOLIC BLOOD PRESSURE: 95 MMHG

## 2020-11-20 VITALS
WEIGHT: 108.8 LBS | BODY MASS INDEX: 18.57 KG/M2 | HEART RATE: 106 BPM | HEIGHT: 64 IN | DIASTOLIC BLOOD PRESSURE: 84 MMHG | SYSTOLIC BLOOD PRESSURE: 118 MMHG

## 2020-11-20 DIAGNOSIS — Z30.42 ENCOUNTER FOR SURVEILLANCE OF INJECTABLE CONTRACEPTIVE: Primary | ICD-10-CM

## 2020-11-20 DIAGNOSIS — G43.009 MIGRAINE WITHOUT AURA AND WITHOUT STATUS MIGRAINOSUS, NOT INTRACTABLE: ICD-10-CM

## 2020-11-20 DIAGNOSIS — E53.8 B12 DEFICIENCY: ICD-10-CM

## 2020-11-20 DIAGNOSIS — N31.9 NEUROGENIC BLADDER: ICD-10-CM

## 2020-11-20 DIAGNOSIS — K52.9 CHRONIC DIARRHEA: ICD-10-CM

## 2020-11-20 DIAGNOSIS — G35 MS (MULTIPLE SCLEROSIS) (HCC): Primary | ICD-10-CM

## 2020-11-20 LAB — O+P STL CONC: NORMAL

## 2020-11-20 PROCEDURE — 4004F PT TOBACCO SCREEN RCVD TLK: CPT | Performed by: PHYSICIAN ASSISTANT

## 2020-11-20 PROCEDURE — 99214 OFFICE O/P EST MOD 30 MIN: CPT | Performed by: PHYSICIAN ASSISTANT

## 2020-11-20 PROCEDURE — 3008F BODY MASS INDEX DOCD: CPT | Performed by: PHYSICIAN ASSISTANT

## 2020-11-20 PROCEDURE — 96372 THER/PROPH/DIAG INJ SC/IM: CPT

## 2020-11-20 RX ORDER — MEDROXYPROGESTERONE ACETATE 150 MG/ML
150 INJECTION, SUSPENSION INTRAMUSCULAR ONCE
Status: COMPLETED | OUTPATIENT
Start: 2020-11-20 | End: 2020-11-20

## 2020-11-20 RX ORDER — METHOCARBAMOL 750 MG/1
750 TABLET, FILM COATED ORAL 3 TIMES DAILY PRN
Qty: 60 TABLET | Refills: 0 | Status: SHIPPED | OUTPATIENT
Start: 2020-11-20 | End: 2021-11-11 | Stop reason: ALTCHOICE

## 2020-11-20 RX ADMIN — MEDROXYPROGESTERONE ACETATE 150 MG: 150 INJECTION, SUSPENSION INTRAMUSCULAR at 11:24

## 2020-11-23 LAB — G LAMBLIA AG STL QL IA: NEGATIVE

## 2020-11-27 ENCOUNTER — HOSPITAL ENCOUNTER (OUTPATIENT)
Dept: CT IMAGING | Facility: HOSPITAL | Age: 30
Discharge: HOME | End: 2020-11-27
Payer: COMMERCIAL

## 2020-11-27 DIAGNOSIS — K52.9 CHRONIC DIARRHEA: ICD-10-CM

## 2020-11-27 LAB — MISCELLANEOUS LAB TEST RESULT: NORMAL

## 2020-11-27 PROCEDURE — G1004 CDSM NDSC: HCPCS

## 2020-11-27 PROCEDURE — 74177 CT ABD & PELVIS W/CONTRAST: CPT

## 2020-11-27 RX ADMIN — IOHEXOL 100 ML: 350 INJECTION, SOLUTION INTRAVENOUS at 12:56

## 2020-11-30 ENCOUNTER — HOSPITAL ENCOUNTER (EMERGENCY)
Facility: HOSPITAL | Age: 30
Discharge: HOME | End: 2020-11-30
Attending: EMERGENCY MEDICINE
Payer: COMMERCIAL

## 2020-11-30 ENCOUNTER — TELEMEDICINE (OUTPATIENT)
Dept: FAMILY MEDICINE CLINIC | Facility: CLINIC | Age: 30
End: 2020-11-30
Payer: COMMERCIAL

## 2020-11-30 VITALS
OXYGEN SATURATION: 98 % | DIASTOLIC BLOOD PRESSURE: 84 MMHG | TEMPERATURE: 98.9 F | RESPIRATION RATE: 18 BRPM | SYSTOLIC BLOOD PRESSURE: 146 MMHG | WEIGHT: 112.66 LBS | HEART RATE: 96 BPM | BODY MASS INDEX: 19.34 KG/M2

## 2020-11-30 VITALS — TEMPERATURE: 97.3 F

## 2020-11-30 DIAGNOSIS — Z20.822 COUGH WITH EXPOSURE TO COVID-19 VIRUS: ICD-10-CM

## 2020-11-30 DIAGNOSIS — Z20.822 EXPOSURE TO COVID-19 VIRUS: Primary | ICD-10-CM

## 2020-11-30 DIAGNOSIS — R05.8 COUGH WITH EXPOSURE TO COVID-19 VIRUS: ICD-10-CM

## 2020-11-30 DIAGNOSIS — R05.9 COUGH: Primary | ICD-10-CM

## 2020-11-30 PROCEDURE — 99284 EMERGENCY DEPT VISIT MOD MDM: CPT

## 2020-11-30 PROCEDURE — 99213 OFFICE O/P EST LOW 20 MIN: CPT | Performed by: FAMILY MEDICINE

## 2020-11-30 PROCEDURE — 99284 EMERGENCY DEPT VISIT MOD MDM: CPT | Performed by: EMERGENCY MEDICINE

## 2020-11-30 PROCEDURE — 87637 SARSCOV2&INF A&B&RSV AMP PRB: CPT | Performed by: EMERGENCY MEDICINE

## 2020-12-02 LAB
FLUAV RNA NPH QL NAA+PROBE: NOT DETECTED
FLUBV RNA NPH QL NAA+PROBE: NOT DETECTED
RSV RNA NPH QL NAA+PROBE: NOT DETECTED
SARS-COV-2 RNA NPH QL NAA+PROBE: NOT DETECTED

## 2020-12-03 ENCOUNTER — TELEMEDICINE (OUTPATIENT)
Dept: FAMILY MEDICINE CLINIC | Facility: CLINIC | Age: 30
End: 2020-12-03
Payer: COMMERCIAL

## 2020-12-03 VITALS — TEMPERATURE: 97.3 F

## 2020-12-03 DIAGNOSIS — R63.4 WEIGHT LOSS: Primary | ICD-10-CM

## 2020-12-03 DIAGNOSIS — J45.30 MILD PERSISTENT ASTHMA WITHOUT COMPLICATION: ICD-10-CM

## 2020-12-03 PROCEDURE — 99213 OFFICE O/P EST LOW 20 MIN: CPT | Performed by: FAMILY MEDICINE

## 2020-12-03 PROCEDURE — 4004F PT TOBACCO SCREEN RCVD TLK: CPT | Performed by: FAMILY MEDICINE

## 2020-12-04 ENCOUNTER — PATIENT MESSAGE (OUTPATIENT)
Dept: FAMILY MEDICINE CLINIC | Facility: CLINIC | Age: 30
End: 2020-12-04

## 2020-12-14 ENCOUNTER — TELEPHONE (OUTPATIENT)
Dept: NEUROLOGY | Facility: CLINIC | Age: 30
End: 2020-12-14

## 2020-12-17 ENCOUNTER — TELEPHONE (OUTPATIENT)
Dept: NEUROLOGY | Facility: CLINIC | Age: 30
End: 2020-12-17

## 2020-12-22 ENCOUNTER — TELEPHONE (OUTPATIENT)
Dept: FAMILY MEDICINE CLINIC | Facility: CLINIC | Age: 30
End: 2020-12-22

## 2020-12-28 ENCOUNTER — TELEPHONE (OUTPATIENT)
Dept: NEUROLOGY | Facility: CLINIC | Age: 30
End: 2020-12-28

## 2021-02-03 ENCOUNTER — DOCUMENTATION (OUTPATIENT)
Dept: NEUROLOGY | Facility: CLINIC | Age: 31
End: 2021-02-03

## 2021-02-11 ENCOUNTER — TELEPHONE (OUTPATIENT)
Dept: OBGYN CLINIC | Facility: CLINIC | Age: 31
End: 2021-02-11

## 2021-02-12 ENCOUNTER — CLINICAL SUPPORT (OUTPATIENT)
Dept: OBGYN CLINIC | Facility: CLINIC | Age: 31
End: 2021-02-12

## 2021-02-12 VITALS
BODY MASS INDEX: 18.88 KG/M2 | DIASTOLIC BLOOD PRESSURE: 94 MMHG | HEART RATE: 110 BPM | SYSTOLIC BLOOD PRESSURE: 136 MMHG | WEIGHT: 110 LBS

## 2021-02-12 DIAGNOSIS — Z30.42 ENCOUNTER FOR SURVEILLANCE OF INJECTABLE CONTRACEPTIVE: Primary | ICD-10-CM

## 2021-02-12 PROCEDURE — 96372 THER/PROPH/DIAG INJ SC/IM: CPT | Performed by: OBSTETRICS & GYNECOLOGY

## 2021-02-12 RX ORDER — MEDROXYPROGESTERONE ACETATE 150 MG/ML
150 INJECTION, SUSPENSION INTRAMUSCULAR ONCE
Status: COMPLETED | OUTPATIENT
Start: 2021-02-12 | End: 2021-02-12

## 2021-02-12 RX ADMIN — MEDROXYPROGESTERONE ACETATE 150 MG: 150 INJECTION, SUSPENSION INTRAMUSCULAR at 10:49

## 2021-02-12 NOTE — PROGRESS NOTES
Patient came in for depo shot   Give: right deltoid  [facility]:[id number]   by: Koki THOMPSON

## 2021-02-16 ENCOUNTER — HOSPITAL ENCOUNTER (EMERGENCY)
Facility: HOSPITAL | Age: 31
Discharge: HOME | End: 2021-02-16
Attending: EMERGENCY MEDICINE | Admitting: EMERGENCY MEDICINE
Payer: COMMERCIAL

## 2021-02-16 VITALS
DIASTOLIC BLOOD PRESSURE: 89 MMHG | SYSTOLIC BLOOD PRESSURE: 138 MMHG | RESPIRATION RATE: 16 BRPM | HEART RATE: 118 BPM | WEIGHT: 109.19 LBS | OXYGEN SATURATION: 98 % | BODY MASS INDEX: 18.74 KG/M2 | TEMPERATURE: 98.3 F

## 2021-02-16 DIAGNOSIS — G35 MULTIPLE SCLEROSIS EXACERBATION (HCC): ICD-10-CM

## 2021-02-16 DIAGNOSIS — G89.29 OTHER CHRONIC PAIN: Primary | ICD-10-CM

## 2021-02-16 PROCEDURE — 99284 EMERGENCY DEPT VISIT MOD MDM: CPT | Performed by: EMERGENCY MEDICINE

## 2021-02-16 PROCEDURE — 99283 EMERGENCY DEPT VISIT LOW MDM: CPT

## 2021-02-16 RX ORDER — PREDNISONE 20 MG/1
60 TABLET ORAL ONCE
Status: COMPLETED | OUTPATIENT
Start: 2021-02-16 | End: 2021-02-16

## 2021-02-16 RX ORDER — PREDNISONE 20 MG/1
TABLET ORAL
Qty: 10 TABLET | Refills: 0 | Status: SHIPPED | OUTPATIENT
Start: 2021-02-16 | End: 2021-03-01

## 2021-02-16 RX ADMIN — PREDNISONE 60 MG: 20 TABLET ORAL at 10:07

## 2021-02-16 NOTE — Clinical Note
Karolina Mercado was seen and treated in our emergency department on 2/16/2021  Diagnosis:     Laura Day  may return to work on return date  She may return on this date: 02/18/2021         If you have any questions or concerns, please don't hesitate to call        Lucy Leone DO    ______________________________           _______________          _______________  Hospital Representative                              Date                                Time

## 2021-02-19 NOTE — ED PROVIDER NOTES
History  Chief Complaint   Patient presents with    Pain     states history of MS from an untreated infection- about 1 year now-seeing neurologist- last visit November-states arms and legs are hurting; states muscle relaxor makes sleepy but not helping pain; tylenol not working either; states last time given steroids that helped  History provided by:  Patient  Fatigue  Severity:  Mild  Onset quality:  Gradual  Timing:  Intermittent  Progression:  Waxing and waning  Chronicity:  New  Relieved by:  Nothing  Worsened by:  Nothing  Ineffective treatments:  None tried  Associated symptoms: lethargy    Associated symptoms: no abdominal pain, no chest pain, no cough, no diarrhea, no dizziness, no dysuria, no fever, no headaches, no myalgias, no nausea, no shortness of breath and no urgency        Prior to Admission Medications   Prescriptions Last Dose Informant Patient Reported? Taking?    albuterol (Ventolin HFA) 90 mcg/act inhaler  Self No No   Sig: Inhale 2 puffs every 6 (six) hours as needed for wheezing   medroxyPROGESTERone (DEPO-PROVERA) 150 mg/mL injection  Self No No   Sig: Inject 1 mL (150 mg total) into a muscle every 3 (three) months   methocarbamol (ROBAXIN) 750 mg tablet  Self No No   Sig: Take 1 tablet (750 mg total) by mouth 3 (three) times a day as needed for muscle spasms      Facility-Administered Medications: None       Past Medical History:   Diagnosis Date    Asthma     albuterol prn    Depression     hasn't required treatment since 2018    [facility] [address] [facility]     Seizure [facility] [address] )        Past Surgical History:   Procedure Laterality Date    CLOSED REDUCTION FINGER FRACTURE Left     FL LUMBAR PUNCTURE DIAGNOSTIC  12/5/2019    HERNIA REPAIR  2016    TONSILLECTOMY Bilateral 1994       Family History   Problem Relation Age of Onset    Coronary artery disease Paternal Grandmother     Hypertension Paternal Grandfather         TYPE 2    Diabetes Paternal Grandfather     Cancer Paternal Grandfather         pancreatic, lung    Cancer Mother         thyroid    Diabetes Father     Hypertension Father     Breast cancer Neg Hx      I have reviewed and agree with the history as documented  E-Cigarette/Vaping    E-Cigarette Use Never User      E-Cigarette/Vaping Substances     Social History     Tobacco Use    Smoking status: Current Every Day Smoker     Packs/day: 0 25     Years: 9 00     Pack years: 2 25     Types: Cigarettes    Smokeless tobacco: Current User   Substance Use Topics    Alcohol use: Yes     Frequency: Monthly or less     Drinks per session: 1 or 2     Binge frequency: Never    Drug use: Not Currently     Types: Marijuana, Cocaine, MDMA (ecstacy)     Comment: hasn't used any since 2010       Review of Systems   Constitutional: Positive for fatigue  Negative for chills and fever  HENT: Negative for rhinorrhea, sore throat and trouble swallowing  Eyes: Negative for pain  Respiratory: Negative for cough, shortness of breath, wheezing and stridor  Cardiovascular: Negative for chest pain and leg swelling  Gastrointestinal: Negative for abdominal pain, diarrhea and nausea  Endocrine: Negative for polyuria  Genitourinary: Negative for dysuria, flank pain and urgency  Musculoskeletal: Negative for joint swelling, myalgias and neck stiffness  Skin: Negative for rash  Allergic/Immunologic: Negative for immunocompromised state  Neurological: Positive for weakness  Negative for dizziness, syncope, numbness and headaches  Psychiatric/Behavioral: Negative for confusion and suicidal ideas  All other systems reviewed and are negative  Physical Exam  Physical Exam  Vitals signs and nursing note reviewed  Constitutional:       Appearance: She is well-developed  HENT:      Head: Normocephalic and atraumatic  Eyes:      Pupils: Pupils are equal, round, and reactive to light  Neck:      Musculoskeletal: Normal range of motion and neck supple  Cardiovascular:      Rate and Rhythm: Normal rate and regular rhythm  Heart sounds: No murmur  No friction rub  Pulmonary:      Effort: No respiratory distress  Breath sounds: Normal breath sounds  No wheezing or rales  Abdominal:      General: Bowel sounds are normal  There is no distension  Palpations: Abdomen is soft  Tenderness: There is no abdominal tenderness  Musculoskeletal: Normal range of motion  General: No tenderness  Skin:     General: Skin is warm  Capillary Refill: Capillary refill takes less than 2 seconds  Findings: No rash  Neurological:      General: No focal deficit present  Mental Status: She is alert and oriented to person, place, and time  Comments: GCS 15  AAOx4  No focal neuro deficits  CN II-XII intact  PERRL  EOMI  Duncan Cowing No pronator drift   strength 5/5 bilaterally  B/L UE strength 5/5 throughout  Finger to nose normal  Cerebellar function normal  Ambulates without difficulty  B/L LE strength 5/5 throughout  Gross sensation to b/l upper and lower extremities intact         Psychiatric:         Mood and Affect: Mood normal          Vital Signs  ED Triage Vitals [02/16/21 0950]   Temperature Pulse Respirations Blood Pressure SpO2   98 3 °F (36 8 °C) (!) 118 16 138/89 98 %      Temp Source Heart Rate Source Patient Position - Orthostatic VS BP Location FiO2 (%)   Tympanic Monitor Sitting Left arm --      Pain Score       8           Vitals:    02/16/21 0950   BP: 138/89   Pulse: (!) 118   Patient Position - Orthostatic VS: Sitting         Visual Acuity      ED Medications  Medications   predniSONE tablet 60 mg (60 mg Oral Given 2/16/21 1007)       Diagnostic Studies  Results Reviewed     None                 No orders to display              Procedures  Procedures         ED Course                                           MDM  Number of Diagnoses or Management Options  [facility] new and requires workup  Other chronic pain: new and requires workup  Diagnosis management comments: 25-year-old female presents emergency department generalized weakness and fatigue history of MS  Currently on maintenance medications  No focal weakness noted  Vital signs stable except for noted heart rate elevation likely secondary to being in the emergency department no signs of dehydration no signs of acute pain  Sinus tachycardia on the monitor  Plan will be for outpatient management treatment with steroid  Informed to follow up with Neurology given strict instructions when to return back to the emergency department  Pt re-examined and evaluated after testing and treatment  Spoke with the patient and feeling improved and sxs have resolved  Will discharge home with close f/u with pcp and instructed to return to the ED if sxs worsen or continue  Pt agrees with the plan for discharge and feels comfortable to go home with proper f/u  Advised to return for worsening or additional problems  Diagnostic tests were reviewed and questions answered  Diagnosis, care plan and treatment options were discussed  The patient understand instructions and will follow up as directed  Counseling: I had a detailed discussion with the patient and/or guardian regarding: the historical points, exam findings, and any diagnostic results supporting the discharge diagnosis, lab results, radiology results, discharge instructions reviewed with patient and/or family/caregiver and understanding was verbalized  Instructions given to return to the emergency department if symptoms worsen or persist, or if there are any questions or concerns that arise at home       All labs reviewed and utilized in the medical decision making process    All radiology studies independently viewed by me and interpreted by the radiologist       Disposition  Final diagnoses:   Other chronic pain   Multiple sclerosis exacerbation ([facility] [address] )     Time reflects when diagnosis was documented in both MDM as applicable and the Disposition within this note     Time User Action Codes Description Comment    2/16/2021 10:01 AM Lluvia Ponce [G89 29] Other chronic pain     2/16/2021 10:01 AM Lluvia Ponce [G35] Multiple sclerosis exacerbation [facility])       ED Disposition     ED Disposition Condition Date/Time Comment    Discharge Stable Tue Feb 16, 2021 10:01 AM [place] Prudent 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  Follow-up Information     Follow up With Specialties Details Why Jacob Gill MD Family Medicine Schedule an appointment as soon as possible for a visit  If symptoms worsen [address]  [address]            Discharge Medication List as of 2/16/2021 10:02 AM      START taking these medications    Details   predniSONE 20 mg tablet 6 tabs daily for 4 days then, 4 tabs daily for 2 days then, 2 tabs daily for 2 days then, 1 tab daily 2 days, Print         CONTINUE these medications which have NOT CHANGED    Details   albuterol (Ventolin HFA) 90 mcg/act inhaler Inhale 2 puffs every 6 (six) hours as needed for wheezing, Starting Tue 11/10/2020, Normal      medroxyPROGESTERone (DEPO-PROVERA) 150 mg/mL injection Inject 1 mL (150 mg total) into a muscle every 3 (three) months, Starting Thu 9/3/2020, Normal      methocarbamol (ROBAXIN) 750 mg tablet Take 1 tablet (750 mg total) by mouth 3 (three) times a day as needed for muscle spasms, Starting Fri 11/20/2020, Normal           No discharge procedures on file      PDMP Review       Value Time User    PDMP Reviewed  Yes 10/16/2020 11:39 PM Ro Farr DO          ED Provider  Electronically Signed by           Tamela Pappas DO  02/19/21 2384

## 2021-03-01 ENCOUNTER — ANNUAL EXAM (OUTPATIENT)
Dept: OBGYN CLINIC | Facility: CLINIC | Age: 31
End: 2021-03-01

## 2021-03-01 VITALS
SYSTOLIC BLOOD PRESSURE: 138 MMHG | HEART RATE: 83 BPM | DIASTOLIC BLOOD PRESSURE: 100 MMHG | BODY MASS INDEX: 19.16 KG/M2 | WEIGHT: 111.6 LBS

## 2021-03-01 DIAGNOSIS — Z12.39 ENCOUNTER FOR BREAST CANCER SCREENING USING NON-MAMMOGRAM MODALITY: ICD-10-CM

## 2021-03-01 DIAGNOSIS — Z12.4 SCREENING FOR CERVICAL CANCER: ICD-10-CM

## 2021-03-01 DIAGNOSIS — Z01.419 ENCOUNTER FOR GYNECOLOGICAL EXAMINATION WITHOUT ABNORMAL FINDING: Primary | ICD-10-CM

## 2021-03-01 DIAGNOSIS — Z11.3 SCREEN FOR STD (SEXUALLY TRANSMITTED DISEASE): ICD-10-CM

## 2021-03-01 PROCEDURE — 3725F SCREEN DEPRESSION PERFORMED: CPT | Performed by: NURSE PRACTITIONER

## 2021-03-01 PROCEDURE — 87591 N.GONORRHOEAE DNA AMP PROB: CPT | Performed by: NURSE PRACTITIONER

## 2021-03-01 PROCEDURE — 99395 PREV VISIT EST AGE 18-39: CPT | Performed by: NURSE PRACTITIONER

## 2021-03-01 PROCEDURE — 87491 CHLMYD TRACH DNA AMP PROBE: CPT | Performed by: NURSE PRACTITIONER

## 2021-03-01 PROCEDURE — 4004F PT TOBACCO SCREEN RCVD TLK: CPT | Performed by: NURSE PRACTITIONER

## 2021-03-01 NOTE — LETTER
3/2/2021    To Bill Deshpande  : 1990      This letter is to advise you that your recent CULTURE results were reviewed by me and are NORMAL  Please contact the office for an appointment if you have any additional concerns      DANIEL Call

## 2021-03-01 NOTE — PROGRESS NOTES
ANNUAL GYNECOLOGICAL EXAMINATION    Ana Laura Moseley is a 27 y o  female who presents today for annual GYN exam   Her last pap smear was performed 2020 and result was NILM  She reports no history of abnormal pap smears in her past   She reports menses as absent other than some occasional irregular spotting  Patient's last menstrual period was 2021 (exact date)  Her contraceptive method is Depoprovera  Her general medical history has been reviewed and she reports it as follows:    Past Medical History:   Diagnosis Date    Asthma     Albuterol prn    Depression     hasn't required treatment since 2018    [facility] [address] [facility] 2018    follows poorly with neurology, manages with Robaxin    Seizure [facility])     possibly r/t MS diagnosis, has never been on meds, convulsive, last episode 2020     Past Surgical History:   Procedure Laterality Date    CLOSED REDUCTION FINGER FRACTURE Left     FL LUMBAR PUNCTURE DIAGNOSTIC  2019    TONSILLECTOMY Bilateral 5230    UMBILICAL HERNIA REPAIR  2016     OB History        3    Para   2    Term   1       1    AB   1    Living   2       SAB   1    TAB   0    Ectopic   0    Multiple   0    Live Births   2               Social History     Tobacco Use    Smoking status: Current Every Day Smoker     Packs/day: 0 25     Years: 9 00     Pack years: 2 25     Types: Cigarettes    Smokeless tobacco: Current User   Substance Use Topics    Alcohol use: Yes     Frequency: Monthly or less     Drinks per session: 1 or 2    Drug use: Not Currently     Types: Marijuana, Cocaine, MDMA (ecstacy)     Comment: hasn't used any since      Cancer-related family history includes Cancer in her mother and paternal grandfather  There is no history of Breast cancer      Current Outpatient Medications   Medication Instructions    albuterol (Ventolin HFA) 90 mcg/act inhaler 2 puffs, Inhalation, Every 6 hours PRN    medroxyPROGESTERone (DEPO-PROVERA) 150 mg, Intramuscular, Every 3 months    methocarbamol (ROBAXIN) 750 mg, Oral, 3 times daily PRN       Review of Systems:  Review of Systems   Constitutional: Negative  Gastrointestinal: Negative  Genitourinary: Positive for pelvic pain  Negative for difficulty urinating, menstrual problem and vaginal discharge  Skin: Negative  Physical Exam:  /100   Pulse 83   Wt 50 6 kg (111 lb 9 6 oz)   LMP 02/13/2021 (Exact Date)   BMI 19 16 kg/m²   Physical Exam  Constitutional:       General: She is not in acute distress  Appearance: She is well-developed  Genitourinary:      Vulva, vagina and uterus normal       No lesions in the vagina  No cervical motion tenderness or lesion  Uterus is not tender  No right or left adnexal mass present  Right adnexa not tender  Left adnexa not tender  Neck:      Musculoskeletal: Neck supple  Thyroid: No thyromegaly  Cardiovascular:      Rate and Rhythm: Normal rate and regular rhythm  Pulmonary:      Effort: Pulmonary effort is normal    Chest:      Breasts:         Right: No mass, nipple discharge, skin change or tenderness  Left: No mass, nipple discharge, skin change or tenderness  Abdominal:      Palpations: Abdomen is soft  Tenderness: There is no abdominal tenderness  Neurological:      Mental Status: She is alert and oriented to person, place, and time  Skin:     General: Skin is warm and dry  Vitals signs reviewed  Assessment/Plan:   1  Normal well-woman GYN exam   2  Cervical cancer screening:  Normal pelvic exam   Pap smear not indicated at this time  3  STD screening:  Orders placed for vaginal GC/CT cultures  Orders placed for serum anti-HIV, anti-HCV, HbsAg, RPR    4  Breast cancer screening:  Normal breast exam   Reviewed breast self-awareness  5  Depression Screening: Patient's depression screening was assessed with a PHQ-2 score of 0  Their PHQ-9 score was 0   Clinically patient does not have depression  No treatment is required  6  BMI Counseling: Body mass index is 19 16 kg/m²  No intervention  Indicated  7  Tobacco Cessation Counseling: Tobacco cessation counseling and education was provided  The patient is sincerely urged to quit consumption of tobacco  She is not ready to quit tobacco  The numerous health risks of tobacco consumption were discussed  If she decides to quit, there are a number of helpful adjunctive aids, and she can see me to discuss nicotine replacement therapy, chantix, or bupropion anytime in the future  8  Contraception:  Depoprovera  Continue with injections every 12 weeks  9  Return to office in 1 year for annual GYN exam     Reviewed with patient that test results are available in [facility] immediately, but that they will not necessarily be reviewed by me immediately  Explained that I will review results at my earliest opportunity and contact patient appropriately

## 2021-03-01 NOTE — PATIENT INSTRUCTIONS
Cigarette Smoking and Your Health     What are the risks to my health if I smoke tobacco?  Nicotine and other chemicals found in tobacco damage every cell in your body  Even if you are a light smoker, you have an increased risk for cancer, heart disease, and lung disease  If you are pregnant or have diabetes, smoking increases your risk for complications  What are the benefits to my health if I stop smoking? · You decrease respiratory symptoms such as coughing, wheezing, and shortness of breath  · You reduce your risk for cancers of the lung, mouth, throat, kidney, bladder, pancreas, stomach, and cervix  If you already have cancer, you increase the benefits of chemotherapy  You also reduce your risk for cancer returning or a second cancer from developing  · You reduce your risk for heart disease, blood clots, heart attack, and stroke  · You reduce your risk for lung infections, and diseases such as pneumonia, asthma, chronic bronchitis, and emphysema  · Your circulation improves  More oxygen can be delivered to your body  If you have diabetes, you lower your risk for complications, such as kidney, artery, and eye diseases  You also lower your risk for nerve damage  Nerve damage can lead to amputations, poor vision, and blindness  · You improve your body's ability to heal and to fight infections  What are the health benefits to others if I stop smoking? Tobacco is harmful to nonsmokers who breathe in your secondhand smoke  The following are ways the health of others around you may improve when you stop smoking:  · You lower the risks for lung cancer and heart disease in nonsmoking adults  · If you are pregnant, you lower the risk for miscarriage, early delivery, low birth weight, and stillbirth  You also lower your baby's risk for SIDS, obesity, developmental delay, and neurobehavioral problems, such as ADHD       · If you have children, you lower their risk for ear infections, colds, pneumonia, bronchitis, and asthma  How to Stop Smoking     You will improve your health and the health of others around you  if you stop smoking  Your risk for heart and lung disease, cancer, stroke, heart attack, and vision problems will also decrease  You can benefit from quitting no matter how long you have smoked  PREPARE to stop smoking  Nicotine is a highly addictive drug found in cigarettes  Withdrawal symptoms can happen when you stop smoking and make it hard to quit  These include anxiety, depression, irritability, trouble sleeping, and increased appetite  You increase your chances of success if you PREPARE to quit  · Set a quit date  [place] a date that is within the next 2 weeks  Do not pick a day that you think may be stressful or busy  Write down the day or [place] it on your calender  · Tell friends and family that you plan to quit  Explain that you may have withdrawal symptoms when you try to quit  Ask them to support you  They may be able to encourage you and help reduce your stress to make it easier for you to quit  · Make a list of your reasons for quitting  Put the list somewhere you will see it every day, such as your refrigerator  You can look at the list when you have a craving  · Remove all tobacco and nicotine products from your home, car, and workplace  Also, remove anything else that will tempt you to smoke, such as lighters, matches, or ashtrays  Clean your car, home, and places at work that smell like smoke  The smell of smoke can trigger a craving  · Identify triggers that make you want to smoke  This may include activities, feelings, or people  Also write down 1 way you can deal with each of your triggers  For example, if you want to smoke as soon as you wake up, plan another activity during this time, such as exercise  · Make a plan for how you will quit    Learn about the tools that can help you quit, such as medicine, counseling, or nicotine replacement therapy  Choose at least 2 options to help you quit  Tools to help you stop smoking:     · Counseling  from a trained healthcare provider can provide you with support and skills to quit smoking  The provider will also teach you to manage your withdrawal symptoms and cravings  You may receive counseling from one counselor, in group therapy, or through phone therapy called a quit line  · Nicotine replacement therapy (NRT)  such as nicotine patches, gum, or lozenges may help reduce your nicotine cravings  You may get these without a doctor's order  Do not use e-cigarettes or smokeless tobacco in place of cigarettes or to help you quit  They still contain nicotine  · Prescription medicines  such as nasal sprays or nicotine inhalers may help reduce your withdrawal symptoms  Other medicines may also be used to reduce your urge to smoke  Ask your healthcare provider about these medicines  You may need to start certain medicines 2 weeks before your quit date for them to work well  · Hypnosis  is a practice that helps guide you through thoughts and feelings  Hypnosis may help decrease your cravings and make you more willing to quit  · Acupuncture therapy  uses very thin needles to balance energy channels in the body  This is thought to help decrease cravings and symptoms of nicotine withdrawal      · Support groups  let you talk to others who are trying to quit or have already quit  It may be helpful to speak with others about how they quit  Manage your cravings:     · Avoid situations, people, and places that tempt you to smoke  Go to nonsmoking places, such as libraries or restaurants  Understand what tempts you and try to avoid these things  · Keep your hands busy  Hold things such as a stress ball or pen  · Put candy or toothpicks in your mouth  Keep lollipops, sugarless gum, or toothpicks with you at all times  · Do not have alcohol or caffeine    These drinks may tempt you to smoke  Drink healthy liquids such as water or juice instead  · Reward yourself when you resist your cravings  Rewards will motivate you and help you stay positive  · Do an activity that distracts you from your craving  Examples include going for a walk, exercising, or cleaning  Prevent weight gain after you quit:  You may gain a few pounds after you quit smoking  It is healthier for you to gain a few pounds than to continue to smoke  The following can help you prevent weight gain:    · Eat healthy foods  These include fruits, vegetables, whole-grain breads, low-fat dairy products, beans, lean meats, and fish  Eat healthy snacks, such as low-fat yogurt, if you get hungry between meals  · Drink water before, during, and between meals  This will make your stomach feel full and help prevent you from overeating  Ask your healthcare provider how much liquid to drink each day and which liquids are best for you  · Exercise  Take a walk or do some kind of exercise every day  Ask your healthcare provider what exercise is right for you  This may help reduce your cravings and reduce stress

## 2021-03-02 LAB
C TRACH DNA SPEC QL NAA+PROBE: NEGATIVE
N GONORRHOEA DNA SPEC QL NAA+PROBE: NEGATIVE

## 2021-04-29 ENCOUNTER — CLINICAL SUPPORT (OUTPATIENT)
Dept: OBGYN CLINIC | Facility: CLINIC | Age: 31
End: 2021-04-29

## 2021-04-29 DIAGNOSIS — Z30.42 ENCOUNTER FOR SURVEILLANCE OF INJECTABLE CONTRACEPTIVE: Primary | ICD-10-CM

## 2021-04-29 PROCEDURE — 96372 THER/PROPH/DIAG INJ SC/IM: CPT | Performed by: OBSTETRICS & GYNECOLOGY

## 2021-04-29 RX ORDER — MEDROXYPROGESTERONE ACETATE 150 MG/ML
150 INJECTION, SUSPENSION INTRAMUSCULAR ONCE
Status: COMPLETED | OUTPATIENT
Start: 2021-04-29 | End: 2021-04-29

## 2021-04-29 RX ADMIN — MEDROXYPROGESTERONE ACETATE 150 MG: 150 INJECTION, SUSPENSION INTRAMUSCULAR at 09:00

## 2021-04-29 NOTE — PROGRESS NOTES
Pt presents today for her Depo inj , given in the RD  Lot [id number], exp  09/30/2024    [id number]  Pt tolerated it well and provided her own medication

## 2021-05-23 ENCOUNTER — TELEPHONE (OUTPATIENT)
Dept: FAMILY MEDICINE CLINIC | Facility: CLINIC | Age: 31
End: 2021-05-23

## 2021-05-27 NOTE — TELEPHONE ENCOUNTER
Patient sent a [employer] message and is asking to reschedule due to COVID-19 concerns  I will postpone her orders  last six months

## 2021-07-05 ENCOUNTER — HOSPITAL ENCOUNTER (OUTPATIENT)
Facility: HOSPITAL | Age: 31
Setting detail: OBSERVATION
Discharge: LEFT BEFORE DISCHARGE | End: 2021-07-05
Attending: EMERGENCY MEDICINE | Admitting: FAMILY MEDICINE
Payer: COMMERCIAL

## 2021-07-05 VITALS
HEART RATE: 98 BPM | TEMPERATURE: 98.6 F | RESPIRATION RATE: 16 BRPM | WEIGHT: 110 LBS | OXYGEN SATURATION: 100 % | BODY MASS INDEX: 18.88 KG/M2 | DIASTOLIC BLOOD PRESSURE: 91 MMHG | SYSTOLIC BLOOD PRESSURE: 139 MMHG

## 2021-07-05 DIAGNOSIS — F10.929 ALCOHOL INTOXICATION (HCC): ICD-10-CM

## 2021-07-05 DIAGNOSIS — G35 MS (MULTIPLE SCLEROSIS) (HCC): ICD-10-CM

## 2021-07-05 DIAGNOSIS — R56.9 SEIZURE (HCC): Primary | ICD-10-CM

## 2021-07-05 LAB
ALBUMIN SERPL BCP-MCNC: 4.5 G/DL (ref 3–5.2)
ALP SERPL-CCNC: 59 U/L (ref 43–122)
ALT SERPL W P-5'-P-CCNC: 21 U/L
AMPHETAMINES SERPL QL SCN: NEGATIVE
ANION GAP SERPL CALCULATED.3IONS-SCNC: 14 MMOL/L (ref 5–14)
APAP SERPL-MCNC: <10 UG/ML (ref 10–20)
AST SERPL W P-5'-P-CCNC: 23 U/L (ref 14–36)
ATRIAL RATE: 85 BPM
ATRIAL RATE: 87 BPM
BACTERIA UR QL AUTO: ABNORMAL /HPF
BARBITURATES UR QL: NEGATIVE
BASOPHILS # BLD AUTO: 0.1 THOUSANDS/ΜL (ref 0–0.1)
BASOPHILS NFR BLD AUTO: 1 % (ref 0–1)
BENZODIAZ UR QL: NEGATIVE
BILIRUB SERPL-MCNC: 0.35 MG/DL
BILIRUB UR QL STRIP: NEGATIVE
BUN SERPL-MCNC: 12 MG/DL (ref 5–25)
CALCIUM SERPL-MCNC: 9.5 MG/DL (ref 8.4–10.2)
CHLORIDE SERPL-SCNC: 112 MMOL/L (ref 97–108)
CLARITY UR: CLEAR
CO2 SERPL-SCNC: 20 MMOL/L (ref 22–30)
COCAINE UR QL: NEGATIVE
COLOR UR: ABNORMAL
CREAT SERPL-MCNC: 0.61 MG/DL (ref 0.6–1.2)
EOSINOPHIL # BLD AUTO: 0.3 THOUSAND/ΜL (ref 0–0.4)
EOSINOPHIL NFR BLD AUTO: 3 % (ref 0–6)
ERYTHROCYTE [DISTWIDTH] IN BLOOD BY AUTOMATED COUNT: 12.5 %
ETHANOL SERPL-MCNC: 220 MG/DL (ref 0–10)
EXT PREG TEST URINE: NEGATIVE
EXT. CONTROL ED NAV: NORMAL
GFR SERPL CREATININE-BSD FRML MDRD: 122 ML/MIN/1.73SQ M
GLUCOSE SERPL-MCNC: 86 MG/DL (ref 70–99)
GLUCOSE UR STRIP-MCNC: NEGATIVE MG/DL
HCT VFR BLD AUTO: 42.8 % (ref 36–46)
HGB BLD-MCNC: 14.7 G/DL (ref 12–16)
HGB UR QL STRIP.AUTO: 25
KETONES UR STRIP-MCNC: NEGATIVE MG/DL
LEUKOCYTE ESTERASE UR QL STRIP: 100
LYMPHOCYTES # BLD AUTO: 2.9 THOUSANDS/ΜL (ref 0.5–4)
LYMPHOCYTES NFR BLD AUTO: 27 % (ref 25–45)
MAGNESIUM SERPL-MCNC: 2.2 MG/DL (ref 1.6–2.3)
MCH RBC QN AUTO: 32.6 PG (ref 26–34)
MCHC RBC AUTO-ENTMCNC: 34.3 G/DL (ref 31–36)
MCV RBC AUTO: 95 FL (ref 80–100)
METHADONE UR QL: NEGATIVE
MONOCYTES # BLD AUTO: 0.8 THOUSAND/ΜL (ref 0.2–0.9)
MONOCYTES NFR BLD AUTO: 7 % (ref 1–10)
NEUTROPHILS # BLD AUTO: 6.8 THOUSANDS/ΜL (ref 1.8–7.8)
NEUTS SEG NFR BLD AUTO: 63 % (ref 45–65)
NITRITE UR QL STRIP: NEGATIVE
NON-SQ EPI CELLS URNS QL MICRO: ABNORMAL /HPF
OPIATES UR QL SCN: NEGATIVE
OXYCODONE+OXYMORPHONE UR QL SCN: NEGATIVE
P AXIS: 74 DEGREES
P AXIS: 74 DEGREES
PCP UR QL: NEGATIVE
PH UR STRIP.AUTO: 6 [PH]
PLATELET # BLD AUTO: 234 THOUSANDS/UL (ref 150–450)
PMV BLD AUTO: 8.1 FL (ref 8.9–12.7)
POTASSIUM SERPL-SCNC: 3.8 MMOL/L (ref 3.6–5)
PR INTERVAL: 142 MS
PR INTERVAL: 144 MS
PROT SERPL-MCNC: 7.9 G/DL (ref 5.9–8.4)
PROT UR STRIP-MCNC: NEGATIVE MG/DL
QRS AXIS: 78 DEGREES
QRS AXIS: 79 DEGREES
QRSD INTERVAL: 110 MS
QRSD INTERVAL: 110 MS
QT INTERVAL: 396 MS
QT INTERVAL: 398 MS
QTC INTERVAL: 471 MS
QTC INTERVAL: 478 MS
RBC # BLD AUTO: 4.51 MILLION/UL (ref 4–5.2)
RBC #/AREA URNS AUTO: ABNORMAL /HPF
SALICYLATES SERPL-MCNC: <1 MG/DL (ref 10–30)
SODIUM SERPL-SCNC: 146 MMOL/L (ref 137–147)
SP GR UR STRIP.AUTO: 1.01 (ref 1–1.04)
T WAVE AXIS: 24 DEGREES
T WAVE AXIS: 27 DEGREES
THC UR QL: NEGATIVE
UROBILINOGEN UA: NEGATIVE MG/DL
VENTRICULAR RATE: 85 BPM
VENTRICULAR RATE: 87 BPM
WBC # BLD AUTO: 10.8 THOUSAND/UL (ref 4.5–11)
WBC #/AREA URNS AUTO: ABNORMAL /HPF

## 2021-07-05 PROCEDURE — 80179 DRUG ASSAY SALICYLATE: CPT | Performed by: EMERGENCY MEDICINE

## 2021-07-05 PROCEDURE — 80143 DRUG ASSAY ACETAMINOPHEN: CPT | Performed by: EMERGENCY MEDICINE

## 2021-07-05 PROCEDURE — 80307 DRUG TEST PRSMV CHEM ANLYZR: CPT | Performed by: EMERGENCY MEDICINE

## 2021-07-05 PROCEDURE — 93010 ELECTROCARDIOGRAM REPORT: CPT

## 2021-07-05 PROCEDURE — 99284 EMERGENCY DEPT VISIT MOD MDM: CPT

## 2021-07-05 PROCEDURE — 80053 COMPREHEN METABOLIC PANEL: CPT | Performed by: EMERGENCY MEDICINE

## 2021-07-05 PROCEDURE — 85025 COMPLETE CBC W/AUTO DIFF WBC: CPT | Performed by: EMERGENCY MEDICINE

## 2021-07-05 PROCEDURE — 82077 ASSAY SPEC XCP UR&BREATH IA: CPT | Performed by: EMERGENCY MEDICINE

## 2021-07-05 PROCEDURE — 81025 URINE PREGNANCY TEST: CPT | Performed by: EMERGENCY MEDICINE

## 2021-07-05 PROCEDURE — 81001 URINALYSIS AUTO W/SCOPE: CPT | Performed by: EMERGENCY MEDICINE

## 2021-07-05 PROCEDURE — 93005 ELECTROCARDIOGRAM TRACING: CPT

## 2021-07-05 PROCEDURE — 96360 HYDRATION IV INFUSION INIT: CPT

## 2021-07-05 PROCEDURE — 36415 COLL VENOUS BLD VENIPUNCTURE: CPT | Performed by: EMERGENCY MEDICINE

## 2021-07-05 PROCEDURE — 83735 ASSAY OF MAGNESIUM: CPT | Performed by: EMERGENCY MEDICINE

## 2021-07-05 PROCEDURE — 99284 EMERGENCY DEPT VISIT MOD MDM: CPT | Performed by: EMERGENCY MEDICINE

## 2021-07-05 RX ORDER — LORAZEPAM 2 MG/ML
2 INJECTION INTRAMUSCULAR EVERY 4 HOURS PRN
Status: CANCELLED | OUTPATIENT
Start: 2021-07-05

## 2021-07-05 RX ORDER — FOLIC ACID 1 MG/1
1 TABLET ORAL DAILY
Status: DISCONTINUED | OUTPATIENT
Start: 2021-07-05 | End: 2021-07-05 | Stop reason: HOSPADM

## 2021-07-05 RX ORDER — ONDANSETRON 2 MG/ML
4 INJECTION INTRAMUSCULAR; INTRAVENOUS EVERY 6 HOURS PRN
Status: CANCELLED | OUTPATIENT
Start: 2021-07-05

## 2021-07-05 RX ORDER — LANOLIN ALCOHOL/MO/W.PET/CERES
100 CREAM (GRAM) TOPICAL DAILY
Status: DISCONTINUED | OUTPATIENT
Start: 2021-07-05 | End: 2021-07-05 | Stop reason: HOSPADM

## 2021-07-05 RX ORDER — METHOCARBAMOL 500 MG/1
750 TABLET, FILM COATED ORAL 3 TIMES DAILY PRN
Status: CANCELLED | OUTPATIENT
Start: 2021-07-05

## 2021-07-05 RX ORDER — ALBUTEROL SULFATE 90 UG/1
2 AEROSOL, METERED RESPIRATORY (INHALATION) EVERY 6 HOURS PRN
Status: CANCELLED | OUTPATIENT
Start: 2021-07-05

## 2021-07-05 RX ORDER — ACETAMINOPHEN 325 MG/1
650 TABLET ORAL EVERY 6 HOURS PRN
Status: CANCELLED | OUTPATIENT
Start: 2021-07-05

## 2021-07-05 RX ORDER — NICOTINE 21 MG/24HR
1 PATCH, TRANSDERMAL 24 HOURS TRANSDERMAL DAILY
Status: CANCELLED | OUTPATIENT
Start: 2021-07-05

## 2021-07-05 RX ADMIN — SODIUM CHLORIDE 1000 ML: 0.9 INJECTION, SOLUTION INTRAVENOUS at 02:11

## 2021-07-05 NOTE — Clinical Note
Date: 7/5/2021  Patient: Louise Colindres  Admitted: 7/5/2021  1:34 AM  Attending Provider: Kary Berry MD    Louise Colindres or her authorized caregiver has made the decision for the patient to leave the emergency department against the advice of her at tending physician  She or her authorized caregiver has been informed and understands the inherent risks, including death, permanent disability  She or her authorized caregiver has decided to accept the responsibility for this decision  Louise Colindres  and all necessary parties have been advised that she may return for further evaluation or treatment  Her condition at time of discharge was stable    Louise Colindres had current vital signs as follows:  /91 (BP Location: Left arm)   Pulse 98   Te mp 98 6 °F (37 °C) (Tympanic)   Resp 16   Wt 49 9 kg (110 lb)   LMP 07/05/2021 (LMP Unknown)

## 2021-07-05 NOTE — ED NOTES
RN entered room to bring patient to room  Patient became uncooperative, screaming and crying, attempting to pull IV from arm  Stating "I need my mom, she would never just leave me like this, call my mom!!" Patient informed that she needed to stop screaming and we would give her a phone to call her mother  Patient convinced that parents are in the waiting room  Informed patient that her parents left  Patient called mother and became belligerent, threw the phone at the door breaking it  Security at bedside  Attempted multiple times to re-direct patient and explain her options  Patient screaming stating "y'all get paid top [employer] dollars and 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

## 2021-07-05 NOTE — ED NOTES
Pt refused last set of vitals, mother at bedside to pick patient up   IV pulled      [facility], [facility]  07/05/21 3135

## 2021-07-05 NOTE — ED PROVIDER NOTES
History  Chief Complaint   Patient presents with    Seizure - Prior Hx Of     pt has a hx of MS and seizures  had two 22oz beers tonight because her arms weere hurting d/t the MS  was on couch with parents and began seizing, patient states mom was wiping foam from mouth      HPI    28 yo F hx of MS (managed only with muscle relaxers; follows with [place] intermittently) presents to ed for eval of possible seizures  Events around incident: past two days, patient has ongoing pain in arms and legs  Patient is also seeing a psychiatrist for her full body pain, no si or hi  She states she drinks on her days off to manage her pain  Drank 22 oz beers tonight  After drinking alcohol she has seizures usually per mother or when patient is stressed  Drinks nightly 3 20 oz beefrs every night  Does not want rehab  Return to baseline after event or confusion: yes, like her normal self now  Seizure like activity: per mother yes  + LOC: yes  Duration of LOC: unknown, few seconds  Head strike: denies, laying on her sofa  Witnessed? No  Previous syncopal event? Yes, has this happen 2 times per month  Was possibly arguing with her partner  Prodrome? yes  Dehydration or decreased PO intake: no  Urinary complaints: no  Chest Pain: no  Shortness of breath: no    woman LMP:  On depo shot    Imaging:  10/2019: Brain MRI  IMPRESSION:     Findings strongly suspicious for demyelinating disease/multiple sclerosis  There is a moderate burden of subacute and chronic plaque identified  There are no enhancing lesions seen  Is due for MRI  Patient states she will follow up with neurologist and pcp     States she is ready to go to bed and leave  Prior to Admission Medications   Prescriptions Last Dose Informant Patient Reported? Taking?    albuterol (Ventolin HFA) 90 mcg/act inhaler  Self No No   Sig: Inhale 2 puffs every 6 (six) hours as needed for wheezing   medroxyPROGESTERone (DEPO-PROVERA) 150 mg/mL injection  Self No No Sig: Inject 1 mL (150 mg total) into a muscle every 3 (three) months   methocarbamol (ROBAXIN) 750 mg tablet  Self No No   Sig: Take 1 tablet (750 mg total) by mouth 3 (three) times a day as needed for muscle spasms      Facility-Administered Medications: None       Past Medical History:   Diagnosis Date    Asthma     Albuterol prn    Depression     hasn't required treatment since 2018    Multiple sclerosis ([facility] [address] ) 2018    follows poorly with neurology, manages with Robaxin    Seizure [facility])     possibly r/t MS diagnosis, has never been on meds, convulsive, last episode 9/2020       Past Surgical History:   Procedure Laterality Date    CLOSED REDUCTION FINGER FRACTURE Left     [place] LUMBAR PUNCTURE DIAGNOSTIC  12/5/2019    TONSILLECTOMY Bilateral 1761    UMBILICAL HERNIA REPAIR  2016       Family History   Problem Relation Age of Onset    Coronary artery disease Paternal Grandmother     Hypertension Paternal Grandfather         TYPE 2    Diabetes Paternal Grandfather     Cancer Paternal Grandfather         pancreatic, lung    Cancer Mother         thyroid    Diabetes Father     Hypertension Father     Breast cancer Neg Hx      I have reviewed and agree with the history as documented  E-Cigarette/Vaping    E-Cigarette Use Never User      E-Cigarette/Vaping Substances     Social History     Tobacco Use    Smoking status: Current Every Day Smoker     Packs/day: 0 25     Years: 9 00     Pack years: 2 25     Types: Cigarettes    Smokeless tobacco: Current User   Vaping Use    Vaping Use: Never used   Substance Use Topics    Alcohol use: Yes    Drug use: Not Currently     Types: Marijuana, Cocaine, MDMA (ecstacy)     Comment: hasn't used any since 2010       Review of Systems   Constitutional: Negative for chills, fatigue and fever  HENT: Negative for sore throat  Eyes: Negative for redness and visual disturbance  Respiratory: Negative for cough and shortness of breath      Cardiovascular: Negative for chest pain  Gastrointestinal: Negative for abdominal pain, diarrhea and nausea  Genitourinary: Negative for difficulty urinating, dysuria and pelvic pain  Musculoskeletal: Negative for back pain  Skin: Negative for rash  Neurological: Positive for seizures  Negative for syncope, weakness and headaches  All other systems reviewed and are negative  Physical Exam  Physical Exam  Vitals and nursing note reviewed  Constitutional:       General: She is not in acute distress  HENT:      Head: Normocephalic and atraumatic  Eyes:      Conjunctiva/sclera: Conjunctivae normal    Cardiovascular:      Rate and Rhythm: Normal rate and regular rhythm  Heart sounds: Normal heart sounds  Pulmonary:      Effort: Pulmonary effort is normal  No respiratory distress  Breath sounds: Normal breath sounds  Abdominal:      General: Bowel sounds are normal       Palpations: Abdomen is soft  Tenderness: There is no abdominal tenderness  Musculoskeletal:         General: Normal range of motion  Cervical back: Normal range of motion  Skin:     General: Skin is warm and dry  Findings: No rash  Neurological:      Mental Status: She is alert and oriented to person, place, and time  Cranial Nerves: No cranial nerve deficit  Sensory: No sensory deficit  Motor: No abnormal muscle tone  Coordination: Coordination normal       Comments: Mental Status: Alert and oriented to person place time and situation, language fluent with good comprehension and repetition  CN: PERRLA, visual fields full to finger counting bilaterally, extraocular muscles intact without nystagmus  Face symmetrical with full sensation  Hearing in tact to bilateral finger rub  Tongue protrudes midline and palate elevates symmetrically  Sternocleidomastoid and trapezius muscle have full strength bilaterally    Motor: Normal muscle bulk and tone throughout 5/5 strength in upper and lower extremities throughout  No clonus present  Sensory: Sensation intact to light touch  Coordination: Able to perform finger to nose to finger  Gait at baseline           Vital Signs  ED Triage Vitals [07/05/21 0135]   Temperature Pulse Respirations Blood Pressure SpO2   98 6 °F (37 °C) 98 16 139/91 100 %      Temp Source Heart Rate Source Patient Position - Orthostatic VS BP Location FiO2 (%)   Tympanic Monitor Sitting Left arm --      Pain Score       8           Vitals:    07/05/21 0135   BP: 139/91   Pulse: 98   Patient Position - Orthostatic VS: Sitting         Visual Acuity  Visual Acuity      Most Recent Value   L Pupil Size (mm)  3   R Pupil Size (mm)  3          ED Medications  Medications   thiamine tablet 100 mg (has no administration in time range)   folic acid (FOLVITE) tablet 1 mg (has no administration in time range)   multivitamin-minerals (CENTRUM) tablet 1 tablet (has no administration in time range)   sodium chloride 0 9 % bolus 1,000 mL (0 mL Intravenous Stopped 7/5/21 0315)       Diagnostic Studies  Results Reviewed     Procedure Component Value Units Date/Time    Rapid drug screen, urine [[id number]]  (Normal) Collected: 07/05/21 0214    Lab Status: Final result Specimen: Urine, Clean Catch Updated: 07/05/21 0250     Amph/Meth UR Negative     Barbiturate Ur Negative     Benzodiazepine Urine Negative     Cocaine Urine Negative     Methadone Urine Negative     Opiate Urine Negative     PCP Ur Negative     THC Urine Negative     Oxycodone Urine Negative    Narrative:      FOR MEDICAL PURPOSES ONLY  IF CONFIRMATION NEEDED PLEASE CONTACT THE LAB WITHIN 5 DAYS      Drug Screen Cutoff Levels:  AMPHETAMINE/METHAMPHETAMINES  1000 ng/mL  BARBITURATES     200 ng/mL  BENZODIAZEPINES     200 ng/mL  COCAINE      300 ng/mL  METHADONE      300 ng/mL  OPIATES      300 ng/mL  PHENCYCLIDINE     25 ng/mL  THC       50 ng/mL  OXYCODONE      100 ng/mL    Urine Microscopic [[id number]]  (Abnormal) Collected: 07/05/21 0214    Lab Status: Final result Specimen: Urine, Clean Catch Updated: 07/05/21 0236     RBC, UA 1-2 /hpf      WBC, UA 4-10 /hpf      Epithelial Cells Occasional /hpf      Bacteria, UA Occasional /hpf     Comprehensive metabolic panel [[id number]]  (Abnormal) Collected: 07/05/21 0212    Lab Status: Final result Specimen: Blood from Arm, Right Updated: 07/05/21 0235     Sodium 146 mmol/L      Potassium 3 8 mmol/L      Chloride 112 mmol/L      CO2 20 mmol/L      ANION GAP 14 mmol/L      BUN 12 mg/dL      Creatinine 0 61 mg/dL      Glucose 86 mg/dL      Calcium 9 5 mg/dL      AST 23 U/L      ALT 21 U/L      Alkaline Phosphatase 59 U/L      Total Protein 7 9 g/dL      Albumin 4 5 g/dL      Total Bilirubin 0 35 mg/dL      eGFR 122 ml/min/1 73sq m     Narrative:      [place] guidelines for Chronic Kidney Disease (CKD):     Stage 1 with normal or high GFR (GFR > 90 mL/min/1 73 square meters)    Stage 2 Mild CKD (GFR = 60-89 mL/min/1 73 square meters)    Stage 3A Moderate CKD (GFR = 45-59 mL/min/1 73 square meters)    Stage 3B Moderate CKD (GFR = 30-44 mL/min/1 73 square meters)    Stage 4 Severe CKD (GFR = 15-29 mL/min/1 73 square meters)    Stage 5 End Stage CKD (GFR <15 mL/min/1 73 square meters)  Note: GFR calculation is accurate only with a steady state creatinine    Magnesium [[id number]]  (Normal) Collected: 07/05/21 0212    Lab Status: Final result Specimen: Blood from Arm, Right Updated: 07/05/21 0235     Magnesium 2 2 mg/dL     Acetaminophen level-If concentration is detectable, please discuss with medical  on call   [[id number]]  (Abnormal) Collected: 07/05/21 0211    Lab Status: Final result Specimen: Blood from Arm, Right Updated: 07/05/21 0233     Acetaminophen Level <10 ug/mL     Ethanol [[id number]]  (Abnormal) Collected: 07/05/21 0211    Lab Status: Final result Specimen: Blood from Arm, Right Updated: 07/05/21 0233     Ethanol Lvl 391 mg/dL     Salicylate level [[id number]]  (Abnormal) Collected: 07/05/21 0211    Lab Status: Final result Specimen: Blood from Arm, Right Updated: 57/85/30 5915     Salicylate Lvl <0 6 mg/dL     UA (URINE) with reflex to Scope [[id number]]  (Abnormal) Collected: 07/05/21 0214    Lab Status: Final result Specimen: Urine, Clean Catch Updated: 07/05/21 0232     Color, UA Straw     Clarity, UA Clear     Specific Gravity, UA 1 010     pH, UA 6 0     Leukocytes,  0     Nitrite, UA Negative     Protein, UA Negative mg/dl      Glucose, UA Negative mg/dl      Ketones, UA Negative mg/dl      Bilirubin, UA Negative     Blood, UA 25 0     UROBILINOGEN UA Negative mg/dL     POCT pregnancy, urine [[id number]]  (Normal) Resulted: 07/05/21 0227    Lab Status: Final result Updated: 07/05/21 0227     EXT PREG TEST UR (Ref: Negative) negative     Control valid    CBC and differential [[id number]]  (Abnormal) Collected: 07/05/21 0212    Lab Status: Final result Specimen: Blood from Arm, Right Updated: 07/05/21 0225     WBC 10 80 Thousand/uL      RBC 4 51 Million/uL      Hemoglobin 14 7 g/dL      Hematocrit 42 8 %      MCV 95 fL      MCH 32 6 pg      MCHC 34 3 g/dL      RDW 12 5 %      MPV 8 1 fL      Platelets 335 Thousands/uL      Neutrophils Relative 63 %      Lymphocytes Relative 27 %      Monocytes Relative 7 %      Eosinophils Relative 3 %      Basophils Relative 1 %      Neutrophils Absolute 6 80 Thousands/µL      Lymphocytes Absolute 2 90 Thousands/µL      Monocytes Absolute 0 80 Thousand/µL      Eosinophils Absolute 0 30 Thousand/µL      Basophils Absolute 0 10 Thousands/µL                  No orders to display              Procedures  Procedures         ED Course  ED Course as of Jul 05 0350 Mon Jul 05, 2021   0234 MEDICAL ALCOHOL(!): [phone] Texted Dr Renea Tamayo neurology      2023 Per neurology, bring in for MRI brain  and c spince with and without contrast       0336 Patient was admitted, mother was in the room, and patient agreed to stay   as she was about to sign the observation papers, she refused  She became emotional states she wants to go home and be with her mom and dog  Patient's mother states she will come back to ER and pick patient up and sign patient out AMA  mother in room, took patient home        SBIRT 20yo+      Most Recent Value   SBIRT (24 yo +)   In order to provide better care to our patients, we are screening all of our patients for alcohol and drug use  Would it be okay to ask you these screening questions? Yes Filed at: 07/05/2021 0143   Initial Alcohol Screen: US AUDIT-C    1  How often do you have a drink containing alcohol? 1 Filed at: 07/05/2021 0143   2  How many drinks containing alcohol do you have on a typical day you are drinking? 2 Filed at: 07/05/2021 0143   3a  Male UNDER 65: How often do you have five or more drinks on one occasion? 0 Filed at: 07/05/2021 0143   3b  FEMALE Any Age, or MALE 65+: How often do you have 4 or more drinks on one occassion? 0 Filed at: 07/05/2021 0143   Audit-C Score  3 Filed at: 07/05/2021 9302   WESLEY: How many times in the past year have you    Used an illegal drug or used a prescription medication for non-medical reasons? Never Filed at: 07/05/2021 0143                MDM     26 yo F w/ seizures and intoxication and MS  Patient has normal neuro exam here  Already ahd imaging done in past  Denies head strike  Spoke to neuro, could benefit from imaging  Was admitted see ed course  At time of admission, patient refused after initially stating she was OK with admission mother came to get patient left ama        Disposition  Final diagnoses:   Seizure ([facility] [address] )   Alcohol intoxication ([facility] [address] )   MS (multiple sclerosis) ([facility] [address] )     Time reflects when diagnosis was documented in both MDM as applicable and the Disposition within this note     Time User Action Codes Description Comment    7/5/2021  3:01 AM Crista Celeste Add [R56 9] Seizure ([facility] [address] )     7/5/2021  3:03 AM Crista Celeset Add [F10 929] Alcohol intoxication ([facility] [address] )     7/5/2021  3:04 AM Leamon Mediate Add [G40 919] Breakthrough seizure ([facility] [address] )     7/5/2021  3:04 AM Leamon Mediate Modify [R56 9] Seizure ([facility] [address] )     7/5/2021  3:04 AM Leamon Mediate Modify [G40 919] Breakthrough seizure ([facility] [address] )     7/5/2021  3:04 AM Leamon Mediate Modify [R56 9] Seizure ([facility] [address] )     7/5/2021  3:04 AM Leamon Mediate Modify [G40 919] Breakthrough seizure ([facility] [address] )     7/5/2021  3:04 AM Leamon Mediate Remove [G40 919] Breakthrough seizure ([facility] [address] )     7/5/2021  3:05 AM Leamon Mediate Add [Z82 0] Family history of MS (multiple sclerosis)     7/5/2021  3:05 AM Leamon Mediate Remove [Z82 0] Family history of MS (multiple sclerosis)     7/5/2021  3:05 AM Leamon Mediate Add [G35] MS (multiple sclerosis) [facility])       ED Disposition     ED Disposition Condition Date/Time Comment    [facility]  Mon Jul 5, 2021  3:39 AM Date: 7/5/2021  Patient: Doug Lucio  Admitted: 7/5/2021  1:34 AM  Attending Provider: Iveth Arthur MD    Doug Lucio or her authorized caregiver has made the decision for the patient to leave the emergency department against the advice of her at tending physician  She or her authorized caregiver has been informed and understands the inherent risks, including death, permanent disability  She or her authorized caregiver has decided to accept the responsibility for this decision  Doug Lucio  and all necessary parties have been advised that she may return for further evaluation or treatment  Her condition at time of discharge was stable  Doug Lucio had current vital signs as follows:  /91 (BP Location: Left arm)   Pulse 98   Te mp 98 6 °F (37 °C) (Tympanic)   Resp 16   Wt 49 9 kg (110 lb)   LMP 07/05/2021 (LMP Unknown)           Follow-up Information     Follow up With Specialties Details Why Contact Info    Doraine Buerger, MD Family Medicine Schedule an appointment as soon as possible for a visit in 1 day For follow up regarding your symptoms and recheck [address] [facility] [address] [facility]      Abdirahman Alonzo MD Neurology Schedule an appointment as soon as possible for a visit in 1 day For follow up regarding your symptoms and recheck [address] [facility]  [phone]            Discharge Medication List as of 7/5/2021  3:39 AM      CONTINUE these medications which have NOT CHANGED    Details   albuterol (Ventolin HFA) 90 mcg/act inhaler Inhale 2 puffs every 6 (six) hours as needed for wheezing, Starting Tue 11/10/2020, Normal      medroxyPROGESTERone (DEPO-PROVERA) 150 mg/mL injection Inject 1 mL (150 mg total) into a muscle every 3 (three) months, Starting Thu 9/3/2020, Normal      methocarbamol (ROBAXIN) 750 mg tablet Take 1 tablet (750 mg total) by mouth 3 (three) times a day as needed for muscle spasms, Starting Fri 11/20/2020, Normal           No discharge procedures on file      PDMP Review       Value Time User    PDMP Reviewed  Yes 10/16/2020 11:39 PM Wojciech Hernandez DO          ED Provider  Electronically Signed by           Marita Robertson MD  07/05/21 8029

## 2021-07-06 ENCOUNTER — TELEPHONE (OUTPATIENT)
Dept: FAMILY MEDICINE CLINIC | Facility: CLINIC | Age: 31
End: 2021-07-06

## 2021-07-06 ENCOUNTER — TRANSITIONAL CARE MANAGEMENT (OUTPATIENT)
Dept: FAMILY MEDICINE CLINIC | Facility: CLINIC | Age: 31
End: 2021-07-06

## 2021-07-15 ENCOUNTER — TELEPHONE (OUTPATIENT)
Dept: OBGYN CLINIC | Facility: CLINIC | Age: 31
End: 2021-07-15

## 2021-07-16 ENCOUNTER — CLINICAL SUPPORT (OUTPATIENT)
Dept: OBGYN CLINIC | Facility: CLINIC | Age: 31
End: 2021-07-16

## 2021-07-16 VITALS — WEIGHT: 108.6 LBS | BODY MASS INDEX: 18.64 KG/M2

## 2021-07-16 DIAGNOSIS — Z30.42 ENCOUNTER FOR SURVEILLANCE OF INJECTABLE CONTRACEPTIVE: Primary | ICD-10-CM

## 2021-07-16 PROCEDURE — 96372 THER/PROPH/DIAG INJ SC/IM: CPT | Performed by: OBSTETRICS & GYNECOLOGY

## 2021-07-16 RX ORDER — MEDROXYPROGESTERONE ACETATE 150 MG/ML
150 INJECTION, SUSPENSION INTRAMUSCULAR
Status: SHIPPED | OUTPATIENT
Start: 2021-07-16 | End: 2022-07-11

## 2021-07-16 RX ADMIN — MEDROXYPROGESTERONE ACETATE 150 MG: 150 INJECTION, SUSPENSION INTRAMUSCULAR at 11:37

## 2021-07-16 NOTE — PROGRESS NOTES
Patient presents for Depo  Administered in left deltoid  Patient tolerated well      Fabiola Grade, MA

## 2021-07-28 ENCOUNTER — PATIENT MESSAGE (OUTPATIENT)
Dept: NEUROLOGY | Facility: CLINIC | Age: 31
End: 2021-07-28

## 2021-07-29 ENCOUNTER — TELEPHONE (OUTPATIENT)
Dept: NEUROLOGY | Facility: CLINIC | Age: 31
End: 2021-07-29

## 2021-07-29 NOTE — TELEPHONE ENCOUNTER
Cannot write any letters for her at this time  Needs to be seen for evaluation  No history of seizures that we are aware of  I would also let her know that she has had multiple no shows and needs to attend follow up visit    If not, would consider discharge from the practice due to multiple no shows

## 2021-07-29 NOTE — TELEPHONE ENCOUNTER
Reviewed pt's chart  It appears pt has been seen in the ED for seizures  Pt does not have a f/u scheduled with our office, last seen  11/20/20 with no mention of seizures at that time  Pt has also has been a no show with the MS center 3-4x  Please advise

## 2021-07-29 NOTE — TELEPHONE ENCOUNTER
----- Message from Lalito Ordoñez RN sent at 7/29/2021  9:38 AM EDT -----  Regarding: FW: Non-Urgent Medical Question  Contact: [phone]    ----- Message -----  From: Frances Steel  Sent: 7/28/2021   9:44 PM EDT  To: [facility]  Subject: Non-Urgent Medical Question                      The other day i had a seizure so i called off from work  My boss wants to know if you can write a note for me stating that i am okay to return to work full duty  I have been working but have lost access to [address] [place] [address] of my work functions   The only way i can get it all back is with a note

## 2021-07-30 ENCOUNTER — TELEPHONE (OUTPATIENT)
Dept: FAMILY MEDICINE CLINIC | Facility: CLINIC | Age: 31
End: 2021-07-30

## 2021-07-30 NOTE — TELEPHONE ENCOUNTER
Patient requesting note to return to work states she had a seizure The other day i had a seizure so i called off from work  My boss wants to know if you can write a note for me stating that i am okay to return to work full duty  I have been working but have lost access to [email or web address] of my work functions  The only way i can get it all back is with a note

## 2021-07-30 NOTE — TELEPHONE ENCOUNTER
----- Message from Oly Avelar sent at 7/30/2021  1:45 PM EDT -----  Regarding: RE: Non-Urgent Medical Question  Contact: [phone]  I did and no answer  I need the note before sunday

## 2021-07-30 NOTE — TELEPHONE ENCOUNTER
I did not evaluate patient for seizure is this new Diagnosis ,not been seen for awhile need to be clear by Neurology

## 2021-08-05 ENCOUNTER — HOSPITAL ENCOUNTER (EMERGENCY)
Facility: HOSPITAL | Age: 31
Discharge: HOME | End: 2021-08-05
Attending: EMERGENCY MEDICINE | Admitting: EMERGENCY MEDICINE
Payer: COMMERCIAL

## 2021-08-05 VITALS
TEMPERATURE: 96.8 F | WEIGHT: 108.06 LBS | HEART RATE: 93 BPM | BODY MASS INDEX: 18.55 KG/M2 | OXYGEN SATURATION: 98 % | RESPIRATION RATE: 16 BRPM | SYSTOLIC BLOOD PRESSURE: 126 MMHG | DIASTOLIC BLOOD PRESSURE: 93 MMHG

## 2021-08-05 DIAGNOSIS — R21 RASH AND NONSPECIFIC SKIN ERUPTION: Primary | ICD-10-CM

## 2021-08-05 PROCEDURE — 99282 EMERGENCY DEPT VISIT SF MDM: CPT

## 2021-08-05 PROCEDURE — 99284 EMERGENCY DEPT VISIT MOD MDM: CPT | Performed by: PHYSICIAN ASSISTANT

## 2021-08-05 RX ORDER — PREDNISONE 20 MG/1
60 TABLET ORAL DAILY
Qty: 15 TABLET | Refills: 0 | Status: SHIPPED | OUTPATIENT
Start: 2021-08-05 | End: 2021-08-10

## 2021-08-05 RX ORDER — DIAPER,BRIEF,INFANT-TODD,DISP
EACH MISCELLANEOUS
Qty: 15 G | Refills: 0 | Status: SHIPPED | OUTPATIENT
Start: 2021-08-05 | End: 2021-11-11 | Stop reason: ALTCHOICE

## 2021-08-05 RX ORDER — DIPHENHYDRAMINE HCL 25 MG
25 TABLET ORAL EVERY 6 HOURS
Qty: 20 TABLET | Refills: 0 | Status: SHIPPED | OUTPATIENT
Start: 2021-08-05 | End: 2021-11-10

## 2021-08-05 NOTE — ED PROVIDER NOTES
History  Chief Complaint   Patient presents with    Rash     yesterday started with a rash on the right arm while sitting at work; states now rash on stomach, and left arm  28 y/o female, erythematous rash, no lip tongue or facial swelling, no chest pain, dyspnea, abd pain n/v/d  No new soaps, detergents, clothing, linens, medicines, or food groups recalled  History provided by:  Patient   used: No    Rash  Location:  Shoulder/arm  Shoulder/arm rash location:  L arm  Quality: itchiness and redness    Severity:  Moderate  Onset quality:  Gradual  Duration:  1 day  Timing:  Constant  Progression:  Unchanged  Chronicity:  New  Relieved by:  None tried  Worsened by:  Nothing  Ineffective treatments:  None tried  Associated symptoms: no abdominal pain, no diarrhea, no fatigue, no fever, no headaches, no hoarse voice, no induration, no joint pain, no myalgias, no nausea, no periorbital edema, no shortness of breath, no sore throat, no throat swelling, no tongue swelling, no URI, not vomiting and not wheezing        Prior to Admission Medications   Prescriptions Last Dose Informant Patient Reported? Taking?    albuterol (Ventolin HFA) 90 mcg/act inhaler  Self No No   Sig: Inhale 2 puffs every 6 (six) hours as needed for wheezing   medroxyPROGESTERone (DEPO-PROVERA) 150 mg/mL injection   No No   Sig: Inject 1 mL (150 mg total) into a muscle every 3 (three) months   methocarbamol (ROBAXIN) 750 mg tablet  Self No No   Sig: Take 1 tablet (750 mg total) by mouth 3 (three) times a day as needed for muscle spasms      Facility-Administered Medications Last Administration Doses Remaining   medroxyPROGESTERone (DEPO-PROVERA) IM injection 150 mg 7/16/2021 11:37 AM 3          Past Medical History:   Diagnosis Date    Asthma     Albuterol prn    Depression     hasn't required treatment since 2018    Multiple sclerosis ([facility] [address] ) 2018    follows poorly with neurology, manages with Robaxin    Seizure [facility])     possibly r/t MS diagnosis, has never been on meds, convulsive, last episode 9/2020       Past Surgical History:   Procedure Laterality Date    CLOSED REDUCTION FINGER FRACTURE Left     [place] LUMBAR PUNCTURE DIAGNOSTIC  12/5/2019    TONSILLECTOMY Bilateral 1615    UMBILICAL HERNIA REPAIR  2016       Family History   Problem Relation Age of Onset    Coronary artery disease Paternal Grandmother     Hypertension Paternal Grandfather         TYPE 2    Diabetes Paternal Grandfather     Cancer Paternal Grandfather         pancreatic, lung    Cancer Mother         thyroid    Diabetes Father     Hypertension Father     Breast cancer Neg Hx      I have reviewed and agree with the history as documented  E-Cigarette/Vaping    E-Cigarette Use Never User      E-Cigarette/Vaping Substances     Social History     Tobacco Use    Smoking status: Current Every Day Smoker     Packs/day: 0 25     Years: 9 00     Pack years: 2 25     Types: Cigarettes    Smokeless tobacco: Current User   Vaping Use    Vaping Use: Never used   Substance Use Topics    Alcohol use: Yes    Drug use: Not Currently     Types: Marijuana, Cocaine, MDMA (ecstacy)     Comment: hasn't used any since 2010       Review of Systems   Constitutional: Negative for fatigue and fever  HENT: Negative for hoarse voice and sore throat  Respiratory: Negative for shortness of breath and wheezing  Gastrointestinal: Negative for abdominal pain, diarrhea, nausea and vomiting  Musculoskeletal: Negative for arthralgias and myalgias  Skin: Positive for rash  Neurological: Negative for headaches  Physical Exam  Physical Exam  Vitals and nursing note reviewed  Constitutional:       Appearance: She is well-developed  HENT:      Head: Normocephalic  Eyes:      General: No scleral icterus  Cardiovascular:      Rate and Rhythm: Normal rate and regular rhythm     Pulmonary:      Effort: Pulmonary effort is normal       Breath sounds: Normal breath sounds  No stridor  Abdominal:      General: There is no distension  Palpations: Abdomen is soft  Tenderness: There is no abdominal tenderness  Musculoskeletal:         General: Normal range of motion  Skin:     General: Skin is warm and dry  Capillary Refill: Capillary refill takes less than 2 seconds  Findings: Rash present  Comments: Erythematous, blanchable, non sloughing, non vesicular pruritic rash  Neurological:      Mental Status: She is alert and oriented to person, place, and time  Vital Signs  ED Triage Vitals [08/05/21 1354]   Temperature Pulse Respirations Blood Pressure SpO2   (!) 96 8 °F (36 °C) 93 16 126/93 98 %      Temp Source Heart Rate Source Patient Position - Orthostatic VS BP Location FiO2 (%)   Tympanic Monitor Sitting Left arm --      Pain Score       --           Vitals:    08/05/21 1354   BP: 126/93   Pulse: 93   Patient Position - Orthostatic VS: Sitting         Visual Acuity      ED Medications  Medications - No data to display    Diagnostic Studies  Results Reviewed     None                 No orders to display              Procedures  Procedures         ED Course                             SBIRT 20yo+      Most Recent Value   SBIRT (22 yo +)   In order to provide better care to our patients, we are screening all of our patients for alcohol and drug use  Would it be okay to ask you these screening questions? No Filed at: 08/05/2021 1408                    MDM  Number of Diagnoses or Management Options  Rash and nonspecific skin eruption  Diagnosis management comments: Strict return to ED precautions discussed  Patient recommended to follow up promptly with appropriate outpatient provider  Patient and/or family members verbalizes understanding and agrees with plan  Patient is stable for discharge      Portions of the record may have been created with voice recognition software   Occasional wrong word or "sound a like" substitutions may have occurred due to the inherent limitations of voice recognition software  Read the chart carefully and recognize, using context, where substitutions have occurred  Disposition  Final diagnoses:   Rash and nonspecific skin eruption     Time reflects when diagnosis was documented in both MDM as applicable and the Disposition within this note     Time User Action Codes Description Comment    8/5/2021  2:10 PM Vivian Carmichael Add [R21] Rash and nonspecific skin eruption       ED Disposition     ED Disposition Condition Date/Time Comment    Discharge Good Thu Aug 5, 2021  2:11 PM Sarath Formica 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  Follow-up Information     Follow up With Specialties Details Why Lupe Palomino MD Family Medicine Schedule an appointment as soon as possible for a visit in 2 days As needed [address]  [address]            Discharge Medication List as of 8/5/2021  2:11 PM      START taking these medications    Details   diphenhydrAMINE (BENADRYL) 25 mg tablet Take 1 tablet (25 mg total) by mouth every 6 (six) hours, Starting Thu 8/5/2021, Normal      hydrocortisone 1 % cream Apply to affected area 2 times daily, Normal      predniSONE 20 mg tablet Take 3 tablets (60 mg total) by mouth daily for 5 days, Starting Thu 8/5/2021, Until Tue 8/10/2021, Normal         CONTINUE these medications which have NOT CHANGED    Details   albuterol (Ventolin HFA) 90 mcg/act inhaler Inhale 2 puffs every 6 (six) hours as needed for wheezing, Starting Tue 11/10/2020, Normal      medroxyPROGESTERone (DEPO-PROVERA) 150 mg/mL injection Inject 1 mL (150 mg total) into a muscle every 3 (three) months, Starting Thu 7/15/2021, Normal      methocarbamol (ROBAXIN) 750 mg tablet Take 1 tablet (750 mg total) by mouth 3 (three) times a day as needed for muscle spasms, Starting Fri 11/20/2020, Normal           No discharge procedures on file      PDMP Review       Value Time User    PDMP Reviewed  Yes 10/16/2020 11:39 PM Wojciech Hernandez DO          ED Provider  Electronically Signed by           Franny Araiza PA-C  08/05/21 6254

## 2021-08-26 ENCOUNTER — TELEPHONE (OUTPATIENT)
Dept: NEUROLOGY | Facility: CLINIC | Age: 31
End: 2021-08-26

## 2021-08-26 NOTE — TELEPHONE ENCOUNTER
Dr Jolynn Zayas objections to this patient being discharged from the practice? In October 2020 she was sent a no show letter stating if he had another no show then she would be discharged  Patient was a no show again 12/17/20  She recently called the office and was told she needed to schedule an appt, which was scheduled with me today  She was sent a [employer] message dated 7/28/21 when that appt was scheduled and patient was informed: Additionally, your chart indicates that you have not come to multiple scheduled office visits  You must attend follow up visit  If not, would consider discharge from the practice due to multiple no shows  She read and responded to that [facility]  She was a no show today  This is her 4th no show in 1 year (8/18/20, 10/13/20, 12/17/20, 8/26/21)  She is not currently on any meds for MS [place]--copying you if Dr Shelley Wood is ok with the discharge

## 2021-08-26 NOTE — TELEPHONE ENCOUNTER
Patient has advanced MS - she was KINSEY from Dr Jared Gleason services  We had agreement to continue observational approach to her case, but repeated no show is not acceptable  Please discharge the patient, but if she agrees to see us back I will be happy to resume her care
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
## 2021-09-22 NOTE — TELEPHONE ENCOUNTER
Called and Left a message on pt's answering machine for a call back    Also sent a [employer] message asking pt to call our office

## 2021-09-28 NOTE — TELEPHONE ENCOUNTER
Reached pt  She reports she has been working a lot and also bought a home so she now has a mortgage to pay  She would like to resume her care at this time with Dr Schuler as she feels her schedule is slowing down now  I discussed with the patient that if she would not be able to make an appointment for any reason she must call our office ahead of time and we would be happy to assist her in cancelling or rescheduling the visit  Pt verbalizes understanding  Pt accepted appt on 10/28/21 with Dr Betito Stover  Pt confirmed PCP, phone number, and insurance information on file with no changes

## 2021-10-01 ENCOUNTER — CLINICAL SUPPORT (OUTPATIENT)
Dept: OBGYN CLINIC | Facility: CLINIC | Age: 31
End: 2021-10-01

## 2021-10-01 VITALS
SYSTOLIC BLOOD PRESSURE: 143 MMHG | BODY MASS INDEX: 19.4 KG/M2 | WEIGHT: 113 LBS | DIASTOLIC BLOOD PRESSURE: 105 MMHG | HEART RATE: 85 BPM

## 2021-10-01 DIAGNOSIS — Z30.09 UNWANTED FERTILITY: ICD-10-CM

## 2021-10-01 PROCEDURE — 96372 THER/PROPH/DIAG INJ SC/IM: CPT

## 2021-10-01 RX ADMIN — MEDROXYPROGESTERONE ACETATE 150 MG: 150 INJECTION, SUSPENSION INTRAMUSCULAR at 09:09

## 2021-10-05 ENCOUNTER — HOSPITAL ENCOUNTER (EMERGENCY)
Facility: HOSPITAL | Age: 31
Discharge: HOME | End: 2021-10-05
Attending: EMERGENCY MEDICINE
Payer: COMMERCIAL

## 2021-10-05 VITALS
OXYGEN SATURATION: 97 % | RESPIRATION RATE: 18 BRPM | WEIGHT: 113.13 LBS | BODY MASS INDEX: 19.42 KG/M2 | DIASTOLIC BLOOD PRESSURE: 100 MMHG | SYSTOLIC BLOOD PRESSURE: 148 MMHG | HEART RATE: 113 BPM | TEMPERATURE: 96.9 F

## 2021-10-05 DIAGNOSIS — K21.9 GERD (GASTROESOPHAGEAL REFLUX DISEASE): Primary | ICD-10-CM

## 2021-10-05 LAB
ATRIAL RATE: 86 BPM
EXT PREG TEST URINE: NEGATIVE
EXT. CONTROL ED NAV: NORMAL
P AXIS: 73 DEGREES
PR INTERVAL: 138 MS
QRS AXIS: 81 DEGREES
QRSD INTERVAL: 108 MS
QT INTERVAL: 392 MS
QTC INTERVAL: 469 MS
T WAVE AXIS: 3 DEGREES
VENTRICULAR RATE: 86 BPM

## 2021-10-05 PROCEDURE — 81025 URINE PREGNANCY TEST: CPT | Performed by: PHYSICIAN ASSISTANT

## 2021-10-05 PROCEDURE — 93010 ELECTROCARDIOGRAM REPORT: CPT | Performed by: INTERNAL MEDICINE

## 2021-10-05 PROCEDURE — 93005 ELECTROCARDIOGRAM TRACING: CPT

## 2021-10-05 PROCEDURE — 99285 EMERGENCY DEPT VISIT HI MDM: CPT

## 2021-10-05 PROCEDURE — 99285 EMERGENCY DEPT VISIT HI MDM: CPT | Performed by: PHYSICIAN ASSISTANT

## 2021-10-05 RX ORDER — LIDOCAINE HYDROCHLORIDE 20 MG/ML
15 SOLUTION OROPHARYNGEAL ONCE
Status: COMPLETED | OUTPATIENT
Start: 2021-10-05 | End: 2021-10-05

## 2021-10-05 RX ORDER — MAGNESIUM HYDROXIDE/ALUMINUM HYDROXICE/SIMETHICONE 120; 1200; 1200 MG/30ML; MG/30ML; MG/30ML
30 SUSPENSION ORAL ONCE
Status: COMPLETED | OUTPATIENT
Start: 2021-10-05 | End: 2021-10-05

## 2021-10-05 RX ADMIN — LIDOCAINE HYDROCHLORIDE 15 ML: 20 SOLUTION ORAL; TOPICAL at 05:57

## 2021-10-05 RX ADMIN — ALUMINUM HYDROXIDE, MAGNESIUM HYDROXIDE, AND SIMETHICONE 30 ML: 200; 200; 20 SUSPENSION ORAL at 05:57

## 2021-10-27 ENCOUNTER — TELEPHONE (OUTPATIENT)
Dept: FAMILY MEDICINE CLINIC | Facility: CLINIC | Age: 31
End: 2021-10-27

## 2021-10-28 ENCOUNTER — OFFICE VISIT (OUTPATIENT)
Dept: NEUROLOGY | Facility: CLINIC | Age: 31
End: 2021-10-28
Payer: COMMERCIAL

## 2021-10-28 ENCOUNTER — TELEPHONE (OUTPATIENT)
Dept: NEUROLOGY | Facility: CLINIC | Age: 31
End: 2021-10-28

## 2021-10-28 VITALS
HEIGHT: 64 IN | BODY MASS INDEX: 20.11 KG/M2 | SYSTOLIC BLOOD PRESSURE: 131 MMHG | TEMPERATURE: 97.6 F | DIASTOLIC BLOOD PRESSURE: 88 MMHG | WEIGHT: 117.8 LBS | HEART RATE: 78 BPM

## 2021-10-28 DIAGNOSIS — G35 MS (MULTIPLE SCLEROSIS) (HCC): Primary | ICD-10-CM

## 2021-10-28 DIAGNOSIS — R26.2 AMBULATORY DYSFUNCTION: ICD-10-CM

## 2021-10-28 DIAGNOSIS — N31.9 NEUROGENIC BLADDER: ICD-10-CM

## 2021-10-28 DIAGNOSIS — R47.89 WORD FINDING PROBLEM: ICD-10-CM

## 2021-10-28 PROCEDURE — 99215 OFFICE O/P EST HI 40 MIN: CPT | Performed by: PSYCHIATRY & NEUROLOGY

## 2021-10-28 RX ORDER — DEXAMETHASONE 4 MG/1
4 TABLET ORAL
Qty: 5 TABLET | Refills: 2 | Status: SHIPPED | OUTPATIENT
Start: 2021-10-28 | End: 2021-12-03 | Stop reason: ALTCHOICE

## 2021-10-31 ENCOUNTER — PATIENT MESSAGE (OUTPATIENT)
Dept: NEUROLOGY | Facility: CLINIC | Age: 31
End: 2021-10-31

## 2021-11-03 NOTE — TELEPHONE ENCOUNTER
Pt needs updated labs prior to starting Tysabri  Called pt to remind her of this  She reports she will go to a [facility] lab tomorrow  Per Tysabri touch website, pt is no longer authorized to receive Tysabri  New start form is needed  Janette Farr, please assist with start form  Pt previously used the [facility] infusion center  Carlie Baird, would you be agreeable to signing start form in Dr VELASQUEZ's absence? It appears you last saw the patient on 11/20/20 (Dr VELASQUEZ did see pt on 10/28/21)  Or would you like her to sign this when she returns to the office?

## 2021-11-04 ENCOUNTER — APPOINTMENT (OUTPATIENT)
Dept: LAB | Facility: CLINIC | Age: 31
End: 2021-11-04
Payer: COMMERCIAL

## 2021-11-04 DIAGNOSIS — G35 MS (MULTIPLE SCLEROSIS) (HCC): ICD-10-CM

## 2021-11-04 LAB
ALBUMIN SERPL BCP-MCNC: 3.7 G/DL (ref 3.5–5)
ALP SERPL-CCNC: 63 U/L (ref 46–116)
ALT SERPL W P-5'-P-CCNC: 31 U/L (ref 12–78)
ANION GAP SERPL CALCULATED.3IONS-SCNC: 5 MMOL/L (ref 4–13)
AST SERPL W P-5'-P-CCNC: 8 U/L (ref 5–45)
BASOPHILS # BLD AUTO: 0.05 THOUSANDS/ΜL (ref 0–0.1)
BASOPHILS NFR BLD AUTO: 0 % (ref 0–1)
BILIRUB SERPL-MCNC: 0.39 MG/DL (ref 0.2–1)
BILIRUB UR QL STRIP: NEGATIVE
BUN SERPL-MCNC: 16 MG/DL (ref 5–25)
CALCIUM SERPL-MCNC: 9.3 MG/DL (ref 8.3–10.1)
CHLORIDE SERPL-SCNC: 107 MMOL/L (ref 100–108)
CLARITY UR: CLEAR
CO2 SERPL-SCNC: 24 MMOL/L (ref 21–32)
COLOR UR: YELLOW
CREAT SERPL-MCNC: 0.76 MG/DL (ref 0.6–1.3)
EOSINOPHIL # BLD AUTO: 0.17 THOUSAND/ΜL (ref 0–0.61)
EOSINOPHIL NFR BLD AUTO: 1 % (ref 0–6)
ERYTHROCYTE [DISTWIDTH] IN BLOOD BY AUTOMATED COUNT: 12.6 % (ref 11.6–15.1)
GFR SERPL CREATININE-BSD FRML MDRD: 106 ML/MIN/1.73SQ M
GLUCOSE SERPL-MCNC: 88 MG/DL (ref 65–140)
GLUCOSE UR STRIP-MCNC: NEGATIVE MG/DL
HCT VFR BLD AUTO: 43.4 % (ref 34.8–46.1)
HGB BLD-MCNC: 14.2 G/DL (ref 11.5–15.4)
HGB UR QL STRIP.AUTO: NEGATIVE
IMM GRANULOCYTES # BLD AUTO: 0.04 THOUSAND/UL (ref 0–0.2)
IMM GRANULOCYTES NFR BLD AUTO: 0 % (ref 0–2)
KETONES UR STRIP-MCNC: NEGATIVE MG/DL
LEUKOCYTE ESTERASE UR QL STRIP: NEGATIVE
LYMPHOCYTES # BLD AUTO: 4.77 THOUSANDS/ΜL (ref 0.6–4.47)
LYMPHOCYTES NFR BLD AUTO: 40 % (ref 14–44)
MCH RBC QN AUTO: 31.7 PG (ref 26.8–34.3)
MCHC RBC AUTO-ENTMCNC: 32.7 G/DL (ref 31.4–37.4)
MCV RBC AUTO: 97 FL (ref 82–98)
MONOCYTES # BLD AUTO: 0.84 THOUSAND/ΜL (ref 0.17–1.22)
MONOCYTES NFR BLD AUTO: 7 % (ref 4–12)
NEUTROPHILS # BLD AUTO: 6.16 THOUSANDS/ΜL (ref 1.85–7.62)
NEUTS SEG NFR BLD AUTO: 52 % (ref 43–75)
NITRITE UR QL STRIP: NEGATIVE
NRBC BLD AUTO-RTO: 0 /100 WBCS
PH UR STRIP.AUTO: 6.5 [PH]
PLATELET # BLD AUTO: 270 THOUSANDS/UL (ref 149–390)
PMV BLD AUTO: 10 FL (ref 8.9–12.7)
POTASSIUM SERPL-SCNC: 3.3 MMOL/L (ref 3.5–5.3)
PROT SERPL-MCNC: 7.2 G/DL (ref 6.4–8.2)
PROT UR STRIP-MCNC: NEGATIVE MG/DL
RBC # BLD AUTO: 4.48 MILLION/UL (ref 3.81–5.12)
SODIUM SERPL-SCNC: 136 MMOL/L (ref 136–145)
SP GR UR STRIP.AUTO: 1.02 (ref 1–1.03)
UROBILINOGEN UR QL STRIP.AUTO: 1 E.U./DL
WBC # BLD AUTO: 12.03 THOUSAND/UL (ref 4.31–10.16)

## 2021-11-04 PROCEDURE — 86711 JOHN CUNNINGHAM ANTIBODY: CPT

## 2021-11-04 PROCEDURE — 85025 COMPLETE CBC W/AUTO DIFF WBC: CPT

## 2021-11-04 PROCEDURE — 36415 COLL VENOUS BLD VENIPUNCTURE: CPT

## 2021-11-04 PROCEDURE — 81003 URINALYSIS AUTO W/O SCOPE: CPT | Performed by: PSYCHIATRY & NEUROLOGY

## 2021-11-04 PROCEDURE — 80053 COMPREHEN METABOLIC PANEL: CPT

## 2021-11-06 ENCOUNTER — HOSPITAL ENCOUNTER (EMERGENCY)
Facility: HOSPITAL | Age: 31
Discharge: HOME | End: 2021-11-06
Attending: EMERGENCY MEDICINE | Admitting: EMERGENCY MEDICINE
Payer: COMMERCIAL

## 2021-11-06 VITALS
DIASTOLIC BLOOD PRESSURE: 108 MMHG | RESPIRATION RATE: 15 BRPM | TEMPERATURE: 98.2 F | OXYGEN SATURATION: 99 % | WEIGHT: 121 LBS | SYSTOLIC BLOOD PRESSURE: 147 MMHG | BODY MASS INDEX: 20.77 KG/M2 | HEART RATE: 93 BPM

## 2021-11-06 DIAGNOSIS — K08.89 TOOTHACHE: Primary | ICD-10-CM

## 2021-11-06 PROCEDURE — 99282 EMERGENCY DEPT VISIT SF MDM: CPT

## 2021-11-06 PROCEDURE — 99284 EMERGENCY DEPT VISIT MOD MDM: CPT | Performed by: PHYSICIAN ASSISTANT

## 2021-11-06 RX ORDER — IBUPROFEN 600 MG/1
600 TABLET ORAL EVERY 6 HOURS PRN
Qty: 30 TABLET | Refills: 0 | Status: SHIPPED | OUTPATIENT
Start: 2021-11-06

## 2021-11-06 RX ORDER — ACETAMINOPHEN 325 MG/1
650 TABLET ORAL ONCE
Status: COMPLETED | OUTPATIENT
Start: 2021-11-06 | End: 2021-11-06

## 2021-11-06 RX ORDER — CLINDAMYCIN HYDROCHLORIDE 300 MG/1
300 CAPSULE ORAL 4 TIMES DAILY
Qty: 40 CAPSULE | Refills: 0 | Status: SHIPPED | OUTPATIENT
Start: 2021-11-06 | End: 2021-11-16

## 2021-11-06 RX ORDER — CLINDAMYCIN HYDROCHLORIDE 150 MG/1
300 CAPSULE ORAL ONCE
Status: COMPLETED | OUTPATIENT
Start: 2021-11-06 | End: 2021-11-06

## 2021-11-09 ENCOUNTER — HOSPITAL ENCOUNTER (EMERGENCY)
Facility: HOSPITAL | Age: 31
Discharge: HOME | End: 2021-11-09
Attending: EMERGENCY MEDICINE | Admitting: EMERGENCY MEDICINE
Payer: COMMERCIAL

## 2021-11-09 VITALS
SYSTOLIC BLOOD PRESSURE: 156 MMHG | RESPIRATION RATE: 18 BRPM | HEART RATE: 89 BPM | TEMPERATURE: 99.8 F | OXYGEN SATURATION: 98 % | DIASTOLIC BLOOD PRESSURE: 103 MMHG

## 2021-11-09 DIAGNOSIS — T50.905A ADVERSE EFFECT OF DRUG, INITIAL ENCOUNTER: Primary | ICD-10-CM

## 2021-11-09 DIAGNOSIS — K04.7 DENTAL INFECTION: ICD-10-CM

## 2021-11-09 LAB
ALBUMIN SERPL BCP-MCNC: 4.4 G/DL (ref 3–5.2)
ALP SERPL-CCNC: 61 U/L (ref 43–122)
ALT SERPL W P-5'-P-CCNC: 37 U/L
ANION GAP SERPL CALCULATED.3IONS-SCNC: 5 MMOL/L (ref 5–14)
AST SERPL W P-5'-P-CCNC: 38 U/L (ref 14–36)
BASOPHILS # BLD AUTO: 0 THOUSANDS/ΜL (ref 0–0.1)
BASOPHILS NFR BLD AUTO: 0 % (ref 0–1)
BILIRUB SERPL-MCNC: 0.71 MG/DL
BUN SERPL-MCNC: 14 MG/DL (ref 5–25)
CALCIUM SERPL-MCNC: 9.6 MG/DL (ref 8.4–10.2)
CHLORIDE SERPL-SCNC: 108 MMOL/L (ref 97–108)
CO2 SERPL-SCNC: 24 MMOL/L (ref 22–30)
CREAT SERPL-MCNC: 0.57 MG/DL (ref 0.6–1.2)
EOSINOPHIL # BLD AUTO: 0.2 THOUSAND/ΜL (ref 0–0.4)
EOSINOPHIL NFR BLD AUTO: 2 % (ref 0–6)
ERYTHROCYTE [DISTWIDTH] IN BLOOD BY AUTOMATED COUNT: 12.9 %
GFR SERPL CREATININE-BSD FRML MDRD: 125 ML/MIN/1.73SQ M
GLUCOSE SERPL-MCNC: 81 MG/DL (ref 70–99)
HCT VFR BLD AUTO: 42.5 % (ref 36–46)
HGB BLD-MCNC: 14.3 G/DL (ref 12–16)
LYMPHOCYTES # BLD AUTO: 1.8 THOUSANDS/ΜL (ref 0.5–4)
LYMPHOCYTES NFR BLD AUTO: 19 % (ref 25–45)
MCH RBC QN AUTO: 32.1 PG (ref 26–34)
MCHC RBC AUTO-ENTMCNC: 33.6 G/DL (ref 31–36)
MCV RBC AUTO: 96 FL (ref 80–100)
MONOCYTES # BLD AUTO: 0.7 THOUSAND/ΜL (ref 0.2–0.9)
MONOCYTES NFR BLD AUTO: 7 % (ref 1–10)
NEUTROPHILS # BLD AUTO: 7.1 THOUSANDS/ΜL (ref 1.8–7.8)
NEUTS SEG NFR BLD AUTO: 72 % (ref 45–65)
PLATELET # BLD AUTO: 230 THOUSANDS/UL (ref 150–450)
PMV BLD AUTO: 7.6 FL (ref 8.9–12.7)
POTASSIUM SERPL-SCNC: 4.1 MMOL/L (ref 3.6–5)
PROT SERPL-MCNC: 7.9 G/DL (ref 5.9–8.4)
RBC # BLD AUTO: 4.44 MILLION/UL (ref 4–5.2)
SODIUM SERPL-SCNC: 137 MMOL/L (ref 137–147)
WBC # BLD AUTO: 9.8 THOUSAND/UL (ref 4.5–11)

## 2021-11-09 PROCEDURE — 85025 COMPLETE CBC W/AUTO DIFF WBC: CPT | Performed by: PHYSICIAN ASSISTANT

## 2021-11-09 PROCEDURE — 36415 COLL VENOUS BLD VENIPUNCTURE: CPT | Performed by: PHYSICIAN ASSISTANT

## 2021-11-09 PROCEDURE — 96375 TX/PRO/DX INJ NEW DRUG ADDON: CPT

## 2021-11-09 PROCEDURE — 96374 THER/PROPH/DIAG INJ IV PUSH: CPT

## 2021-11-09 PROCEDURE — C9113 INJ PANTOPRAZOLE SODIUM, VIA: HCPCS | Performed by: PHYSICIAN ASSISTANT

## 2021-11-09 PROCEDURE — 99283 EMERGENCY DEPT VISIT LOW MDM: CPT

## 2021-11-09 PROCEDURE — 96361 HYDRATE IV INFUSION ADD-ON: CPT

## 2021-11-09 PROCEDURE — 99284 EMERGENCY DEPT VISIT MOD MDM: CPT | Performed by: PHYSICIAN ASSISTANT

## 2021-11-09 PROCEDURE — 80053 COMPREHEN METABOLIC PANEL: CPT | Performed by: PHYSICIAN ASSISTANT

## 2021-11-09 RX ORDER — AMOXICILLIN 500 MG/1
500 CAPSULE ORAL EVERY 12 HOURS SCHEDULED
Qty: 20 CAPSULE | Refills: 0 | Status: SHIPPED | OUTPATIENT
Start: 2021-11-09 | End: 2021-11-19

## 2021-11-09 RX ORDER — DIPHENHYDRAMINE HCL 25 MG
25 TABLET ORAL EVERY 6 HOURS
Qty: 20 TABLET | Refills: 0 | Status: SHIPPED | OUTPATIENT
Start: 2021-11-09 | End: 2021-11-10

## 2021-11-09 RX ORDER — PANTOPRAZOLE SODIUM 40 MG/1
40 INJECTION, POWDER, FOR SOLUTION INTRAVENOUS ONCE
Status: COMPLETED | OUTPATIENT
Start: 2021-11-09 | End: 2021-11-09

## 2021-11-09 RX ORDER — DIPHENHYDRAMINE HYDROCHLORIDE 50 MG/ML
25 INJECTION INTRAMUSCULAR; INTRAVENOUS ONCE
Status: COMPLETED | OUTPATIENT
Start: 2021-11-09 | End: 2021-11-09

## 2021-11-09 RX ORDER — METHYLPREDNISOLONE SODIUM SUCCINATE 125 MG/2ML
125 INJECTION, POWDER, LYOPHILIZED, FOR SOLUTION INTRAMUSCULAR; INTRAVENOUS ONCE
Status: COMPLETED | OUTPATIENT
Start: 2021-11-09 | End: 2021-11-09

## 2021-11-09 RX ORDER — PREDNISONE 20 MG/1
60 TABLET ORAL DAILY
Qty: 15 TABLET | Refills: 0 | Status: SHIPPED | OUTPATIENT
Start: 2021-11-09 | End: 2021-11-14

## 2021-11-09 RX ADMIN — SODIUM CHLORIDE 1000 ML: 0.9 INJECTION, SOLUTION INTRAVENOUS at 19:37

## 2021-11-09 RX ADMIN — METHYLPREDNISOLONE SODIUM SUCCINATE 125 MG: 125 INJECTION, POWDER, FOR SOLUTION INTRAMUSCULAR; INTRAVENOUS at 19:48

## 2021-11-09 RX ADMIN — DIPHENHYDRAMINE HYDROCHLORIDE 25 MG: 50 INJECTION, SOLUTION INTRAMUSCULAR; INTRAVENOUS at 19:40

## 2021-11-09 RX ADMIN — PANTOPRAZOLE SODIUM 40 MG: 40 INJECTION, POWDER, FOR SOLUTION INTRAVENOUS at 19:43

## 2021-11-10 ENCOUNTER — TELEMEDICINE (OUTPATIENT)
Dept: FAMILY MEDICINE CLINIC | Facility: CLINIC | Age: 31
End: 2021-11-10
Payer: COMMERCIAL

## 2021-11-10 ENCOUNTER — TELEPHONE (OUTPATIENT)
Dept: FAMILY MEDICINE CLINIC | Facility: CLINIC | Age: 31
End: 2021-11-10

## 2021-11-10 DIAGNOSIS — T78.40XA ALLERGIC REACTION, INITIAL ENCOUNTER: Primary | ICD-10-CM

## 2021-11-10 PROCEDURE — 99213 OFFICE O/P EST LOW 20 MIN: CPT | Performed by: NURSE PRACTITIONER

## 2021-11-10 RX ORDER — DIPHENHYDRAMINE HCL 25 MG
25 TABLET ORAL EVERY 6 HOURS
Qty: 20 TABLET | Refills: 0 | Status: SHIPPED | OUTPATIENT
Start: 2021-11-10 | End: 2021-12-03 | Stop reason: ALTCHOICE

## 2021-11-11 PROBLEM — T78.40XA ALLERGIC REACTION: Status: ACTIVE | Noted: 2021-11-11

## 2021-11-15 ENCOUNTER — TELEMEDICINE (OUTPATIENT)
Dept: FAMILY MEDICINE CLINIC | Facility: CLINIC | Age: 31
End: 2021-11-15
Payer: COMMERCIAL

## 2021-11-15 DIAGNOSIS — Z20.822 EXPOSURE TO COVID-19 VIRUS: Primary | ICD-10-CM

## 2021-11-15 DIAGNOSIS — R52 GENERALIZED BODY ACHES: ICD-10-CM

## 2021-11-15 PROCEDURE — U0003 INFECTIOUS AGENT DETECTION BY NUCLEIC ACID (DNA OR RNA); SEVERE ACUTE RESPIRATORY SYNDROME CORONAVIRUS 2 (SARS-COV-2) (CORONAVIRUS DISEASE [COVID-19]), AMPLIFIED PROBE TECHNIQUE, MAKING USE OF HIGH THROUGHPUT TECHNOLOGIES AS DESCRIBED BY CMS-2020-01-R: HCPCS | Performed by: NURSE PRACTITIONER

## 2021-11-15 PROCEDURE — 99213 OFFICE O/P EST LOW 20 MIN: CPT | Performed by: NURSE PRACTITIONER

## 2021-11-15 PROCEDURE — 4004F PT TOBACCO SCREEN RCVD TLK: CPT | Performed by: NURSE PRACTITIONER

## 2021-11-15 PROCEDURE — U0005 INFEC AGEN DETEC AMPLI PROBE: HCPCS | Performed by: NURSE PRACTITIONER

## 2021-11-16 ENCOUNTER — TELEPHONE (OUTPATIENT)
Dept: FAMILY MEDICINE CLINIC | Facility: CLINIC | Age: 31
End: 2021-11-16

## 2021-11-16 LAB — SARS-COV-2 RNA RESP QL NAA+PROBE: NEGATIVE

## 2021-11-17 NOTE — TELEPHONE ENCOUNTER
Received call from Pili Gonzalez with [facility]  She is requesting ICD 10 code for Tysabri  States she is looking into pt's benefits  ICD 10 code provided

## 2021-11-17 NOTE — TELEPHONE ENCOUNTER
Spoke with Carolin Her with [address]  She reports PA is needed for Tysabri  This needs to go through [facility]- due to financial reasons and pt's plan medication must be obtained through specialty pharmacy  Will need to work on [place] [facility] [phone]

## 2021-11-24 NOTE — TELEPHONE ENCOUNTER
PA submitted on CMM, Key: [id number]  Awaiting determination  Dr Kamille Monique, pt has MRI brain, c-spine, and t-spine ordered by Vielka Howe  Should she have these completed prior to starting Tysabri if it is approved?

## 2021-11-24 NOTE — TELEPHONE ENCOUNTER
Yes, imaging should be considered anytime patient PA completed and she can safely schedule her imaging before Tysabri treatment re-started

## 2021-11-26 NOTE — TELEPHONE ENCOUNTER
Called and Left a message on pt's answering machine for a call back  Pt must schedule imaging prior to restarting Tysabri  Still also awaiting PA determination

## 2021-11-29 NOTE — TELEPHONE ENCOUNTER
Received letter from [facility] stating PA for Tysabri is denied as PA cannot be performed for products excluded under the pharmacy benefit  Will need to call [employer] at [phone] to determine if Tysabri may be covered under the medical benefit

## 2021-12-03 ENCOUNTER — OFFICE VISIT (OUTPATIENT)
Dept: FAMILY MEDICINE CLINIC | Facility: CLINIC | Age: 31
End: 2021-12-03
Payer: COMMERCIAL

## 2021-12-03 DIAGNOSIS — Z23 NEED FOR INFLUENZA VACCINATION: ICD-10-CM

## 2021-12-03 DIAGNOSIS — R23.8 BRUISES EASILY: ICD-10-CM

## 2021-12-03 DIAGNOSIS — Z72.0 TOBACCO ABUSE: ICD-10-CM

## 2021-12-03 DIAGNOSIS — Z00.01 ENCOUNTER FOR WELL ADULT EXAM WITH ABNORMAL FINDINGS: Primary | ICD-10-CM

## 2021-12-03 DIAGNOSIS — J45.30 MILD PERSISTENT ASTHMA WITHOUT COMPLICATION: ICD-10-CM

## 2021-12-03 PROCEDURE — 90686 IIV4 VACC NO PRSV 0.5 ML IM: CPT | Performed by: FAMILY MEDICINE

## 2021-12-03 PROCEDURE — 4004F PT TOBACCO SCREEN RCVD TLK: CPT | Performed by: FAMILY MEDICINE

## 2021-12-03 PROCEDURE — 3008F BODY MASS INDEX DOCD: CPT | Performed by: FAMILY MEDICINE

## 2021-12-03 PROCEDURE — 99214 OFFICE O/P EST MOD 30 MIN: CPT | Performed by: FAMILY MEDICINE

## 2021-12-03 PROCEDURE — 90471 IMMUNIZATION ADMIN: CPT | Performed by: FAMILY MEDICINE

## 2021-12-03 PROCEDURE — 99395 PREV VISIT EST AGE 18-39: CPT | Performed by: FAMILY MEDICINE

## 2021-12-03 PROCEDURE — 3725F SCREEN DEPRESSION PERFORMED: CPT | Performed by: FAMILY MEDICINE

## 2021-12-03 RX ORDER — ALBUTEROL SULFATE 90 UG/1
2 AEROSOL, METERED RESPIRATORY (INHALATION) EVERY 6 HOURS PRN
Qty: 18 G | Refills: 2 | Status: SHIPPED | OUTPATIENT
Start: 2021-12-03

## 2021-12-05 VITALS
TEMPERATURE: 99.8 F | HEIGHT: 63 IN | WEIGHT: 119 LBS | DIASTOLIC BLOOD PRESSURE: 80 MMHG | SYSTOLIC BLOOD PRESSURE: 130 MMHG | HEART RATE: 90 BPM | OXYGEN SATURATION: 98 % | BODY MASS INDEX: 21.09 KG/M2

## 2021-12-05 PROBLEM — Z72.0 TOBACCO ABUSE: Status: ACTIVE | Noted: 2017-03-06

## 2021-12-05 PROBLEM — R23.3 BRUISES EASILY: Status: ACTIVE | Noted: 2021-12-05

## 2021-12-05 PROBLEM — R23.8 BRUISES EASILY: Status: ACTIVE | Noted: 2021-12-05

## 2021-12-05 PROBLEM — R23.8 BRUISES EASILY: Status: ACTIVE | Noted: 2021-12-03

## 2021-12-05 PROBLEM — R23.3 BRUISES EASILY: Status: ACTIVE | Noted: 2021-12-03

## 2021-12-06 NOTE — TELEPHONE ENCOUNTER
Called [facility] and followed prompts for PA dept  They do not complete PA requests for medical benefits  She provided phone # [phone]  Called this # and reached [facility], spoke with Grey Johnson  Initiated PA request for Tysabri verbally  She reports [facility] infusion centers would not be able buy and bill per pt's plan  We would need to obtain medication through [facility]  Tysabri is approved with [facility] from 12/6/21 to 6/6/22  Authorization # [id number]  Medication must be obtained from [facility]  Pt has secondary insurance with [employer]  Called [employer] at [phone] and spoke with Christopher Sy  She reports Tysabri would need a PA even when billing as the secondary insurance at a pharmacy  Form is on [place], this should be faxed to [phone]  Form completed and faxed with clinicals  Awaiting determination from [facility]  Pt still has not scheduled MRIs  Cannot proceed with Tysabri until these are completed  Called and Left a message on pt's answering machine for a call back  [employer] message sent

## 2021-12-07 ENCOUNTER — TELEPHONE (OUTPATIENT)
Dept: HEMATOLOGY ONCOLOGY | Facility: CLINIC | Age: 31
End: 2021-12-07

## 2021-12-12 ENCOUNTER — HOSPITAL ENCOUNTER (EMERGENCY)
Facility: HOSPITAL | Age: 31
Discharge: HOME | End: 2021-12-12
Attending: EMERGENCY MEDICINE
Payer: COMMERCIAL

## 2021-12-12 VITALS
TEMPERATURE: 98.9 F | SYSTOLIC BLOOD PRESSURE: 142 MMHG | WEIGHT: 119 LBS | OXYGEN SATURATION: 97 % | RESPIRATION RATE: 18 BRPM | DIASTOLIC BLOOD PRESSURE: 89 MMHG | HEART RATE: 98 BPM | BODY MASS INDEX: 20.32 KG/M2 | HEIGHT: 64 IN

## 2021-12-12 DIAGNOSIS — R56.9 SEIZURE-LIKE ACTIVITY (HCC): Primary | ICD-10-CM

## 2021-12-12 DIAGNOSIS — G35 MULTIPLE SCLEROSIS (HCC): ICD-10-CM

## 2021-12-12 LAB
ALBUMIN SERPL BCP-MCNC: 3.6 G/DL (ref 3.5–5)
ALP SERPL-CCNC: 69 U/L (ref 46–116)
ALT SERPL W P-5'-P-CCNC: 26 U/L (ref 12–78)
ANION GAP SERPL CALCULATED.3IONS-SCNC: 3 MMOL/L (ref 4–13)
AST SERPL W P-5'-P-CCNC: 11 U/L (ref 5–45)
ATRIAL RATE: 93 BPM
BASOPHILS # BLD AUTO: 0.06 THOUSANDS/ΜL (ref 0–0.1)
BASOPHILS NFR BLD AUTO: 1 % (ref 0–1)
BILIRUB SERPL-MCNC: 0.36 MG/DL (ref 0.2–1)
BUN SERPL-MCNC: 11 MG/DL (ref 5–25)
CALCIUM SERPL-MCNC: 8.3 MG/DL (ref 8.3–10.1)
CHLORIDE SERPL-SCNC: 114 MMOL/L (ref 100–108)
CO2 SERPL-SCNC: 24 MMOL/L (ref 21–32)
CREAT SERPL-MCNC: 0.66 MG/DL (ref 0.6–1.3)
EOSINOPHIL # BLD AUTO: 0.19 THOUSAND/ΜL (ref 0–0.61)
EOSINOPHIL NFR BLD AUTO: 2 % (ref 0–6)
ERYTHROCYTE [DISTWIDTH] IN BLOOD BY AUTOMATED COUNT: 12.3 % (ref 11.6–15.1)
EXT PREG TEST URINE: NEGATIVE
EXT. CONTROL ED NAV: NORMAL
GFR SERPL CREATININE-BSD FRML MDRD: 119 ML/MIN/1.73SQ M
GLUCOSE SERPL-MCNC: 70 MG/DL (ref 65–140)
HCT VFR BLD AUTO: 39.3 % (ref 34.8–46.1)
HGB BLD-MCNC: 13.3 G/DL (ref 11.5–15.4)
IMM GRANULOCYTES # BLD AUTO: 0.02 THOUSAND/UL (ref 0–0.2)
IMM GRANULOCYTES NFR BLD AUTO: 0 % (ref 0–2)
LYMPHOCYTES # BLD AUTO: 1.76 THOUSANDS/ΜL (ref 0.6–4.47)
LYMPHOCYTES NFR BLD AUTO: 20 % (ref 14–44)
MCH RBC QN AUTO: 32.3 PG (ref 26.8–34.3)
MCHC RBC AUTO-ENTMCNC: 33.8 G/DL (ref 31.4–37.4)
MCV RBC AUTO: 95 FL (ref 82–98)
MONOCYTES # BLD AUTO: 0.62 THOUSAND/ΜL (ref 0.17–1.22)
MONOCYTES NFR BLD AUTO: 7 % (ref 4–12)
NEUTROPHILS # BLD AUTO: 6.36 THOUSANDS/ΜL (ref 1.85–7.62)
NEUTS SEG NFR BLD AUTO: 70 % (ref 43–75)
NRBC BLD AUTO-RTO: 0 /100 WBCS
P AXIS: 81 DEGREES
PLATELET # BLD AUTO: 219 THOUSANDS/UL (ref 149–390)
PMV BLD AUTO: 9.6 FL (ref 8.9–12.7)
POTASSIUM SERPL-SCNC: 3.6 MMOL/L (ref 3.5–5.3)
PR INTERVAL: 126 MS
PROT SERPL-MCNC: 6.9 G/DL (ref 6.4–8.2)
QRS AXIS: 83 DEGREES
QRSD INTERVAL: 96 MS
QT INTERVAL: 366 MS
QTC INTERVAL: 455 MS
RBC # BLD AUTO: 4.12 MILLION/UL (ref 3.81–5.12)
SODIUM SERPL-SCNC: 141 MMOL/L (ref 136–145)
T WAVE AXIS: 12 DEGREES
TSH SERPL DL<=0.05 MIU/L-ACNC: 1.58 UIU/ML (ref 0.36–3.74)
VENTRICULAR RATE: 93 BPM
WBC # BLD AUTO: 9.01 THOUSAND/UL (ref 4.31–10.16)

## 2021-12-12 PROCEDURE — 84443 ASSAY THYROID STIM HORMONE: CPT | Performed by: INTERNAL MEDICINE

## 2021-12-12 PROCEDURE — 36415 COLL VENOUS BLD VENIPUNCTURE: CPT | Performed by: INTERNAL MEDICINE

## 2021-12-12 PROCEDURE — 99284 EMERGENCY DEPT VISIT MOD MDM: CPT | Performed by: EMERGENCY MEDICINE

## 2021-12-12 PROCEDURE — 81025 URINE PREGNANCY TEST: CPT | Performed by: INTERNAL MEDICINE

## 2021-12-12 PROCEDURE — 80053 COMPREHEN METABOLIC PANEL: CPT | Performed by: INTERNAL MEDICINE

## 2021-12-12 PROCEDURE — 93005 ELECTROCARDIOGRAM TRACING: CPT

## 2021-12-12 PROCEDURE — 96374 THER/PROPH/DIAG INJ IV PUSH: CPT

## 2021-12-12 PROCEDURE — 99284 EMERGENCY DEPT VISIT MOD MDM: CPT

## 2021-12-12 PROCEDURE — 96375 TX/PRO/DX INJ NEW DRUG ADDON: CPT

## 2021-12-12 PROCEDURE — 85025 COMPLETE CBC W/AUTO DIFF WBC: CPT | Performed by: INTERNAL MEDICINE

## 2021-12-12 PROCEDURE — 93010 ELECTROCARDIOGRAM REPORT: CPT | Performed by: INTERNAL MEDICINE

## 2021-12-12 RX ORDER — DEXAMETHASONE 4 MG/1
4 TABLET ORAL
Qty: 4 TABLET | Refills: 0 | Status: SHIPPED | OUTPATIENT
Start: 2021-12-12 | End: 2021-12-16

## 2021-12-12 RX ORDER — DEXAMETHASONE 4 MG/1
4 TABLET ORAL ONCE
Status: DISCONTINUED | OUTPATIENT
Start: 2021-12-12 | End: 2021-12-12

## 2021-12-12 RX ORDER — LORAZEPAM 2 MG/ML
0.5 INJECTION INTRAMUSCULAR ONCE
Status: COMPLETED | OUTPATIENT
Start: 2021-12-12 | End: 2021-12-12

## 2021-12-12 RX ORDER — DEXAMETHASONE SODIUM PHOSPHATE 4 MG/ML
4 INJECTION, SOLUTION INTRA-ARTICULAR; INTRALESIONAL; INTRAMUSCULAR; INTRAVENOUS; SOFT TISSUE ONCE
Status: COMPLETED | OUTPATIENT
Start: 2021-12-12 | End: 2021-12-12

## 2021-12-12 RX ORDER — LORAZEPAM 1 MG/1
1 TABLET ORAL ONCE
Status: DISCONTINUED | OUTPATIENT
Start: 2021-12-12 | End: 2021-12-12

## 2021-12-12 RX ADMIN — DEXAMETHASONE SODIUM PHOSPHATE 4 MG: 4 INJECTION, SOLUTION INTRA-ARTICULAR; INTRALESIONAL; INTRAMUSCULAR; INTRAVENOUS; SOFT TISSUE at 10:47

## 2021-12-12 RX ADMIN — LORAZEPAM 0.5 MG: 2 INJECTION INTRAMUSCULAR; INTRAVENOUS at 10:49

## 2021-12-13 DIAGNOSIS — G35 MS (MULTIPLE SCLEROSIS) (HCC): Primary | ICD-10-CM

## 2021-12-13 RX ORDER — SODIUM CHLORIDE 9 MG/ML
20 INJECTION, SOLUTION INTRAVENOUS ONCE
Status: CANCELLED | OUTPATIENT
Start: 2022-02-03

## 2021-12-13 RX ORDER — NATALIZUMAB 300 MG/15ML
300 INJECTION INTRAVENOUS
Qty: 15 ML | Refills: 12 | Status: SHIPPED | OUTPATIENT
Start: 2021-12-13

## 2021-12-13 RX ORDER — ACETAMINOPHEN 325 MG/1
650 TABLET ORAL ONCE
Status: CANCELLED | OUTPATIENT
Start: 2022-02-03 | End: 2021-12-29

## 2021-12-13 NOTE — TELEPHONE ENCOUNTER
Tysabri is approved with [place] from 12/7/21 to 3/7/22  Medication must be obtained from [facility]  Script entered below  Please review and sign if agreeable  Therapy plan also entered and sent  Referral updated

## 2021-12-17 ENCOUNTER — CLINICAL SUPPORT (OUTPATIENT)
Dept: OBGYN CLINIC | Facility: CLINIC | Age: 31
End: 2021-12-17

## 2021-12-17 VITALS
SYSTOLIC BLOOD PRESSURE: 159 MMHG | WEIGHT: 120.4 LBS | DIASTOLIC BLOOD PRESSURE: 103 MMHG | HEART RATE: 106 BPM | BODY MASS INDEX: 20.67 KG/M2

## 2021-12-17 DIAGNOSIS — Z30.42 ENCOUNTER FOR SURVEILLANCE OF INJECTABLE CONTRACEPTIVE: Primary | ICD-10-CM

## 2021-12-17 PROCEDURE — 96372 THER/PROPH/DIAG INJ SC/IM: CPT

## 2021-12-17 RX ORDER — MEDROXYPROGESTERONE ACETATE 150 MG/ML
150 INJECTION, SUSPENSION INTRAMUSCULAR ONCE
Status: COMPLETED | OUTPATIENT
Start: 2021-12-17 | End: 2021-12-17

## 2021-12-17 RX ADMIN — MEDROXYPROGESTERONE ACETATE 150 MG: 150 INJECTION, SUSPENSION INTRAMUSCULAR at 14:03

## 2021-12-28 NOTE — TELEPHONE ENCOUNTER
Called [facility] at [phone] and spoke with Reshma Paige in the [facility] compliance dept  Explained below  He reports they sent the authorization to [employer] [facility]  Discussed per below notes, medication must be filled with [facility]  He spoke with case management team- specialty pharmacy is in the process of being updated  It may take a few days for this to be completed with authorization forwarded to [employer]  Will f/u  Awaiting scheduling of medication shipment to schedule infusion appt

## 2021-12-28 NOTE — TELEPHONE ENCOUNTER
Called [facility] at [phone] and spoke with Bridget Palomares  She reports their team must reach out to confirm if the patient is enrolled in the [facility]  Once confirmed, order should be ready to schedule for delivery  They are unsure how long this will take and offered to have myself call [facility] to verify  I explained that per the [facility] website, pt is authorized to receive Tysabri infusions  Asked how the pharmacy needs to confirm this information  She reports the [facility] program needs to send proof that pt is authorized with their program  I did ask for fax #, she responded that the program would know where to send it       Will need to call [facility] program

## 2021-12-30 NOTE — TELEPHONE ENCOUNTER
Per [facility] website, procurement method has been switched to [facility]  Will need to call to schedule delivery of medication, then schedule pt's infusion

## 2022-01-02 ENCOUNTER — HOSPITAL ENCOUNTER (EMERGENCY)
Facility: HOSPITAL | Age: 32
Discharge: HOME | End: 2022-01-02
Attending: EMERGENCY MEDICINE
Payer: COMMERCIAL

## 2022-01-02 VITALS
OXYGEN SATURATION: 99 % | WEIGHT: 124 LBS | DIASTOLIC BLOOD PRESSURE: 100 MMHG | HEART RATE: 94 BPM | BODY MASS INDEX: 21.28 KG/M2 | RESPIRATION RATE: 20 BRPM | SYSTOLIC BLOOD PRESSURE: 149 MMHG | TEMPERATURE: 99.3 F

## 2022-01-02 DIAGNOSIS — B34.9 VIRAL SYNDROME: Primary | ICD-10-CM

## 2022-01-02 PROCEDURE — U0003 INFECTIOUS AGENT DETECTION BY NUCLEIC ACID (DNA OR RNA); SEVERE ACUTE RESPIRATORY SYNDROME CORONAVIRUS 2 (SARS-COV-2) (CORONAVIRUS DISEASE [COVID-19]), AMPLIFIED PROBE TECHNIQUE, MAKING USE OF HIGH THROUGHPUT TECHNOLOGIES AS DESCRIBED BY CMS-2020-01-R: HCPCS | Performed by: EMERGENCY MEDICINE

## 2022-01-02 PROCEDURE — 99284 EMERGENCY DEPT VISIT MOD MDM: CPT | Performed by: EMERGENCY MEDICINE

## 2022-01-02 PROCEDURE — U0005 INFEC AGEN DETEC AMPLI PROBE: HCPCS | Performed by: EMERGENCY MEDICINE

## 2022-01-02 PROCEDURE — 99283 EMERGENCY DEPT VISIT LOW MDM: CPT

## 2022-01-02 RX ORDER — MELATONIN
1000 DAILY
Qty: 30 TABLET | Refills: 0 | Status: SHIPPED | OUTPATIENT
Start: 2022-01-02

## 2022-01-02 NOTE — Clinical Note
Bob Perera was seen and treated in our emergency department on 1/2/2022  Diagnosis:     Clau tSuart    She may return on this date:     If the COVID test is negative may return to school/work 72 hours after symptoms have resolved    If the COVID test is positive may return to school/work 10 days after onset of symptoms also must be asymptomatic for 72 hours       If you have any questions or concerns, please don't hesitate to call        Teresa Tafoya DO    ______________________________           _______________          _______________  Hospital Representative                              Date                                Time

## 2022-01-03 ENCOUNTER — TELEPHONE (OUTPATIENT)
Dept: NEUROLOGY | Facility: CLINIC | Age: 32
End: 2022-01-03

## 2022-01-03 LAB — SARS-COV-2 RNA RESP QL NAA+PROBE: POSITIVE

## 2022-01-03 NOTE — TELEPHONE ENCOUNTER
Patient called regarding FMLA paperwork  She states her employer faxed it to our office  Per chart review, no FMLA forms on file  Advised patient of procedure for completing forms (receive forms/collect payment/14 day turnaround time)  Patient verbalized understanding  Per review of fax folder, request for documents was received from patient's insurance company  Fax was transferred to [place] file  VENANCIO form emailed to patient @ [email or web address]  [email or web address] at patient's request    VENANCIO emailed  [employer] to records request (see above)

## 2022-01-04 NOTE — TELEPHONE ENCOUNTER
4 weeks after positive COVID 19 testing regardless of monoclonal antibodies treatment provided or not

## 2022-01-04 NOTE — TELEPHONE ENCOUNTER
As of 1/1/22, [place] is now [facility]  Will need to call to confirm if authorization is still in place  Called [facility] Port[facility] at [phone] and spoke with Roque Schroeder  She confirms authorizations from [facility] [employer] will carry over to [facility]  No new PA needed  Referral updated  Per chart review, pt has COVID infection as of 1/2/22  Dr VELASQUEZ, when should pt start Tysabri infusions in relation to COVID infection? Will need to schedule infusion and medication delivery

## 2022-01-05 NOTE — TELEPHONE ENCOUNTER
Pt to have infusion after 1/30/22  Called [facility] infusion center and reached voicemail  Will try again later

## 2022-01-05 NOTE — TELEPHONE ENCOUNTER
Called [facility] infusion center and spoke with Franc  Pt is scheduled for 2/1/22 @ 12:30pm      Pt also left voicemail asking if signed VENANCIO was received  Called pt, advised VENANCIO not received  She will fax again  Pt made aware of infusion appt date  She reports she has off of work on Thursdays and Fridays  She does not have sick time  Asking if infusion can be rescheduled to one of these days  Does not have preference on time of day  Advised I will reschedule infusion and call her back with updated appt  Will reschedule to Thursday or Friday

## 2022-01-06 NOTE — TELEPHONE ENCOUNTER
Called [facility] infusion center and spoke with Franc  Rescheduled pt's infusion to 2/3/22 @ 12:30pm      Called and Left a message on pt's answering machine for a call back  Pt should have MRIs completed prior to this date (once out of quarantine)  If not completed, will need to reschedule infusion  MyChart message sent  Therapy plan on hold

## 2022-01-10 ENCOUNTER — TELEPHONE (OUTPATIENT)
Dept: NEUROLOGY | Facility: CLINIC | Age: 32
End: 2022-01-10

## 2022-01-10 NOTE — TELEPHONE ENCOUNTER
request for documents was received from patient's insurance company given to Dr Colin Candelaria for signature

## 2022-01-10 NOTE — TELEPHONE ENCOUNTER
[employer] message received by pt: "Last read by Ahsan Duran at 9:47 AM on 1/6/2022 "    Will f/u to ensure MRIs are scheduled

## 2022-01-12 NOTE — TELEPHONE ENCOUNTER
Spoke with pt, she was under the impression MRIs are scheduled for 1/28/22 with [facility]  I advised MRIs are not scheduled with [facility]  Pt requested to be transferred to CS  Call transferred

## 2022-01-12 NOTE — ED PROVIDER NOTES
HPI: Patient is a 32 y o  female who presents with 3 days of fever, chills and cough which the patient describes at mild The patient has had contact with people with similar symptoms  The patient has not taken any medication  Allergies   Allergen Reactions    Clindamycin Throat Swelling    Onion - Food Allergy Anaphylaxis and Swelling     Swelling of throat   Gabapentin Diarrhea, Hives and Itching    Sumatriptan Swelling       Past Medical History:   Diagnosis Date    Asthma     Albuterol prn    Body mass index (BMI) less than 19 in adult 12/2/2019    Depression     hasn't required treatment since 2018    Multiple sclerosis ([facility] [address] ) 2018    follows poorly with neurology, manages with Robaxin    Seizure [facility])     possibly r/t MS diagnosis, has never been on meds, convulsive, last episode 9/2020      Past Surgical History:   Procedure Laterality Date    CLOSED REDUCTION FINGER FRACTURE Left     FL LUMBAR PUNCTURE DIAGNOSTIC  12/5/2019    TONSILLECTOMY Bilateral 4796    UMBILICAL HERNIA REPAIR  2016     Social History     Tobacco Use    Smoking status: Current Every Day Smoker     Packs/day: 0 25     Years: 9 00     Pack years: 2 25     Types: Cigarettes    Smokeless tobacco: Current User   Vaping Use    Vaping Use: Never used   Substance Use Topics    Alcohol use: Yes    Drug use: Not Currently     Types: Marijuana, Cocaine, MDMA (ecstacy)     Comment: hasn't used any since 2010       Nursing notes reviewed  Physical Exam:  ED Triage Vitals [01/02/22 1542]   Temperature Pulse Respirations Blood Pressure SpO2   99 3 °F (37 4 °C) 94 20 149/100 99 %      Temp Source Heart Rate Source Patient Position - Orthostatic VS BP Location FiO2 (%)   Oral Monitor Sitting Left arm --      Pain Score       7           ROS: Positive for fever, chills and cough ,the remainder of a 10 organ system ROS was otherwise unremarkable    General: awake, alert, no acute distress    Head: normocephalic, atraumatic    Eyes: no scleral icterus  Ears: external ears normal, hearing grossly intact  Nose: external exam grossly normal, positive nasal discharge  Neck: symmetric, No JVD noted, trachea midline  Pulmonary: no respiratory distress, no tachypnea noted  Cardiovascular: appears well perfused  Abdomen: no distention noted  Musculoskeletal: no deformities noted, tone normal  Neuro: grossly non-focal  Psych: mood and affect appropriate    The patient is stable and has a history and physical exam consistent with a viral illness  COVID19 testing has been performed  I considered the patient's other medical conditions as applicable/noted above in my medical decision making  The patient is stable upon discharge  The plan is for supportive care at home  The patient (and any family present) verbalized understanding of the discharge instructions and warnings that would necessitate return to the Emergency Department  All questions were answered prior to discharge  Pt re-examined and evaluated after testing and treatment  Spoke with the patient and feeling improved and sxs have resolved  Will discharge home with close f/u with pcp and instructed to return to the ED if sxs worsen or continue  Pt agrees with the plan for discharge and feels comfortable to go home with proper f/u  Advised to return for worsening or additional problems  Diagnostic tests were reviewed and questions answered  Diagnosis, care plan and treatment options were discussed  The patient understand instructions and will follow up as directed  Counseling: I had a detailed discussion with the patient and/or guardian regarding: the historical points, exam findings, and any diagnostic results supporting the discharge diagnosis, lab results, radiology results, discharge instructions reviewed with patient and/or family/caregiver and understanding was verbalized   Instructions given to return to the emergency department if symptoms worsen or persist, or if there are any questions or concerns that arise at home  All labs reviewed and utilized in the medical decision making process    All radiology studies independently viewed by me and interpreted by the radiologist       Medications - No data to display  Final diagnoses:   Viral syndrome     Time reflects when diagnosis was documented in both MDM as applicable and the Disposition within this note     Time User Action Codes Description Comment    1/2/2022  3:48 PM Jerry Ponce [B34 9] Viral syndrome       ED Disposition     ED Disposition Condition Date/Time Comment    Discharge Stable Sun Jan 2, 2022  3:48 PM Verline Reasons 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  Follow-up Information    None       Discharge Medication List as of 1/2/2022  3:48 PM      CONTINUE these medications which have NOT CHANGED    Details   albuterol (Ventolin HFA) 90 mcg/act inhaler Inhale 2 puffs every 6 (six) hours as needed for wheezing, Starting Fri 12/3/2021, Normal      ibuprofen (MOTRIN) 600 mg tablet Take 1 tablet (600 mg total) by mouth every 6 (six) hours as needed for mild pain or moderate pain, Starting Sat 11/6/2021, Print      medroxyPROGESTERone (DEPO-PROVERA) 150 mg/mL injection Inject 1 mL (150 mg total) into a muscle every 3 (three) months, Starting Thu 7/15/2021, Normal      Tysabri 300 MG/15ML Infuse 15 mL (300 mg total) into a venous catheter every 28 days, Starting Mon 12/13/2021, Normal           No discharge procedures on file      Electronically Signed by        Dalia Bardales DO  01/12/22 5238

## 2022-01-17 NOTE — TELEPHONE ENCOUNTER
MRIs are still not scheduled  Left detailed message for pt providing CS phone #  Advised pt to call our office back if she would like assistance

## 2022-01-18 ENCOUNTER — TELEPHONE (OUTPATIENT)
Dept: HEMATOLOGY ONCOLOGY | Facility: CLINIC | Age: 32
End: 2022-01-18

## 2022-01-18 ENCOUNTER — TELEPHONE (OUTPATIENT)
Dept: NEUROLOGY | Facility: CLINIC | Age: 32
End: 2022-01-18

## 2022-01-18 NOTE — TELEPHONE ENCOUNTER
LVM to patient in regards to completing Covid screening questions and to review our no show/cancellation policy  Informed patient to give the office a call back at [phone]

## 2022-01-19 NOTE — TELEPHONE ENCOUNTER
MRIs not scheduled  Pt requires MRIs prior to 2/3/22 infusion or this will need to be rescheduled  I have not yet scheduled medication delivery for infusion as MRIs are not scheduled  Called and Left a message on pt's answering machine for a call back  Need to schedule MRIs and medication delivery or cancel infusion

## 2022-01-21 ENCOUNTER — TELEPHONE (OUTPATIENT)
Dept: NEUROLOGY | Facility: CLINIC | Age: 32
End: 2022-01-21

## 2022-01-21 ENCOUNTER — TELEPHONE (OUTPATIENT)
Dept: HEMATOLOGY ONCOLOGY | Facility: CLINIC | Age: 32
End: 2022-01-21

## 2022-01-21 NOTE — TELEPHONE ENCOUNTER
Dr Schuler- pt has still not scheduled her MRIs  Infusion tentatively scheduled 2/3/22 (less than 2 weeks)  It is unlikely for pt to have 3 routine MRIs completed in this time  Pt is aware of need to schedule and was even transferred to central scheduling team at one point  We are now unable to reach pt and she is not returning our calls  Should I cancel Tysabri scheduled on 2/3/22 and place plan on hold until MRIs are completed?

## 2022-01-21 NOTE — TELEPHONE ENCOUNTER
Spoke with Teri Lr at [facility] infusion center  Cancelled pt's infusion  Plan already on hold  Left detailed message for pt (Okay per communication consent) advising her infusion was cancelled  Asked her to schedule MRIs, CS phone # provided  3rd attempt  Letter mailed       MARCY

## 2022-01-21 NOTE — TELEPHONE ENCOUNTER
Attempted to reach [facility] infusion center and reached voicemail  Will try again later

## 2022-01-26 ENCOUNTER — TELEPHONE (OUTPATIENT)
Dept: NEUROLOGY | Facility: CLINIC | Age: 32
End: 2022-01-26

## 2022-01-26 NOTE — TELEPHONE ENCOUNTER
Reminder appt call    Left message on voicemail       Patient is scheduled  for 1/28/2021 @ 2pm with Dr Arik Landry to advised pt to switch into virtual visit per recommendation of our office due to high increase cases of Covid 19 and potential exposure

## 2022-02-01 ENCOUNTER — HOSPITAL ENCOUNTER (EMERGENCY)
Facility: HOSPITAL | Age: 32
Discharge: HOME | End: 2022-02-01
Attending: EMERGENCY MEDICINE | Admitting: EMERGENCY MEDICINE

## 2022-02-01 VITALS
OXYGEN SATURATION: 99 % | SYSTOLIC BLOOD PRESSURE: 137 MMHG | TEMPERATURE: 98.7 F | DIASTOLIC BLOOD PRESSURE: 93 MMHG | WEIGHT: 124.7 LBS | HEART RATE: 91 BPM | BODY MASS INDEX: 21.4 KG/M2 | RESPIRATION RATE: 16 BRPM

## 2022-02-01 DIAGNOSIS — M25.512 ACUTE PAIN OF LEFT SHOULDER: Primary | ICD-10-CM

## 2022-02-01 PROCEDURE — 99283 EMERGENCY DEPT VISIT LOW MDM: CPT

## 2022-02-01 PROCEDURE — 99284 EMERGENCY DEPT VISIT MOD MDM: CPT | Performed by: EMERGENCY MEDICINE

## 2022-02-01 RX ORDER — METHOCARBAMOL 500 MG/1
500 TABLET, FILM COATED ORAL ONCE
Status: COMPLETED | OUTPATIENT
Start: 2022-02-01 | End: 2022-02-01

## 2022-02-01 RX ORDER — ACETAMINOPHEN 325 MG/1
650 TABLET ORAL ONCE
Status: COMPLETED | OUTPATIENT
Start: 2022-02-01 | End: 2022-02-01

## 2022-02-01 RX ORDER — IBUPROFEN 600 MG/1
600 TABLET ORAL ONCE
Status: COMPLETED | OUTPATIENT
Start: 2022-02-01 | End: 2022-02-01

## 2022-02-01 RX ORDER — NAPROXEN 500 MG/1
500 TABLET ORAL 2 TIMES DAILY WITH MEALS
Qty: 30 TABLET | Refills: 0 | Status: SHIPPED | OUTPATIENT
Start: 2022-02-01

## 2022-02-01 RX ORDER — METHOCARBAMOL 500 MG/1
500 TABLET, FILM COATED ORAL 2 TIMES DAILY
Qty: 20 TABLET | Refills: 0 | Status: SHIPPED | OUTPATIENT
Start: 2022-02-01

## 2022-02-01 RX ADMIN — METHOCARBAMOL 500 MG: 500 TABLET ORAL at 12:09

## 2022-02-01 RX ADMIN — IBUPROFEN 600 MG: 600 TABLET ORAL at 12:09

## 2022-02-01 RX ADMIN — ACETAMINOPHEN 650 MG: 325 TABLET ORAL at 12:09

## 2022-02-01 NOTE — ED PROVIDER NOTES
History  Chief Complaint   Patient presents with    Shoulder Pain     lifting windshield this morning at work and pulled left shoulder; pain going up into neck also  History provided by:  Patient  Shoulder Pain  Location:  Shoulder  Shoulder location:  L shoulder  Injury: no    Pain details:     Quality:  Aching and cramping    Radiates to:  Does not radiate    Severity:  Mild    Onset quality:  Gradual    Timing:  Constant  Relieved by:  Nothing  Worsened by:  Nothing  Ineffective treatments:  None tried  Associated symptoms: decreased range of motion    Associated symptoms: no fever        Prior to Admission Medications   Prescriptions Last Dose Informant Patient Reported? Taking?    Tysabri 300 MG/15ML   No No   Sig: Infuse 15 mL (300 mg total) into a venous catheter every 28 days   albuterol (Ventolin HFA) 90 mcg/act inhaler   No No   Sig: Inhale 2 puffs every 6 (six) hours as needed for wheezing   cholecalciferol (VITAMIN D3) 1,000 units tablet   No No   Sig: Take 1 tablet (1,000 Units total) by mouth daily   ibuprofen (MOTRIN) 600 mg tablet  Self No No   Sig: Take 1 tablet (600 mg total) by mouth every 6 (six) hours as needed for mild pain or moderate pain   medroxyPROGESTERone (DEPO-PROVERA) 150 mg/mL injection  Self No No   Sig: Inject 1 mL (150 mg total) into a muscle every 3 (three) months      Facility-Administered Medications Last Administration Doses Remaining   medroxyPROGESTERone (DEPO-PROVERA) IM injection 150 mg 10/1/2021  9:09 AM 2          Past Medical History:   Diagnosis Date    Asthma     Albuterol prn    Body mass index (BMI) less than 19 in adult 12/2/2019    Depression     hasn't required treatment since 2018    Multiple sclerosis ([facility] [address] ) 2018    follows poorly with neurology, manages with Robaxin    Seizure [facility])     possibly r/t MS diagnosis, has never been on meds, convulsive, last episode 9/2020       Past Surgical History:   Procedure Laterality Date    CLOSED REDUCTION FINGER FRACTURE Left     FL LUMBAR PUNCTURE DIAGNOSTIC  12/5/2019    TONSILLECTOMY Bilateral 1640    UMBILICAL HERNIA REPAIR  2016       Family History   Problem Relation Age of Onset    Coronary artery disease Paternal Grandmother     Hypertension Paternal Grandfather         TYPE 2    Diabetes Paternal Grandfather     Cancer Paternal Grandfather         pancreatic, lung    Cancer Mother         thyroid    Diabetes Father     Hypertension Father     Breast cancer Neg Hx      I have reviewed and agree with the history as documented  E-Cigarette/Vaping    E-Cigarette Use Never User      E-Cigarette/Vaping Substances     Social History     Tobacco Use    Smoking status: Former Smoker     Packs/day: 0 25     Years: 9 00     Pack years: 2 25     Types: Cigarettes    Smokeless tobacco: Current User   Vaping Use    Vaping Use: Never used   Substance Use Topics    Alcohol use: Not Currently    Drug use: Not Currently     Types: Marijuana, Cocaine, MDMA (ecstacy)     Comment: hasn't used any since 2010       Review of Systems   Constitutional: Negative for chills and fever  HENT: Negative for rhinorrhea, sore throat and trouble swallowing  Eyes: Negative for pain  Respiratory: Negative for cough, shortness of breath, wheezing and stridor  Cardiovascular: Negative for chest pain and leg swelling  Gastrointestinal: Negative for abdominal pain, diarrhea and nausea  Endocrine: Negative for polyuria  Genitourinary: Negative for dysuria, flank pain and urgency  Musculoskeletal: Negative for joint swelling, myalgias and neck stiffness  Skin: Negative for rash  Allergic/Immunologic: Negative for immunocompromised state  Neurological: Negative for dizziness, syncope, weakness, numbness and headaches  Psychiatric/Behavioral: Negative for confusion and suicidal ideas  All other systems reviewed and are negative  Physical Exam  Physical Exam  Vitals and nursing note reviewed  Constitutional:       Appearance: Normal appearance  She is well-developed  HENT:      Head: Normocephalic and atraumatic  Nose: Nose normal       Mouth/Throat:      Mouth: Mucous membranes are moist    Eyes:      Extraocular Movements: Extraocular movements intact  Pupils: Pupils are equal, round, and reactive to light  Cardiovascular:      Rate and Rhythm: Normal rate and regular rhythm  Heart sounds: No murmur heard  No friction rub  Pulmonary:      Effort: No respiratory distress  Breath sounds: Normal breath sounds  No wheezing or rales  Abdominal:      General: Bowel sounds are normal  There is no distension  Palpations: Abdomen is soft  Tenderness: There is no abdominal tenderness  Musculoskeletal:         General: Tenderness and signs of injury present  Deformity: n  Cervical back: Normal range of motion and neck supple  Comments: Unable to abduct left arm without pain  Neurovascularly intact, tenderness to palpation overlying the rotator cuff  And left trapezius spasm   Skin:     General: Skin is warm  Findings: No rash  Neurological:      Mental Status: She is alert and oriented to person, place, and time     Psychiatric:         Mood and Affect: Mood normal          Vital Signs  ED Triage Vitals [02/01/22 1158]   Temperature Pulse Respirations Blood Pressure SpO2   98 7 °F (37 1 °C) 91 16 137/93 99 %      Temp Source Heart Rate Source Patient Position - Orthostatic VS BP Location FiO2 (%)   Oral Monitor Sitting Right arm --      Pain Score       7           Vitals:    02/01/22 1158   BP: 137/93   Pulse: 91   Patient Position - Orthostatic VS: Sitting         Visual Acuity      ED Medications  Medications   acetaminophen (TYLENOL) tablet 650 mg (650 mg Oral Given 2/1/22 1209)   ibuprofen (MOTRIN) tablet 600 mg (600 mg Oral Given 2/1/22 1209)   methocarbamol (ROBAXIN) tablet 500 mg (500 mg Oral Given 2/1/22 1209)       Diagnostic Studies  Results Reviewed     None                 No orders to display              Procedures  Procedures         ED Course                               SBIRT 22yo+      Most Recent Value   SBIRT (24 yo +)    In order to provide better care to our patients, we are screening all of our patients for alcohol and drug use  Would it be okay to ask you these screening questions? No Filed at: 02/01/2022 1213                    MDM  Number of Diagnoses or Management Options  Acute pain of left shoulder: new and requires workup  Diagnosis management comments: This is a 32 year female who presents emergency department with symptoms left shoulder pain after lifting a windshield  Patient has tenderness to palpation and pain with range of motion  In the emergency department this point time there is no evidence of fall no evidence of significant trauma  No known indication for x-ray at this point time decreased range of motion secondary to pain treatment with muscle relaxers as well as NSAIDs for pain control  Pt re-examined and evaluated after testing and treatment  Spoke with the patient and feeling improved and sxs have resolved  Will discharge home with close f/u with pcp and instructed to return to the ED if sxs worsen or continue  Pt agrees with the plan for discharge and feels comfortable to go home with proper f/u  Advised to return for worsening or additional problems  Diagnostic tests were reviewed and questions answered  Diagnosis, care plan and treatment options were discussed  The patient understand instructions and will follow up as directed  Counseling: I had a detailed discussion with the patient and/or guardian regarding: the historical points, exam findings, and any diagnostic results supporting the discharge diagnosis, lab results, radiology results, discharge instructions reviewed with patient and/or family/caregiver and understanding was verbalized   Instructions given to return to the emergency department if symptoms worsen or persist, or if there are any questions or concerns that arise at home  All labs reviewed and utilized in the medical decision making process    All radiology studies independently viewed by me and interpreted by the radiologist             Disposition  Final diagnoses:   Acute pain of left shoulder     Time reflects when diagnosis was documented in both MDM as applicable and the Disposition within this note     Time User Action Codes Description Comment    2/1/2022 12:01 PM Fahad Ponce [O07 542] Acute pain of left shoulder       ED Disposition     ED Disposition Condition Date/Time Comment    Discharge Stable Tue Feb 1, 2022 12:01 PM Octavio Mendoza 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              Follow-up Information    None         Discharge Medication List as of 2/1/2022 12:02 PM      START taking these medications    Details   methocarbamol (ROBAXIN) 500 mg tablet Take 1 tablet (500 mg total) by mouth 2 (two) times a day, Starting Tue 2/1/2022, Normal      naproxen (Naprosyn) 500 mg tablet Take 1 tablet (500 mg total) by mouth 2 (two) times a day with meals, Starting Tue 2/1/2022, Normal         CONTINUE these medications which have NOT CHANGED    Details   albuterol (Ventolin HFA) 90 mcg/act inhaler Inhale 2 puffs every 6 (six) hours as needed for wheezing, Starting Fri 12/3/2021, Normal      cholecalciferol (VITAMIN D3) 1,000 units tablet Take 1 tablet (1,000 Units total) by mouth daily, Starting Sun 1/2/2022, Normal      ibuprofen (MOTRIN) 600 mg tablet Take 1 tablet (600 mg total) by mouth every 6 (six) hours as needed for mild pain or moderate pain, Starting Sat 11/6/2021, Print      medroxyPROGESTERone (DEPO-PROVERA) 150 mg/mL injection Inject 1 mL (150 mg total) into a muscle every 3 (three) months, Starting Thu 7/15/2021, Normal      Tysabri 300 MG/15ML Infuse 15 mL (300 mg total) into a venous catheter every 28 days, Starting Mon 12/13/2021, Normal             No discharge procedures on file      PDMP Review       Value Time User    PDMP Reviewed  Yes 10/16/2020 11:39 PM Anthony Tom DO          ED Provider  Electronically Signed by           Nasim Lucas DO  02/01/22 1419

## 2022-02-01 NOTE — Clinical Note
Georgette Jaida was seen and treated in our emergency department on 2/1/2022  Diagnosis:     Kathrine Marrero    She may return on this date: If you have any questions or concerns, please don't hesitate to call        German Camacho, DO    ______________________________           _______________          _______________  Hospital Representative                              Date                                Time

## 2022-02-17 ENCOUNTER — HOSPITAL ENCOUNTER (EMERGENCY)
Facility: HOSPITAL | Age: 32
Discharge: HOME | End: 2022-02-17
Attending: EMERGENCY MEDICINE | Admitting: EMERGENCY MEDICINE
Payer: MEDICARE

## 2022-02-17 VITALS
SYSTOLIC BLOOD PRESSURE: 105 MMHG | HEART RATE: 96 BPM | WEIGHT: 121.25 LBS | BODY MASS INDEX: 20.81 KG/M2 | OXYGEN SATURATION: 100 % | DIASTOLIC BLOOD PRESSURE: 71 MMHG | TEMPERATURE: 97.8 F | RESPIRATION RATE: 20 BRPM

## 2022-02-17 DIAGNOSIS — R56.9 SEIZURE-LIKE ACTIVITY (HCC): Primary | ICD-10-CM

## 2022-02-17 LAB
ALBUMIN SERPL BCP-MCNC: 4.1 G/DL (ref 3–5.2)
ALP SERPL-CCNC: 62 U/L (ref 43–122)
ALT SERPL W P-5'-P-CCNC: 19 U/L
ANION GAP SERPL CALCULATED.3IONS-SCNC: 12 MMOL/L (ref 5–14)
AST SERPL W P-5'-P-CCNC: 19 U/L (ref 14–36)
ATRIAL RATE: 96 BPM
BASOPHILS # BLD AUTO: 0.1 THOUSANDS/ΜL (ref 0–0.1)
BASOPHILS NFR BLD AUTO: 1 % (ref 0–1)
BILIRUB SERPL-MCNC: 0.32 MG/DL
BUN SERPL-MCNC: 11 MG/DL (ref 5–25)
CALCIUM SERPL-MCNC: 8.3 MG/DL (ref 8.4–10.2)
CHLORIDE SERPL-SCNC: 110 MMOL/L (ref 97–108)
CO2 SERPL-SCNC: 20 MMOL/L (ref 22–30)
CREAT SERPL-MCNC: 0.64 MG/DL (ref 0.6–1.2)
EOSINOPHIL # BLD AUTO: 0.3 THOUSAND/ΜL (ref 0–0.4)
EOSINOPHIL NFR BLD AUTO: 4 % (ref 0–6)
ERYTHROCYTE [DISTWIDTH] IN BLOOD BY AUTOMATED COUNT: 12.5 %
GFR SERPL CREATININE-BSD FRML MDRD: 119 ML/MIN/1.73SQ M
GLUCOSE SERPL-MCNC: 103 MG/DL (ref 70–99)
HCT VFR BLD AUTO: 38.8 % (ref 36–46)
HGB BLD-MCNC: 13.4 G/DL (ref 12–16)
LYMPHOCYTES # BLD AUTO: 2.1 THOUSANDS/ΜL (ref 0.5–4)
LYMPHOCYTES NFR BLD AUTO: 36 % (ref 25–45)
MCH RBC QN AUTO: 32.5 PG (ref 26–34)
MCHC RBC AUTO-ENTMCNC: 34.5 G/DL (ref 31–36)
MCV RBC AUTO: 94 FL (ref 80–100)
MONOCYTES # BLD AUTO: 0.5 THOUSAND/ΜL (ref 0.2–0.9)
MONOCYTES NFR BLD AUTO: 9 % (ref 1–10)
NEUTROPHILS # BLD AUTO: 2.8 THOUSANDS/ΜL (ref 1.8–7.8)
NEUTS SEG NFR BLD AUTO: 49 % (ref 45–65)
P AXIS: 69 DEGREES
PLATELET # BLD AUTO: 189 THOUSANDS/UL (ref 150–450)
PMV BLD AUTO: 7.8 FL (ref 8.9–12.7)
POTASSIUM SERPL-SCNC: 3.7 MMOL/L (ref 3.6–5)
PR INTERVAL: 138 MS
PROT SERPL-MCNC: 7.3 G/DL (ref 5.9–8.4)
QRS AXIS: 68 DEGREES
QRSD INTERVAL: 110 MS
QT INTERVAL: 388 MS
QTC INTERVAL: 490 MS
RBC # BLD AUTO: 4.13 MILLION/UL (ref 4–5.2)
SODIUM SERPL-SCNC: 142 MMOL/L (ref 137–147)
T WAVE AXIS: 16 DEGREES
TSH SERPL DL<=0.05 MIU/L-ACNC: 2.54 UIU/ML (ref 0.47–4.68)
VENTRICULAR RATE: 96 BPM
WBC # BLD AUTO: 5.8 THOUSAND/UL (ref 4.5–11)

## 2022-02-17 PROCEDURE — 99285 EMERGENCY DEPT VISIT HI MDM: CPT

## 2022-02-17 PROCEDURE — 85025 COMPLETE CBC W/AUTO DIFF WBC: CPT | Performed by: PHYSICIAN ASSISTANT

## 2022-02-17 PROCEDURE — 84443 ASSAY THYROID STIM HORMONE: CPT | Performed by: PHYSICIAN ASSISTANT

## 2022-02-17 PROCEDURE — 99285 EMERGENCY DEPT VISIT HI MDM: CPT | Performed by: PHYSICIAN ASSISTANT

## 2022-02-17 PROCEDURE — 80053 COMPREHEN METABOLIC PANEL: CPT | Performed by: PHYSICIAN ASSISTANT

## 2022-02-17 PROCEDURE — 93010 ELECTROCARDIOGRAM REPORT: CPT

## 2022-02-17 PROCEDURE — 36415 COLL VENOUS BLD VENIPUNCTURE: CPT | Performed by: PHYSICIAN ASSISTANT

## 2022-02-17 PROCEDURE — 93005 ELECTROCARDIOGRAM TRACING: CPT

## 2022-02-17 RX ORDER — LORAZEPAM 2 MG/ML
1 INJECTION INTRAMUSCULAR ONCE
Status: COMPLETED | OUTPATIENT
Start: 2022-02-17 | End: 2022-02-17

## 2022-02-17 RX ORDER — DEXAMETHASONE 4 MG/1
4 TABLET ORAL 2 TIMES DAILY WITH MEALS
Qty: 4 TABLET | Refills: 0 | Status: SHIPPED | OUTPATIENT
Start: 2022-02-17

## 2022-02-17 RX ORDER — LORAZEPAM 1 MG/1
1 TABLET ORAL ONCE
Status: COMPLETED | OUTPATIENT
Start: 2022-02-17 | End: 2022-02-17

## 2022-02-17 RX ORDER — DEXAMETHASONE 4 MG/1
4 TABLET ORAL ONCE
Status: COMPLETED | OUTPATIENT
Start: 2022-02-17 | End: 2022-02-17

## 2022-02-17 RX ADMIN — DEXAMETHASONE 4 MG: 4 TABLET ORAL at 00:38

## 2022-02-17 RX ADMIN — LORAZEPAM 1 MG: 1 TABLET ORAL at 00:38

## 2022-02-17 NOTE — ED NOTES
Reports seizure tonight -has been off her medications for approximately 3 months due to insurance change  New insurance picked up today  No incontinence of urine   No tongue biting  Generalized discomfort       Merlinda Section, RN  02/17/22 2081

## 2022-02-17 NOTE — ED NOTES
Patient sleeping when not disturbed  Monitor - sinus rhythm  Father present in room with patient       Marcin Kwan RN  02/17/22 4851

## 2022-02-17 NOTE — ED NOTES
Patient noted to be sleeping with no seizure activity noted  Respirations are easy and non labored       Saeed Colunga RN  02/17/22 2772

## 2022-02-17 NOTE — ED PROVIDER NOTES
History  Chief Complaint   Patient presents with    Seizure - Prior Hx Of     Patient with a significant PMH of multiple sclerosis presents for an evaluation of possible seizure  States she was in the middle of an argument when she felt like she was going to have a seizure  States she remembers the event with tonic clonic movements of upper and lower extremities  She was alert and oriented after event and denies any post ictal event  Denies any tongue biting or urinary incontinence  Will typically have several of these per month  Follows with neurology who are aware of these episodes  Controlled normally with steroids however has not been compliant with medication due to insurance issues  She does not take any seizure medication nor has she been prescribed any by neurology  Prior to Admission Medications   Prescriptions Last Dose Informant Patient Reported? Taking?    Tysabri 300 MG/15ML Not Taking at Unknown time  No No   Sig: Infuse 15 mL (300 mg total) into a venous catheter every 28 days   Patient not taking: Reported on 2/17/2022    albuterol (Ventolin HFA) 90 mcg/act inhaler   No No   Sig: Inhale 2 puffs every 6 (six) hours as needed for wheezing   cholecalciferol (VITAMIN D3) 1,000 units tablet Not Taking at Unknown time  No No   Sig: Take 1 tablet (1,000 Units total) by mouth daily   Patient not taking: Reported on 2/17/2022    ibuprofen (MOTRIN) 600 mg tablet  Self No No   Sig: Take 1 tablet (600 mg total) by mouth every 6 (six) hours as needed for mild pain or moderate pain   medroxyPROGESTERone (DEPO-PROVERA) 150 mg/mL injection  Self No No   Sig: Inject 1 mL (150 mg total) into a muscle every 3 (three) months   methocarbamol (ROBAXIN) 500 mg tablet Not Taking at Unknown time  No No   Sig: Take 1 tablet (500 mg total) by mouth 2 (two) times a day   Patient not taking: Reported on 2/17/2022    naproxen (Naprosyn) 500 mg tablet   No No   Sig: Take 1 tablet (500 mg total) by mouth 2 (two) times a day with meals      Facility-Administered Medications Last Administration Doses Remaining   medroxyPROGESTERone (DEPO-PROVERA) IM injection 150 mg 10/1/2021  9:09 AM 2          Past Medical History:   Diagnosis Date    Asthma     Albuterol prn    Body mass index (BMI) less than 19 in adult 12/2/2019    Depression     hasn't required treatment since 2018    Multiple sclerosis ([address] ) 2018    follows poorly with neurology, manages with Robaxin    Seizure [facility])     possibly r/t MS diagnosis, has never been on meds, convulsive, last episode 9/2020       Past Surgical History:   Procedure Laterality Date    CLOSED REDUCTION FINGER FRACTURE Left     FL LUMBAR PUNCTURE DIAGNOSTIC  12/5/2019    TONSILLECTOMY Bilateral 4278    UMBILICAL HERNIA REPAIR  2016       Family History   Problem Relation Age of Onset    Coronary artery disease Paternal Grandmother     Hypertension Paternal Grandfather         TYPE 2    Diabetes Paternal Grandfather     Cancer Paternal Grandfather         pancreatic, lung    Cancer Mother         thyroid    Diabetes Father     Hypertension Father     Breast cancer Neg Hx      I have reviewed and agree with the history as documented  E-Cigarette/Vaping    E-Cigarette Use Never User      E-Cigarette/Vaping Substances     Social History     Tobacco Use    Smoking status: Former Smoker     Packs/day: 0 25     Years: 9 00     Pack years: 2 25     Types: Cigarettes    Smokeless tobacco: Current User   Vaping Use    Vaping Use: Never used   Substance Use Topics    Alcohol use: Yes    Drug use: Not Currently     Types: Marijuana, Cocaine, MDMA (ecstacy)     Comment: hasn't used any since 2010       Review of Systems   Constitutional: Negative for chills and fever  HENT: Negative for congestion, ear pain and sore throat  Eyes: Negative for pain  Respiratory: Negative for cough and shortness of breath  Cardiovascular: Negative for chest pain     Gastrointestinal: Negative for abdominal pain, nausea and vomiting  Genitourinary: Negative for dysuria  Musculoskeletal: Negative for back pain  Skin: Negative for rash  Neurological: Positive for seizures  Negative for dizziness and numbness  Psychiatric/Behavioral: Negative for suicidal ideas  All other systems reviewed and are negative  Physical Exam  Physical Exam  Vitals reviewed  Constitutional:       Appearance: Normal appearance  She is well-developed  HENT:      Head: Normocephalic and atraumatic  Right Ear: External ear normal       Left Ear: External ear normal       Nose: Nose normal       Mouth/Throat:      Mouth: Mucous membranes are moist       Pharynx: Oropharynx is clear  Eyes:      Extraocular Movements: Extraocular movements intact  Pupils: Pupils are equal, round, and reactive to light  Cardiovascular:      Rate and Rhythm: Normal rate and regular rhythm  Heart sounds: Normal heart sounds  Pulmonary:      Effort: Pulmonary effort is normal       Breath sounds: Normal breath sounds  Abdominal:      General: Bowel sounds are normal       Palpations: Abdomen is soft  Tenderness: There is no abdominal tenderness  Musculoskeletal:         General: Normal range of motion  Cervical back: Normal range of motion and neck supple  Skin:     General: Skin is warm and dry  Capillary Refill: Capillary refill takes less than 2 seconds  Neurological:      Mental Status: She is alert and oriented to person, place, and time     Psychiatric:         Behavior: Behavior normal          Vital Signs  ED Triage Vitals [02/17/22 0004]   Temperature Pulse Respirations Blood Pressure SpO2   97 8 °F (36 6 °C) (!) 110 18 128/93 98 %      Temp Source Heart Rate Source Patient Position - Orthostatic VS BP Location FiO2 (%)   Oral Monitor Sitting Left arm --      Pain Score       5           Vitals:    02/17/22 0004 02/17/22 0043 02/17/22 0100 02/17/22 0130   BP: 128/93 109/71 107/65 106/62 Pulse: (!) 110 95 91 93   Patient Position - Orthostatic VS: Sitting Lying Lying Lying         Visual Acuity  Visual Acuity      Most Recent Value   L Pupil Size (mm) 4   R Pupil Size (mm) 4          ED Medications  Medications   LORazepam (FOR EMS ONLY) (ATIVAN) 2 mg/mL injection 2 mg (0 mg Does not apply Given to EMS 2/17/22 0007)   LORazepam (ATIVAN) tablet 1 mg (1 mg Oral Given 2/17/22 0038)   dexamethasone (DECADRON) tablet 4 mg (4 mg Oral Given 2/17/22 0038)       Diagnostic Studies  Results Reviewed     Procedure Component Value Units Date/Time    TSH, 3rd generation [[id number]]  (Normal) Collected: 02/17/22 0037    Lab Status: Final result Specimen: Blood from Arm, Left Updated: 02/17/22 0124     TSH 3RD GENERATON 2 540 uIU/mL     Narrative:      Patients undergoing fluorescein dye angiography may retain small amounts of fluorescein in the body for 48-72 hours post procedure  Samples containing fluorescein can produce falsely depressed TSH values  If the patient had this procedure,a specimen should be resubmitted post fluorescein clearance        Comprehensive metabolic panel [[id number]]  (Abnormal) Collected: 02/17/22 0037    Lab Status: Final result Specimen: Blood from Arm, Left Updated: 02/17/22 0055     Sodium 142 mmol/L      Potassium 3 7 mmol/L      Chloride 110 mmol/L      CO2 20 mmol/L      ANION GAP 12 mmol/L      BUN 11 mg/dL      Creatinine 0 64 mg/dL      Glucose 103 mg/dL      Calcium 8 3 mg/dL      AST 19 U/L      ALT 19 U/L      Alkaline Phosphatase 62 U/L      Total Protein 7 3 g/dL      Albumin 4 1 g/dL      Total Bilirubin 0 32 mg/dL      eGFR 119 ml/min/1 73sq m     Narrative:      [place] guidelines for Chronic Kidney Disease (CKD):     Stage 1 with normal or high GFR (GFR > 90 mL/min/1 73 square meters)    Stage 2 Mild CKD (GFR = 60-89 mL/min/1 73 square meters)    Stage 3A Moderate CKD (GFR = 45-59 mL/min/1 73 square meters)    Stage 3B Moderate CKD (GFR = 30-44 mL/min/1 73 square meters)    Stage 4 Severe CKD (GFR = 15-29 mL/min/1 73 square meters)    Stage 5 End Stage CKD (GFR <15 mL/min/1 73 square meters)  Note: GFR calculation is accurate only with a steady state creatinine    CBC and differential [[id number]]  (Abnormal) Collected: 02/17/22 0037    Lab Status: Final result Specimen: Blood from Arm, Left Updated: 02/17/22 0052     WBC 5 80 Thousand/uL      RBC 4 13 Million/uL      Hemoglobin 13 4 g/dL      Hematocrit 38 8 %      MCV 94 fL      MCH 32 5 pg      MCHC 34 5 g/dL      RDW 12 5 %      MPV 7 8 fL      Platelets 792 Thousands/uL      Neutrophils Relative 49 %      Lymphocytes Relative 36 %      Monocytes Relative 9 %      Eosinophils Relative 4 %      Basophils Relative 1 %      Neutrophils Absolute 2 80 Thousands/µL      Lymphocytes Absolute 2 10 Thousands/µL      Monocytes Absolute 0 50 Thousand/µL      Eosinophils Absolute 0 30 Thousand/µL      Basophils Absolute 0 10 Thousands/µL     UA w Reflex to Microscopic w Reflex to Culture [[id number]]     Lab Status: No result Specimen: Urine, Clean Catch     POCT pregnancy, urine [[id number]]     Lab Status: No result                  No orders to display              Procedures  Procedures         ED Course  ED Course as of 02/17/22 0201   Thu Feb 17, 2022   0031 Procedure Note: EKG  Date/Time: 02/17/22 12:31 AM   Performed by: Malena Solomon  Authorized by: Malena Solomon  ECG interpreted by me, the ED Provider: yes   The EKG demonstrates:  Rate 96  Rhythm normal sinus  QTc 490  No ST elevations/depressions  RBBB     0143 Resting comfortably                               SBIRT 22yo+      Most Recent Value   SBIRT (25 yo +)    In order to provide better care to our patients, we are screening all of our patients for alcohol and drug use  Would it be okay to ask you these screening questions? Yes Filed at: 02/17/2022 0009   Initial Alcohol Screen: US AUDIT-C     1  How often do you have a drink containing alcohol? 1 Filed at: 02/17/2022 0009   2  How many drinks containing alcohol do you have on a typical day you are drinking? 1 Filed at: 02/17/2022 0009   3a  Male UNDER 65: How often do you have five or more drinks on one occasion? 0 Filed at: 02/17/2022 0009   3b  FEMALE Any Age, or MALE 65+: How often do you have 4 or more drinks on one occassion? 0 Filed at: 02/17/2022 0009   Audit-C Score 2 Filed at: 02/17/2022 0009   WESLEY: How many times in the past year have you    Used an illegal drug or used a prescription medication for non-medical reasons? Never Filed at: 02/17/2022 0009                    MDM  Number of Diagnoses or Management Options  Seizure-like activity ([facility] [address] )  Diagnosis management comments: Discussed findings and results with patient and father in room  Unlikely a seizure given history and symptoms - both patient and father states they are aware of this as it had been previously discussed with both ER and neurology providers  These episodes apparently only happen when patient is stressed or in an argument  Patient feeling better  Labs unremarkable  Amb referral placed for neurology  Instructed to follow up  Return precautions discussed  Patient verbalizes understanding and agrees with plan  The management plan was discussed in detail with the patient at bedside and all questions were answered  Prior to discharge, I provided both verbal and written instructions  I discussed with the patient the signs and symptoms for which to return to the emergency department  All questions were answered and patient was comfortable with the plan of care and discharged to home  The patient agrees to return to the Emergency Department for concerns and/or progression of illness          Disposition  Final diagnoses:   Seizure-like activity ([facility] [address] )     Time reflects when diagnosis was documented in both MDM as applicable and the Disposition within this note     Time User Action Codes Description Comment    2/17/2022  1:47 AM Cali Huston Add [R56 9] Seizure-like activity [facility])       ED Disposition     ED Disposition Condition Date/Time Comment    Discharge Stable Thu Feb 17, 2022  1:47 AM Amado Letters 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  Follow-up Information     Follow up With Specialties Details Why [address] [facility] Medicine   [address]    [address]            Patient's Medications   Discharge Prescriptions    DEXAMETHASONE (DECADRON) 4 MG TABLET    Take 1 tablet (4 mg total) by mouth 2 (two) times a day with meals       Start Date: 2/17/2022 End Date: --       Order Dose: 4 mg       Quantity: 4 tablet    Refills: 0           PDMP Review       Value Time User    PDMP Reviewed  Yes 10/16/2020 11:39 PM Morales Smith DO          ED Provider  Electronically Signed by           Tara Sadler PA-C  02/17/22 0200

## 2022-02-17 NOTE — ED NOTES
Patient seen by Joceline Calhoun PA-C with results of testing done as well as discharge instructions and follow up plan of care  Patient left with steady gait with father  No seizure activity noted       Leesa Tripathi RN  02/17/22 8396

## 2022-02-17 NOTE — DISCHARGE INSTRUCTIONS
Please follow up with neurology  I have provided you with a referral  Use medication as prescribed  Please return to the ER with any worsening symptoms

## 2022-03-04 ENCOUNTER — CLINICAL SUPPORT (OUTPATIENT)
Dept: OBGYN CLINIC | Facility: CLINIC | Age: 32
End: 2022-03-04

## 2022-03-04 VITALS
WEIGHT: 122 LBS | DIASTOLIC BLOOD PRESSURE: 98 MMHG | HEART RATE: 89 BPM | SYSTOLIC BLOOD PRESSURE: 154 MMHG | BODY MASS INDEX: 20.94 KG/M2

## 2022-03-04 DIAGNOSIS — Z30.09 UNWANTED FERTILITY: ICD-10-CM

## 2022-03-04 PROCEDURE — 96372 THER/PROPH/DIAG INJ SC/IM: CPT

## 2022-03-04 RX ADMIN — MEDROXYPROGESTERONE ACETATE 150 MG: 150 INJECTION, SUSPENSION INTRAMUSCULAR at 09:18

## 2022-04-12 ENCOUNTER — HOSPITAL ENCOUNTER (EMERGENCY)
Facility: HOSPITAL | Age: 32
Discharge: HOME | End: 2022-04-12
Attending: EMERGENCY MEDICINE | Admitting: EMERGENCY MEDICINE
Payer: COMMERCIAL

## 2022-04-12 VITALS
WEIGHT: 127 LBS | RESPIRATION RATE: 20 BRPM | SYSTOLIC BLOOD PRESSURE: 146 MMHG | HEART RATE: 120 BPM | BODY MASS INDEX: 21.68 KG/M2 | TEMPERATURE: 98 F | OXYGEN SATURATION: 96 % | DIASTOLIC BLOOD PRESSURE: 98 MMHG | HEIGHT: 64 IN

## 2022-04-12 DIAGNOSIS — M54.50 ACUTE EXACERBATION OF CHRONIC LOW BACK PAIN: Primary | ICD-10-CM

## 2022-04-12 DIAGNOSIS — G89.29 ACUTE EXACERBATION OF CHRONIC LOW BACK PAIN: Primary | ICD-10-CM

## 2022-04-12 LAB
BACTERIA UR QL AUTO: ABNORMAL /HPF
BILIRUB UR QL STRIP: NEGATIVE
CLARITY UR: CLEAR
COLOR UR: ABNORMAL
EXT PREG TEST URINE: NEGATIVE
EXT. CONTROL ED NAV: NORMAL
GLUCOSE UR STRIP-MCNC: NEGATIVE MG/DL
HGB UR QL STRIP.AUTO: 10
KETONES UR STRIP-MCNC: NEGATIVE MG/DL
LEUKOCYTE ESTERASE UR QL STRIP: 500
MUCOUS THREADS UR QL AUTO: ABNORMAL
NITRITE UR QL STRIP: NEGATIVE
NON-SQ EPI CELLS URNS QL MICRO: ABNORMAL /HPF
PH UR STRIP.AUTO: 6 [PH]
PROT UR STRIP-MCNC: NEGATIVE MG/DL
RBC #/AREA URNS AUTO: ABNORMAL /HPF
SP GR UR STRIP.AUTO: 1.02 (ref 1–1.04)
UROBILINOGEN UA: NEGATIVE MG/DL
WBC #/AREA URNS AUTO: ABNORMAL /HPF

## 2022-04-12 PROCEDURE — 99284 EMERGENCY DEPT VISIT MOD MDM: CPT | Performed by: PHYSICIAN ASSISTANT

## 2022-04-12 PROCEDURE — 81003 URINALYSIS AUTO W/O SCOPE: CPT | Performed by: PHYSICIAN ASSISTANT

## 2022-04-12 PROCEDURE — 81025 URINE PREGNANCY TEST: CPT | Performed by: PHYSICIAN ASSISTANT

## 2022-04-12 PROCEDURE — 96372 THER/PROPH/DIAG INJ SC/IM: CPT

## 2022-04-12 PROCEDURE — 87086 URINE CULTURE/COLONY COUNT: CPT | Performed by: PHYSICIAN ASSISTANT

## 2022-04-12 PROCEDURE — 99283 EMERGENCY DEPT VISIT LOW MDM: CPT

## 2022-04-12 PROCEDURE — 81001 URINALYSIS AUTO W/SCOPE: CPT | Performed by: PHYSICIAN ASSISTANT

## 2022-04-12 RX ORDER — ACETAMINOPHEN 325 MG/1
650 TABLET ORAL ONCE
Status: COMPLETED | OUTPATIENT
Start: 2022-04-12 | End: 2022-04-12

## 2022-04-12 RX ORDER — CYCLOBENZAPRINE HCL 10 MG
10 TABLET ORAL ONCE
Status: COMPLETED | OUTPATIENT
Start: 2022-04-12 | End: 2022-04-12

## 2022-04-12 RX ORDER — CYCLOBENZAPRINE HCL 10 MG
10 TABLET ORAL 2 TIMES DAILY PRN
Qty: 20 TABLET | Refills: 0 | Status: SHIPPED | OUTPATIENT
Start: 2022-04-12

## 2022-04-12 RX ORDER — KETOROLAC TROMETHAMINE 30 MG/ML
15 INJECTION, SOLUTION INTRAMUSCULAR; INTRAVENOUS ONCE
Status: COMPLETED | OUTPATIENT
Start: 2022-04-12 | End: 2022-04-12

## 2022-04-12 RX ORDER — LIDOCAINE 50 MG/G
1 PATCH TOPICAL DAILY
Qty: 6 PATCH | Refills: 0 | Status: SHIPPED | OUTPATIENT
Start: 2022-04-12

## 2022-04-12 RX ORDER — LIDOCAINE 50 MG/G
1 PATCH TOPICAL ONCE
Status: DISCONTINUED | OUTPATIENT
Start: 2022-04-12 | End: 2022-04-12 | Stop reason: HOSPADM

## 2022-04-12 RX ORDER — NAPROXEN 500 MG/1
500 TABLET ORAL 2 TIMES DAILY WITH MEALS
Qty: 30 TABLET | Refills: 0 | Status: SHIPPED | OUTPATIENT
Start: 2022-04-12

## 2022-04-12 RX ADMIN — LIDOCAINE 1 PATCH: 50 PATCH TOPICAL at 05:53

## 2022-04-12 RX ADMIN — ACETAMINOPHEN 325MG 650 MG: 325 TABLET ORAL at 05:53

## 2022-04-12 RX ADMIN — CYCLOBENZAPRINE HYDROCHLORIDE 10 MG: 10 TABLET, FILM COATED ORAL at 05:53

## 2022-04-12 RX ADMIN — KETOROLAC TROMETHAMINE 15 MG: 30 INJECTION, SOLUTION INTRAMUSCULAR; INTRAVENOUS at 05:53

## 2022-04-12 NOTE — ED PROVIDER NOTES
History  Chief Complaint   Patient presents with    Back Pain     Started yesterday - depending on how she moves pain down both legs and sometimes into her neck  No painful urination  No fevers  No heavy lifting  Patient presents for an evaluation of lower back pain worsening since yesterday  Patient has a history of similar and states it feels like her typical flare ups  She tried Advil for symptoms prior to arrival which she states usually works with no relief  Denies any numbness, tingling, difficulty ambulating, saddle anesthesia, abdominal pain, nausea, vomiting, trauma, fever, chills  Pain radiates down bilateral legs  She works for [employer]  Requesting work note  Prior to Admission Medications   Prescriptions Last Dose Informant Patient Reported? Taking?    Tysabri 300 MG/15ML   No No   Sig: Infuse 15 mL (300 mg total) into a venous catheter every 28 days   Patient not taking: Reported on 2/17/2022    albuterol (Ventolin HFA) 90 mcg/act inhaler   No No   Sig: Inhale 2 puffs every 6 (six) hours as needed for wheezing   cholecalciferol (VITAMIN D3) 1,000 units tablet   No No   Sig: Take 1 tablet (1,000 Units total) by mouth daily   Patient not taking: Reported on 2/17/2022    dexamethasone (DECADRON) 4 mg tablet   No No   Sig: Take 1 tablet (4 mg total) by mouth 2 (two) times a day with meals   ibuprofen (MOTRIN) 600 mg tablet  Self No No   Sig: Take 1 tablet (600 mg total) by mouth every 6 (six) hours as needed for mild pain or moderate pain   medroxyPROGESTERone (DEPO-PROVERA) 150 mg/mL injection  Self No No   Sig: Inject 1 mL (150 mg total) into a muscle every 3 (three) months   methocarbamol (ROBAXIN) 500 mg tablet   No No   Sig: Take 1 tablet (500 mg total) by mouth 2 (two) times a day   Patient not taking: Reported on 2/17/2022    naproxen (Naprosyn) 500 mg tablet   No No   Sig: Take 1 tablet (500 mg total) by mouth 2 (two) times a day with meals Facility-Administered Medications Last Administration Doses Remaining   medroxyPROGESTERone (DEPO-PROVERA) IM injection 150 mg 3/4/2022  9:18 AM 1          Past Medical History:   Diagnosis Date    Asthma     Albuterol prn    Body mass index (BMI) less than 19 in adult 12/2/2019    Depression     hasn't required treatment since 2018    Multiple sclerosis ([facility] [address] ) 2018    follows poorly with neurology, manages with Robaxin    Seizure [facility])     possibly r/t MS diagnosis, has never been on meds, convulsive, last episode 9/2020       Past Surgical History:   Procedure Laterality Date    CLOSED REDUCTION FINGER FRACTURE Left     FL LUMBAR PUNCTURE DIAGNOSTIC  12/5/2019    TONSILLECTOMY Bilateral 2325    UMBILICAL HERNIA REPAIR  2016       Family History   Problem Relation Age of Onset    Coronary artery disease Paternal Grandmother     Hypertension Paternal Grandfather         TYPE 2    Diabetes Paternal Grandfather     Cancer Paternal Grandfather         pancreatic, lung    Cancer Mother         thyroid    Diabetes Father     Hypertension Father     Breast cancer Neg Hx      I have reviewed and agree with the history as documented  E-Cigarette/Vaping    E-Cigarette Use Never User      E-Cigarette/Vaping Substances     Social History     Tobacco Use    Smoking status: Former Smoker     Packs/day: 0 25     Years: 9 00     Pack years: 2 25     Types: Cigarettes    Smokeless tobacco: Current User   Vaping Use    Vaping Use: Never used   Substance Use Topics    Alcohol use: Yes    Drug use: Not Currently     Types: Marijuana, Cocaine, MDMA (ecstacy)     Comment: hasn't used any since 2010       Review of Systems   Constitutional: Negative for chills and fever  HENT: Negative for congestion, ear pain and sore throat  Eyes: Negative for pain  Respiratory: Negative for cough and shortness of breath  Cardiovascular: Negative for chest pain     Gastrointestinal: Negative for abdominal pain, nausea and vomiting  Genitourinary: Negative for dysuria  Musculoskeletal: Positive for back pain  Skin: Negative for rash  Neurological: Negative for dizziness and numbness  Psychiatric/Behavioral: Negative for suicidal ideas  All other systems reviewed and are negative  Physical Exam  Physical Exam  Vitals reviewed  Constitutional:       Appearance: She is well-developed  HENT:      Head: Normocephalic and atraumatic  Right Ear: External ear normal       Left Ear: External ear normal       Nose: Nose normal       Mouth/Throat:      Mouth: Mucous membranes are moist       Pharynx: Oropharynx is clear  Eyes:      Extraocular Movements: Extraocular movements intact  Cardiovascular:      Rate and Rhythm: Normal rate and regular rhythm  Heart sounds: Normal heart sounds  Pulmonary:      Effort: Pulmonary effort is normal       Breath sounds: Normal breath sounds  Abdominal:      General: Bowel sounds are normal       Palpations: Abdomen is soft  Tenderness: There is no abdominal tenderness  Musculoskeletal:      Cervical back: Normal range of motion and neck supple  Lumbar back: Spasms and tenderness present  No swelling, deformity, signs of trauma or bony tenderness  Decreased range of motion  Back:    Skin:     General: Skin is warm and dry  Capillary Refill: Capillary refill takes less than 2 seconds  Neurological:      Mental Status: She is alert and oriented to person, place, and time     Psychiatric:         Behavior: Behavior normal          Vital Signs  ED Triage Vitals [04/12/22 0541]   Temperature Pulse Respirations Blood Pressure SpO2   98 °F (36 7 °C) (!) 120 20 146/98 96 %      Temp Source Heart Rate Source Patient Position - Orthostatic VS BP Location FiO2 (%)   Oral Monitor Sitting Left arm --      Pain Score       8           Vitals:    04/12/22 0541   BP: 146/98   Pulse: (!) 120   Patient Position - Orthostatic VS: Sitting         Visual Acuity      ED Medications  Medications   lidocaine (LIDODERM) 5 % patch 1 patch (1 patch Topical Medication Applied 4/12/22 0553)   ketorolac (TORADOL) injection 15 mg (15 mg Intramuscular Given 4/12/22 0553)   cyclobenzaprine (FLEXERIL) tablet 10 mg (10 mg Oral Given 4/12/22 0553)   acetaminophen (TYLENOL) tablet 650 mg (650 mg Oral Given 4/12/22 0553)       Diagnostic Studies  Results Reviewed     Procedure Component Value Units Date/Time    UA w Reflex to Microscopic w Reflex to Culture [[id number]]  (Abnormal) Collected: 04/12/22 0553    Lab Status: Final result Specimen: Urine, Other Updated: 04/12/22 0616     Color, UA Straw     Clarity, UA Clear     Specific Gravity, UA 1 025     pH, UA 6 0     Leukocytes,  0     Nitrite, UA Negative     Protein, UA Negative mg/dl      Glucose, UA Negative mg/dl      Ketones, UA Negative mg/dl      Bilirubin, UA Negative     Blood, UA 10 0     UROBILINOGEN UA Negative mg/dL     Urine Microscopic [[id number]] Collected: 04/12/22 0553    Lab Status: In process Specimen: Urine, Other Updated: 04/12/22 0613    POCT pregnancy, urine [[id number]]  (Normal) Resulted: 04/12/22 0554    Lab Status: Final result Updated: 04/12/22 0554     EXT PREG TEST UR (Ref: Negative) negative     Control valid                 No orders to display              Procedures  Procedures         ED Course                                             MDM    Disposition  Final diagnoses:   Acute exacerbation of chronic low back pain     Time reflects when diagnosis was documented in both MDM as applicable and the Disposition within this note     Time User Action Codes Description Comment    4/12/2022  6:29 AM Imani uHston Add [M54 50,  G89 29] Acute exacerbation of chronic low back pain       ED Disposition     ED Disposition Condition Date/Time Comment    Discharge Stable Tue Apr 12, 2022  6:29 AM Edda Browning 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              Follow-up Information     Follow up With Specialties Details Why Contact Info Additional Information    Bekah Tan MD Family Medicine   [address] [facility]  [address]  Program Physical Therapy   [phone]          Patient's Medications   Discharge Prescriptions    CYCLOBENZAPRINE (FLEXERIL) 10 MG TABLET    Take 1 tablet (10 mg total) by mouth 2 (two) times a day as needed for muscle spasms       Start Date: 4/12/2022 End Date: --       Order Dose: 10 mg       Quantity: 20 tablet    Refills: 0    LIDOCAINE (LIDODERM) 5 %    Apply 1 patch topically daily Remove & Discard patch within 12 hours or as directed by MD       Start Date: 4/12/2022 End Date: --       Order Dose: 1 patch       Quantity: 6 patch    Refills: 0    NAPROXEN (NAPROSYN) 500 MG TABLET    Take 1 tablet (500 mg total) by mouth 2 (two) times a day with meals       Start Date: 4/12/2022 End Date: --       Order Dose: 500 mg       Quantity: 30 tablet    Refills: 0       No discharge procedures on file      PDMP Review       Value Time User    PDMP Reviewed  Yes 10/16/2020 11:39 PM Kathya Cloud DO          ED Provider  Electronically Signed by           Jewell Raymundo PA-C  04/12/22 4955

## 2022-04-12 NOTE — Clinical Note
Devyn Lewis was seen and treated in our emergency department on 4/12/2022  Diagnosis:     Edelmira Taylor    She may return on this date: 04/13/2022         If you have any questions or concerns, please don't hesitate to call        Jose Mccormick PA-C    ______________________________           _______________          _______________  Hospital Representative                              Date                                Time

## 2022-04-12 NOTE — DISCHARGE INSTRUCTIONS
[facility]  I have provided you with a referral  Use medication as prescribed  Do not take Toradol with other NSAIDS, like ibuprofen and naproxen  Please return to the ER with any worsening symptoms

## 2022-04-14 LAB — BACTERIA UR CULT: NORMAL

## 2022-05-05 DIAGNOSIS — G35 MS (MULTIPLE SCLEROSIS) (HCC): Primary | ICD-10-CM

## 2022-05-27 ENCOUNTER — TELEPHONE (OUTPATIENT)
Dept: OBGYN CLINIC | Facility: CLINIC | Age: 32
End: 2022-05-27

## 2022-06-07 ENCOUNTER — TELEPHONE (OUTPATIENT)
Dept: NEUROLOGY | Facility: CLINIC | Age: 32
End: 2022-06-07

## 2022-06-07 NOTE — TELEPHONE ENCOUNTER
Reminder appt call     Patient is scheduled on 6/10/2022 @ 430 pm with an arrival time 415 pm in the [facility] location with Dr Kelley Jensen  Left message on voicemail

## 2022-06-08 ENCOUNTER — TELEPHONE (OUTPATIENT)
Dept: NEUROLOGY | Facility: CLINIC | Age: 32
End: 2022-06-08

## 2022-06-08 NOTE — TELEPHONE ENCOUNTER
Patient was scheduled with Dr Allison Gunter on Friday 06/10/22 @ 400pm due to change of provider please offer patient appt for 06/10/2022 @ 130pm      Thank you

## 2022-06-09 ENCOUNTER — TELEPHONE (OUTPATIENT)
Dept: NEUROLOGY | Facility: CLINIC | Age: 32
End: 2022-06-09

## 2022-06-09 NOTE — TELEPHONE ENCOUNTER
Reminder appt call     Patient is scheduled on 6/10/2022 @ 130 pm with an arrival time  m in the [place] location with Dr Robbin Casanova       Left message on vm (1)

## 2022-07-11 ENCOUNTER — TELEPHONE (OUTPATIENT)
Dept: NEUROLOGY | Facility: CLINIC | Age: 32
End: 2022-07-11

## 2022-07-22 ENCOUNTER — HOSPITAL ENCOUNTER (EMERGENCY)
Facility: HOSPITAL | Age: 32
Discharge: HOME | End: 2022-07-22
Attending: EMERGENCY MEDICINE | Admitting: EMERGENCY MEDICINE
Payer: COMMERCIAL

## 2022-07-22 VITALS
WEIGHT: 118.8 LBS | BODY MASS INDEX: 20.39 KG/M2 | DIASTOLIC BLOOD PRESSURE: 83 MMHG | TEMPERATURE: 98.2 F | HEART RATE: 96 BPM | OXYGEN SATURATION: 97 % | SYSTOLIC BLOOD PRESSURE: 139 MMHG | RESPIRATION RATE: 18 BRPM

## 2022-07-22 DIAGNOSIS — J02.9 SORE THROAT: ICD-10-CM

## 2022-07-22 DIAGNOSIS — R13.10 SWALLOWING DYSFUNCTION: Primary | ICD-10-CM

## 2022-07-22 PROCEDURE — 99283 EMERGENCY DEPT VISIT LOW MDM: CPT

## 2022-07-22 PROCEDURE — 87255 GENET VIRUS ISOLATE HSV: CPT

## 2022-07-22 PROCEDURE — 99284 EMERGENCY DEPT VISIT MOD MDM: CPT

## 2022-07-22 RX ORDER — LIDOCAINE HYDROCHLORIDE 20 MG/ML
15 SOLUTION OROPHARYNGEAL ONCE
Status: COMPLETED | OUTPATIENT
Start: 2022-07-22 | End: 2022-07-22

## 2022-07-22 RX ADMIN — LIDOCAINE HYDROCHLORIDE 15 ML: 20 SOLUTION ORAL; TOPICAL at 16:07

## 2022-07-22 NOTE — ED PROVIDER NOTES
History  Chief Complaint   Patient presents with    Sore Throat     Patient c/o sore throat and swelling "I feel like something is stuck" x2-3 days  Patient reports one episode of vomiting after eating yesterday     Patient is a 31-year-old female coming in for complaint of sore throat for the last 2 days  Patient states that she is having trouble swallowing, and regurgitating food, and has to push on the throat to swallow  Patient is currently handling her own secretions  A clear 5 with patient, she states that she is able to swallow, however it is painful      History provided by:  Patient   used: No    Sore Throat  Location:  Generalized  Duration:  3 days  Chronicity:  New  Ineffective treatments:  Acetaminophen  Associated symptoms: no ear pain, no fever, no headaches, no neck stiffness and no shortness of breath        Prior to Admission Medications   Prescriptions Last Dose Informant Patient Reported? Taking?    Tysabri 300 MG/15ML   No No   Sig: Infuse 15 mL (300 mg total) into a venous catheter every 28 days   Patient not taking: Reported on 2/17/2022    albuterol (Ventolin HFA) 90 mcg/act inhaler   No No   Sig: Inhale 2 puffs every 6 (six) hours as needed for wheezing   cholecalciferol (VITAMIN D3) 1,000 units tablet   No No   Sig: Take 1 tablet (1,000 Units total) by mouth daily   Patient not taking: Reported on 2/17/2022    cyclobenzaprine (FLEXERIL) 10 mg tablet   No No   Sig: Take 1 tablet (10 mg total) by mouth 2 (two) times a day as needed for muscle spasms   dexamethasone (DECADRON) 4 mg tablet   No No   Sig: Take 1 tablet (4 mg total) by mouth 2 (two) times a day with meals   ibuprofen (MOTRIN) 600 mg tablet  Self No No   Sig: Take 1 tablet (600 mg total) by mouth every 6 (six) hours as needed for mild pain or moderate pain   lidocaine (Lidoderm) 5 %   No No   Sig: Apply 1 patch topically daily Remove & Discard patch within 12 hours or as directed by MD   medroxyPROGESTERone (DEPO-PROVERA) 150 mg/mL injection  Self No No   Sig: Inject 1 mL (150 mg total) into a muscle every 3 (three) months   methocarbamol (ROBAXIN) 500 mg tablet   No No   Sig: Take 1 tablet (500 mg total) by mouth 2 (two) times a day   Patient not taking: Reported on 2/17/2022    naproxen (Naprosyn) 500 mg tablet   No No   Sig: Take 1 tablet (500 mg total) by mouth 2 (two) times a day with meals   naproxen (Naprosyn) 500 mg tablet   No No   Sig: Take 1 tablet (500 mg total) by mouth 2 (two) times a day with meals      Facility-Administered Medications: None       Past Medical History:   Diagnosis Date    Asthma     Albuterol prn    Body mass index (BMI) less than 19 in adult 12/2/2019    Depression     hasn't required treatment since 2018    Multiple sclerosis ([facility] [address] ) 2018    follows poorly with neurology, manages with Robaxin    Seizure [facility])     possibly r/t MS diagnosis, has never been on meds, convulsive, last episode 9/2020       Past Surgical History:   Procedure Laterality Date    CLOSED REDUCTION FINGER FRACTURE Left     FL LUMBAR PUNCTURE DIAGNOSTIC  12/5/2019    TONSILLECTOMY Bilateral 2556    UMBILICAL HERNIA REPAIR  2016       Family History   Problem Relation Age of Onset    Coronary artery disease Paternal Grandmother     Hypertension Paternal Grandfather         TYPE 2    Diabetes Paternal Grandfather     Cancer Paternal Grandfather         pancreatic, lung    Cancer Mother         thyroid    Diabetes Father     Hypertension Father     Breast cancer Neg Hx      I have reviewed and agree with the history as documented  E-Cigarette/Vaping    E-Cigarette Use Never User      E-Cigarette/Vaping Substances     Social History     Tobacco Use    Smoking status: Current Every Day Smoker     Packs/day: 0 25     Years: 9 00     Pack years: 2 25     Types: Cigarettes    Smokeless tobacco: Current User   Vaping Use    Vaping Use: Never used   Substance Use Topics    Alcohol use:  Yes Comment: socially    Drug use: Not Currently     Types: Marijuana, Cocaine, MDMA (ecstacy)     Comment: hasn't used any since 2010       Review of Systems   Constitutional: Negative  Negative for activity change, appetite change and fever  HENT: Positive for sore throat  Negative for ear pain  Eyes: Negative  Respiratory: Negative  Negative for chest tightness and shortness of breath  Cardiovascular: Negative  Gastrointestinal: Negative  Genitourinary: Negative  Musculoskeletal: Negative  Negative for neck stiffness  Skin: Negative  Neurological: Negative  Negative for headaches  Psychiatric/Behavioral: Negative  Physical Exam  Physical Exam  Vitals reviewed  Constitutional:       Appearance: She is well-developed and normal weight  HENT:      Head: Normocephalic and atraumatic  Right Ear: External ear normal       Left Ear: External ear normal       Nose: Nose normal       Mouth/Throat:      Mouth: Oral lesions present  Pharynx: Uvula midline  Comments: Tonsils are not present  There is a lesion on top of patient's oropharynx  Eyes:      Conjunctiva/sclera: Conjunctivae normal    Cardiovascular:      Rate and Rhythm: Normal rate  Pulmonary:      Effort: Pulmonary effort is normal    Abdominal:      Palpations: Abdomen is soft  Musculoskeletal:         General: Normal range of motion  Cervical back: Normal range of motion  Skin:     General: Skin is warm and dry  Neurological:      Mental Status: She is alert           Vital Signs  ED Triage Vitals [07/22/22 1452]   Temperature Pulse Respirations Blood Pressure SpO2   98 2 °F (36 8 °C) 96 18 139/83 97 %      Temp Source Heart Rate Source Patient Position - Orthostatic VS BP Location FiO2 (%)   Oral Monitor Sitting Left arm --      Pain Score       --           Vitals:    07/22/22 1452   BP: 139/83   Pulse: 96   Patient Position - Orthostatic VS: Sitting         Visual Acuity      ED Medications  Medications   Lidocaine Viscous HCl (XYLOCAINE) 2 % mucosal solution 15 mL (15 mL Swish & Swallow Given 7/22/22 1607)       Diagnostic Studies  Results Reviewed     Procedure Component Value Units Date/Time    Throat culture [[id number]] Collected: 07/22/22 1556    Lab Status: In process Specimen: Throat Updated: 07/22/22 1617    Herpes simplex virus culture [[id number]] Collected: 07/22/22 1556    Lab Status: In process Specimen: Other from Mouth Updated: 07/22/22 1617    CMV culture [[id number]] Collected: 07/22/22 1556    Lab Status: In process Specimen: Other Updated: 07/22/22 1617                 No orders to display              Procedures  Procedures         ED Course                                             MDM  Number of Diagnoses or Management Options  Sore throat: new and does not require workup  Swallowing dysfunction: new and does not require workup  Diagnosis management comments: Patient passed p o  Challenge  Throat was swabbed for potential viruses and bacteria  Patient was discharged home with ambulatory referral to ENT    Counseling: I had a detailed discussion with the patient and/or guardian regarding: the historical points, exam findings, and any diagnostic results supporting the discharge diagnosis, lab results, radiology results, discharge instructions reviewed with patient and/or family/caregiver and understanding was verbalized   Instructions given to return to the emergency department if symptoms worsen or persist, or if there are any questions or concerns that arise at home       All labs reviewed and utilized in the medical decision making process     All radiology studies independently viewed by me and interpreted by the radiologist     Portions of the record may have been created with voice recognition software   Occasional wrong word or "sound a like" substitutions may have occurred due to the inherent limitations of voice recognition software   Read the chart carefully and recognize, using context, where substitutions have occurred  Amount and/or Complexity of Data Reviewed  Clinical lab tests: ordered    Risk of Complications, Morbidity, and/or Mortality  Presenting problems: minimal  Diagnostic procedures: minimal  Management options: minimal    Patient Progress  Patient progress: stable      Disposition  Final diagnoses:   Swallowing dysfunction   Sore throat     Time reflects when diagnosis was documented in both MDM as applicable and the Disposition within this note     Time User Action Codes Description Comment    7/22/2022  4:10 PM Mary Ponce [R13 10] Swallowing dysfunction     7/22/2022  4:10 PM Mary Ponce [J02 9] Sore throat       ED Disposition     ED Disposition   Discharge    Condition   Stable    Date/Time   Fri Jul 22, 2022  4:10 PM    James Rodriguez [id number] 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  Follow-up Information     Follow up With Specialties Details Why [address] [facility]   [address]    [address]             Discharge Medication List as of 7/22/2022  4:11 PM      CONTINUE these medications which have NOT CHANGED    Details   albuterol (Ventolin HFA) 90 mcg/act inhaler Inhale 2 puffs every 6 (six) hours as needed for wheezing, Starting Fri 12/3/2021, Normal      cholecalciferol (VITAMIN D3) 1,000 units tablet Take 1 tablet (1,000 Units total) by mouth daily, Starting Sun 1/2/2022, Normal      cyclobenzaprine (FLEXERIL) 10 mg tablet Take 1 tablet (10 mg total) by mouth 2 (two) times a day as needed for muscle spasms, Starting Tue 4/12/2022, Normal      dexamethasone (DECADRON) 4 mg tablet Take 1 tablet (4 mg total) by mouth 2 (two) times a day with meals, Starting Thu 2/17/2022, Normal      ibuprofen (MOTRIN) 600 mg tablet Take 1 tablet (600 mg total) by mouth every 6 (six) hours as needed for mild pain or moderate pain, Starting Sat 11/6/2021, Print lidocaine (Lidoderm) 5 % Apply 1 patch topically daily Remove & Discard patch within 12 hours or as directed by MD, Starting Tue 4/12/2022, Normal      medroxyPROGESTERone (DEPO-PROVERA) 150 mg/mL injection Inject 1 mL (150 mg total) into a muscle every 3 (three) months, Starting Thu 7/15/2021, Normal      methocarbamol (ROBAXIN) 500 mg tablet Take 1 tablet (500 mg total) by mouth 2 (two) times a day, Starting Tue 2/1/2022, Normal      !! naproxen (Naprosyn) 500 mg tablet Take 1 tablet (500 mg total) by mouth 2 (two) times a day with meals, Starting Tue 2/1/2022, Normal      !! naproxen (Naprosyn) 500 mg tablet Take 1 tablet (500 mg total) by mouth 2 (two) times a day with meals, Starting Tue 4/12/2022, Normal      Tysabri 300 MG/15ML Infuse 15 mL (300 mg total) into a venous catheter every 28 days, Starting Mon 12/13/2021, Normal       !! - Potential duplicate medications found  Please discuss with provider                PDMP Review       Value Time User    PDMP Reviewed  Yes 10/16/2020 11:39 PM Johnny Dbuose DO          ED Provider  Electronically Signed by           Froy Guzman PA-C  07/22/22 6430

## 2022-07-22 NOTE — Clinical Note
Carmella Basurto was seen and treated in our emergency department on 7/22/2022  No restrictions            Diagnosis:     Aman Sequeira    She may return on this date: If you have any questions or concerns, please don't hesitate to call        Dorothea Monroe PA-C    ______________________________           _______________          _______________  Hospital Representative                              Date                                Time

## 2022-07-22 NOTE — ED ATTENDING ATTESTATION
I was the attending physician on duty at the time the patient visited the emergency department  The patient was evaluated and dispositioned by the APC  I was personally available for consultation  I am administratively signing the chart after the fact      Alin Girard MD

## 2022-07-26 ENCOUNTER — OFFICE VISIT (OUTPATIENT)
Dept: NEUROLOGY | Facility: CLINIC | Age: 32
End: 2022-07-26
Payer: COMMERCIAL

## 2022-07-26 VITALS
WEIGHT: 120 LBS | TEMPERATURE: 98.1 F | HEIGHT: 64 IN | HEART RATE: 86 BPM | BODY MASS INDEX: 20.49 KG/M2 | SYSTOLIC BLOOD PRESSURE: 152 MMHG | DIASTOLIC BLOOD PRESSURE: 99 MMHG

## 2022-07-26 DIAGNOSIS — R26.2 AMBULATORY DYSFUNCTION: ICD-10-CM

## 2022-07-26 DIAGNOSIS — G35 MS (MULTIPLE SCLEROSIS) (HCC): Primary | ICD-10-CM

## 2022-07-26 DIAGNOSIS — R29.898 LEFT LEG WEAKNESS: ICD-10-CM

## 2022-07-26 DIAGNOSIS — R49.0 HOARSENESS: ICD-10-CM

## 2022-07-26 LAB — HSV SPEC CULT: NORMAL

## 2022-07-26 PROCEDURE — 99215 OFFICE O/P EST HI 40 MIN: CPT | Performed by: PSYCHIATRY & NEUROLOGY

## 2022-07-26 NOTE — PROGRESS NOTES
[facility] MULTIPLE SCLEROSIS CENTER  PATIENT:  Verline Reasons  MRN:  [id number]  :  1990  DATE OF SERVICE:  2022    Assessment/Plan:           Problem List Items Addressed This Visit        Nervous and Auditory    MS (multiple sclerosis) ([facility] [address] ) - Primary    Left leg weakness       Other    Ambulatory dysfunction    Hoarseness         Mrs Roberto Carlos Cordero has  presented to [facility] [address] for follow-up on multiple sclerosis and related issues  Patient described no new focal neurological dysfunction with left leg sensory motor dysfunction has been persistent since her last relapse  Patient has not had imaging studies since 2019, patient will be advised imaging after we initiate disease modifying regimen  In the light of patient working with large group of coworkers, we would prefer to avoid medical regimens which going to set the patient to immunocompromised state, patient was advised against Ocrevus/Mavenclad and ofatumumab  We discussed risk and benefits of Plegridy and Vumerity; risk and benefits were accepted, patient agreed with considering this medical regimens to review again and discuss it further on follow-up visit with Christian Hathaway; if patient believes she would like to initiate disease modifying regimen prior to follow-up, booklet and forms of both medical regimens were offered during this office visit  Patient had serological workup completed in 2022 with last imaging of the brain and spine in 2019  Patient is to have close follow-up with ENT team, patient stated she had sore throat previously with left lower side of her face swollen and now patient developing hoarseness, patient was coughing intermittently  Patient is to follow with primary care team on scheduled basis  Follow-up with [facility] advanced practitioner team within 2-4 months       Subjective:  voice change-1 1/2 month, tremors both hands, speech difficulty, weakness left leg and numbness in left leg  25 foot walk 6 25 seconds  HPI    Mrs Edith Escamilla has presented to  [address] [place] [address] for follow up on MS and related issues  Since last office visit in October 2021, patient described persistent left lower extremity sensory motor dysfunction with intermittent cramping  Patient also stated she is having tremors in her hands and that time she requires using cane for ambulation  Patient has no significant  progression of her MS -related ambulatory dysfunction and fatigue, and MS-related depression has been an ongoing challenge; neurogenic bladder with incontinence has been described;   - we were considering resuming Tysabri infusion, medication was discontinued due to work related challenges, as patient was not excused on the days when she requires infusion, as per the patient  Last office with [facility] was in   October 2021  Patient described progressive  Worsening of her fatigue, mood swings, weakness in upper lower extremities  Patient requires intermittent days of as she is not able to accommodate her physical work at [employer]  Patient has difficulties walking up stairs in her house  Neurogenic bladder in the form of intermittent incontinence has been ongoing issue      Patient was initially evaluated by Dr Mendez with diagnosis of MS was established based on clinical and radiographic findings  Prior evaluation was consistent with MRI and was noted to have multiple classic MS demyelinating plaques, involving periventricular white matter and left cerebellum with most of them has corresponding T1W black holes  The following portions of the patient's history were reviewed and updated as appropriate:   She  has a past medical history of Asthma, Body mass index (BMI) less than 19 in adult (12/2/2019), Depression, Multiple sclerosis ([address] ) (2018), and Seizure ([facility] [address] )    She   Patient Active Problem List    Diagnosis Date Noted    Left leg weakness 07/26/2022  Hoarseness 07/26/2022    Bruises easily 12/03/2021    Generalized body aches 11/15/2021    Allergic reaction 11/11/2021    Word finding problem 10/28/2021    Ambulatory dysfunction 10/28/2021    Exposure to COVID-19 virus 11/30/2020    Chronic diarrhea 11/10/2020    Weight loss 11/10/2020    Urgency of urination 11/10/2020    Acute midline low back pain without sciatica 09/17/2020    Concussion without loss of consciousness 07/24/2020    MS (multiple sclerosis) ([facility] [address] ) 01/29/2020    Numbness and tingling of both legs 12/19/2019    Palpitation 12/02/2019    Neurogenic bladder 11/29/2019    CNS demyelination ([facility] ) 11/29/2019    Vertigo 11/29/2019    Migraine without aura and without status migrainosus, not intractable 10/03/2019    Syncopal episode 10/03/2019    Encounter for well adult exam with abnormal findings 07/11/2019    Ventral hernia without obstruction or gangrene 06/13/2019    Other headache syndrome 06/13/2019    Tobacco abuse 24/87/9014    Umbilical hernia without obstruction and without gangrene 03/28/2016    Mild persistent asthma without complication 22/25/0521     She  has a past surgical history that includes Tonsillectomy (Bilateral, 1994); FL lumbar puncture diagnostic (12/5/2019); Closed reduction finger fracture (Left); and Umbilical hernia repair (2016)  Her family history includes Cancer in her mother and paternal grandfather; Coronary artery disease in her paternal grandmother; Diabetes in her father and paternal grandfather; Hypertension in her father and paternal grandfather  She  reports that she has been smoking cigarettes  She has a 2 25 pack-year smoking history  She uses smokeless tobacco  She reports current alcohol use  She reports previous drug use  Drugs: Marijuana, Cocaine, and MDMA (ecstacy)    Current Outpatient Medications   Medication Sig Dispense Refill    naproxen (Naprosyn) 500 mg tablet Take 1 tablet (500 mg total) by mouth 2 (two) times a day with meals 30 tablet 0    al mag oxide-diphenhydramine-lidocaine viscous (MAGIC MOUTHWASH) 1:1:1 suspension Swish and spit 10 mL every 4 (four) hours as needed for mouth pain or discomfort (Patient not taking: Reported on 7/26/2022) 90 mL 0    albuterol (Ventolin HFA) 90 mcg/act inhaler Inhale 2 puffs every 6 (six) hours as needed for wheezing (Patient not taking: Reported on 7/26/2022) 18 g 2    cholecalciferol (VITAMIN D3) 1,000 units tablet Take 1 tablet (1,000 Units total) by mouth daily (Patient not taking: No sig reported) 30 tablet 0    cyclobenzaprine (FLEXERIL) 10 mg tablet Take 1 tablet (10 mg total) by mouth 2 (two) times a day as needed for muscle spasms (Patient not taking: Reported on 7/26/2022) 20 tablet 0    dexamethasone (DECADRON) 4 mg tablet Take 1 tablet (4 mg total) by mouth 2 (two) times a day with meals (Patient not taking: Reported on 7/26/2022) 4 tablet 0    ibuprofen (MOTRIN) 600 mg tablet Take 1 tablet (600 mg total) by mouth every 6 (six) hours as needed for mild pain or moderate pain (Patient not taking: Reported on 7/26/2022) 30 tablet 0    lidocaine (Lidoderm) 5 % Apply 1 patch topically daily Remove & Discard patch within 12 hours or as directed by MD (Patient not taking: Reported on 7/26/2022) 6 patch 0    medroxyPROGESTERone (DEPO-PROVERA) 150 mg/mL injection Inject 1 mL (150 mg total) into a muscle every 3 (three) months (Patient not taking: Reported on 7/26/2022) 1 mL 5    methocarbamol (ROBAXIN) 500 mg tablet Take 1 tablet (500 mg total) by mouth 2 (two) times a day (Patient not taking: No sig reported) 20 tablet 0    naproxen (Naprosyn) 500 mg tablet Take 1 tablet (500 mg total) by mouth 2 (two) times a day with meals (Patient not taking: Reported on 7/26/2022) 30 tablet 0    Tysabri 300 MG/15ML Infuse 15 mL (300 mg total) into a venous catheter every 28 days (Patient not taking: No sig reported) 15 mL 12     No current facility-administered medications for this visit       Current Outpatient Medications on File Prior to Visit   Medication Sig    naproxen (Naprosyn) 500 mg tablet Take 1 tablet (500 mg total) by mouth 2 (two) times a day with meals    al mag oxide-diphenhydramine-lidocaine viscous (MAGIC MOUTHWASH) 1:1:1 suspension Swish and spit 10 mL every 4 (four) hours as needed for mouth pain or discomfort (Patient not taking: Reported on 7/26/2022)    albuterol (Ventolin HFA) 90 mcg/act inhaler Inhale 2 puffs every 6 (six) hours as needed for wheezing (Patient not taking: Reported on 7/26/2022)    cholecalciferol (VITAMIN D3) 1,000 units tablet Take 1 tablet (1,000 Units total) by mouth daily (Patient not taking: No sig reported)    cyclobenzaprine (FLEXERIL) 10 mg tablet Take 1 tablet (10 mg total) by mouth 2 (two) times a day as needed for muscle spasms (Patient not taking: Reported on 7/26/2022)    dexamethasone (DECADRON) 4 mg tablet Take 1 tablet (4 mg total) by mouth 2 (two) times a day with meals (Patient not taking: Reported on 7/26/2022)    ibuprofen (MOTRIN) 600 mg tablet Take 1 tablet (600 mg total) by mouth every 6 (six) hours as needed for mild pain or moderate pain (Patient not taking: Reported on 7/26/2022)    lidocaine (Lidoderm) 5 % Apply 1 patch topically daily Remove & Discard patch within 12 hours or as directed by MD (Patient not taking: Reported on 7/26/2022)    medroxyPROGESTERone (DEPO-PROVERA) 150 mg/mL injection Inject 1 mL (150 mg total) into a muscle every 3 (three) months (Patient not taking: Reported on 7/26/2022)    methocarbamol (ROBAXIN) 500 mg tablet Take 1 tablet (500 mg total) by mouth 2 (two) times a day (Patient not taking: No sig reported)    naproxen (Naprosyn) 500 mg tablet Take 1 tablet (500 mg total) by mouth 2 (two) times a day with meals (Patient not taking: Reported on 7/26/2022)    Tysabri 300 MG/15ML Infuse 15 mL (300 mg total) into a venous catheter every 28 days (Patient not taking: No sig reported)     No current facility-administered medications on file prior to visit  She is allergic to clindamycin, onion - food allergy, gabapentin, and sumatriptan            Objective:    Blood pressure 152/99, pulse 86, temperature 98 1 °F (36 7 °C), temperature source Skin, height 5' 4" (1 626 m), weight 54 4 kg (120 lb), not currently breastfeeding  Physical Exam    Neurological Exam    Gait    25 foot walk 6 25 seconds  CONSTITUTIONAL: NAD, pleasant  NECK: supple, no lymphadenopathy, no thyromegaly, no JVD  CARDIOVASCULAR: RRR, normal S1S2, no murmurs, no rubs  RESP: clear to auscultation bilaterally, no wheezes/rhonchi/rales  ABDOMEN: soft, non tender, non distended  SKIN: no rash or skin lesions  EXTREMITIES: no edema, pulses 2+bilaterally  PSYCH: appropriate mood and affect  NEUROLOGIC COMPREHENSIVE EXAM: Patient is oriented to person, place and time, NAD; appropriate affect  CN II, III, IV, V, VI, VII,VIII,IX,X,XI-XII intact with EOMI, PERRLA, OKN intact, VF grossly intact, fundi poorly visualized secondary to pupillary constriction; symmetric face noted  Motor: 5/5 UE/LE bilateral symmetric; Sensory: intact to light touch and pinprick bilaterally; normal vibration sensation feet bilaterally; Coordination within normal limits on FTN and BENJI testing; DTR: 2/4 through, no Babinski, no clonus  Tandem gait is intact  Romberg: absent  ROS:  12 points of review of system was reviewed with the patient and was unremarkable with exception: see HPI  Review of Systems   Constitutional: Negative  Negative for appetite change and fever  HENT: Positive for voice change (1 1/2 month)  Negative for hearing loss, tinnitus and trouble swallowing  Eyes: Negative  Negative for photophobia and pain  Respiratory: Negative  Negative for shortness of breath  Cardiovascular: Negative  Negative for palpitations  Gastrointestinal: Negative  Negative for nausea and vomiting  Endocrine: Negative    Negative for cold intolerance  Genitourinary: Negative  Negative for dysuria, frequency and urgency  Musculoskeletal: Negative  Negative for myalgias and neck pain  Skin: Negative  Negative for rash  Neurological: Positive for tremors (both hands), speech difficulty, weakness (left leg) and numbness (left leg)  Negative for dizziness, seizures, syncope, facial asymmetry, light-headedness and headaches  Hematological: Negative  Does not bruise/bleed easily  Psychiatric/Behavioral: Negative  Negative for confusion, hallucinations and sleep disturbance

## 2022-07-26 NOTE — LETTER
July 26, 2022     Patient: Stan Higgins  YOB: 1990  Date of Visit: 7/26/2022      To Whom it May Concern:    Stan Higgins is under my professional care  Dickson Knee was seen in my office on 7/26/2022  Dickson Knee   She may return today  If you have any questions or concerns, please don't hesitate to call           Sincerely,          Melina Jin MD        CC: No Recipients

## 2022-09-15 ENCOUNTER — OFFICE VISIT (OUTPATIENT)
Dept: OBGYN CLINIC | Facility: CLINIC | Age: 32
End: 2022-09-15

## 2022-09-15 VITALS
HEART RATE: 77 BPM | SYSTOLIC BLOOD PRESSURE: 155 MMHG | HEIGHT: 64 IN | DIASTOLIC BLOOD PRESSURE: 105 MMHG | BODY MASS INDEX: 19.97 KG/M2 | WEIGHT: 117 LBS

## 2022-09-15 DIAGNOSIS — N89.8 VAGINAL ODOR: Primary | ICD-10-CM

## 2022-09-15 DIAGNOSIS — B96.89 BV (BACTERIAL VAGINOSIS): ICD-10-CM

## 2022-09-15 DIAGNOSIS — Z20.2 POSSIBLE EXPOSURE TO STD: ICD-10-CM

## 2022-09-15 DIAGNOSIS — Z59.41 FOOD INSECURITY: ICD-10-CM

## 2022-09-15 DIAGNOSIS — N76.0 BV (BACTERIAL VAGINOSIS): ICD-10-CM

## 2022-09-15 LAB
BV WHIFF TEST VAG QL: POSITIVE
CLUE CELLS SPEC QL WET PREP: POSITIVE
PH SMN: 5.5 [PH]
SL AMB POCT WET MOUNT: ABNORMAL
T VAGINALIS VAG QL WET PREP: NEGATIVE
YEAST VAG QL WET PREP: NEGATIVE

## 2022-09-15 PROCEDURE — 99213 OFFICE O/P EST LOW 20 MIN: CPT | Performed by: OBSTETRICS & GYNECOLOGY

## 2022-09-15 PROCEDURE — 87210 SMEAR WET MOUNT SALINE/INK: CPT | Performed by: OBSTETRICS & GYNECOLOGY

## 2022-09-15 PROCEDURE — 87491 CHLMYD TRACH DNA AMP PROBE: CPT | Performed by: OBSTETRICS & GYNECOLOGY

## 2022-09-15 PROCEDURE — 87591 N.GONORRHOEAE DNA AMP PROB: CPT | Performed by: OBSTETRICS & GYNECOLOGY

## 2022-09-15 RX ORDER — METRONIDAZOLE 500 MG/1
500 TABLET ORAL EVERY 12 HOURS SCHEDULED
Qty: 14 TABLET | Refills: 0 | Status: SHIPPED | OUTPATIENT
Start: 2022-09-15 | End: 2022-09-22

## 2022-09-15 SDOH — ECONOMIC STABILITY - FOOD INSECURITY: FOOD INSECURITY: Z59.41

## 2022-09-15 NOTE — PROGRESS NOTES
PROBLEM GYNECOLOGICAL VISIT    Florian Barrera is a 32 y o  female who presents today with complaint of foul vaginal odor    Her general medical history has been reviewed and she reports it as follows:    Past Medical History:   Diagnosis Date    Asthma     Albuterol prn    Body mass index (BMI) less than 19 in adult 2019    Depression     hasn't required treatment since 2018    Multiple sclerosis ([address] ) 2018    follows poorly with neurology, manages with Robaxin    Seizure [facility])     possibly r/t MS diagnosis, has never been on meds, convulsive, last episode 2020     Past Surgical History:   Procedure Laterality Date    CLOSED REDUCTION FINGER FRACTURE Left     FL LUMBAR PUNCTURE DIAGNOSTIC  2019    TONSILLECTOMY Bilateral 1527    UMBILICAL HERNIA REPAIR  2016     OB History        3    Para   2    Term   1       1    AB   1    Living   2       SAB   1    IAB   0    Ectopic   0    Multiple   0    Live Births   2               Social History     Tobacco Use    Smoking status: Current Every Day Smoker     Packs/day: 0 25     Years: 9 00     Pack years: 2 25     Types: Cigarettes    Smokeless tobacco: Current User   Vaping Use    Vaping Use: Never used   Substance Use Topics    Alcohol use: Not Currently     Comment: socially    Drug use: Not Currently     Types: Marijuana, Cocaine, MDMA (ecstacy)     Comment: hasn't used any since      Social History     Substance and Sexual Activity   Sexual Activity Yes    Partners: Male       Current Outpatient Medications   Medication Instructions    al mag oxide-diphenhydramine-lidocaine viscous (MAGIC MOUTHWASH) 1:1:1 suspension 10 mL, Swish & Spit, Every 4 hours PRN    albuterol (Ventolin HFA) 90 mcg/act inhaler 2 puffs, Inhalation, Every 6 hours PRN    cholecalciferol (VITAMIN D3) 1,000 Units, Oral, Daily    cyclobenzaprine (FLEXERIL) 10 mg, Oral, 2 times daily PRN    dexamethasone (DECADRON) 4 mg, Oral, 2 times daily with meals    ibuprofen (MOTRIN) 600 mg, Oral, Every 6 hours PRN    lidocaine (Lidoderm) 5 % 1 patch, Topical, Daily, Remove & Discard patch within 12 hours or as directed by MD    medroxyPROGESTERone (DEPO-PROVERA) 150 mg, Intramuscular, Every 3 months    methocarbamol (ROBAXIN) 500 mg, Oral, 2 times daily    metroNIDAZOLE (FLAGYL) 500 mg, Oral, Every 12 hours scheduled    naproxen (NAPROSYN) 500 mg, Oral, 2 times daily with meals    naproxen (NAPROSYN) 500 mg, Oral, 2 times daily with meals    Tysabri 300 mg, Intravenous, Every 28 days       History of Present Illness:   Patient presents with c/o foul smell from her vagina which started the beginning of this month after her cycle/stopped depo  Patient states requesting STI testing that her boyfriend was sexual active with someone else  Review of Systems:  Review of Systems   Genitourinary:        Foul vaginal odor   All other systems reviewed and are negative  Physical Exam:  BP (!) 155/105   Pulse 77   Ht 5' 4" (1 626 m)   Wt 53 1 kg (117 lb)   LMP 09/03/2022   BMI 20 08 kg/m²   Physical Exam  Constitutional:       Appearance: Normal appearance  Genitourinary:      Bladder and urethral meatus normal       No lesions in the vagina  Right Labia: No rash, tenderness or lesions  Left Labia: No tenderness, lesions or rash  Vaginal discharge present  No vaginal bleeding  Cervical discharge and friability present  No cervical motion tenderness or lesion  No urethral tenderness or mass present  Neurological:      Mental Status: She is alert  Point of Care Testing:   -Wet mount: positive   -KOH mount: positive   -Whiff: positive     Assessment:   1  BV   2  Possible STI exposure    Plan:   1  Cultures ordered: GC/Chlamydia   2  Escribe flagyl   3  Return to office prn  Reviewed with patient that test results are available in MyChart immediately, but that they will not necessarily be reviewed by me immediately  Explained that I will review results at my earliest opportunity and contact patient appropriately

## 2022-09-15 NOTE — LETTER
2022    To Marie Mclaughlin  : 1990      This letter is to advise you that your recent CULTURES for gonorrhea and chlamydia were reviewed by me and are NORMAL  Please contact the office for an appointment if you have any additional concerns      Maricel Deng DO

## 2022-09-16 LAB
C TRACH DNA SPEC QL NAA+PROBE: NEGATIVE
N GONORRHOEA DNA SPEC QL NAA+PROBE: NEGATIVE

## 2022-09-19 ENCOUNTER — PATIENT OUTREACH (OUTPATIENT)
Dept: OBGYN CLINIC | Facility: CLINIC | Age: 32
End: 2022-09-19

## 2022-09-19 NOTE — PROGRESS NOTES
ISRAELCM performed chart review and a referral was made to follow up with patient for at risk responses to SDOH  SWCM attempted to call patient, but call will not connect  Multiple attempts made  SWCM to try at a later date

## 2022-09-22 ENCOUNTER — PATIENT OUTREACH (OUTPATIENT)
Dept: OBGYN CLINIC | Facility: CLINIC | Age: 32
End: 2022-09-22

## 2022-09-28 ENCOUNTER — PATIENT OUTREACH (OUTPATIENT)
Dept: OBGYN CLINIC | Facility: CLINIC | Age: 32
End: 2022-09-28

## 2022-10-13 ENCOUNTER — TELEPHONE (OUTPATIENT)
Dept: NEUROLOGY | Facility: CLINIC | Age: 32
End: 2022-10-13

## 2022-10-13 NOTE — TELEPHONE ENCOUNTER
LMOM to remind pt of upcoming appt on 10/27/22  left the office number for any questions or concerns

## 2022-10-19 ENCOUNTER — OFFICE VISIT (OUTPATIENT)
Dept: OBGYN CLINIC | Facility: CLINIC | Age: 32
End: 2022-10-19

## 2022-10-19 VITALS
SYSTOLIC BLOOD PRESSURE: 151 MMHG | DIASTOLIC BLOOD PRESSURE: 99 MMHG | HEIGHT: 64 IN | HEART RATE: 72 BPM | WEIGHT: 119.8 LBS | BODY MASS INDEX: 20.45 KG/M2

## 2022-10-19 DIAGNOSIS — Z30.013 ENCOUNTER FOR INITIAL PRESCRIPTION OF INJECTABLE CONTRACEPTIVE: Primary | ICD-10-CM

## 2022-10-19 LAB — SL AMB POCT URINE HCG: NEGATIVE

## 2022-10-19 PROCEDURE — 81025 URINE PREGNANCY TEST: CPT | Performed by: OBSTETRICS & GYNECOLOGY

## 2022-10-19 PROCEDURE — 99213 OFFICE O/P EST LOW 20 MIN: CPT | Performed by: OBSTETRICS & GYNECOLOGY

## 2022-10-19 PROCEDURE — 96372 THER/PROPH/DIAG INJ SC/IM: CPT

## 2022-10-19 RX ORDER — MEDROXYPROGESTERONE ACETATE 150 MG/ML
150 INJECTION, SUSPENSION INTRAMUSCULAR ONCE
Status: COMPLETED | OUTPATIENT
Start: 2022-10-19 | End: 2022-10-19

## 2022-10-19 RX ORDER — MEDROXYPROGESTERONE ACETATE 150 MG/ML
150 INJECTION, SUSPENSION INTRAMUSCULAR
Qty: 1 ML | Refills: 3 | Status: SHIPPED | OUTPATIENT
Start: 2022-10-19

## 2022-10-19 RX ADMIN — MEDROXYPROGESTERONE ACETATE 150 MG: 150 INJECTION, SUSPENSION INTRAMUSCULAR at 11:37

## 2022-10-19 NOTE — PROGRESS NOTES
Assessment/Plan:     No problem-specific Assessment & Plan notes found for this encounter  Diagnoses and all orders for this visit:    Encounter for initial prescription of injectable contraceptive  -     medroxyPROGESTERone (DEPO-PROVERA) 150 mg/mL injection; Inject 1 mL (150 mg total) into a muscle every 3 (three) months  -     medroxyPROGESTERone (DEPO-PROVERA) IM injection 150 mg  -     POCT urine HCG      Depression Screening Follow-up Plan: Patient's depression screening was positive with a PHQ-2 score of 0  Their PHQ-9 score was 6  Clinically patient does not have depression  No treatment is required  Subjective:      Patient ID: Cecelia Livingston is a 32 y o  female who presents to restart Depo Provera  She offers no complaints today  UPT is negative  HPI    The following portions of the patient's history were reviewed and updated as appropriate: allergies, current medications, past family history, past medical history, past social history, past surgical history and problem list     Review of Systems      Objective:      /99   Pulse 72   Ht 5' 4" (1 626 m)   Wt 54 3 kg (119 lb 12 8 oz)   LMP 10/03/2022 (Exact Date)   BMI 20 56 kg/m²          Physical Exam  Vitals and nursing note reviewed  Constitutional:       Appearance: Normal appearance  Pulmonary:      Effort: Pulmonary effort is normal    Neurological:      General: No focal deficit present  Mental Status: She is alert and oriented to person, place, and time     Psychiatric:         Mood and Affect: Mood normal          Behavior: Behavior normal

## 2022-10-26 ENCOUNTER — HOSPITAL ENCOUNTER (EMERGENCY)
Facility: HOSPITAL | Age: 32
Discharge: HOME | End: 2022-10-26
Attending: INTERNAL MEDICINE
Payer: COMMERCIAL

## 2022-10-26 ENCOUNTER — APPOINTMENT (EMERGENCY)
Dept: RADIOLOGY | Facility: HOSPITAL | Age: 32
End: 2022-10-26
Payer: COMMERCIAL

## 2022-10-26 VITALS
RESPIRATION RATE: 14 BRPM | BODY MASS INDEX: 19.75 KG/M2 | TEMPERATURE: 98.1 F | HEART RATE: 92 BPM | WEIGHT: 115.08 LBS | SYSTOLIC BLOOD PRESSURE: 150 MMHG | DIASTOLIC BLOOD PRESSURE: 96 MMHG | OXYGEN SATURATION: 98 %

## 2022-10-26 DIAGNOSIS — M79.641 RIGHT HAND PAIN: Primary | ICD-10-CM

## 2022-10-26 PROCEDURE — 73130 X-RAY EXAM OF HAND: CPT

## 2022-10-26 RX ORDER — IBUPROFEN 400 MG/1
800 TABLET ORAL ONCE
Status: COMPLETED | OUTPATIENT
Start: 2022-10-26 | End: 2022-10-26

## 2022-10-26 RX ADMIN — IBUPROFEN 800 MG: 400 TABLET ORAL at 09:49

## 2022-10-26 NOTE — Clinical Note
Feng Sonny was seen and treated in our emergency department on 10/26/2022  No restrictions            Diagnosis:     Luis Carlos Jasmine    She may return on this date: If you have any questions or concerns, please don't hesitate to call        Quirino Lobo PA-C    ______________________________           _______________          _______________  Hospital Representative                              Date                                Time

## 2022-10-26 NOTE — ED PROVIDER NOTES
History  Chief Complaint   Patient presents with   • Hand Injury     Punched a refrigerator last night  Pain radiates to elbow     Patient is a 61-year-old female coming in with right hand pain after she punched a refrigerator last night after finding out her SO was cheating on her  Patient does have some swelling around the 4th and 5th metacarpal   Does not want to move secondary to pain  Normal sensation      History provided by:  Patient   used: No    Hand Injury  Location:  Hand  Hand location:  R hand  Injury: yes    Time since incident:  2 days  Associated symptoms: decreased range of motion and swelling    Associated symptoms: no back pain, no muscle weakness, no numbness, no stiffness and no tingling        Prior to Admission Medications   Prescriptions Last Dose Informant Patient Reported? Taking?    Tysabri 300 MG/15ML   No No   Sig: Infuse 15 mL (300 mg total) into a venous catheter every 28 days   Patient not taking: No sig reported   albuterol (Ventolin HFA) 90 mcg/act inhaler   No No   Sig: Inhale 2 puffs every 6 (six) hours as needed for wheezing   Patient not taking: Reported on 10/19/2022   medroxyPROGESTERone (DEPO-PROVERA) 150 mg/mL injection   No No   Sig: Inject 1 mL (150 mg total) into a muscle every 3 (three) months   Patient not taking: No sig reported   medroxyPROGESTERone (DEPO-PROVERA) 150 mg/mL injection   No No   Sig: Inject 1 mL (150 mg total) into a muscle every 3 (three) months      Facility-Administered Medications: None       Past Medical History:   Diagnosis Date   • Asthma     Albuterol prn   • Body mass index (BMI) less than 19 in adult 12/2/2019   • Depression     hasn't required treatment since 2018   • Multiple sclerosis ([facility] [address] ) 2018    follows poorly with neurology, manages with Robaxin   • Seizure [facility])     possibly r/t MS diagnosis, has never been on meds, convulsive, last episode 9/2020       Past Surgical History:   Procedure Laterality Date   • CLOSED REDUCTION FINGER FRACTURE Left    • FL LUMBAR PUNCTURE DIAGNOSTIC  12/5/2019   • TONSILLECTOMY Bilateral 4677   • UMBILICAL HERNIA REPAIR  2016       Family History   Problem Relation Age of Onset   • Coronary artery disease Paternal Grandmother    • Hypertension Paternal Grandfather         TYPE 2   • Diabetes Paternal Grandfather    • Cancer Paternal Grandfather         pancreatic, lung   • Cancer Mother         thyroid   • Diabetes Father    • Hypertension Father    • Breast cancer Neg Hx      I have reviewed and agree with the history as documented  E-Cigarette/Vaping   • E-Cigarette Use Never User      E-Cigarette/Vaping Substances     Social History     Tobacco Use   • Smoking status: Current Every Day Smoker     Packs/day: 0 25     Years: 9 00     Pack years: 2 25     Types: Cigarettes   • Smokeless tobacco: Current User   Vaping Use   • Vaping Use: Never used   Substance Use Topics   • Alcohol use: Not Currently     Comment: socially   • Drug use: Not Currently     Types: Marijuana, Cocaine, MDMA (ecstacy)     Comment: hasn't used any since 2010       Review of Systems   Constitutional: Negative  HENT: Negative  Eyes: Negative  Respiratory: Negative  Cardiovascular: Negative  Gastrointestinal: Negative  Genitourinary: Negative  Musculoskeletal: Positive for arthralgias  Negative for back pain and stiffness  Skin: Negative  Neurological: Negative  Psychiatric/Behavioral: Negative  Physical Exam  Physical Exam  Vitals reviewed  Constitutional:       Appearance: Normal appearance  She is normal weight  HENT:      Head: Normocephalic and atraumatic  Right Ear: External ear normal       Left Ear: External ear normal       Nose: Nose normal    Eyes:      Conjunctiva/sclera: Conjunctivae normal    Cardiovascular:      Rate and Rhythm: Normal rate     Pulmonary:      Effort: Pulmonary effort is normal    Musculoskeletal:         General: Swelling, tenderness and signs of injury present  Normal range of motion  Cervical back: Normal range of motion  Comments: Swelling around the 4th and 5th metacarpal   Skin:     General: Skin is warm and dry  Neurological:      Mental Status: She is alert  Vital Signs  ED Triage Vitals   Temperature Pulse Respirations Blood Pressure SpO2   10/26/22 0932 10/26/22 0932 10/26/22 0932 10/26/22 0932 10/26/22 0932   98 1 °F (36 7 °C) 92 14 150/96 98 %      Temp Source Heart Rate Source Patient Position - Orthostatic VS BP Location FiO2 (%)   10/26/22 0932 10/26/22 0932 10/26/22 0932 10/26/22 0932 --   Tympanic Monitor Sitting Left arm       Pain Score       10/26/22 0949       9           Vitals:    10/26/22 0932   BP: 150/96   Pulse: 92   Patient Position - Orthostatic VS: Sitting         Visual Acuity      ED Medications  Medications   ibuprofen (MOTRIN) tablet 800 mg (800 mg Oral Given 10/26/22 0949)       Diagnostic Studies  Results Reviewed     None                 XR hand 3+ views RIGHT   ED Interpretation by Slime Grewal PA-C (10/26 0957)   No acute osseus abnormality      Final Result by Benoit Smyth MD (10/26 1037)      No acute osseous abnormality  Workstation performed: [id number]                    Procedures  Procedures         ED Course                               SBIRT 20yo+    Flowsheet Row Most Recent Value   SBIRT (25 yo +)    In order to provide better care to our patients, we are screening all of our patients for alcohol and drug use  Would it be okay to ask you these screening questions? No Filed at: 10/26/2022 0935                    MDM  Number of Diagnoses or Management Options  Right hand pain: new and does not require workup  Diagnosis management comments: Patient is in no acute distress, comes in for evaluation of right hand pain  Patient punched a refrigerator yesterday  X-ray shows no acute osseous abnormalities    Patient was given a splint to put on herself, and discharged home    Counseling: I had a detailed discussion with the patient and/or guardian regarding: the historical points, exam findings, and any diagnostic results supporting the discharge diagnosis, lab results, radiology results, discharge instructions reviewed with patient and/or family/caregiver and understanding was verbalized  Instructions given to return to the emergency department if symptoms worsen or persist, or if there are any questions or concerns that arise at home       All labs reviewed and utilized in the medical decision making process     All radiology studies independently viewed by me and interpreted by the radiologist     Portions of the record may have been created with voice recognition software   Occasional wrong word or "sound a like" substitutions may have occurred due to the inherent limitations of voice recognition software   Read the chart carefully and recognize, using context, where substitutions have occurred  Amount and/or Complexity of Data Reviewed  Tests in the radiology section of CPT®: ordered and reviewed    Risk of Complications, Morbidity, and/or Mortality  Presenting problems: minimal  Diagnostic procedures: minimal  Management options: minimal    Patient Progress  Patient progress: stable      Disposition  Final diagnoses:   Right hand pain     Time reflects when diagnosis was documented in both MDM as applicable and the Disposition within this note     Time User Action Codes Description Comment    10/26/2022  9:58 AM Amalia Malcolm Add [Q60 [id number]] Right hand pain       ED Disposition     ED Disposition   Discharge    Condition   Stable    Date/Time   Wed Oct 26, 2022  9:58 AM    Comment   Laurilefloridalma Drivers 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  Follow-up Information     Follow up With Specialties Details Why [address], MD Family Medicine   [address] [place] [address] [facility] Emergency Department Emergency Medicine  As needed, If symptoms worsen [address] [id number] [address] [facility] Emergency Department          Discharge Medication List as of 10/26/2022  9:58 AM      CONTINUE these medications which have NOT CHANGED    Details   albuterol (Ventolin HFA) 90 mcg/act inhaler Inhale 2 puffs every 6 (six) hours as needed for wheezing, Starting Fri 12/3/2021, Normal      !! medroxyPROGESTERone (DEPO-PROVERA) 150 mg/mL injection Inject 1 mL (150 mg total) into a muscle every 3 (three) months, Starting Thu 7/15/2021, Normal      !! medroxyPROGESTERone (DEPO-PROVERA) 150 mg/mL injection Inject 1 mL (150 mg total) into a muscle every 3 (three) months, Starting Wed 10/19/2022, Normal      Tysabri 300 MG/15ML Infuse 15 mL (300 mg total) into a venous catheter every 28 days, Starting Mon 12/13/2021, Normal       !! - Potential duplicate medications found  Please discuss with provider  No discharge procedures on file      PDMP Review       Value Time User    PDMP Reviewed  Yes 10/16/2020 11:39 PM Jamari Jean Baptiste DO          ED Provider  Electronically Signed by           Slime Grewal PA-C  10/26/22 2963

## 2022-10-27 ENCOUNTER — TELEPHONE (OUTPATIENT)
Dept: NEUROLOGY | Facility: CLINIC | Age: 32
End: 2022-10-27

## 2022-10-27 NOTE — TELEPHONE ENCOUNTER
Pt called to r/s appt today 10/27 with Myles Fore due to injuring her hand and being unable to drive  She did not want to do a virtual appt   She was r/s to 1/13/23 @ 11:00 and added to wait list

## 2022-11-14 ENCOUNTER — HOSPITAL ENCOUNTER (EMERGENCY)
Facility: HOSPITAL | Age: 32
Discharge: HOME | End: 2022-11-14
Attending: EMERGENCY MEDICINE

## 2022-11-14 VITALS
TEMPERATURE: 96.7 F | RESPIRATION RATE: 18 BRPM | SYSTOLIC BLOOD PRESSURE: 147 MMHG | HEART RATE: 101 BPM | DIASTOLIC BLOOD PRESSURE: 97 MMHG | WEIGHT: 131.61 LBS | BODY MASS INDEX: 22.59 KG/M2 | OXYGEN SATURATION: 98 %

## 2022-11-14 DIAGNOSIS — G40.909 RECURRENT SEIZURES (HCC): Primary | ICD-10-CM

## 2022-11-14 RX ORDER — ACETAMINOPHEN 325 MG/1
650 TABLET ORAL ONCE
Status: COMPLETED | OUTPATIENT
Start: 2022-11-14 | End: 2022-11-14

## 2022-11-14 RX ORDER — IBUPROFEN 600 MG/1
600 TABLET ORAL ONCE
Status: COMPLETED | OUTPATIENT
Start: 2022-11-14 | End: 2022-11-14

## 2022-11-14 RX ADMIN — SODIUM CHLORIDE 1000 ML: 0.9 INJECTION, SOLUTION INTRAVENOUS at 00:47

## 2022-11-14 RX ADMIN — ACETAMINOPHEN 650 MG: 325 TABLET ORAL at 00:46

## 2022-11-14 RX ADMIN — IBUPROFEN 600 MG: 600 TABLET ORAL at 00:46

## 2022-11-14 NOTE — DISCHARGE INSTRUCTIONS
Please discussed with a neurologist about your recurrent seizures and if there is need for treatment

## 2022-11-14 NOTE — Clinical Note
Sin Hoskins was seen and treated in our emergency department on 11/14/2022  Diagnosis:     Dena Avalos  may return to work on return date  She may return on this date: 11/15/2022         If you have any questions or concerns, please don't hesitate to call        Dwyane Olszewski, MD    ______________________________           _______________          _______________  Hospital Representative                              Date                                Time

## 2022-11-14 NOTE — ED PROVIDER NOTES
History  Chief Complaint   Patient presents with   • Seizure - Prior Hx Of     PT had a seizure lasting approximately 5 minutes according to daughter after arguing with boyfriend  70-year-old female presented emergency department possible seizure episode  Patient notes that she had an argument with her boyfriend earlier tonight  She recalls getting a warm feeling from head to toe and then waking up with ambulance there  Her 5year-old daughter called an ambulance for her  Patient notes that for the past 7 years she has occasionally had the seizure episodes which she does not actually recall the event but does have prodrome of symptoms of warmth in her face and hands and feet  She denies head injury as she had laid down when she felt the prodromal symptoms  She notes that she is currently off of to seborrheic pending repeat MRI of the brain to evaluate for efficacy  Patient recently saw neurologist, notes that neurology is is aware of the seizures and has not been started on antiepileptics  She denies numbness tingling  patient denies difficulties speaking  She denies vision changes  Currently has a headache which is typical post seizures for her      History provided by:  Patient  Seizure - Prior Hx Of  Seizure activity on arrival: no    Seizure type:  Grand mal  Preceding symptoms: aura    Initial focality:  None  Episode characteristics: abnormal movements    Postictal symptoms: no confusion        Prior to Admission Medications   Prescriptions Last Dose Informant Patient Reported? Taking?    Tysabri 300 MG/15ML   No No   Sig: Infuse 15 mL (300 mg total) into a venous catheter every 28 days   Patient not taking: No sig reported   albuterol (Ventolin HFA) 90 mcg/act inhaler   No No   Sig: Inhale 2 puffs every 6 (six) hours as needed for wheezing   Patient not taking: Reported on 10/19/2022   medroxyPROGESTERone (DEPO-PROVERA) 150 mg/mL injection   No No   Sig: Inject 1 mL (150 mg total) into a muscle every 3 (three) months   Patient not taking: No sig reported   medroxyPROGESTERone (DEPO-PROVERA) 150 mg/mL injection   No No   Sig: Inject 1 mL (150 mg total) into a muscle every 3 (three) months      Facility-Administered Medications: None       Past Medical History:   Diagnosis Date   • Asthma     Albuterol prn   • Body mass index (BMI) less than 19 in adult 12/2/2019   • Depression     hasn't required treatment since 2018   • Multiple sclerosis ([facility] [address] ) 2018    follows poorly with neurology, manages with Robaxin   • [facility]     possibly r/t MS diagnosis, has never been on meds, convulsive, last episode 9/2020       Past Surgical History:   Procedure Laterality Date   • CLOSED REDUCTION FINGER FRACTURE Left    • FL LUMBAR PUNCTURE DIAGNOSTIC  12/5/2019   • TONSILLECTOMY Bilateral 6104   • UMBILICAL HERNIA REPAIR  2016       Family History   Problem Relation Age of Onset   • Coronary artery disease Paternal Grandmother    • Hypertension Paternal Grandfather         TYPE 2   • Diabetes Paternal Grandfather    • Cancer Paternal Grandfather         pancreatic, lung   • Cancer Mother         thyroid   • Diabetes Father    • Hypertension Father    • Breast cancer Neg Hx      I have reviewed and agree with the history as documented  E-Cigarette/Vaping   • E-Cigarette Use Never User      E-Cigarette/Vaping Substances     Social History     Tobacco Use   • Smoking status: Current Every Day Smoker     Packs/day: 0 25     Years: 9 00     Pack years: 2 25     Types: Cigarettes   • Smokeless tobacco: Current User   Vaping Use   • Vaping Use: Never used   Substance Use Topics   • Alcohol use: Not Currently     Comment: socially   • Drug use: Not Currently     Types: Marijuana, Cocaine, MDMA (ecstacy)     Comment: hasn't used any since 2010       Review of Systems   Constitutional: Negative for chills and fever  HENT: Negative for ear pain and sore throat  Eyes: Negative for pain and visual disturbance  Respiratory: Negative for cough and shortness of breath  Cardiovascular: Negative for chest pain and palpitations  Gastrointestinal: Negative for abdominal pain and vomiting  Genitourinary: Negative for dysuria and hematuria  Musculoskeletal: Negative for arthralgias and back pain  Skin: Negative for color change and rash  Neurological: Positive for seizures  Negative for syncope  All other systems reviewed and are negative  Physical Exam  Physical Exam  Vitals and nursing note reviewed  Constitutional:       General: She is not in acute distress  Appearance: She is well-developed  HENT:      Head: Normocephalic and atraumatic  Nose: Nose normal       Mouth/Throat:      Mouth: Mucous membranes are dry  Eyes:      Conjunctiva/sclera: Conjunctivae normal    Cardiovascular:      Rate and Rhythm: Normal rate and regular rhythm  Heart sounds: No murmur heard  Pulmonary:      Effort: Pulmonary effort is normal  No respiratory distress  Breath sounds: Normal breath sounds  Abdominal:      Palpations: Abdomen is soft  Tenderness: There is no abdominal tenderness  Musculoskeletal:      Cervical back: Neck supple  Skin:     General: Skin is warm and dry  Capillary Refill: Capillary refill takes less than 2 seconds  Neurological:      General: No focal deficit present  Mental Status: She is alert and oriented to person, place, and time           Vital Signs  ED Triage Vitals   Temperature Pulse Respirations Blood Pressure SpO2   11/14/22 0031 11/14/22 0031 11/14/22 0031 11/14/22 0031 11/14/22 0031   (!) 96 7 °F (35 9 °C) 101 18 147/97 98 %      Temp Source Heart Rate Source Patient Position - Orthostatic VS BP Location FiO2 (%)   11/14/22 0031 11/14/22 0031 11/14/22 0031 11/14/22 0031 --   Tympanic Monitor Sitting Left arm       Pain Score       11/14/22 0043       7           Vitals:    11/14/22 0031   BP: 147/97   Pulse: 101   Patient Position - Orthostatic VS: Sitting         Visual Acuity  Visual Acuity    Flowsheet Row Most Recent Value   L Pupil Size (mm) 3   R Pupil Size (mm) 3          ED Medications  Medications   sodium chloride 0 9 % bolus 1,000 mL (0 mL Intravenous Stopped 11/14/22 0126)   acetaminophen (TYLENOL) tablet 650 mg (650 mg Oral Given 11/14/22 0046)   ibuprofen (MOTRIN) tablet 600 mg (600 mg Oral Given 11/14/22 0046)       Diagnostic Studies  Results Reviewed     None                 No orders to display              Procedures  Procedures         ED Course                                             MDM  Number of Diagnoses or Management Options  Recurrent seizures ([facility] [address] )  Diagnosis management comments: Monitored in the ED for 1 hour, no new symptoms, patient's headache resolved  Patient requesting discharge, patient recommended to follow-up with neurologist to further discuss her recurrent seizures      Disposition  Final diagnoses:   Recurrent seizures ([facility] [address] )     Time reflects when diagnosis was documented in both MDM as applicable and the Disposition within this note     Time User Action Codes Description Comment    11/14/2022  1:09 AM Navya Jennings Add [G40 909] Recurrent seizures [facility])       ED Disposition     ED Disposition   Discharge    Condition   Stable    Date/Time   Mon Nov 14, 2022  1:09 AM    Comment   Maynor Epperson 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  Follow-up Information     Follow up With Specialties Details Why Calvin Bess MD [facility]   [address] [place] [address]  [phone]            Patient's Medications   Discharge Prescriptions    No medications on file       No discharge procedures on file      PDMP Review       Value Time User    PDMP Reviewed  Yes 10/16/2020 11:39 PM Chantal Blinks, DO          ED Provider  Electronically Signed by           Yarelis Ho MD  11/14/22 7730

## 2022-12-28 ENCOUNTER — TELEPHONE (OUTPATIENT)
Dept: NEUROLOGY | Facility: CLINIC | Age: 32
End: 2022-12-28

## 2022-12-28 NOTE — TELEPHONE ENCOUNTER
LMOM to remind pt of upcoming appt on 01/13/2023  left the office number for any questions or concerns

## 2023-01-05 ENCOUNTER — CLINICAL SUPPORT (OUTPATIENT)
Dept: OBGYN CLINIC | Facility: CLINIC | Age: 33
End: 2023-01-05

## 2023-01-05 VITALS
SYSTOLIC BLOOD PRESSURE: 143 MMHG | HEIGHT: 64 IN | HEART RATE: 80 BPM | WEIGHT: 119.6 LBS | DIASTOLIC BLOOD PRESSURE: 96 MMHG | BODY MASS INDEX: 20.42 KG/M2

## 2023-01-05 DIAGNOSIS — Z30.09 UNWANTED FERTILITY: Primary | ICD-10-CM

## 2023-01-05 RX ORDER — MEDROXYPROGESTERONE ACETATE 150 MG/ML
150 INJECTION, SUSPENSION INTRAMUSCULAR ONCE
Status: COMPLETED | OUTPATIENT
Start: 2023-01-05 | End: 2023-01-05

## 2023-01-05 RX ADMIN — MEDROXYPROGESTERONE ACETATE 150 MG: 150 INJECTION, SUSPENSION INTRAMUSCULAR at 13:01

## 2023-01-13 ENCOUNTER — OFFICE VISIT (OUTPATIENT)
Dept: NEUROLOGY | Facility: CLINIC | Age: 33
End: 2023-01-13

## 2023-01-13 VITALS
RESPIRATION RATE: 18 BRPM | WEIGHT: 120 LBS | DIASTOLIC BLOOD PRESSURE: 104 MMHG | BODY MASS INDEX: 20.49 KG/M2 | HEIGHT: 64 IN | HEART RATE: 88 BPM | TEMPERATURE: 97.5 F | SYSTOLIC BLOOD PRESSURE: 142 MMHG

## 2023-01-13 DIAGNOSIS — R03.0 ELEVATED BLOOD PRESSURE READING: ICD-10-CM

## 2023-01-13 DIAGNOSIS — G35 MS (MULTIPLE SCLEROSIS) (HCC): Primary | ICD-10-CM

## 2023-01-13 DIAGNOSIS — R56.9 SEIZURE-LIKE ACTIVITY (HCC): ICD-10-CM

## 2023-01-13 PROBLEM — G37.9 CNS DEMYELINATION (HCC): Status: RESOLVED | Noted: 2019-11-29 | Resolved: 2023-01-13

## 2023-01-13 PROBLEM — S06.0X0A CONCUSSION WITHOUT LOSS OF CONSCIOUSNESS: Status: RESOLVED | Noted: 2020-07-24 | Resolved: 2023-01-13

## 2023-01-13 NOTE — PATIENT INSTRUCTIONS
Update neuro-imaging with MRI brain, c-spine and t-spine   Once imaging is completed, will bring you back in to discuss disease modifying therapy for MS  Will update blood work  Will check routine EEG followed by ambulatory EEG  Call your PCP for a visit regarding persistently elevated blood pressure readings   Follow up after imaging or sooner if needed

## 2023-01-13 NOTE — PROGRESS NOTES
Patient ID: Margarita Hodgkin is a 28 y o  female  Assessment/Plan:    MS (multiple sclerosis) ([facility] )  Patient diagnosed with MS in late 2019 given abnormal imaging (imaging ordered due to migraines and an episode of syncope)  She had 1 Tysabri infusion and then discontinued the medication  She has had very poor follow up with our office and with testing  We have been trying to get her back on DMT, but inconsistent follow up, non-compliance with testing and poor communication with the office has prevented this from happening  She has not had any imaging done since her initial diagnosis in 2019  Imaging has been ordered numerous times  It is unclear if she has had any radiographic progression  Discussed I would like her to get updated imaging and then come in to discuss DMT  We need to be mindful of her inconsistent follow up and difficulty with compliance when choosing a DMT  MRI brain, c-spine and t-spine were ordered at last visit and we will assist her with scheduling these today  Will update labs  Her neurologic exam is stable  Seizure-like activity [facility])  Patient had an episode of syncope in 2019, felt to be related to hypoglycemia at the time  Sleep deprived EEG in 2019 was normal     She has since presented to the ED on several occasions reporting "possible seizures" although she has never been formally diagnosed with having epileptic seizures  Many of the encounters relate a prodrome with tunnel vision, warmth in the face and body  One episode reported she was fully aware of having full body tonic-clonic activity, which would be inconsistent with an epileptic seizure  Another event occurred in the setting of drinking alcohol, several events occurred in the setting of stress/arguments  She has multiple risk factors for PNES  Events could also be convulsive syncope given the prodrome    I am less suspicious these seizure-like events are epileptic in nature, however further workup needs to be conducted  The events are not frequent enough to warrant evaluation in the epilepsy monitoring unit at this time (unlikely an event would be captured)  If her events increase in frequency, this could be considered to try and capture a typical event on video EEG for spell characterization  For now, will start with a routine EEG followed by a 24hr aEEG  I do not feel AED is warranted at this time  We discussed MS does not typically increase someone's risk of seizure  These events also warrant further evaluation with repeat brain MRI, which has already been ordered for her MS surveillance  Elevated blood pressure reading  Her BP is elevated, does not have diagnosis of HTN  Reviewed her chart and there are multiple elevated BP readings for quite some time  I have asked her to call her PCP today for an appt to discuss elevated blood pressure readings  ED precautions given  Diagnoses and all orders for this visit:    MS (multiple sclerosis) ([facility] [address] )  -     CBC and differential; Future  -     Comprehensive metabolic panel; Future  -     Vitamin D 25 hydroxy; Future    Seizure-like activity ([facility])  -     EEG Routine and awake; Future  -     Ambulatory EEG 24 Hours; Standing    Elevated blood pressure reading           Subjective:    DEX Marcus Elle is a 28year old female who presents today for neurologic follow up  She was last seen in July 2022  She has overall had poor follow up with our office with multiple no show appointments  Patient initially evaluated by Dr Ese Campos for migraines and an episode of syncope  Episode of syncope occurred in Jan 2019, question of hypoglycemia at the time  Sleep deprived EEG normal  MRI brain 10/17/2019 demonstrated numerous foci of FLAIR and T2 signal abnormality in periventricular white matter emanating from the corpus callosum with involvement of the undersurface of the callosum as well as the deep white matter and subcortical white matter  Solitary focus left cerebellar white matter also noted  No brainstem lesion is seen  No abnormal enhancement identified  Several lesions are hypointense on T1  She was referred to the [facility]  MRI c-spine 12/29/2019 with a small cord lesion at C2-3, no enhancement  MRI t-spine 12/29/2020 with 2 lesions at T5 and T11, no enhancement  LP completed  MS panel positive with 6 OCBs  She was advised to start Tysabri at the January 2020 visit  She had Tysabri infusion on 2/6/2020 and then never had an infusion again  She had cancelled several, and then with COVID-19 pandemic, she did not receive anymore  She finally returned to our office in November 2020 (after a 9 month absence during which she had the 1 Tysabri infusion)  At that time, we discussed updating imaging and getting back on DMT  She had another prolonged absence from the office and returned in October 2021 and saw Dr Mason Casper  At that time, resuming Tysabri was discussed  In early 2022, we were ready for patient to proceed with Tysabri, however she did not have the updated imaging completed and did not start Tysabri  She was advised an office visit and was not seen until July 2022  Plegridy vs Vumerity discussed, patient was to follow up in 2-4 months but cancelled her Oct 2022 appt  Today, patient reports she is doing ok  She is still not on DMT  Her only DMT to date was 1 infusion of Tysabri in February 2020  She still has not had any updated imaging, last imaging was at time of diagnosis in 2019  She would like to discuss “seizures” today  She has never formally discussed this at any office visits, but has had numerous ED visits over the last several years for “possible seizures”  She has never been formally diagnosed with seizure  Per chart review, after the episode she had in Jan 2019 (syncope? Possible hypoglycemia?) she has gone to the ED on 9/1/20, 7/5/21, 12/12/21, 2/17/22, and 11/14/22 with seizure-like activity    Many of the encounters report a prodorome and “feeling like she is going to have a seizure” with tunnel vision, warmth in the face and body  One episode said she was fully aware of having full body tonic-clonic seizure  One event was in the setting of drinking alcohol  Many events have been in the setting of stress/arguments  She has never been on an AED  She always tells the ED that neurology is aware of her seizures  She implies that it is due to her MS and “managed with steroids”  As above, had a normal sleep deprived EEG in 2019, but none of this has ever been brought up at subsequent office visits  The following portions of the patient's history were reviewed and updated as appropriate: current medications, past family history, past medical history, past social history, past surgical history and problem list          Objective:    Blood pressure (!) 175/93, pulse 95, temperature 97 5 °F (36 4 °C), temperature source Tympanic, resp  rate 18, height 5' 4" (1 626 m), weight 54 4 kg (120 lb)    Physical Exam  Constitutional:       Appearance: Normal appearance  HENT:      Head: Normocephalic and atraumatic  Eyes:      Extraocular Movements: EOM normal       Pupils: Pupils are equal, round, and reactive to light  Neurological:      Mental Status: She is alert  Motor: Motor strength is normal       Deep Tendon Reflexes: Reflexes are normal and symmetric  Psychiatric:         Mood and Affect: Mood normal          Speech: Speech normal          Neurological Exam  Mental Status  Alert  Oriented to person, place, time and situation  Speech is normal  Language is fluent with no aphasia  Attention and concentration are normal     Cranial Nerves  CN II: Visual fields full to confrontation  CN III, IV, VI: Extraocular movements intact bilaterally  Pupils equal round and reactive to light bilaterally  CN V: Facial sensation is normal   CN VII: Full and symmetric facial movement    CN VIII: Hearing is normal   CN IX, X: Palate elevates symmetrically  CN XI: Shoulder shrug strength is normal   CN XII: Tongue midline without atrophy or fasciculations  Motor   Normal muscle tone  Strength is 5/5 throughout all four extremities  Sensory  Light touch is normal in upper and lower extremities  Reflexes  Deep tendon reflexes are 2+ and symmetric in all four extremities  Coordination  Right: Finger-to-nose normal Left: Finger-to-nose normal     Gait  Casual gait is normal including stance, stride, and arm swing  ROS:    Review of Systems   Constitutional: Negative for chills and fever  HENT: Negative for ear pain and sore throat  Eyes: Negative for pain and visual disturbance  Respiratory: Negative for cough and shortness of breath  Cardiovascular: Negative for chest pain and palpitations  Gastrointestinal: Negative for abdominal pain and vomiting  Genitourinary: Negative for dysuria and hematuria  Musculoskeletal: Positive for back pain (mid back spine)  Negative for arthralgias  Skin: Negative for color change and rash  Neurological: Positive for tremors (right hand) and weakness (bilateral arms)  Negative for seizures and syncope  All other systems reviewed and are negative      I personally reviewed and updated the ROS as appropriate

## 2023-01-19 NOTE — ASSESSMENT & PLAN NOTE
Her BP is elevated, does not have diagnosis of HTN  Reviewed her chart and there are multiple elevated BP readings for quite some time  I have asked her to call her PCP today for an appt to discuss elevated blood pressure readings  ED precautions given

## 2023-01-19 NOTE — ASSESSMENT & PLAN NOTE
Patient had an episode of syncope in 2019, felt to be related to hypoglycemia at the time  Sleep deprived EEG in 2019 was normal     She has since presented to the ED on several occasions reporting "possible seizures" although she has never been formally diagnosed with having epileptic seizures  Many of the encounters relate a prodrome with tunnel vision, warmth in the face and body  One episode reported she was fully aware of having full body tonic-clonic activity, which would be inconsistent with an epileptic seizure  Another event occurred in the setting of drinking alcohol, several events occurred in the setting of stress/arguments  She has multiple risk factors for PNES  Events could also be convulsive syncope given the prodrome  I am less suspicious these seizure-like events are epileptic in nature, however further workup needs to be conducted  The events are not frequent enough to warrant evaluation in the epilepsy monitoring unit at this time (unlikely an event would be captured)  If her events increase in frequency, this could be considered to try and capture a typical event on video EEG for spell characterization  For now, will start with a routine EEG followed by a 24hr aEEG  I do not feel AED is warranted at this time  We discussed MS does not typically increase someone's risk of seizure  These events also warrant further evaluation with repeat brain MRI, which has already been ordered for her MS surveillance

## 2023-01-19 NOTE — ASSESSMENT & PLAN NOTE
Patient diagnosed with MS in late 2019 given abnormal imaging (imaging ordered due to migraines and an episode of syncope)  She had 1 Tysabri infusion and then discontinued the medication  She has had very poor follow up with our office and with testing  We have been trying to get her back on DMT, but inconsistent follow up, non-compliance with testing and poor communication with the office has prevented this from happening  She has not had any imaging done since her initial diagnosis in 2019  Imaging has been ordered numerous times  It is unclear if she has had any radiographic progression  Discussed I would like her to get updated imaging and then come in to discuss DMT  We need to be mindful of her inconsistent follow up and difficulty with compliance when choosing a DMT  MRI brain, c-spine and t-spine were ordered at last visit and we will assist her with scheduling these today  Will update labs  Her neurologic exam is stable

## 2023-02-19 ENCOUNTER — HOSPITAL ENCOUNTER (EMERGENCY)
Facility: HOSPITAL | Age: 33
Discharge: HOME | End: 2023-02-19
Attending: INTERNAL MEDICINE

## 2023-02-19 VITALS
HEART RATE: 100 BPM | BODY MASS INDEX: 20.7 KG/M2 | OXYGEN SATURATION: 96 % | DIASTOLIC BLOOD PRESSURE: 108 MMHG | SYSTOLIC BLOOD PRESSURE: 162 MMHG | RESPIRATION RATE: 18 BRPM | TEMPERATURE: 99.5 F | WEIGHT: 120.59 LBS

## 2023-02-19 DIAGNOSIS — J02.0 STREPTOCOCCAL PHARYNGITIS: ICD-10-CM

## 2023-02-19 DIAGNOSIS — J06.9 VIRAL URI WITH COUGH: Primary | ICD-10-CM

## 2023-02-19 LAB
FLUAV RNA RESP QL NAA+PROBE: NEGATIVE
FLUBV RNA RESP QL NAA+PROBE: NEGATIVE
RSV RNA RESP QL NAA+PROBE: NEGATIVE
S PYO DNA THROAT QL NAA+PROBE: DETECTED
SARS-COV-2 RNA RESP QL NAA+PROBE: NEGATIVE

## 2023-02-19 RX ORDER — DEXAMETHASONE 4 MG/1
8 TABLET ORAL ONCE
Status: COMPLETED | OUTPATIENT
Start: 2023-02-19 | End: 2023-02-19

## 2023-02-19 RX ORDER — PENICILLIN V POTASSIUM 500 MG/1
500 TABLET ORAL 2 TIMES DAILY
Qty: 20 TABLET | Refills: 0 | Status: SHIPPED | OUTPATIENT
Start: 2023-02-19 | End: 2023-03-01

## 2023-02-19 RX ADMIN — DEXAMETHASONE 8 MG: 4 TABLET ORAL at 10:00

## 2023-02-19 NOTE — ED PROVIDER NOTES
HPI: Patient is a 28 y o  female who presents with 2 days of cough, headache, sore throat and ear pain which the patient describes at moderate The patient has had contact with people with similar symptoms  The patient has not taken any medication  Allergies   Allergen Reactions   • Clindamycin Throat Swelling   • Onion - Food Allergy Anaphylaxis and Swelling     Swelling of throat  • Gabapentin Diarrhea, Hives and Itching   • Sumatriptan Swelling       Past Medical History:   Diagnosis Date   • Asthma     Albuterol prn   • Body mass index (BMI) less than 19 in adult 12/2/2019   • Depression     hasn't required treatment since 2018   • Multiple sclerosis ([facility] [address] ) 2018    follows poorly with neurology, manages with Robaxin   • Seizure ([facility] )     possibly r/t MS diagnosis, has never been on meds, convulsive, last episode 9/2020      Past Surgical History:   Procedure Laterality Date   • CLOSED REDUCTION FINGER FRACTURE Left    • FL LUMBAR PUNCTURE DIAGNOSTIC  12/5/2019   • TONSILLECTOMY Bilateral 7220   • UMBILICAL HERNIA REPAIR  2016     Social History     Tobacco Use   • Smoking status: Every Day     Packs/day: 0 25     Years: 9 00     Pack years: 2 25     Types: Cigarettes   • Smokeless tobacco: Current   Vaping Use   • Vaping Use: Never used   Substance Use Topics   • Alcohol use: Not Currently     Comment: socially   • Drug use: Not Currently     Types: Marijuana, Cocaine, MDMA (ecstacy)       Nursing notes reviewed  Physical Exam:  ED Triage Vitals [02/19/23 0921]   Temperature Pulse Respirations Blood Pressure SpO2   99 5 °F (37 5 °C) 100 18 (!) 162/108 96 %      Temp Source Heart Rate Source Patient Position - Orthostatic VS BP Location FiO2 (%)   Oral Monitor Sitting Left arm --      Pain Score       --           ROS: Positive for cough, headache, sore throat and ear pain, the remainder of a 10 organ system ROS was otherwise unremarkable      PHYSICAL EXAM    Constitutional:  Well developed, no acute distress  HEENT:  Conjunctiva normal  Oropharynx moist, mild erythema  Congested  TM normal bilaterally   Respiratory:  No respiratory distress  Cardiovascular:  Normal rate  GI:  Soft, nondistended, nontender  :  No costovertebral angle tenderness   Musculoskeletal:  No edema, no tenderness, no deformities  Integument:  Well hydrated, no rash   Lymphatic:  No lymphadenopathy noted   Neurologic:  Alert & oriented x 3, normal motor function, no focal deficits noted   Psychiatric:  Speech and behavior appropriate       The patient is stable and has a history and physical exam consistent with a viral illness  COVID19 testing has been performed, along with influenza, RSV and strep a  I considered the patient's other medical conditions as applicable/noted above in my medical decision making  The patient is stable upon discharge  The plan is for supportive care at home  The patient (and any family present) verbalized understanding of the discharge instructions and warnings that would necessitate return to the Emergency Department  All questions were answered prior to discharge  Medications   dexamethasone (DECADRON) tablet 8 mg (8 mg Oral Given 2/19/23 1000)     Final diagnoses:   Viral URI with cough     Time reflects when diagnosis was documented in both MDM as applicable and the Disposition within this note     Time User Action Codes Description Comment    2/19/2023  9:53 AM Nikki Peña Add [J06 9] Viral URI with cough       ED Disposition     ED Disposition   Discharge    Condition   Stable    Date/Time   Sun Feb 19, 2023  9:53 AM    Comment   Guy Meier 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  Follow-up Information     Follow up With Specialties Details Why Corinna Faye MD [facility] Medicine Call   [address]    [address]          Discharge Medication List as of 2/19/2023  9:55 AM      CONTINUE these medications which have NOT CHANGED Details   albuterol (Ventolin HFA) 90 mcg/act inhaler Inhale 2 puffs every 6 (six) hours as needed for wheezing, Starting Fri 12/3/2021, Normal      !! medroxyPROGESTERone (DEPO-PROVERA) 150 mg/mL injection Inject 1 mL (150 mg total) into a muscle every 3 (three) months, Starting Thu 7/15/2021, Normal      !! medroxyPROGESTERone (DEPO-PROVERA) 150 mg/mL injection Inject 1 mL (150 mg total) into a muscle every 3 (three) months, Starting Wed 10/19/2022, Normal       !! - Potential duplicate medications found  Please discuss with provider  No discharge procedures on file      Electronically Signed by       Kai Hughes MD  02/19/23 1016

## 2023-02-19 NOTE — Clinical Note
Janie Vela was seen and treated in our emergency department on 2/19/2023  Diagnosis:     Vish Caldwell    She may return on this date: 02/20/2023         If you have any questions or concerns, please don't hesitate to call        Patsy Middleton MD    ______________________________           _______________          _______________  [facility] Representative                              Date                                Time

## 2023-02-22 ENCOUNTER — TELEPHONE (OUTPATIENT)
Dept: FAMILY MEDICINE CLINIC | Facility: CLINIC | Age: 33
End: 2023-02-22

## 2023-03-02 ENCOUNTER — TELEPHONE (OUTPATIENT)
Dept: NEUROLOGY | Facility: CLINIC | Age: 33
End: 2023-03-02

## 2023-03-09 ENCOUNTER — TELEPHONE (OUTPATIENT)
Dept: NEUROLOGY | Facility: CLINIC | Age: 33
End: 2023-03-09

## 2023-03-09 NOTE — TELEPHONE ENCOUNTER
Þyvonne office called patient in attempt to reschedule missed appointment from today at 11am with Bernie Presser  There was no answer  I left message on the voicemail to contact office to reschedule and at the patients; earliest convenience

## 2023-03-23 ENCOUNTER — TELEPHONE (OUTPATIENT)
Dept: OBGYN CLINIC | Facility: CLINIC | Age: 33
End: 2023-03-23

## 2023-04-03 ENCOUNTER — TELEPHONE (OUTPATIENT)
Dept: FAMILY MEDICINE CLINIC | Facility: CLINIC | Age: 33
End: 2023-04-03

## 2023-04-03 ENCOUNTER — HOSPITAL ENCOUNTER (EMERGENCY)
Facility: HOSPITAL | Age: 33
Discharge: HOME | End: 2023-04-03
Attending: EMERGENCY MEDICINE

## 2023-04-03 VITALS
WEIGHT: 123.1 LBS | TEMPERATURE: 97.6 F | BODY MASS INDEX: 21.13 KG/M2 | RESPIRATION RATE: 18 BRPM | DIASTOLIC BLOOD PRESSURE: 75 MMHG | HEART RATE: 97 BPM | SYSTOLIC BLOOD PRESSURE: 102 MMHG | OXYGEN SATURATION: 97 %

## 2023-04-03 DIAGNOSIS — F32.A DEPRESSION: Primary | ICD-10-CM

## 2023-04-03 RX ADMIN — TETANUS TOXOID, REDUCED DIPHTHERIA TOXOID AND ACELLULAR PERTUSSIS VACCINE, ADSORBED 0.5 ML: 5; 2.5; 8; 8; 2.5 SUSPENSION INTRAMUSCULAR at 01:39

## 2023-04-03 NOTE — ED NOTES
[address]    Name and Date of Birth:  Kolton Tucker y o  1990    Date of Referral: April 3, 2023    Presenting Symptoms and Stressors:      Symptoms:  depression, mood swings and for psychiatric medication management  Stressors:  family conflict, relationship problems and medical problems    Access to Weapons:  No    Smoking Status: 1 pack per day    Substance Use:  None    Suicidal Ideation: None    Homicidal Ideation: None    Depressed Mood: sadness, low motivation, poor concentration, irritability    Shantell/Hypomania: None    Psychosis: None    Agitation: No    Appetite Changes: fluctuating appetite    Sleep Disturbance: fluctuating sleep pattern    Diagnoses:  1   Major Depressive Disorder, recurrent, moderate    Current Psychiatrist or Therapist:    Psychiatrist: None  Therapist: None    Do they Require Ambulatory Assistance: No    Communication Assistance: not required     Legal Issues: No current legal problems        Bhavin Fonseca

## 2023-04-03 NOTE — ED PROVIDER NOTES
History  Chief Complaint   Patient presents with   • Psychiatric Evaluation     Patient arrives reporting multiple stressors at home r/t a relationship  Reports feeling depressed  Superficial cuts to bilateral forearms that were done prior to arrival  Patient reports no current SI/HI/AH/VH  42-year-old female presents with complaint of worsening depression  She reports having multiple stressors and has resumed self-harm in the form of cutting her forearms with a razor  She denies any intention of killing herself stating that in the past she has attempted overdose but she estimates that for now  She is homicidal ideation, hallucinations, drug use, or acute medical concerns  She is wanting to be set up with outpatient resources  Psychiatric Evaluation  Presenting symptoms: depression    Presenting symptoms: no suicidal thoughts    Degree of incapacity (severity):  Severe  Onset quality:  Gradual  Timing:  Constant  Progression:  Waxing and waning  Chronicity:  Chronic  Context: stressful life event    Treatment compliance:  Untreated  Relieved by:  Nothing  Worsened by:  Nothing  Ineffective treatments:  None tried  Associated symptoms: no abdominal pain and no chest pain        Prior to Admission Medications   Prescriptions Last Dose Informant Patient Reported? Taking?    albuterol (Ventolin HFA) 90 mcg/act inhaler   No No   Sig: Inhale 2 puffs every 6 (six) hours as needed for wheezing   medroxyPROGESTERone (DEPO-PROVERA) 150 mg/mL injection   No No   Sig: Inject 1 mL (150 mg total) into a muscle every 3 (three) months   medroxyPROGESTERone (DEPO-PROVERA) 150 mg/mL injection   No No   Sig: Inject 1 mL (150 mg total) into a muscle every 3 (three) months      Facility-Administered Medications: None       Past Medical History:   Diagnosis Date   • Asthma     Albuterol prn   • Body mass index (BMI) less than 19 in adult 12/2/2019   • Depression     hasn't required treatment since 2018   • Multiple sclerosis [facility]) 2018    follows poorly with neurology, manages with Robaxin   • Seizure [facility])     possibly r/t MS diagnosis, has never been on meds, convulsive, last episode 9/2020       Past Surgical History:   Procedure Laterality Date   • CLOSED REDUCTION FINGER FRACTURE Left    • FL LUMBAR PUNCTURE DIAGNOSTIC  12/5/2019   • TONSILLECTOMY Bilateral 9778   • UMBILICAL HERNIA REPAIR  2016       Family History   Problem Relation Age of Onset   • Coronary artery disease Paternal Grandmother    • Hypertension Paternal Grandfather         TYPE 2   • Diabetes Paternal Grandfather    • Cancer Paternal Grandfather         pancreatic, lung   • Cancer Mother         thyroid   • Diabetes Father    • Hypertension Father    • Breast cancer Neg Hx      I have reviewed and agree with the history as documented  E-Cigarette/Vaping   • E-Cigarette Use Never User      E-Cigarette/Vaping Substances     Social History     Tobacco Use   • Smoking status: Every Day     Packs/day: 0 25     Years: 9 00     Pack years: 2 25     Types: Cigarettes   • Smokeless tobacco: Current   Vaping Use   • Vaping Use: Never used   Substance Use Topics   • Alcohol use: Not Currently     Comment: socially   • Drug use: Not Currently     Types: Marijuana, Cocaine, MDMA (ecstacy)       Review of Systems   Constitutional: Negative for fever  Respiratory: Negative for shortness of breath  Cardiovascular: Negative for chest pain  Gastrointestinal: Negative for abdominal pain, nausea and vomiting  Skin: Positive for wound  Psychiatric/Behavioral: Negative for suicidal ideas  All other systems reviewed and are negative  Physical Exam  Physical Exam  Vitals and nursing note reviewed  Constitutional:       General: She is not in acute distress  Appearance: Normal appearance  She is well-developed  She is not ill-appearing or toxic-appearing  HENT:      Head: Normocephalic and atraumatic        Right Ear: External ear normal       Left Ear: External ear normal       Nose: Nose normal  No congestion  Mouth/Throat:      Mouth: Mucous membranes are moist       Pharynx: Oropharynx is clear  Eyes:      Conjunctiva/sclera: Conjunctivae normal       Pupils: Pupils are equal, round, and reactive to light  Cardiovascular:      Rate and Rhythm: Normal rate and regular rhythm  Heart sounds: Normal heart sounds  Pulmonary:      Effort: Pulmonary effort is normal  No respiratory distress  Breath sounds: Normal breath sounds  No wheezing  Abdominal:      General: Bowel sounds are normal       Palpations: Abdomen is soft  Tenderness: There is no abdominal tenderness  There is no guarding  Musculoskeletal:         General: No tenderness or deformity  Cervical back: Normal range of motion and neck supple  No rigidity  Skin:     General: Skin is warm and dry  Capillary Refill: Capillary refill takes less than 2 seconds  Findings: No rash  Neurological:      General: No focal deficit present  Mental Status: She is alert and oriented to person, place, and time  Psychiatric:         Mood and Affect: Mood is depressed  Affect is tearful  Speech: Speech normal          Behavior: Behavior normal  Behavior is cooperative  Thought Content: Thought content normal  Thought content does not include homicidal or suicidal ideation           Vital Signs  ED Triage Vitals [04/03/23 0118]   Temperature Pulse Respirations Blood Pressure SpO2   97 6 °F (36 4 °C) (!) 110 18 (!) 178/108 97 %      Temp Source Heart Rate Source Patient Position - Orthostatic VS BP Location FiO2 (%)   Tympanic Monitor Sitting Left arm --      Pain Score       --           Vitals:    04/03/23 0118 04/03/23 0210   BP: (!) 178/108 102/75   Pulse: (!) 110 97   Patient Position - Orthostatic VS: Sitting Sitting         Visual Acuity      ED Medications  Medications   tetanus-diphtheria-acellular pertussis (BOOSTRIX) IM injection 0 5 mL (0 5 mL Intramuscular Given 4/3/23 0139)       Diagnostic Studies  Results Reviewed     None                 No orders to display              Procedures  Procedures         ED Course                               SBIRT 22yo+    Flowsheet Row Most Recent Value   SBIRT (25 yo +)    In order to provide better care to our patients, we are screening all of our patients for alcohol and drug use  Would it be okay to ask you these screening questions? No Filed at: 04/03/2023 0143                    Medical Decision Making  79-year-old female presents with multiple stressors complaining of worsening depression  She has been off of medications for the past 5 to 6 years and does not currently have a therapist that she follows along with  She has tried other methods to relieve stress but is found that superficial cutting provides most relief  She is able to contract for safety and denies any thoughts of suicide  She is requesting that we provide her with follow-up options as an outpatient  I spoke with crisis who also met with the patient  She has been provided resources and is aware that she is welcome to return to the ED at any time  Patient is able to contract for safety  Amount and/or Complexity of Data Reviewed  Independent Historian: parent      Risk  Prescription drug management  Disposition  Final diagnoses:   Depression     Time reflects when diagnosis was documented in both MDM as applicable and the Disposition within this note     Time User Action Codes Description Comment    4/3/2023  1:38 AM Aaron Tobias Add Good Sang  NAHUN] Depression       ED Disposition     ED Disposition   Discharge    Condition   Stable    Date/Time   Mon Apr 3, 2023  2:04 AM    Comment   Audrey Man 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  Follow-up Information     Follow up With Specialties Details Why [address] [facility] Medicine   [address] Patient's Medications   Discharge Prescriptions    No medications on file       No discharge procedures on file      PDMP Review       Value Time User    PDMP Reviewed  Yes 10/16/2020 11:39 PM Kary Dudley DO          ED Provider  Electronically Signed by           Caio Laboy DO  04/03/23 0210

## 2023-04-03 NOTE — DISCHARGE INSTRUCTIONS
Follow-up as instructed by the crisis worker  [facility]  [facility]  (Adults) [address]   [address] [place] [address], [address] [place] [address]  [phone] - fax     Partial       If your symptoms return or worsen, return to the nearest emergency room  [facility] [place] can be reached at [phone] if you need further assistance in the community

## 2023-04-04 ENCOUNTER — TELEPHONE (OUTPATIENT)
Dept: PSYCHOLOGY | Facility: CLINIC | Age: 33
End: 2023-04-04

## 2023-04-07 ENCOUNTER — OFFICE VISIT (OUTPATIENT)
Dept: PSYCHIATRY | Facility: CLINIC | Age: 33
End: 2023-04-07

## 2023-04-07 ENCOUNTER — OFFICE VISIT (OUTPATIENT)
Dept: PSYCHOLOGY | Facility: CLINIC | Age: 33
End: 2023-04-07

## 2023-04-07 VITALS — BODY MASS INDEX: 20.83 KG/M2 | HEIGHT: 64 IN | WEIGHT: 122 LBS | HEART RATE: 90 BPM

## 2023-04-07 DIAGNOSIS — F43.10 PTSD (POST-TRAUMATIC STRESS DISORDER): Primary | ICD-10-CM

## 2023-04-07 DIAGNOSIS — F33.1 MODERATE EPISODE OF RECURRENT MAJOR DEPRESSIVE DISORDER (HCC): ICD-10-CM

## 2023-04-07 DIAGNOSIS — F19.11 SUBSTANCE ABUSE IN REMISSION (HCC): ICD-10-CM

## 2023-04-07 DIAGNOSIS — F33.1 MAJOR DEPRESSIVE DISORDER, RECURRENT, MODERATE (HCC): ICD-10-CM

## 2023-04-07 PROBLEM — F33.9 RECURRENT MAJOR DEPRESSIVE DISORDER (HCC): Status: ACTIVE | Noted: 2023-04-07

## 2023-04-07 RX ORDER — FLUOXETINE 10 MG/1
10 CAPSULE ORAL DAILY
Qty: 14 CAPSULE | Refills: 0 | Status: SHIPPED | OUTPATIENT
Start: 2023-04-07

## 2023-04-07 NOTE — PSYCH
Visit Time    Visit Start Time: 0930  Visit Stop Time: 7913  Total Visit Duration: 60 minutes    Subjective:     Patient ID: Kenzie Thomas is a 28 y o  female  [facility] Clinical Progress Notes      Specialized Services Documentation  Therapist must complete separate progress note for each specific clinical activity in which the individual participated during the day  Group Psychotherapy - Relaxation Techniques  This group was facilitated face to face in a private office setting using HIPAA compliant protocols  Kenzie Thomas attended group on relaxation techniques  Today group members focused on understanding their experience/relationship with stress  Members evaluated their stressors and identified the types of stress they experience  The goal of today's group was for members to openly discuss their stressors and identify their coping methods  As well as evaluate a variety of relaxation techniques they could utilize when stressed   facilitated discussion on:   • What are your current stressors? Symptoms? • What coping skills do you currently use? Members also participated in group through completing guided relaxation practice  This writer encouraged members to openly share and integrate the techniques within their daily routine  Kenzie Thomas made some effort towards progress goals  Alison Menendez came in to group around ten minutes before it ended due to intake     [address] [facility] Objective:1 1,1 2,1 4   [place], [place]

## 2023-04-07 NOTE — BH TREATMENT PLAN
"Assessment/Plan:        Diagnoses and all orders for this visit:    PTSD (post-traumatic stress disorder)    Major depressive disorder, recurrent, moderate (HCC)    Substance abuse in remission ([facility] [address] )           Subjective:      Patient ID: Alexy Araiza  is a 28 y o  female   [facility] Treatment Plan   AREAS OF NEED: Eloise Mehta has been struggling with symptoms related to PTSD and MDD as evidenced by oversleeping, reduced motivation/energy, loss of yanet, isolation, anger outbursts, and self-harm due to the stress associated with upcoming job loss and past traumas  Date Initiated: 04/07/23     Strengths: Although Eloise Mehta did not provide a strength, this writer believes her to be Resilient  LONG TERM GOAL:   Date Initiated: 04/07/23  1 0 While at [facility], I will gain support/insights/skills/education/techniques which I can use daily to reduce my depressive/ptsd symptoms and increase my quality of life  Target Date: 05/05/23  Completion Date:         SHORT TERM OBJECTIVES:      Date Initiated: 04/07/23  1 1 I will learn and practice the Dialectical Behavioral Therapy protocol of \"Sleep Hygiene\"  Revision Date:   Target Date: 04/18/23  Completion Date:      Date Initiated: 04/07/23  1 2 I will learn and practice at least 3 new/effective techniques for reframing my thoughts, controlling my anger, and decrease my symptoms (such as EFT, cognitive distortions, STOP skill, etc)  Revision Date:   Target Date: 04/18/23  Completion Date:     Date Initiated: 04/07/23  1 3 I will take medications as prescribed and share questions and concerns if they arise  Revision Date:   Target Date: 04/18/23  Completion Date:      Date Initiated: 04/07/23  1 4 I will identify 3 ways my supports can assist in my recovery and agree to use them when/if needed      Revision Date:   Target Date: 04/18/23  Completion Date:            7 DAY REVISION:     Date Initiated:   1 5   Revision Date:   Target Date:  Completion Date:         " PSYCHIATRY:  Date Initiated: 04/07/23  Medication Management and Education       Revision Date:       The person(s) responsible for carrying out the plan is Pranav Farrar MD     NURSING/SYMPTOM EDUCATION:  Date Initiated: 04/07/23      1 1, 1 2  1 3, 1 4 Provide wellness/symptoms and skill education groups three to five days weekly to educate Irena Lara on signs and symptoms of diagnoses, skills to manage stressors, and medication questions that will be addressed by the treatment team         Revision date: The person(s) responsible for carrying out the plan is Luz Marina Yanez MS & PEACE Childress    PSYCHOLOGY:   Date Initiated: 04/07/23       1 1, 1 2, 1 4 Provide psychotherapy group 5 times per week to allow opportunity for Irena Lara  to explore stressors and ways of coping  Revision Date:      The person(s) responsible for carrying out the plan is KASIA Butterfield     ALLIED THERAPY:   Date Initiated: 04/07/23  1 1,1 2 Engage Irena Lara in AT group 5 times daily to encourage development and use of wellness tools to decrease symptoms and promote recovery through meaningful activity  Revision Date:          The person(s) responsible for carrying out the plan is TenERIC RyanBC & BELA Sellers     CASE MANAGEMENT:   Date Initiated: 04/07/23      1 0 This  will meet with Irena Cobbamauri  3-4 times weekly to assess treatment progress, discharge planning, connection to community supports and UR as indicated  Revision Date:    The person(s) responsible for carrying out the plan is Luz Marina Yanez MS     TREATMENT REVIEW/COMMENTS:      DISCHARGE CRITERIA: Identify 3 signs of progress and complete relapse prevention plan  DISCHARGE PLAN: Connect with identified outpatient providers  Estimated Length of Stay: 10 treatment days          Diagnosis and Treatment Plan explained to   Irena Cobbamauri  relates understanding diagnosis and is agreeable to Treatment Plan  CLIENT COMMENTS / Please share your thoughts, feelings, need and/or experiences regarding your treatment plan with Staff  Please see follow up note with comments  Signatures can be found on [facility] Treatment plan consent form

## 2023-04-07 NOTE — PSYCH
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 Risks/Benefits:      Risks, Benefits And Possible Side Effects Of Medications:    Risks, benefits, and possible side effects of medications explained to [facility] including risk of suicidality and serotonin syndrome related to treatment with antidepressants  She verbalizes understanding and agreement for treatment  Controlled Medication Discussion:     Not applicable      Visit Time    Visit Start Time: 8:55 AM  Visit Stop Time: 10:00 AM  Total Visit Duration: 65 minutes     The total visit duration detailed above includes: patient engagement, medication management, psychotherapy/counseling, discussion regarding treatment goals, and coordination of care  Note Share Disclaimer:      This note was not shared with the patient due to reasonable likelihood of causing patient harm      Mary Rosa MD  Board Certified Diplomate of the American Board of Psychiatry and Neurology  04/07/23

## 2023-04-07 NOTE — PSYCH
Subjective:     Patient ID: Zhane Pickens is a 28 y o  female  [facility] Clinical Progress Notes      Specialized Services Documentation  Therapist must complete separate progress note for each specific clinical activity in which the individual participated during the day  Allied Therapy  [id number] Group participated in a [employer] game consisting of review questions about sessions that week, coping skills, and the HEARS acronym as well as reasons for goal-setting  Group also participated in musical challenges such as identifying coping skills that match a song clip, listening to song clips to identify the song, and practicing mindfulness through musical improvisation  Zhane Pickens participated actively on her team to the best of her ability as today was her first day  She was observed interacting with teammates and  at different times throughout the session, occasionally stating that she did not know the answer as today was her first day   Continue AT for development and practice of group interaction and reviewing skills taught in program     Tx Objectives: 1 1, 1 2, 1 4  Therapists: ANGELITO Hurtado and BELA Lehman

## 2023-04-07 NOTE — PSYCH
Subjective:     Patient ID: Robbin Carrion is a 28 y o  female  [facility] Clinical Progress Notes      Specialized Services Documentation  Therapist must complete separate progress note for each specific clinical activity in which the individual participated during the day  Other Karon bowersert requested  A UR is expected to return an approval call within 48 hours  Reference number is [id number]    Tx plan will be signed Monday 4/10/23    Case Management Note    Shae Fletcher, MS    Current suicide risk : Low     [id number] INTAKE  This writer met with Robbin Carrion to review program expectations/schedule, fill out and sign ROIs, and acquire the standard intake information  Please see this writer's initial evaluation note for more information  Medications changes/added/denied? Yes     Treatment session number: 1    Individual Case Management Visit provided today?  Yes     [facility] follow up physician's orders: admit to php today 4/07/23- see note by Dr Milagro Fountain

## 2023-04-07 NOTE — PSYCH
Subjective:    Patient ID: Jung Leonard is a 28 y o  female      [facility] Clinical Progress Notes      Specialized Services Documentation  Therapist must complete separate progress note for each specific clinical activity in which the individual participated during the day  Education Therapy   [id number]  Jung Leonard was excused due to intake during check in and goal review  [address] Angelique Cisneros engaged throughout the treatment day  Was engaged in learning related to Illness, Medication, Aftercare and Wellness Tools  Staff utilized Verbal, Written, A/V and Demonstration teaching methods  Jung Leonard shared area of learning and set a goal for outside of program to not stay in bed to sleep all weekend and to work on her vegetables in the garden        Tx Plan Objective: 1 1, 1 2, 1 4, Therapist: PEACE Valenzuela
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
## 2023-04-11 NOTE — PSYCH
Subjective:     Patient ID: Yosef Pandey 28 y o  {Gender:[id number]}    [facility]ations Clinical Progress Notes      Specialized Services Documentation  Therapist must complete separate progress note for each specific clinical activity in which the individual participated during the day  Education Therapy     [id number] Yosef Pandey  {SL AMB PSYCH ACTIVELY:97563} shared in morning assessment and goal review  Presented as {Readiness to Learin} related to readiness to learn  Yosef Pandey {DID/DID DQS:29276} complete goal from last treatment day identifying gaining ***  {DID/DID CDW:23758} present with any barriers to learning  Tx Plan Objective: 1 1, 1 2, 1 4, Therapist:  Torie Giang 105 Psychotherapy     ({LSTIMES:05201}) Yosef Pandey participated in a psychotherapy group about Grief and Loss  Group members learned about the five stages of grief and shared their experiences, if they would like  (5 stages: Denial, Anger, Bargaining, Depression, and Acceptance)  - Later in the group, members were introduced to the possible sixth stage of grief:  Meaning Making coined by Eugune Duty  Group members were asked to think about a loss they experienced and how that impacted their wellness journey  This also included non-death related losses as explained by Grief being a loss including nondeath losses (home, divorce, health, loss of innocence (abuse)), etc   used the metaphor of a knotted ball of yarn and explained that grief is not a linear process  We can be in one stage of grief today and another tomorrow, and we vacillate between several positive and unpleasant emotions for quite some time before making peace  Group was also focused on processing different types of grief from disenfranchised grief, situational grief, maturational loss, and perceived loss   Participants learned the difference between grief, bereavement, and mourning as well as engaging in coping skills and ways in which they can make meaning out of the loss  Group participants learned about the Four Tasks of Mourning to heal and engaged in discussion about each task:     To accept the reality of the loss   To work through the pain of grief   To adjust to life without the    To maintain a connection to the  while moving on with life    Lastly, group participants were provided with resources they can use to help make me meaning for them as they move through their experiences  Such items included:    Talking about the death of a loved one/talking about the loss    Writing a letter to your loved one   Keeping traditions/ Setting a plate for them at a table at a holiday/special occasion    Creating a memory box/jar   Learning emotional regulation skills   Name what you're going through   Abran Fitzpatrick your feelings    Labeling when you know you are in each stage of grief (Denial, Anger, Bargaining, Depression, Acceptance)   Therapy     Nahomi Sol {LSPARTICIPATIONUSE:08609}  {LSPROGRESS:31550} effort towards treatment plan goals  Provide continued psychoeducation and engaged participants in self-reflection surrounding their personal grief journey  Tx Plan Objective 1 1, 1 2, 1 4 Therapist: Anibal Dillon

## 2023-04-12 ENCOUNTER — APPOINTMENT (OUTPATIENT)
Dept: PSYCHOLOGY | Facility: CLINIC | Age: 33
End: 2023-04-12

## 2023-04-13 ENCOUNTER — APPOINTMENT (OUTPATIENT)
Dept: PSYCHOLOGY | Facility: CLINIC | Age: 33
End: 2023-04-13

## 2023-04-18 ENCOUNTER — APPOINTMENT (OUTPATIENT)
Dept: PSYCHOLOGY | Facility: CLINIC | Age: 33
End: 2023-04-18

## 2023-04-18 NOTE — PSYCH
"Subjective:    Patient ID: René Harris is a 28 y o  female      [facility] Clinical Progress Notes      Specialized Services Documentation  Therapist must complete separate progress note for each specific clinical activity in which the individual participated during the day  Group Psychotherapy  [id number] René Harris participated actively in a psychotherapy group focused on scheduled worry time  The group began with an independent [place] dump\" writing activity (where participants wrote down all miscellaneous thoughts in their minds), followed by sharing and an open discussion about relationships with daily anxieties  Group members then went through detailed steps of using scheduled worry time as participants voluntarily contributed ideas to each step  Participants all received paper handouts and shared ideas were written on the whiteboard  The group was also provided with tips and disclaimers associated with the technique  Time was designated at the end of the group for members to independently create  (then voluntarily share) a scheduled worry time plan, based on steps that were discussed throughout the group  René Harris ***  Continue to note progress towards goals  Continue with psychotherapy to further develop anxiety management strategies     Tx Plan Objective 1 1, 1 2, 1 4 Therapist: PEACE Robles    "

## 2023-04-20 NOTE — PSYCH
Subjective:     Patient ID: Shahida Carter 28 y o  {Gender Identity:13358}    [facility] Clinical Progress Notes      Specialized Services Documentation  Therapist must complete separate progress note for each specific clinical activity in which the individual participated during the day  Group Psychotherapy     {grouptime:44285} Shahida Carter { AMB PSYCH ACTIVELY:13476} participated in an open processing group about trauma after participating in a psychoeducational group focused learning about what is trauma and the impact on the mind and body  The group engaged in openly sharing their thoughts and feelings about the previous group  Group members were also given a safe space to discuss experiences in their life and the impact it has had on their overall development, functioning, and relationships   posed the following questions to encourage post traumatic growth:     1  How do you prioritize your mental and emotional health in the aftermath of trauma? 2  How do you prioritize self-care and self-compassion in your healing journey? 3  What do you believe is important for anyone to be aware of if they know someone with a history of trauma?  used the following structure to support and lead the group through open ended questions, safe and supportive space, and guided instruction for processing  Shahida Carter {LSPARTICIPATIONUSE:89901}  {LSPROGRESS:85346} progress noted towards goal   Continue with open processing groups to build and increase empowerment and sense of self  Tx Plan Objective 1 1, 1 2, 1 4 Therapist: Eliceo Blackburn

## 2023-04-21 ENCOUNTER — APPOINTMENT (OUTPATIENT)
Dept: PSYCHOLOGY | Facility: CLINIC | Age: 33
End: 2023-04-21

## 2023-04-24 ENCOUNTER — OFFICE VISIT (OUTPATIENT)
Dept: PSYCHIATRY | Facility: CLINIC | Age: 33
End: 2023-04-24

## 2023-04-24 ENCOUNTER — OFFICE VISIT (OUTPATIENT)
Dept: PSYCHOLOGY | Facility: CLINIC | Age: 33
End: 2023-04-24

## 2023-04-24 ENCOUNTER — TELEPHONE (OUTPATIENT)
Dept: FAMILY MEDICINE CLINIC | Facility: CLINIC | Age: 33
End: 2023-04-24

## 2023-04-24 DIAGNOSIS — F43.10 PTSD (POST-TRAUMATIC STRESS DISORDER): ICD-10-CM

## 2023-04-24 DIAGNOSIS — F33.1 MAJOR DEPRESSIVE DISORDER, RECURRENT, MODERATE (HCC): Primary | ICD-10-CM

## 2023-04-24 DIAGNOSIS — F19.11 SUBSTANCE ABUSE IN REMISSION (HCC): ICD-10-CM

## 2023-04-24 DIAGNOSIS — F33.1 MODERATE EPISODE OF RECURRENT MAJOR DEPRESSIVE DISORDER (HCC): Primary | ICD-10-CM

## 2023-04-24 RX ORDER — FLUOXETINE HYDROCHLORIDE 20 MG/1
20 CAPSULE ORAL DAILY
Qty: 30 CAPSULE | Refills: 1 | Status: SHIPPED | OUTPATIENT
Start: 2023-04-24 | End: 2023-04-24

## 2023-04-24 RX ORDER — FLUOXETINE HYDROCHLORIDE 40 MG/1
40 CAPSULE ORAL DAILY
Qty: 30 CAPSULE | Refills: 0 | Status: SHIPPED | OUTPATIENT
Start: 2023-04-24

## 2023-04-24 NOTE — PSYCH
Subjective:     Patient ID: Radha Goodrich is a 28 y o  female  [facility] Clinical Progress Notes      Specialized Services Documentation  Therapist must complete separate progress note for each specific clinical activity in which the individual participated during the day  Group Psychotherapy [id number]     This group consisted of roughly 40 minutes which focused on the science/benefits of yoga (with a chair yoga practice included) and 20 minutes of learning the science behind/practicing Emotional Freedom Tapping  The EFT discussion began with a conversation focusing around the understanding the science behind emotional freedom tapping and in which ways it can help improve lives (sleep, stress, diet, etc)  We then discussed the technique in detail and practiced it multiple times as a group  Following reflecting on the EFT practice, the group then began discussion on the research-based benefits of yoga  We then practiced chair yoga as a group  The yoga practice was also followed by a period of reflection/discussion  Radha Goodrich participated by sharing with the group how effective EFT has been for her and her children since learning it upon starting PHP, kerrie   Viki Lj continues to work on treatment goals  Continue psychotherapy groups to encourage further exploration of needs, personal awareness, and skills  Tx Plan Objective: 1 1,1 2, 1 4   Therapist: Joo De MS    Case Management Note    Joo De MS    Current suicide risk : Low     Not a case management day for Radha Goodrich today  Did not reach out with any additional questions and concerns and aware of next scheduled 1:1      Medications changes/added/denied? No    Treatment session number: 13    Individual Case Management Visit provided today? No    [facility] follow up physician's orders: medication check completed today   Please see note from Jian Valencia, [address]

## 2023-04-24 NOTE — PSYCH
"Subjective:     Patient ID: Krish Albert 28 y o  Female    [facility] Clinical Progress Notes      Specialized Services Documentation  Therapist must complete separate progress note for each specific clinical activity in which the individual participated during the day  Group Psychotherapy    [id number] Krish Albert remained quiet throughout in a psychoeducational and therapeutic group focused on identifying feelings and becoming more aware when they are in a state of emotional avoidance   asked each group member to share a coping skill that they use and how it helps them   asked each group member to remember this coping skill throughout group  As group progressed:     Group members were posed with these two questions:   • “Have you ever felt _____ feeling before - if so, tell us about it? If not, what is a situation that would influence this feeling? • \"Is this an “easy” feeling to accept and why? \"    After group members shared their thoughts and engaged in some process forward talk,  provided information about feelings and the influence they have on our thoughts and perceptions and the importance of being nonjudgmental as feelings are things that simply exist   then asked the group to participate in a mindfulness exercise designed to enhance their awareness of their own thoughts and feelings to build a tolerance for those feelings  The group was provided with information on the RAIN technique to counter emotional avoidance and be more emotionally aware (Recognize, Acknowledge, Investigate, Non-Identify) Further, group members learned about how emotions have certain functions and how they can either motivate us or avoid situations  Lastly, group members discussed  the stages of avoidance and how avoiding emotions keep them at a high intensity and the consequences of emotional avoidance    provided a worksheet and asked members to participate in an " activity  The activity was to help them identify their avoidance pattern and alternative coping - called TRAP and TRAC (Trigger, Response, Avoidance Pattern and Trigger, Response, Alternative Coping)  Group leaded provided space for members to share as they felt comfortable, provided active listening, and encouragement to share and offer support to group   used the following structure to support and lead the group: open ended questions, verbal and nonverbal gestures for encouragement, and space for vulnerability  Delora Stake engaged nonverbally as evidenced by taking notes, maintaining eye contact, open body language, and overall attentiveness  some  progress noted towards goals  Continue with psychoeducation to further strengthen enhanced ability to label a wide range of emotions  Tx Plan Objective 1 1, 1 2, 1 4 Therapist: Gavin Desai

## 2023-04-24 NOTE — PSYCH
"PHP MEDICATION MANAGEMENT NOTE        [address]    Name and Date of Birth:  Irena Lara 28 y o  1990 MRN: [id number]    Date of Visit: April 24, 2023    Allergies   Allergen Reactions   • Clindamycin Throat Swelling   • Onion - Food Allergy Anaphylaxis and Swelling     Swelling of throat  • Gabapentin Diarrhea, Hives and Itching   • Sumatriptan Swelling       Visit Time    Visit Start Time: 9718  Visit Stop Time: 1519  Total Visit Duration: 20 minutes    SUBJECTIVE:    Tamar Winkler is seen today for a follow up for Major Depressive Disorder, PTSD and anxiety  She continues to improve gradually since beginning PHP  She reports she had a flair up of MS symptoms and gets overwhelmed sometimes with this diagnosis  She reports for that reason her mood was down this weekend  Feels energetic in the morning, but it wears off by the afternoon  Feels she is only having a partial response from Prozac for her depression and requests a dose increase  She continues to do better with anger and impulsivity using skills she has learned here in the partial program and states \"I no longer have to walk away, I can stay in the situation and stay calm  \"  And very pleased with her progress  She denies any side effects from current medications      PLAN:    Increase Prozac 40 mg daily    Aware of 24 hour and weekend coverage for urgent situations accessed by calling [facility] main practice number  Continue partial hospitalization program    Diagnoses and all orders for this visit:    Moderate episode of recurrent major depressive disorder (HCC)    PTSD (post-traumatic stress disorder)        Current Outpatient Medications on File Prior to Visit   Medication Sig Dispense Refill   • albuterol (Ventolin HFA) 90 mcg/act inhaler Inhale 2 puffs every 6 (six) hours as needed for wheezing 18 g 2   • FLUoxetine (PROzac) 20 mg capsule Take 1 capsule (20 mg total) " by mouth daily 30 capsule 1   • medroxyPROGESTERone (DEPO-PROVERA) 150 mg/mL injection Inject 1 mL (150 mg total) into a muscle every 3 (three) months 1 mL 5   • medroxyPROGESTERone (DEPO-PROVERA) 150 mg/mL injection Inject 1 mL (150 mg total) into a muscle every 3 (three) months 1 mL 3     Current Facility-Administered Medications on File Prior to Visit   Medication Dose Route Frequency Provider Last Rate Last Admin   • medroxyPROGESTERone (DEPO-PROVERA) IM injection 150 mg  150 mg Intramuscular Q3 Months Anita White MD   150 mg at 04/11/23 4685       Psychotherapy Provided:     Individual psychotherapy provided: Supportive counseling provided  Medications, treatment progress and treatment plan reviewed with Tahmina Montez  Reassurance and supportive therapy provided  HPI ROS Appetite Changes and Sleep:     She reports fluctuating sleep pattern, fluctuating appetite, fluctuating energy levels   Denies homicidal ideation, denies suicidal ideation    Review Of Systems:      General decreased functioning   Personality no change in personality   Constitutional negative   ENT negative   Cardiovascular negative   Respiratory negative   Gastrointestinal negative   Genitourinary negative   Musculoskeletal negative   Integumentary negative   Neurological negative   Endocrine negative   Other Symptoms none, all other systems are negative     Mental Status Evaluation:    Appearance Adequate hygiene and grooming   Behavior calm and cooperative   Mood anxious   Speech Normal rate and volume   Affect mood-congruent   Thought Processes Goal directed and coherent   Thought Content Does not verbalize delusional material   Associations Tightly connected   Perceptual Disturbances Denies hallucinations and does not appear to be responding to internal stimuli   Risk Potential Suicidal/Homicidal Ideation - No evidence of suicidal or homicidal ideation and patient does not verbalize suicidal or homicidal ideation  Risk of Violence - No evidence of risk for violence found on assessment  Risk of Self Mutilation - No evidence of risk for self mutilation found on assessment   Orientation oriented to person, place, time/date and situation   Memory recent and remote memory grossly intact   Consciousness alert and awake   Attention/Concentration attention span and concentration are age appropriate   Insight limited   Judgement improving   Muscle Strength and Gait normal muscle strength and normal muscle tone, normal gait/station and normal balance   Motor Activity no abnormal movements   Language no difficulty naming common objects, no difficulty repeating a phrase, no difficulty writing a sentence   Fund of Knowledge adequate knowledge of current events  adequate fund of knowledge regarding past history  adequate fund of knowledge regarding vocabulary      Past Psychiatric History Update:     Inpatient Psychiatric Admission Since Last Encounter:   no  Suicide Attempt Or Self Mutilation Since Last Encounter:   no  Incidence of Violent Behavior Since Last Encounter:   no    Traumatic History Update:     New Onset of Abuse Since Last Encounter:   no  Traumatic Events Since Last Encounter:   no    Past Medical History:    Past Medical History:   Diagnosis Date   • Asthma     Albuterol prn   • Body mass index (BMI) less than 19 in adult 12/2/2019   • Depression     hasn't required treatment since 2018   • [facility] 2018    follows poorly with neurology, manages with Robaxin   • Seizure ([facility] )     possibly r/t MS diagnosis, has never been on meds, convulsive, last episode 9/2020        Past Surgical History:   Procedure Laterality Date   • CLOSED REDUCTION FINGER FRACTURE Left    • FL LUMBAR PUNCTURE DIAGNOSTIC  12/5/2019   • TONSILLECTOMY Bilateral 0857   • UMBILICAL HERNIA REPAIR  2016     Allergies   Allergen Reactions   • Clindamycin Throat Swelling   • Onion - Food Allergy Anaphylaxis and Swelling     Swelling of throat       • Gabapentin Diarrhea, Hives and Itching   • Sumatriptan Swelling     Substance Abuse History:    Social History     Substance and Sexual Activity   Alcohol Use Not Currently    Comment: socially     Social History     Substance and Sexual Activity   Drug Use Not Currently   • Types: Marijuana, Cocaine, MDMA (ecstacy)     Social History:    Social History     Socioeconomic History   • Marital status: Single     Spouse name: Not on file   • Number of children: 2   • Years of education: Not on file   • Highest education level: High school graduate   Occupational History     Employer: [employer]   Tobacco Use   • Smoking status: Every Day     Packs/day: 0 25     Years: 9 00     Pack years: 2 25     Types: Cigarettes   • Smokeless tobacco: Current   Vaping Use   • Vaping Use: Never used   Substance and Sexual Activity   • Alcohol use: Not Currently     Comment: socially   • Drug use: Not Currently     Types: Marijuana, Cocaine, MDMA (ecstacy)   • Sexual activity: Yes     Partners: Male   Other Topics Concern   • Not on file   Social History Narrative   • Not on file     Social Determinants of Health     Financial Resource Strain: Low Risk    • Difficulty of Paying Living Expenses: Not hard at all   Food Insecurity: Food Insecurity Present   • Worried About [address] in the Last Year: Sometimes true   • Ran Out of Food in the Last Year: Sometimes true   Transportation Needs: No Transportation Needs   • Lack of Transportation (Medical): No   • Lack of Transportation (Non-Medical):  No   Physical Activity: Not on file   Stress: Not on file   Social Connections: Not on file   Intimate Partner Violence: Not on file   Housing Stability: Not on file     Family Psychiatric History:     Family History   Problem Relation Age of Onset   • Depression Mother    • Cancer Mother         thyroid   • Diabetes Father    • Hypertension Father    • Hypertension Paternal Grandfather         TYPE 2   • Diabetes Paternal Grandfather    • Cancer Paternal Grandfather         pancreatic, lung   • Coronary artery disease Paternal Grandmother    • Drug abuse Cousin    • Breast cancer Neg Hx      History Review: The following portions of the patient's history were reviewed and updated as appropriate: allergies, current medications, past family history, past medical history, past social history, past surgical history and problem list     OBJECTIVE:     Vital signs in last 24 hours: There were no vitals filed for this visit  Laboratory Results: I have personally reviewed all pertinent laboratory/tests results  Medications Risks/Benefits:      Risks, Benefits And Possible Side Effects Of Medications:    Discussed risks and benefits of treatment with patient including risk of suicidality, serotonin syndrome, increased QTc interval and SIADH related to treatment with antidepressants;  Risk of induction of manic symptoms in certain patient populations     Controlled Medication Discussion:     Not applicable - controlled prescriptions are not prescribed by this practice    DANIEL Humphries 04/24/23    This note was not shared with the patient due to reasonable likelihood of causing patient harm

## 2023-04-24 NOTE — PSYCH
Subjective:    Patient ID: Marlen Eaton is a 28 y o  female      [facility] Clinical Progress Notes      Specialized Services Documentation  Therapist must complete separate progress note for each specific clinical activity in which the individual participated during the day  Education Therapy   [id number]  Marlen Eaton participated actively in shared in check in and goal review  Presented as receptive related to readiness to learn  Marlen Eaton  did complete goal from last treatment day identifying gaining hope and responsibility  did not present with any barriers to learning  Amber Cisneros engaged throughout the treatment day  Was engaged in learning related to Illness, Medication, Aftercare and Wellness Tools  Staff utilized Verbal, Written, A/V and Demonstration teaching methods  Marlen Eaton shared area of learning and set a goal for outside of program to manage triggers and cope  Tx Plan Objective: 1 1, 1 2, 1 4, Therapist: Mark Esparza [place]    Group Psychotherapy  [id number] Marlen Eaton participated actively in a psychotherapy group on neurographic art  The group was supplied with paper, markers, pens, and watercolor paints and smooth plastic circles  Instruction was provided on how to create a neurographic drawing  This involved using light pressure to follow/guide a plastic [place] from one side of the paper wherever it moved along until it reached another edge, repeating until the paper was mostly full of lines  The next instruction was to round the intersections of lines concavely, then color in the drawing however they would like (i e  with solid colors, words of their choice, patterns, etc ) until satisfied with the piece  Members were provided the options to create another drawing or journal on blank paper about their feelings regarding the activity  Group instructor provided information on the conscious and unconscious mind, as well as mindfulness and other topics that arose   Open "discussion was encouraged and prompted throughout the group, along with sharing of art  Lucas Tan shared that she had an internal struggle between feeling as though she was not \"doing it right\" and telling herself that she was doing fine  Bertin Jones said that she kept reminding herself of how facilitator stated in the beginning of group that there is no right or wrong way to do this  Bertin Jones shared that eventually she was able to \"stop thinking about it\" and just create, at which point she identified she really enjoyed herself and felt much better  Continue to note progress towards goals  Continue with psychotherapy to further explore creative expression and mindfulness     Tx Plan Objective 1 1, 1 2, 1 4 Therapist Luna Oppenheim, BA    "

## 2023-04-24 NOTE — TELEPHONE ENCOUNTER
I left a message on voicemail to let patient know to give the office a call to schedule her physical

## 2023-04-25 ENCOUNTER — DOCUMENTATION (OUTPATIENT)
Dept: PSYCHOLOGY | Facility: CLINIC | Age: 33
End: 2023-04-25

## 2023-04-25 ENCOUNTER — APPOINTMENT (OUTPATIENT)
Dept: PSYCHOLOGY | Facility: CLINIC | Age: 33
End: 2023-04-25

## 2023-04-25 NOTE — PROGRESS NOTES
Subjective:     Patient ID: Amanda Baldwin is a 28 y o  female  [facility] Clinical Progress Notes      Specialized Services Documentation  Therapist must complete separate progress note for each specific clinical activity in which the individual participated during the day  Case Management Note    Lin Mejia, MS    Current suicide risk : Radha Samson called out again today    Medications changes/added/denied? No    Treatment session number: 9    Individual Case Management Visit provided today? No    [facility] follow up physician's orders: none, return to program tomorrow

## 2023-04-26 ENCOUNTER — APPOINTMENT (OUTPATIENT)
Dept: PSYCHOLOGY | Facility: CLINIC | Age: 33
End: 2023-04-26

## 2023-04-26 ENCOUNTER — DOCUMENTATION (OUTPATIENT)
Dept: PSYCHOLOGY | Facility: CLINIC | Age: 33
End: 2023-04-26

## 2023-04-26 NOTE — PROGRESS NOTES
"Subjective:     Patient ID: Krish Albert is a 28 y o  female  [facility] Clinical Progress Notes      Specialized Services Documentation  Therapist must complete separate progress note for each specific clinical activity in which the individual participated during the day  Case Management Note    Mirtha Clemens MS    Current suicide risk : Karel Tyler called out again today  She explained that she had a \"breakdown\" and ended up self-harming yesterday  She assured this  that she was currently safe and did not require medical attention  This writer expressed that inpatient would be a good option if she feels the urge to harm herself again or if she feels PHP is not enough  Capri Arreola explained that group/staff have been an enormous help to her and that this slip up made her realize how important it is to be here every day  She requested to return tomorrow, this writer obliged  This writer suggested she use this lapse as a jump start and begin participating/engaging/sharing more when she comes back  Medications changes/added/denied? No    Treatment session number: 9    Individual Case Management Visit provided today?  No    [facility] follow up physician's orders: none, return tomorrow  "

## 2023-04-27 ENCOUNTER — OFFICE VISIT (OUTPATIENT)
Dept: PSYCHOLOGY | Facility: CLINIC | Age: 33
End: 2023-04-27

## 2023-04-27 DIAGNOSIS — F19.11 SUBSTANCE ABUSE IN REMISSION (HCC): ICD-10-CM

## 2023-04-27 DIAGNOSIS — F43.10 PTSD (POST-TRAUMATIC STRESS DISORDER): ICD-10-CM

## 2023-04-27 DIAGNOSIS — F33.1 MAJOR DEPRESSIVE DISORDER, RECURRENT, MODERATE (HCC): Primary | ICD-10-CM

## 2023-04-27 NOTE — PSYCH
Subjective:    Patient ID: Amanda Baldwin is a 28 y o  female      [facility] Clinical Progress Notes      Specialized Services Documentation  Therapist must complete separate progress note for each specific clinical activity in which the individual participated during the day  Group Psychotherapy  [id number] Amanda Baldwin participated actively in a psychotherapy group focused on habit stacking  Initially, participants did some independent writing about long-term goals and subsequently habits that would help them achieve those goals  As well as habits that would support them in making positive changes to become their future self  The group explored the components of / steps to habit stacking which included tips for success  Members shared existing and desired new habits, ideas for: pairs, cues, environmental design, temptation bundling, etc throughout the session  Amanda Baldwin shared that she is going to ask for help the next time she does laundry, as to only fold her own while her  and son fold their own (as opposed to her folding all of everyone's laundry as she normally does)  Continue to note progress towards goals  Continue with psychotherapy to encourage further development of healthy habits and routines     Tx Plan Objective 1 1, 1 2, 1 4 Therapist: PEACE Mcadams

## 2023-04-27 NOTE — PSYCH
Subjective:     Patient ID: Herminio Bennett is a 28 y o  female  [facility] Clinical Progress Notes      Specialized Services Documentation  Therapist must complete separate progress note for each specific clinical activity in which the individual participated during the day  Allied Therapy   [id number] Clients considered the concept of confirmation bias relating to mental health  This included only noticing negative thought patterns and circumstances that confirm what they already believe about themselves   used this concept to discuss personalization, pervasiveness, and permanence   used nadia analysis to illustrate body sensations, thought patterns, emotions, and open curiosity  This was explained in regards to personal triggers that cause the clients to become stuck in negative thought patterns   then used visual illustration to describe the broader picture rather than continuing to focus solely on negative thoughts  Clients were challenged to reframe these thoughts using the pillars of the HEARS acronym  Herminio Bennett participated actively throughout the session in discussion, music-making, and the worksheet activity  Continue AT for the development and practice of reframing negative thoughts  Tx Objectives: 1 1, 1 2, 1 4  Therapist: ANGELITO Lemus       Education Therapy   [id number] Herminio Bennett actively shared in morning assessment and goal review  Presented as Receptive related to readiness to learn  Herminio Bennett did not complete goal from last treatment day, but identified gaining hope from reframing the goal  did not present with any barriers to learning       Tx Objective: 1 1, 1 2, 1 4 Therapist: ANGELITO Lemus

## 2023-04-27 NOTE — PSYCH
Subjective:     Patient ID: Dieter Parker is a 28 y o  female  [facility] Clinical Progress Notes      Specialized Services Documentation  Therapist must complete separate progress note for each specific clinical activity in which the individual participated during the day  Group Psychoeducation  [id number]- Group psychoeducation focused on learning and answering questions regarding to what life will be like and look like after discharge  Each group member received 3 slips of paper  On each slip of paper remaining anonymous, they were to write a fear they are thinking or experiencing related to discharge or a question regarding discharge (I e  What can I expect in my first intake appointment with a psychiatrist?) The facilitator led each question or concern aloud and allowed for additional questions to be asked, strategies and other items, they felt helpful  Today's group questions and fears discussed were: what to expect in my first outpatient therapy and psychiatry appointment, how to be prepared for my therapy and psychiatry appointments, how to find OP providers if I do not believe the services are receiving are effective, how to find and allocate supports, and how to maintain my skills once I discharge from program  Dieter aPrker participated in group by taking notes and presenting as receptive to peers and facilitator as observed by changing facial affect  Some effort noted treatment goal  Continue psychoeducation related to identifying what supports, coping skills, advocacy for needs, and learning problem solving skills when facing barriers to maintain mental health wellness after discharging from [facility] level of care  Tx Plan Objective: 1 1, 1 2, & 1 4 Therapist:  BELA Huynh    Education Therapy  [id number]  Dieter Parker engaged throughout the treatment day  Was engaged in learning related to illness, medication review, aftercare, and wellness tools   Staff utilized verbal, written, A/V, and demonstration teaching methods  Claudene Gower shared area of learning and set a goal for outside of program to discuss with partner their needs/challenge areas and strengths related to their mental health wellness as well as how their partner can support them       Tx objectives: 1 1, 1 2, 1 4  Therapist: Elias Berger, MT-BC

## 2023-04-27 NOTE — PSYCH
"Subjective:     Patient ID: Zhane Pickens is a 28 y o  female  [facility] Clinical Progress Notes      Specialized Services Documentation  Therapist must complete separate progress note for each specific clinical activity in which the individual participated during the day  Case Management Note    Lonnylindsey Mora, MS    Current suicide risk : Low     [id number] This writer met with Jessica Cuevason for case management  We discussed the SIB situation which had occurred during her absence from program  Jessica Andrade explained that she had been feeling \"really bad\" and refrained from taking her medication so that she could sleep all day  After resting throughout the day, Jessica Andrade came to the realization that she had not accomplished anything  She immediately began negative self-talk until she had convinced herself to self-harm  The self harm only exacerbated the situation and caused an argument with her boyfriend  This writer explained the significant consequences that can result from missing even one dose of a psychotropic medication  This writer pointed out the likelihood that this contributed to her SIB lapse  This writer also discussed \"Opposite Action\", provided her with the relevant handouts, and reviewed its theory/efficacy/application  Additionally, we discussed multiple substitutions for self harm  Jessica Andrade had used rubber bands before but did not have success  We spoke about using very cold ice cubes/ice packs  This writer also recommended removing razor blades from the home and used the example of gun locks (the few moments it takes to unlock a gun can be enough time to force the user to rethink the act)  This way, Jessica Andrade would need to take the time to go to the store and return before she could act  Jessica Andrade stated that she was already starting to do that and 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 No    Treatment session number: 9    Individual Case Management Visit provided today? Yes     [facility] follow up physician's orders: None, next medication check will be next week with DANIEL Coffey

## 2023-04-28 ENCOUNTER — OFFICE VISIT (OUTPATIENT)
Dept: PSYCHOLOGY | Facility: CLINIC | Age: 33
End: 2023-04-28

## 2023-04-28 DIAGNOSIS — F33.1 MAJOR DEPRESSIVE DISORDER, RECURRENT, MODERATE (HCC): Primary | ICD-10-CM

## 2023-04-28 DIAGNOSIS — F19.11 SUBSTANCE ABUSE IN REMISSION (HCC): ICD-10-CM

## 2023-04-28 DIAGNOSIS — F43.10 PTSD (POST-TRAUMATIC STRESS DISORDER): ICD-10-CM

## 2023-04-28 NOTE — PSYCH
Subjective:     Patient ID: Claudene Gower is a 28 y o  female  [facility] Clinical Progress Notes      Specialized Services Documentation  Therapist must complete separate progress note for each specific clinical activity in which the individual participated during the day  Group Psychotherapy ([id number])    The group engaged in the wellness assessment, which evaluates progress on several different areas of wellness/wellbeing: physical, emotional, cognitive, vocational, social and spiritual  Clients rated their progress and discussed areas that need work  By completing and discussing areas of progress and challenges, members are connected and reminded that, in their mental health struggle, they are not alone  Topics of discussion revolved around positive experiences within each area of wellness as well as the challenging aspects to wellness within their past week  Claudene Gower continues to make progress towards goals through participation in group activity and personal disclosures  Continue psychotherapy groups to encourage further exploration of needs, personal awareness, and skills  Tx Plan Objective: 1 1,1 2, 1 4   Therapist: Avel Gonzalez MS    Case Management Note    Avel Gonzalez MS    Current suicide risk : Low     Not a case management day for Claudene Gower today  Did not reach out with any additional questions and concerns and aware of next scheduled 1:1      Medications changes/added/denied? No    Treatment session number: 10    Individual Case Management Visit provided today? no    [facility] follow up physician's orders: None, next medication check will be next week with DANIEL Thornton

## 2023-04-28 NOTE — PSYCH
"    Subjective:     Patient ID: Kolton Kang is a 28 y o  female  [facility] Clinical Progress Notes      Specialized Services Documentation  Therapist must complete separate progress note for each specific clinical activity in which the individual participated during the day  Group Psychotherapy (9:30-10:30) Kolton Kang was present for this psychotherapy group on guilt  Participants began with a five minute mindful stretching meditation  The emotion of guilt was introduced with participants reflecting on the physical sensations, thoughts and behavioral expressions one experiences when feeling guilt  Healthy actions that result from the feelings of guilt were dicussed in comparison with toxic guilt  Participants reflected on contributing factors to feelings of toxic guilt  Participants read and reflected on the story of \"[place],\" in which the message of not taking on others emotional burdens was illustrated  Participants reviewed ways of coping with guilt  Lastly participants took part in responding to prompts regarding guilt with the group  Tahmina Montez was attentive but quiet during the group  She is encouraged to share during groups to meet her treatment goals       Tx Goal Objective: 1 1,1 2  Therapist: KASIA Camargo  "

## 2023-04-28 NOTE — PSYCH
Subjective:    Patient ID: Robles Desai is a 28 y o  female      [facility] Clinical Progress Notes      Specialized Services Documentation  Therapist must complete separate progress note for each specific clinical activity in which the individual participated during the day  Education Therapy   [id number]  Robles Desai participated actively in shared in check in and goal review  Presented as receptive related to readiness to learn  Robles Desai  did complete goal from last treatment day identifying gaining hope and support  did not present with any barriers to learning  Amber Cisneros engaged throughout the treatment day  Was engaged in learning related to Illness, Medication, Aftercare and Wellness Tools  Staff utilized Verbal, Written, A/V and Demonstration teaching methods  Robles Desai shared area of learning and set a goal for outside of program to keep her emotions grounded and keep talking positively to herself        Tx Plan Objective: 1 1, 1 2, 1 4, Therapist: PEACE Griffith

## 2023-05-01 ENCOUNTER — APPOINTMENT (OUTPATIENT)
Dept: PSYCHOLOGY | Facility: CLINIC | Age: 33
End: 2023-05-01
Payer: COMMERCIAL

## 2023-05-01 ENCOUNTER — DOCUMENTATION (OUTPATIENT)
Dept: PSYCHOLOGY | Facility: CLINIC | Age: 33
End: 2023-05-01

## 2023-05-01 NOTE — PROGRESS NOTES
Subjective:     Patient ID: Carol Sandoval is a 28 y o  female  [facility] Clinical Progress Notes      Specialized Services Documentation  Therapist must complete separate progress note for each specific clinical activity in which the individual participated during the day  Case Management Note    Jose L Harris MS    Current suicide risk : Low     Called out today  Was notified that Wednesday would be discharge and recommended that she continue calling OP providers, which she agreed to do today  Medications changes/added/denied? No    Treatment session number: 11    Individual Case Management Visit provided today?  No    [facility] follow up physician's orders: none, return tomorrow

## 2023-05-02 ENCOUNTER — OFFICE VISIT (OUTPATIENT)
Dept: PSYCHOLOGY | Facility: CLINIC | Age: 33
End: 2023-05-02

## 2023-05-02 DIAGNOSIS — F43.10 PTSD (POST-TRAUMATIC STRESS DISORDER): ICD-10-CM

## 2023-05-02 DIAGNOSIS — F19.11 SUBSTANCE ABUSE IN REMISSION (HCC): ICD-10-CM

## 2023-05-02 DIAGNOSIS — F33.1 MAJOR DEPRESSIVE DISORDER, RECURRENT, MODERATE (HCC): Primary | ICD-10-CM

## 2023-05-02 NOTE — PSYCH
Subjective:    Patient ID: Yosef Pandey is a 28 y o  female      [facility] Clinical Progress Notes      Specialized Services Documentation  Therapist must complete separate progress note for each specific clinical activity in which the individual participated during the day  Education Therapy   [id number]  Yosef Pandey participated actively in shared in check in and goal review  Presented as receptive related to readiness to learn  Yosef Pandey  did complete goal from last treatment day identifying gaining education and support  did not present with any barriers to learning  Amber Cisneros engaged throughout the treatment day  Was engaged in learning related to Illness, Medication, Aftercare and Wellness Tools  Staff utilized Verbal, Written, A/V and Demonstration teaching methods  Yosef Pandey shared area of learning and set a goal for outside of program to make phone calls to out patient providers  Tx Plan Objective: 1 1, 1 2, 1 4, Therapist: Zeynep Zelaya    Group Psychotherapy  [id number] Yosef Pandey participated actively in a psychotherapy group focused on values  The group  was provided with a large list of values, members identified which resonated with them, categorized those into 1-5 groups based on self-identified similarity, then selected one value within each group that encapsulated the entire group  Then using the selected values, participants added a verb to each value to see what it looks like as an actionable core value (e g  live in freedom, act with mindfulness, etc )  Art supplies were provided, including: various beads of differing types/colors, colorful strings, as well as different elastic  The group was given creative freedom to make something (key chains, bracelets, etc ) using a self-decided symbolic system that was representative to their personal life as related to their identified values   Yosef Pandey shared about how her process this group varied from when she "first did this earlier in her program time  Brian Man shared that the written portion was easier this time, and the difference in what she created  Brian Man shared that the first time, she made a bracelet that spelled out \"growth,\" but did not have an understanding of why she values and pursues growth; she shared that she is much more hopeful at this time, so she created two new pieces, why represented her reasons why she is growing  Continue to note progress towards goals  Continue with psychotherapy to encourage further value-alignment based progress     Tx Plan Objective 1 1, 1 2, 1 4 Therapist: PEACE Sanchez    "

## 2023-05-02 NOTE — PSYCH
Subjective:     Patient ID: Patrizia Melara is a 28 y o  female  [facility] Clinical Progress Notes      Specialized Services Documentation  Therapist must complete separate progress note for each specific clinical activity in which the individual participated during the day  Allied Therapy  [id number]   Clients listened, discussed, and added to a song that focused on regaining hope  Clients identified lyrics that represented hopelessness, a turning point, and hope  Therapist used answers and song lyrics to discuss autonomy and agency, defining them respectively as having control over oneself/decisions and directing the autonomy to a specific purpose  Therapist prompted group to add to the song by identifying when they feel hopeless, what it feels like physically and emotionally, why they came to program, and what they have gained from program so far  Therapist used answers to illustrate [employer], and prompted clients to identify where they saw it within their own answers  Patrizia Melara actively participated in small and large group discussion, and engaged with the songwriting activity   Continue AT for development and practice of autonomy and agency    Tx Objectives: 1 1, 1 2, 1 4                   Therapist: ANGELITO Kelley

## 2023-05-02 NOTE — PSYCH
Subjective:     Patient ID: Patrizia Melara is a 28 y o  female  [facility] Clinical Progress Notes      Specialized Services Documentation  Therapist must complete separate progress note for each specific clinical activity in which the individual participated during the day  Group Psychotherapy ([id number])  Patrizia Melara engaged in a group focusing on practicing mindfulness, creating a more cohesive group environment, and allowing members to become more familiar with one another (to promote a more comfortable environment for sharing  Each group member was given a paper to write down four different unique traits or obstacles they have accomplished in life  After writing down the four things then each group member tore them into four different pieces of paper and then crumbled them up and threw in one big basket   then picked one out of the basket at a time with the group having to guess who it belonged too  Group members were encouraged to relate to similarities of other group members while also being mindful and engaging during the group  An example of the personal fact shared by Patrizia Melara was that she was born in [birth year] but can't stand the cold  Ronny Victoria  will continue with life skills and psychotherapy groups  Good progress made towards treatment  Continue psychotherapy groups to encourage further exploration of needs, personal awareness, and skills  Tx Plan Objective: 1 1,1 2, 1 4   Therapist: Alie Rincon MS    Other established OP psych 5/17 1pm @ [facility] with Ezequiel Mo (intake appt)    Case Management Note    Alie Rincon MS    Current suicide risk : Low     [id number] This writer met with Ronny Victoria to provide the RPP & PHQ9, review return to work paperwork, and to establish aftercare  We determined exactly what Bisi needs for her to be paid and to return to work  This writer was able to establish follow up psych with [facility] while with Ronny Victoria also assured this writer that she would make calls on her own  We requested therapy anywhere, however, she was denied  Instead, [facility] has offered therapy after a month waiting period  This appt cannot be scheduled until after intake  Medications changes/added/denied? No    Treatment session number: 11    Individual Case Management Visit provided today? Yes     [facility] follow up physician's orders: None, next medication check will be tomorrow with DANIEL Rodriguez

## 2023-05-03 ENCOUNTER — APPOINTMENT (OUTPATIENT)
Dept: PSYCHOLOGY | Facility: CLINIC | Age: 33
End: 2023-05-03
Payer: COMMERCIAL

## 2023-05-03 ENCOUNTER — DOCUMENTATION (OUTPATIENT)
Dept: PSYCHOLOGY | Facility: CLINIC | Age: 33
End: 2023-05-03

## 2023-05-03 ENCOUNTER — TELEPHONE (OUTPATIENT)
Dept: PSYCHIATRY | Facility: CLINIC | Age: 33
End: 2023-05-03
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
## 2023-05-03 NOTE — TELEPHONE ENCOUNTER
Contacted Patient in regards scheduling appointment, patient is on our ASAP wait list  Patient is currently in php

## 2023-05-03 NOTE — PROGRESS NOTES
Subjective:     Patient ID: Troy Tilley is a 28 y o  female  [facility] Clinical Progress Notes      Specialized Services Documentation  Therapist must complete separate progress note for each specific clinical activity in which the individual participated during the day  Other DISCHARGE TODAY 05/03/23  (non-routine due to non-attendance, however, still successful)    Case Management Note    Ever Vasquez, MS    Current suicide risk : Low     Josy Duckworth called off this morning, however, she agreed to come into program at 1400 in order to review/sign discharge paperwork  ([id number]) CM reviewed Relapse Prevention Plan, discharge instructions, medication list and crisis information with Troy Tilley    Medications changes/added/denied? No    Treatment session number: 12    Individual Case Management Visit provided today? Yes     [facility] follow up physician's orders: DISCHARGE TODAY 05/03/23, successful but non-routine due to absence today

## 2023-05-03 NOTE — PSYCH
[facility] Discharge Instructions:   Disposition: home  Address: [address] [place] [address]  Diagnosis:    PTSD (post-traumatic stress disorder)     Major depressive disorder, recurrent, moderate (HCC)     Substance abuse in remission (HCC)       Allergies (Drug/Food): Allergies   Allergen Reactions    Clindamycin Throat Swelling    Onion - Food Allergy Anaphylaxis and Swelling     Swelling of throat   Gabapentin Diarrhea, Hives and Itching    Sumatriptan Swelling     Activity: no restrictions  Diet:no recommendations  Smoking Cessation: The best thing you can do to improve your health is to stop using tobacco  Diagnostic/Laboratory Orders: none  Vaccines: If you received a vaccine, please notify your family physician on your next visit  For more information, please call [phone]  Follow-up appointments/Referrals:     Begin outpatient psychiatry and therapy (05/17/23 @ 1pm)  [facility] [address], [address], [address] [place] [address]  [phone]     ICM/CTT: None  [facility] [phone]  Intake/Referral/Evaluation (Non-Emergency) *NON INSURED FOR FUNDING: [facility]: [phone], [facility]: [phone], [facility]: [phone] and [place]: [phone]  Crisis Intervention (Emergency) [place] [facility]: [facility]: [phone], [place]: [phone], [facility]: [phone], [facility]): [phone], [facility]: [phone] and [facility]: [phone]  _________________________________  National Crisis Intervention Hotline: 7-212.890.9842  National Suicide Crisis Hotline: 0-276.192.3551  I, the undersigned, have received and understand the above instructions          Patient/Rep Signature: __________________________________       Date/Time: ______________         Physician Signature: ____________________________________      Date/Time: ______________               Signature: ________________________________       Date/Time: ______________

## 2023-05-04 ENCOUNTER — APPOINTMENT (OUTPATIENT)
Dept: PSYCHOLOGY | Facility: CLINIC | Age: 33
End: 2023-05-04
Payer: COMMERCIAL

## 2023-05-05 ENCOUNTER — APPOINTMENT (OUTPATIENT)
Dept: PSYCHOLOGY | Facility: CLINIC | Age: 33
End: 2023-05-05
Payer: COMMERCIAL

## 2023-05-08 NOTE — TELEPHONE ENCOUNTER
Contacted Patient in regards to scheduling, patient is on our discharge wait list  lvm for patient to contact intake department

## 2023-05-09 NOTE — TELEPHONE ENCOUNTER
Called pt and lvm to cb to offer potential therapy services  Pt is listed on high priority list  Pt has been contacted x3   Being removed from the list

## 2023-05-14 ENCOUNTER — HOSPITAL ENCOUNTER (EMERGENCY)
Facility: HOSPITAL | Age: 33
Discharge: HOME | End: 2023-05-14
Attending: EMERGENCY MEDICINE

## 2023-05-14 VITALS
TEMPERATURE: 98.3 F | BODY MASS INDEX: 20.74 KG/M2 | WEIGHT: 120.8 LBS | HEART RATE: 98 BPM | RESPIRATION RATE: 18 BRPM | SYSTOLIC BLOOD PRESSURE: 176 MMHG | OXYGEN SATURATION: 96 % | DIASTOLIC BLOOD PRESSURE: 116 MMHG

## 2023-05-14 DIAGNOSIS — M79.10 MYALGIA: Primary | ICD-10-CM

## 2023-05-14 RX ORDER — METHOCARBAMOL 500 MG/1
500 TABLET, FILM COATED ORAL ONCE
Status: COMPLETED | OUTPATIENT
Start: 2023-05-14 | End: 2023-05-14

## 2023-05-14 RX ADMIN — METHOCARBAMOL TABLETS 500 MG: 500 TABLET, COATED ORAL at 04:19

## 2023-05-14 NOTE — ED PROVIDER NOTES
History  Chief Complaint   Patient presents with   • Generalized Body Aches     Reports generalized body aches d/t her MS  States she usually gets steroid infusions  HPI    29 yo F hx of MS depression presents to ed from intermediate for myalgias  Patient was in intermediate after touching , charges are per patient dropped  Patient states she became anxious in prison and had generalized shaking  Remembers whole event  States this occurs whenever she becomes anxious  Patient denies being on anti epileptic medications  States she gets myalgias whenever her ms acts up and that muscle relaxers alleviate symptoms  Patient denies any neuro symptoms, global fatigue, bulbar symptoms, discoid rash or any other complaints on ros  She feels better now that she is not in the intermediate cell  Prior to Admission Medications   Prescriptions Last Dose Informant Patient Reported? Taking?    FLUoxetine (PROzac) 40 MG capsule   No No   Sig: Take 1 capsule (40 mg total) by mouth daily   albuterol (Ventolin HFA) 90 mcg/act inhaler   No No   Sig: Inhale 2 puffs every 6 (six) hours as needed for wheezing   medroxyPROGESTERone (DEPO-PROVERA) 150 mg/mL injection   No No   Sig: Inject 1 mL (150 mg total) into a muscle every 3 (three) months   medroxyPROGESTERone (DEPO-PROVERA) 150 mg/mL injection   No No   Sig: Inject 1 mL (150 mg total) into a muscle every 3 (three) months      Facility-Administered Medications Last Administration Doses Remaining   medroxyPROGESTERone (DEPO-PROVERA) IM injection 150 mg 4/11/2023  4:15 PM           Past Medical History:   Diagnosis Date   • Asthma     Albuterol prn   • Body mass index (BMI) less than 19 in adult 12/2/2019   • Depression     hasn't required treatment since 2018   • Multiple sclerosis ([facility] [address] ) 2018    follows poorly with neurology, manages with Robaxin   • Seizure [facility])     possibly r/t MS diagnosis, has never been on meds, convulsive, last episode 9/2020       Past Surgical History:   Procedure Laterality Date   • CLOSED REDUCTION FINGER FRACTURE Left    • FL LUMBAR PUNCTURE DIAGNOSTIC  12/5/2019   • TONSILLECTOMY Bilateral 1470   • UMBILICAL HERNIA REPAIR  2016       Family History   Problem Relation Age of Onset   • Depression Mother    • Cancer Mother         thyroid   • Diabetes Father    • Hypertension Father    • Hypertension Paternal Grandfather         TYPE 2   • Diabetes Paternal Grandfather    • Cancer Paternal Grandfather         pancreatic, lung   • Coronary artery disease Paternal Grandmother    • Drug abuse Cousin    • Breast cancer Neg Hx      I have reviewed and agree with the history as documented  E-Cigarette/Vaping   • E-Cigarette Use Never User      E-Cigarette/Vaping Substances     Social History     Tobacco Use   • Smoking status: Every Day     Packs/day: 0 25     Years: 9 00     Pack years: 2 25     Types: Cigarettes   • Smokeless tobacco: Current   Vaping Use   • Vaping Use: Never used   Substance Use Topics   • Alcohol use: Not Currently     Comment: socially   • Drug use: Not Currently     Types: Marijuana, Cocaine, MDMA (ecstacy)       Review of Systems   Constitutional: Negative for chills, fatigue and fever  HENT: Negative for sore throat  Eyes: Negative for redness and visual disturbance  Respiratory: Negative for cough and shortness of breath  Cardiovascular: Negative for chest pain  Gastrointestinal: Negative for abdominal pain, diarrhea and nausea  Genitourinary: Negative for difficulty urinating, dysuria and pelvic pain  Musculoskeletal: Positive for myalgias  Negative for back pain  Skin: Negative for rash  Neurological: Negative for syncope, weakness and headaches  All other systems reviewed and are negative  Physical Exam  Physical Exam  Vitals and nursing note reviewed  Constitutional:       General: She is not in acute distress  HENT:      Head: Normocephalic and atraumatic        Right Ear: External ear normal       Left Ear: External ear normal    Eyes:      Extraocular Movements: Extraocular movements intact  Conjunctiva/sclera: Conjunctivae normal    Cardiovascular:      Rate and Rhythm: Normal rate and regular rhythm  Heart sounds: Normal heart sounds  Pulmonary:      Effort: Pulmonary effort is normal  No respiratory distress  Breath sounds: Normal breath sounds  Abdominal:      General: Abdomen is flat  Tenderness: There is no abdominal tenderness  Musculoskeletal:         General: Normal range of motion  Cervical back: Normal range of motion  Skin:     General: Skin is warm and dry  Neurological:      Mental Status: She is alert and oriented to person, place, and time  Cranial Nerves: No cranial nerve deficit  Motor: No abnormal muscle tone  Coordination: Coordination normal       Comments: Mental Status: Alert and oriented to person place time and situation, language fluent with good comprehension and repetition  CN: PERRLA, visual fields intact, extraocular muscles intact without nystagmus  Face symmetrical with full sensation  Hearing in tact, CN in tact grossly  Motor: Normal muscle bulk and tone throughout 5/5 strength in upper and lower extremities throughout  Sensory: Sensation intact to light touch     Coordination:   Gait at baseline           Vital Signs  ED Triage Vitals [05/14/23 0352]   Temperature Pulse Respirations Blood Pressure SpO2   98 3 °F (36 8 °C) (!) 110 18 (!) 176/116 96 %      Temp Source Heart Rate Source Patient Position - Orthostatic VS BP Location FiO2 (%)   Oral Monitor Sitting Left arm --      Pain Score       --           Vitals:    05/14/23 0352 05/14/23 0423   BP: (!) 176/116    Pulse: (!) 110 98   Patient Position - Orthostatic VS: Sitting          Visual Acuity      ED Medications  Medications   methocarbamol (ROBAXIN) tablet 500 mg (500 mg Oral Given 5/14/23 5412)       Diagnostic Studies  Results Reviewed     None                 No orders to display Procedures  Procedures         ED Course                               SBIRT 22yo+    Flowsheet Row Most Recent Value   Initial Alcohol Screen: US AUDIT-C     1  How often do you have a drink containing alcohol? 0 Filed at: 05/14/2023 0354   2  How many drinks containing alcohol do you have on a typical day you are drinking? 0 Filed at: 05/14/2023 0354   3b  FEMALE Any Age, or MALE 65+: How often do you have 4 or more drinks on one occassion? 0 Filed at: 05/14/2023 0354   Audit-C Score 0 Filed at: 05/14/2023 4019   WESLEY: How many times in the past year have you    Used an illegal drug or used a prescription medication for non-medical reasons? Never Filed at: 05/14/2023 0354                    MDM     Reviewed past medical records: yes, hx of MS    History Provided by patient and EMS    Differential considered msk pain vs ms flare, unlikely given circumstances around event, patient at baseline with no complaints at this time  Patient given symptomatic treatments with resolution of symptoms  pcp follow up  The patient was instructed to follow up as documented  Strict return precautions were discussed with the patient and the patient was instructed to return to the emergency department immediately if symptoms worsen  The patient/patient family member acknowledged and were in agreement with plan  Disposition  Final diagnoses:   Myalgia     Time reflects when diagnosis was documented in both MDM as applicable and the Disposition within this note     Time User Action Codes Description Comment    5/14/2023  4:25 AM Maurizio Costa Add [M79 10] Myalgia       ED Disposition     ED Disposition   Discharge    Condition   Stable    Date/Time   Sun May 14, 2023  4:25 AM    Comment   Rosalee Paiz 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                 Follow-up Information     Follow up With Specialties Details Why Contact Info Additional Information    [facility]   [address]  [address] [address]  [address] [facility] [address] [facility], [address] [facility], [facility] [address], [place], [address] [facility] Family Medicine Schedule an appointment as soon as possible for a visit in 1 week To find a primary care doctor, For follow up regarding your symptoms and recheck [address], [address] [facility] [address]  [address] [facility] [address], [address], [address] [place], [place], [address]          Discharge Medication List as of 5/14/2023  4:27 AM      CONTINUE these medications which have NOT CHANGED    Details   albuterol (Ventolin HFA) 90 mcg/act inhaler Inhale 2 puffs every 6 (six) hours as needed for wheezing, Starting Fri 12/3/2021, Normal      FLUoxetine (PROzac) 40 MG capsule Take 1 capsule (40 mg total) by mouth daily, Starting Mon 4/24/2023, Normal      !! medroxyPROGESTERone (DEPO-PROVERA) 150 mg/mL injection Inject 1 mL (150 mg total) into a muscle every 3 (three) months, Starting Thu 7/15/2021, Normal      !! medroxyPROGESTERone (DEPO-PROVERA) 150 mg/mL injection Inject 1 mL (150 mg total) into a muscle every 3 (three) months, Starting Wed 10/19/2022, Normal       !! - Potential duplicate medications found  Please discuss with provider  No discharge procedures on file      PDMP Review       Value Time User    PDMP Reviewed  Yes 10/16/2020 11:39 PM Edward Garcia DO          ED Provider  Electronically Signed by           Glen Mayorga MD  05/14/23 7270

## 2023-05-20 ENCOUNTER — HOSPITAL ENCOUNTER (EMERGENCY)
Facility: HOSPITAL | Age: 33
Discharge: HOME | End: 2023-05-20
Attending: EMERGENCY MEDICINE

## 2023-05-20 VITALS
DIASTOLIC BLOOD PRESSURE: 104 MMHG | SYSTOLIC BLOOD PRESSURE: 144 MMHG | TEMPERATURE: 97.7 F | BODY MASS INDEX: 21.04 KG/M2 | WEIGHT: 122.58 LBS | RESPIRATION RATE: 18 BRPM | HEART RATE: 100 BPM | OXYGEN SATURATION: 98 %

## 2023-05-20 DIAGNOSIS — R56.9 SEIZURE-LIKE ACTIVITY (HCC): Primary | ICD-10-CM

## 2023-05-20 LAB
ATRIAL RATE: 94 BPM
ATRIAL RATE: 95 BPM
P AXIS: 47 DEGREES
P AXIS: 54 DEGREES
PR INTERVAL: 122 MS
PR INTERVAL: 128 MS
QRS AXIS: 62 DEGREES
QRS AXIS: 65 DEGREES
QRSD INTERVAL: 106 MS
QRSD INTERVAL: 110 MS
QT INTERVAL: 388 MS
QT INTERVAL: 392 MS
QTC INTERVAL: 487 MS
QTC INTERVAL: 490 MS
T WAVE AXIS: 21 DEGREES
T WAVE AXIS: 22 DEGREES
VENTRICULAR RATE: 94 BPM
VENTRICULAR RATE: 95 BPM
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
## 2023-06-09 ENCOUNTER — TELEPHONE (OUTPATIENT)
Dept: FAMILY MEDICINE CLINIC | Facility: CLINIC | Age: 33
End: 2023-06-09

## 2023-06-09 NOTE — TELEPHONE ENCOUNTER
Called and left message for patient to return call to the office to schedule physical exam  4th attempt to reach patient  Letter sent to patient

## 2023-06-11 ENCOUNTER — HOSPITAL ENCOUNTER (EMERGENCY)
Facility: HOSPITAL | Age: 33
Discharge: HOME | End: 2023-06-11
Attending: EMERGENCY MEDICINE
Payer: COMMERCIAL

## 2023-06-11 VITALS
OXYGEN SATURATION: 99 % | SYSTOLIC BLOOD PRESSURE: 155 MMHG | DIASTOLIC BLOOD PRESSURE: 95 MMHG | RESPIRATION RATE: 18 BRPM | WEIGHT: 133.8 LBS | TEMPERATURE: 99.5 F | BODY MASS INDEX: 22.97 KG/M2 | HEART RATE: 88 BPM

## 2023-06-11 DIAGNOSIS — F41.9 ANXIETY: ICD-10-CM

## 2023-06-11 DIAGNOSIS — F10.929 ALCOHOL INTOXICATION (HCC): ICD-10-CM

## 2023-06-11 DIAGNOSIS — R56.9 SEIZURE (HCC): Primary | ICD-10-CM

## 2023-06-11 LAB — ETHANOL EXG-MCNC: 0.15 MG/DL

## 2023-06-11 PROCEDURE — 82075 ASSAY OF BREATH ETHANOL: CPT | Performed by: EMERGENCY MEDICINE

## 2023-06-11 PROCEDURE — 99284 EMERGENCY DEPT VISIT MOD MDM: CPT
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
## 2023-06-11 NOTE — ED NOTES
Patient is awake and alert - oriented x4 - talking with girlfriend present in room with patient  Has spoken with crisis worker  Patient is pleasant and cooperative and is offering no complaints       Magalys Moss RN  06/11/23 1976
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
## 2023-06-15 ENCOUNTER — HOSPITAL ENCOUNTER (EMERGENCY)
Facility: HOSPITAL | Age: 33
End: 2023-06-15
Attending: EMERGENCY MEDICINE
Payer: COMMERCIAL

## 2023-06-15 ENCOUNTER — HOSPITAL ENCOUNTER (INPATIENT)
Facility: HOSPITAL | Age: 33
LOS: 6 days | Discharge: HOME | DRG: 885 | End: 2023-06-21
Attending: PSYCHIATRY & NEUROLOGY | Admitting: PSYCHIATRY & NEUROLOGY
Payer: COMMERCIAL

## 2023-06-15 VITALS
TEMPERATURE: 99.1 F | OXYGEN SATURATION: 96 % | RESPIRATION RATE: 18 BRPM | HEART RATE: 82 BPM | SYSTOLIC BLOOD PRESSURE: 136 MMHG | DIASTOLIC BLOOD PRESSURE: 84 MMHG

## 2023-06-15 DIAGNOSIS — I10 HYPERTENSION, UNSPECIFIED TYPE: ICD-10-CM

## 2023-06-15 DIAGNOSIS — T50.902A INTENTIONAL OVERDOSE, INITIAL ENCOUNTER (HCC): Primary | ICD-10-CM

## 2023-06-15 DIAGNOSIS — F10.929 ALCOHOL INTOXICATION (HCC): ICD-10-CM

## 2023-06-15 DIAGNOSIS — R39.15 URGENCY OF URINATION: ICD-10-CM

## 2023-06-15 DIAGNOSIS — T14.91XA SUICIDE ATTEMPT (HCC): ICD-10-CM

## 2023-06-15 DIAGNOSIS — F33.1 MODERATE EPISODE OF RECURRENT MAJOR DEPRESSIVE DISORDER (HCC): ICD-10-CM

## 2023-06-15 DIAGNOSIS — F33.2 MAJOR DEPRESSIVE DISORDER, RECURRENT, SEVERE WITHOUT PSYCHOTIC FEATURES (HCC): Primary | Chronic | ICD-10-CM

## 2023-06-15 LAB
ALBUMIN SERPL BCP-MCNC: 3.4 G/DL (ref 3.5–5)
ALP SERPL-CCNC: 60 U/L (ref 34–104)
ALT SERPL W P-5'-P-CCNC: 14 U/L (ref 7–52)
AMPHETAMINES SERPL QL SCN: NEGATIVE
ANION GAP SERPL CALCULATED.3IONS-SCNC: 6 MMOL/L (ref 4–13)
ANION GAP SERPL CALCULATED.3IONS-SCNC: 9 MMOL/L (ref 4–13)
APAP SERPL-MCNC: <10 UG/ML (ref 10–20)
APTT PPP: 19 SECONDS (ref 23–37)
AST SERPL W P-5'-P-CCNC: 18 U/L (ref 13–39)
ATRIAL RATE: 132 BPM
BACTERIA UR QL AUTO: ABNORMAL /HPF
BARBITURATES UR QL: NEGATIVE
BASE EX.OXY STD BLDV CALC-SCNC: 87.9 % (ref 60–80)
BASE EXCESS BLDV CALC-SCNC: -8.5 MMOL/L
BASOPHILS # BLD AUTO: 0.08 THOUSANDS/ÂΜL (ref 0–0.1)
BASOPHILS NFR BLD AUTO: 1 % (ref 0–1)
BENZODIAZ UR QL: NEGATIVE
BILIRUB SERPL-MCNC: 0.19 MG/DL (ref 0.2–1)
BILIRUB UR QL STRIP: NEGATIVE
BUN SERPL-MCNC: 10 MG/DL (ref 5–25)
BUN SERPL-MCNC: 5 MG/DL (ref 5–25)
CALCIUM ALBUM COR SERPL-MCNC: 7.7 MG/DL (ref 8.3–10.1)
CALCIUM SERPL-MCNC: 7.2 MG/DL (ref 8.4–10.2)
CALCIUM SERPL-MCNC: 8.5 MG/DL (ref 8.4–10.2)
CHLORIDE SERPL-SCNC: 112 MMOL/L (ref 96–108)
CHLORIDE SERPL-SCNC: 113 MMOL/L (ref 96–108)
CLARITY UR: CLEAR
CO2 SERPL-SCNC: 18 MMOL/L (ref 21–32)
CO2 SERPL-SCNC: 21 MMOL/L (ref 21–32)
COCAINE UR QL: NEGATIVE
COLOR UR: YELLOW
CREAT SERPL-MCNC: 0.6 MG/DL (ref 0.6–1.3)
CREAT SERPL-MCNC: 0.62 MG/DL (ref 0.6–1.3)
EOSINOPHIL # BLD AUTO: 0.22 THOUSAND/ÂΜL (ref 0–0.61)
EOSINOPHIL NFR BLD AUTO: 3 % (ref 0–6)
ERYTHROCYTE [DISTWIDTH] IN BLOOD BY AUTOMATED COUNT: 12.3 % (ref 11.6–15.1)
ETHANOL SERPL-MCNC: 129 MG/DL
GFR SERPL CREATININE-BSD FRML MDRD: 119 ML/MIN/1.73SQ M
GFR SERPL CREATININE-BSD FRML MDRD: 120 ML/MIN/1.73SQ M
GLUCOSE SERPL-MCNC: 103 MG/DL (ref 65–140)
GLUCOSE SERPL-MCNC: 105 MG/DL (ref 65–140)
GLUCOSE SERPL-MCNC: 89 MG/DL (ref 65–140)
GLUCOSE UR STRIP-MCNC: NEGATIVE MG/DL
HCG SERPL QL: NEGATIVE
HCO3 BLDV-SCNC: 16.9 MMOL/L (ref 24–30)
HCT VFR BLD AUTO: 43.8 % (ref 34.8–46.1)
HGB BLD-MCNC: 15 G/DL (ref 11.5–15.4)
HGB UR QL STRIP.AUTO: ABNORMAL
HYALINE CASTS #/AREA URNS LPF: ABNORMAL /LPF
IMM GRANULOCYTES # BLD AUTO: 0.03 THOUSAND/UL (ref 0–0.2)
IMM GRANULOCYTES NFR BLD AUTO: 0 % (ref 0–2)
INR PPP: 1.01 (ref 0.84–1.19)
KETONES UR STRIP-MCNC: ABNORMAL MG/DL
LEUKOCYTE ESTERASE UR QL STRIP: NEGATIVE
LYMPHOCYTES # BLD AUTO: 2.4 THOUSANDS/ÂΜL (ref 0.6–4.47)
LYMPHOCYTES NFR BLD AUTO: 28 % (ref 14–44)
MAGNESIUM SERPL-MCNC: 1.9 MG/DL (ref 1.9–2.7)
MCH RBC QN AUTO: 32.7 PG (ref 26.8–34.3)
MCHC RBC AUTO-ENTMCNC: 34.2 G/DL (ref 31.4–37.4)
MCV RBC AUTO: 95 FL (ref 82–98)
METHADONE UR QL: NEGATIVE
MONOCYTES # BLD AUTO: 0.65 THOUSAND/ÂΜL (ref 0.17–1.22)
MONOCYTES NFR BLD AUTO: 8 % (ref 4–12)
MUCOUS THREADS UR QL AUTO: ABNORMAL
NEUTROPHILS # BLD AUTO: 5.13 THOUSANDS/ÂΜL (ref 1.85–7.62)
NEUTS SEG NFR BLD AUTO: 60 % (ref 43–75)
NITRITE UR QL STRIP: NEGATIVE
NON-SQ EPI CELLS URNS QL MICRO: ABNORMAL /HPF
NRBC BLD AUTO-RTO: 0 /100 WBCS
O2 CT BLDV-SCNC: 17.7 ML/DL
OPIATES UR QL SCN: NEGATIVE
OXYCODONE+OXYMORPHONE UR QL SCN: NEGATIVE
P AXIS: 75 DEGREES
PCO2 BLDV: 35.2 MM HG (ref 42–50)
PCP UR QL: NEGATIVE
PH BLDV: 7.3 [PH] (ref 7.3–7.4)
PH UR STRIP.AUTO: 6 [PH] (ref 4.5–8)
PLATELET # BLD AUTO: 341 THOUSANDS/UL (ref 149–390)
PMV BLD AUTO: 10.9 FL (ref 8.9–12.7)
PO2 BLDV: 71.9 MM HG (ref 35–45)
POTASSIUM SERPL-SCNC: 3.7 MMOL/L (ref 3.5–5.3)
POTASSIUM SERPL-SCNC: 3.8 MMOL/L (ref 3.5–5.3)
PR INTERVAL: 126 MS
PROT SERPL-MCNC: 5.6 G/DL (ref 6.4–8.4)
PROT UR STRIP-MCNC: NEGATIVE MG/DL
PROTHROMBIN TIME: 13.3 SECONDS (ref 11.6–14.5)
QRS AXIS: 89 DEGREES
QRSD INTERVAL: 102 MS
QT INTERVAL: 288 MS
QTC INTERVAL: 426 MS
RBC # BLD AUTO: 4.59 MILLION/UL (ref 3.81–5.12)
RBC #/AREA URNS AUTO: ABNORMAL /HPF
SALICYLATES SERPL-MCNC: <5 MG/DL (ref 3–20)
SODIUM SERPL-SCNC: 139 MMOL/L (ref 135–147)
SODIUM SERPL-SCNC: 140 MMOL/L (ref 135–147)
SP GR UR STRIP.AUTO: 1.01 (ref 1–1.03)
T WAVE AXIS: 19 DEGREES
THC UR QL: NEGATIVE
TSH SERPL DL<=0.05 MIU/L-ACNC: 1.93 UIU/ML (ref 0.45–4.5)
UROBILINOGEN UR QL STRIP.AUTO: 0.2 E.U./DL
VENTRICULAR RATE: 132 BPM
WBC # BLD AUTO: 8.51 THOUSAND/UL (ref 4.31–10.16)
WBC #/AREA URNS AUTO: ABNORMAL /HPF

## 2023-06-15 PROCEDURE — 84443 ASSAY THYROID STIM HORMONE: CPT

## 2023-06-15 PROCEDURE — 83735 ASSAY OF MAGNESIUM: CPT

## 2023-06-15 PROCEDURE — 96375 TX/PRO/DX INJ NEW DRUG ADDON: CPT

## 2023-06-15 PROCEDURE — 80143 DRUG ASSAY ACETAMINOPHEN: CPT

## 2023-06-15 PROCEDURE — 36415 COLL VENOUS BLD VENIPUNCTURE: CPT

## 2023-06-15 PROCEDURE — 85730 THROMBOPLASTIN TIME PARTIAL: CPT

## 2023-06-15 PROCEDURE — 80048 BASIC METABOLIC PNL TOTAL CA: CPT | Performed by: PHYSICIAN ASSISTANT

## 2023-06-15 PROCEDURE — 93010 ELECTROCARDIOGRAM REPORT: CPT

## 2023-06-15 PROCEDURE — 81001 URINALYSIS AUTO W/SCOPE: CPT

## 2023-06-15 PROCEDURE — 80179 DRUG ASSAY SALICYLATE: CPT

## 2023-06-15 PROCEDURE — 82948 REAGENT STRIP/BLOOD GLUCOSE: CPT

## 2023-06-15 PROCEDURE — 82805 BLOOD GASES W/O2 SATURATION: CPT

## 2023-06-15 PROCEDURE — 99285 EMERGENCY DEPT VISIT HI MDM: CPT

## 2023-06-15 PROCEDURE — 80307 DRUG TEST PRSMV CHEM ANLYZR: CPT

## 2023-06-15 PROCEDURE — 84703 CHORIONIC GONADOTROPIN ASSAY: CPT

## 2023-06-15 PROCEDURE — 99447 NTRPROF PH1/NTRNET/EHR 11-20: CPT | Performed by: EMERGENCY MEDICINE

## 2023-06-15 PROCEDURE — 96374 THER/PROPH/DIAG INJ IV PUSH: CPT

## 2023-06-15 PROCEDURE — 80053 COMPREHEN METABOLIC PANEL: CPT

## 2023-06-15 PROCEDURE — 85025 COMPLETE CBC W/AUTO DIFF WBC: CPT

## 2023-06-15 PROCEDURE — 82077 ASSAY SPEC XCP UR&BREATH IA: CPT

## 2023-06-15 PROCEDURE — 85610 PROTHROMBIN TIME: CPT

## 2023-06-15 PROCEDURE — 93005 ELECTROCARDIOGRAM TRACING: CPT

## 2023-06-15 RX ORDER — LORAZEPAM 2 MG/ML
1 INJECTION INTRAMUSCULAR
Status: CANCELLED | OUTPATIENT
Start: 2023-06-15

## 2023-06-15 RX ORDER — TRAZODONE HYDROCHLORIDE 50 MG/1
50 TABLET ORAL
Status: DISCONTINUED | OUTPATIENT
Start: 2023-06-15 | End: 2023-06-21 | Stop reason: HOSPADM

## 2023-06-15 RX ORDER — HALOPERIDOL 5 MG/1
2.5 TABLET ORAL
Status: CANCELLED | OUTPATIENT
Start: 2023-06-15

## 2023-06-15 RX ORDER — LANOLIN ALCOHOL/MO/W.PET/CERES
3 CREAM (GRAM) TOPICAL
Status: CANCELLED | OUTPATIENT
Start: 2023-06-15

## 2023-06-15 RX ORDER — HYDROXYZINE HYDROCHLORIDE 25 MG/1
100 TABLET, FILM COATED ORAL
Status: CANCELLED | OUTPATIENT
Start: 2023-06-15

## 2023-06-15 RX ORDER — LORAZEPAM 2 MG/ML
2 INJECTION INTRAMUSCULAR
Status: DISCONTINUED | OUTPATIENT
Start: 2023-06-15 | End: 2023-06-21 | Stop reason: HOSPADM

## 2023-06-15 RX ORDER — ONDANSETRON 2 MG/ML
4 INJECTION INTRAMUSCULAR; INTRAVENOUS ONCE
Status: COMPLETED | OUTPATIENT
Start: 2023-06-15 | End: 2023-06-15

## 2023-06-15 RX ORDER — HALOPERIDOL 5 MG/ML
5 INJECTION INTRAMUSCULAR
Status: CANCELLED | OUTPATIENT
Start: 2023-06-15

## 2023-06-15 RX ORDER — HYDROXYZINE 50 MG/1
100 TABLET, FILM COATED ORAL
Status: DISCONTINUED | OUTPATIENT
Start: 2023-06-15 | End: 2023-06-21 | Stop reason: HOSPADM

## 2023-06-15 RX ORDER — HALOPERIDOL 5 MG/ML
2.5 INJECTION INTRAMUSCULAR
Status: CANCELLED | OUTPATIENT
Start: 2023-06-15

## 2023-06-15 RX ORDER — ACETAMINOPHEN 325 MG/1
975 TABLET ORAL EVERY 6 HOURS PRN
Status: DISCONTINUED | OUTPATIENT
Start: 2023-06-15 | End: 2023-06-21 | Stop reason: HOSPADM

## 2023-06-15 RX ORDER — DIAZEPAM 5 MG/ML
5 INJECTION, SOLUTION INTRAMUSCULAR; INTRAVENOUS ONCE
Status: COMPLETED | OUTPATIENT
Start: 2023-06-15 | End: 2023-06-15

## 2023-06-15 RX ORDER — ALBUTEROL SULFATE 90 UG/1
2 AEROSOL, METERED RESPIRATORY (INHALATION) EVERY 6 HOURS PRN
Status: CANCELLED | OUTPATIENT
Start: 2023-06-15

## 2023-06-15 RX ORDER — HALOPERIDOL 5 MG/1
5 TABLET ORAL
Status: DISCONTINUED | OUTPATIENT
Start: 2023-06-15 | End: 2023-06-21 | Stop reason: HOSPADM

## 2023-06-15 RX ORDER — MAGNESIUM HYDROXIDE/ALUMINUM HYDROXICE/SIMETHICONE 120; 1200; 1200 MG/30ML; MG/30ML; MG/30ML
30 SUSPENSION ORAL EVERY 4 HOURS PRN
Status: DISCONTINUED | OUTPATIENT
Start: 2023-06-15 | End: 2023-06-21 | Stop reason: HOSPADM

## 2023-06-15 RX ORDER — ACETAMINOPHEN 325 MG/1
975 TABLET ORAL EVERY 6 HOURS PRN
Status: CANCELLED | OUTPATIENT
Start: 2023-06-15

## 2023-06-15 RX ORDER — TRAZODONE HYDROCHLORIDE 50 MG/1
50 TABLET ORAL
Status: CANCELLED | OUTPATIENT
Start: 2023-06-15

## 2023-06-15 RX ORDER — AMOXICILLIN 250 MG
1 CAPSULE ORAL DAILY PRN
Status: DISCONTINUED | OUTPATIENT
Start: 2023-06-15 | End: 2023-06-21 | Stop reason: HOSPADM

## 2023-06-15 RX ORDER — LORAZEPAM 2 MG/ML
2 INJECTION INTRAMUSCULAR
Status: CANCELLED | OUTPATIENT
Start: 2023-06-15

## 2023-06-15 RX ORDER — POLYETHYLENE GLYCOL 3350 17 G/17G
17 POWDER, FOR SOLUTION ORAL DAILY PRN
Status: CANCELLED | OUTPATIENT
Start: 2023-06-15

## 2023-06-15 RX ORDER — DIPHENHYDRAMINE HYDROCHLORIDE 50 MG/ML
50 INJECTION INTRAMUSCULAR; INTRAVENOUS EVERY 6 HOURS PRN
Status: DISCONTINUED | OUTPATIENT
Start: 2023-06-15 | End: 2023-06-21 | Stop reason: HOSPADM

## 2023-06-15 RX ORDER — MAGNESIUM HYDROXIDE/ALUMINUM HYDROXICE/SIMETHICONE 120; 1200; 1200 MG/30ML; MG/30ML; MG/30ML
30 SUSPENSION ORAL EVERY 4 HOURS PRN
Status: CANCELLED | OUTPATIENT
Start: 2023-06-15

## 2023-06-15 RX ORDER — POLYETHYLENE GLYCOL 3350 17 G/17G
17 POWDER, FOR SOLUTION ORAL DAILY PRN
Status: DISCONTINUED | OUTPATIENT
Start: 2023-06-15 | End: 2023-06-16

## 2023-06-15 RX ORDER — HALOPERIDOL 1 MG/1
1 TABLET ORAL EVERY 6 HOURS PRN
Status: CANCELLED | OUTPATIENT
Start: 2023-06-15

## 2023-06-15 RX ORDER — HYDROXYZINE HYDROCHLORIDE 25 MG/1
25 TABLET, FILM COATED ORAL
Status: CANCELLED | OUTPATIENT
Start: 2023-06-15

## 2023-06-15 RX ORDER — HYDROXYZINE 50 MG/1
50 TABLET, FILM COATED ORAL
Status: DISCONTINUED | OUTPATIENT
Start: 2023-06-15 | End: 2023-06-21 | Stop reason: HOSPADM

## 2023-06-15 RX ORDER — LORAZEPAM 2 MG/ML
2 INJECTION INTRAMUSCULAR EVERY 6 HOURS PRN
Status: DISCONTINUED | OUTPATIENT
Start: 2023-06-15 | End: 2023-06-21 | Stop reason: HOSPADM

## 2023-06-15 RX ORDER — DIPHENHYDRAMINE HYDROCHLORIDE 50 MG/ML
50 INJECTION INTRAMUSCULAR; INTRAVENOUS EVERY 6 HOURS PRN
Status: CANCELLED | OUTPATIENT
Start: 2023-06-15

## 2023-06-15 RX ORDER — ACETAMINOPHEN 325 MG/1
650 TABLET ORAL EVERY 6 HOURS PRN
Status: CANCELLED | OUTPATIENT
Start: 2023-06-15

## 2023-06-15 RX ORDER — BENZTROPINE MESYLATE 1 MG/ML
0.5 INJECTION INTRAMUSCULAR; INTRAVENOUS
Status: CANCELLED | OUTPATIENT
Start: 2023-06-15

## 2023-06-15 RX ORDER — HALOPERIDOL 5 MG/ML
2.5 INJECTION INTRAMUSCULAR
Status: DISCONTINUED | OUTPATIENT
Start: 2023-06-15 | End: 2023-06-21 | Stop reason: HOSPADM

## 2023-06-15 RX ORDER — BENZTROPINE MESYLATE 1 MG/ML
1 INJECTION INTRAMUSCULAR; INTRAVENOUS
Status: CANCELLED | OUTPATIENT
Start: 2023-06-15

## 2023-06-15 RX ORDER — HALOPERIDOL 5 MG/1
5 TABLET ORAL
Status: CANCELLED | OUTPATIENT
Start: 2023-06-15

## 2023-06-15 RX ORDER — HYDROXYZINE HYDROCHLORIDE 25 MG/1
50 TABLET, FILM COATED ORAL
Status: CANCELLED | OUTPATIENT
Start: 2023-06-15

## 2023-06-15 RX ORDER — FLUOXETINE HYDROCHLORIDE 20 MG/1
40 CAPSULE ORAL DAILY
Status: DISCONTINUED | OUTPATIENT
Start: 2023-06-16 | End: 2023-06-21 | Stop reason: HOSPADM

## 2023-06-15 RX ORDER — HALOPERIDOL 1 MG/1
1 TABLET ORAL EVERY 6 HOURS PRN
Status: DISCONTINUED | OUTPATIENT
Start: 2023-06-15 | End: 2023-06-21 | Stop reason: HOSPADM

## 2023-06-15 RX ORDER — HALOPERIDOL 5 MG/ML
5 INJECTION INTRAMUSCULAR
Status: DISCONTINUED | OUTPATIENT
Start: 2023-06-15 | End: 2023-06-21 | Stop reason: HOSPADM

## 2023-06-15 RX ORDER — ALBUTEROL SULFATE 90 UG/1
2 AEROSOL, METERED RESPIRATORY (INHALATION) EVERY 6 HOURS PRN
Status: DISCONTINUED | OUTPATIENT
Start: 2023-06-15 | End: 2023-06-21 | Stop reason: HOSPADM

## 2023-06-15 RX ORDER — LORAZEPAM 2 MG/ML
2 INJECTION INTRAMUSCULAR EVERY 6 HOURS PRN
Status: CANCELLED | OUTPATIENT
Start: 2023-06-15

## 2023-06-15 RX ORDER — BISACODYL 10 MG
10 SUPPOSITORY, RECTAL RECTAL DAILY PRN
Status: CANCELLED | OUTPATIENT
Start: 2023-06-15

## 2023-06-15 RX ORDER — BENZTROPINE MESYLATE 1 MG/1
1 TABLET ORAL
Status: CANCELLED | OUTPATIENT
Start: 2023-06-15

## 2023-06-15 RX ORDER — LANOLIN ALCOHOL/MO/W.PET/CERES
3 CREAM (GRAM) TOPICAL
Status: DISCONTINUED | OUTPATIENT
Start: 2023-06-15 | End: 2023-06-18

## 2023-06-15 RX ORDER — NICOTINE 21 MG/24HR
14 PATCH, TRANSDERMAL 24 HOURS TRANSDERMAL ONCE
Status: DISCONTINUED | OUTPATIENT
Start: 2023-06-15 | End: 2023-06-15 | Stop reason: HOSPADM

## 2023-06-15 RX ORDER — BENZTROPINE MESYLATE 1 MG/1
1 TABLET ORAL
Status: DISCONTINUED | OUTPATIENT
Start: 2023-06-15 | End: 2023-06-21 | Stop reason: HOSPADM

## 2023-06-15 RX ORDER — BENZTROPINE MESYLATE 1 MG/ML
1 INJECTION INTRAMUSCULAR; INTRAVENOUS
Status: DISCONTINUED | OUTPATIENT
Start: 2023-06-15 | End: 2023-06-21 | Stop reason: HOSPADM

## 2023-06-15 RX ORDER — ACETAMINOPHEN 325 MG/1
650 TABLET ORAL EVERY 4 HOURS PRN
Status: CANCELLED | OUTPATIENT
Start: 2023-06-15

## 2023-06-15 RX ORDER — HYDROXYZINE HYDROCHLORIDE 25 MG/1
25 TABLET, FILM COATED ORAL
Status: DISCONTINUED | OUTPATIENT
Start: 2023-06-15 | End: 2023-06-21 | Stop reason: HOSPADM

## 2023-06-15 RX ORDER — BISACODYL 10 MG
10 SUPPOSITORY, RECTAL RECTAL DAILY PRN
Status: DISCONTINUED | OUTPATIENT
Start: 2023-06-15 | End: 2023-06-16

## 2023-06-15 RX ORDER — BENZTROPINE MESYLATE 1 MG/ML
0.5 INJECTION INTRAMUSCULAR; INTRAVENOUS
Status: DISCONTINUED | OUTPATIENT
Start: 2023-06-15 | End: 2023-06-21 | Stop reason: HOSPADM

## 2023-06-15 RX ORDER — ACETAMINOPHEN 325 MG/1
650 TABLET ORAL ONCE
Status: COMPLETED | OUTPATIENT
Start: 2023-06-15 | End: 2023-06-15

## 2023-06-15 RX ORDER — ACETAMINOPHEN 325 MG/1
650 TABLET ORAL EVERY 4 HOURS PRN
Status: DISCONTINUED | OUTPATIENT
Start: 2023-06-15 | End: 2023-06-21 | Stop reason: HOSPADM

## 2023-06-15 RX ORDER — LORAZEPAM 2 MG/ML
1 INJECTION INTRAMUSCULAR
Status: DISCONTINUED | OUTPATIENT
Start: 2023-06-15 | End: 2023-06-21 | Stop reason: HOSPADM

## 2023-06-15 RX ORDER — AMOXICILLIN 250 MG
1 CAPSULE ORAL DAILY PRN
Status: CANCELLED | OUTPATIENT
Start: 2023-06-15

## 2023-06-15 RX ORDER — FLUOXETINE HYDROCHLORIDE 20 MG/1
40 CAPSULE ORAL DAILY
Status: CANCELLED | OUTPATIENT
Start: 2023-06-16

## 2023-06-15 RX ORDER — ACETAMINOPHEN 325 MG/1
650 TABLET ORAL EVERY 6 HOURS PRN
Status: DISCONTINUED | OUTPATIENT
Start: 2023-06-15 | End: 2023-06-21 | Stop reason: HOSPADM

## 2023-06-15 RX ADMIN — ONDANSETRON 4 MG: 2 INJECTION INTRAMUSCULAR; INTRAVENOUS at 13:04

## 2023-06-15 RX ADMIN — SODIUM CHLORIDE 1000 ML: 0.9 INJECTION, SOLUTION INTRAVENOUS at 04:45

## 2023-06-15 RX ADMIN — ACETAMINOPHEN 650 MG: 325 TABLET, FILM COATED ORAL at 13:35

## 2023-06-15 RX ADMIN — Medication 14 MG: at 14:45

## 2023-06-15 RX ADMIN — SODIUM CHLORIDE 1000 ML: 0.9 INJECTION, SOLUTION INTRAVENOUS at 05:30

## 2023-06-15 RX ADMIN — DIAZEPAM 5 MG: 10 INJECTION, SOLUTION INTRAMUSCULAR; INTRAVENOUS at 04:47

## 2023-06-15 NOTE — Clinical Note
Miller Pettit was seen and treated in our emergency department on 6/15/2023  Diagnosis:     Dang Andujar    She may return on this date:     Patient is currently admitted to our facility for treatment  If you have any questions or concerns, please don't hesitate to call        [id number] Maik Wagner PA-C    ______________________________           _______________          _______________  Hospital Representative                              Date                                Time Pt refused echo. MD notified.

## 2023-06-15 NOTE — ED NOTES
Patient shaking while sleeping, snoring occasionally, SpO2 98% room air        Edson Medley RN  06/15/23 4444

## 2023-06-15 NOTE — ED NOTES
PT agreed to allow mom to take the patients keys home with her at this time      Brennan DrakeNew [facility]  06/15/23 1075

## 2023-06-15 NOTE — ED NOTES
If pt allows the parents would like an update on the patients care      Jaycee Shannon RN  06/15/23 3663

## 2023-06-15 NOTE — ED NOTES
[facility] went to speak with patient about expanding her search as she is more anxious to leave  She stated that she will wait until tomorrow for a [facility] and if there is not a bed, she will be open to more beds in the surrounding areas

## 2023-06-15 NOTE — EMTALA/ACUTE CARE TRANSFER
[facility]  [address] [id number]  Dept: [phone]      EMTALA TRANSFER CONSENT    NAME Carolyn David                                         1990                              MRN [id number]    I have been informed of my rights regarding examination, treatment, and transfer   by Dr Shaniqua Barrera MD    Benefits: Specialized equipment and/or services available at the receiving facility (Include comment)________________________    Risks: Potential for delay in receiving treatment, Potential deterioration of medical condition, Increased discomfort during transfer, Possible worsening of condition or death during transfer      Consent for Transfer:  I acknowledge that my medical condition has been evaluated and explained to me by the emergency department physician or other qualified medical person and/or my attending physician, who has recommended that I be transferred to the service of  Accepting Physician: Dr Nevaeh Couch at [address] [place] [address] Name, [address] : [facility]  The above potential benefits of such transfer, the potential risks associated with such transfer, and the probable risks of not being transferred have been explained to me, and I fully understand them  The doctor has explained that, in my case, the benefits of transfer outweigh the risks  I agree to be transferred  I authorize the performance of emergency medical procedures and treatments upon me in both transit and upon arrival at the receiving facility  Additionally, I authorize the release of any and all medical records to the receiving facility and request they be transported with me, if possible  I understand that the safest mode of transportation during a medical emergency is an ambulance and that the Hospital advocates the use of this mode of transport   Risks of traveling to the receiving facility by car, including absence of medical control, life sustaining equipment, such as oxygen, and medical personnel has been explained to me and I fully understand them  (LORRAINE CORRECT BOX BELOW)  [  ]  I consent to the stated transfer and to be transported by ambulance/helicopter  [  ]  I consent to the stated transfer, but refuse transportation by ambulance and accept full responsibility for my transportation by car  I understand the risks of non-ambulance transfers and I exonerate the Hospital and its staff from any deterioration in my condition that results from this refusal     X___________________________________________    DATE  06/15/23  TIME________  Signature of patient or legally responsible individual signing on patient behalf           RELATIONSHIP TO PATIENT_________________________          Provider Certification    NAME Malou Whiteside                                         1990                              MRN [id number]    A medical screening exam was performed on the above named patient  Based on the examination:    Condition Necessitating Transfer The primary encounter diagnosis was Intentional overdose, initial encounter ([facility] )  Diagnoses of Alcohol intoxication ([facility] ), Suicide attempt [facility]), and Urgency of urination were also pertinent to this visit      Patient Condition: The patient has been stabilized such that within reasonable medical probability, no material deterioration of the patient condition or the condition of the unborn child(alexander) is likely to result from the transfer    Reason for Transfer: Level of Care needed not available at this facility    Transfer Requirements: [address]   · Space available and qualified personnel available for treatment as acknowledged by    · Agreed to accept transfer and to provide appropriate medical treatment as acknowledged by       Dr Jayme Floyd  · Appropriate medical records of the examination and treatment of the patient are provided at the time of transfer   STAFF INITIAL WHEN COMPLETED _______  · Transfer will be performed by qualified personnel from    and appropriate transfer equipment as required, including the use of necessary and appropriate life support measures  Provider Certification: I have examined the patient and explained the following risks and benefits of being transferred/refusing transfer to the patient/family:  General risk, such as traffic hazards, adverse weather conditions, rough terrain or turbulence, possible failure of equipment (including vehicle or aircraft), or consequences of actions of persons outside the control of the transport personnel, Unanticipated needs of medical equipment and personnel during transport, Risk of worsening condition, The possibility of a transport vehicle being unavailable      Based on these reasonable risks and benefits to the patient and/or the unborn child(alexander), and based upon the information available at the time of the patient’s examination, I certify that the medical benefits reasonably to be expected from the provision of appropriate medical treatments at another medical facility outweigh the increasing risks, if any, to the individual’s medical condition, and in the case of labor to the unborn child, from effecting the transfer      X____________________________________________ DATE 06/15/23        TIME_______      ORIGINAL - SEND TO MEDICAL RECORDS   COPY - SEND WITH PATIENT DURING TRANSFER

## 2023-06-15 NOTE — ED NOTES
Insurance Authorization for admission:   Phone call placed to [facility]  Phone number: [phone]  Spoke to [place]  7 days approved  Level of care: Acute IB  Review on 6/21/23     Authorization # [id number]         [id number] Punch contact at [facility]
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
## 2023-06-15 NOTE — ED NOTES
Pt's sister in room  Pt gave the okayed to see visitor        Michele Andres, FREDRICK  06/15/23 1132

## 2023-06-15 NOTE — ED NOTES
Patient is accepted at [facility]  Patient is accepted by Dr Jordana Deal per Lillian Savery  Transportation is arranged with [employer]  Transportation is scheduled for TBD  Patient may go to the floor at anytime

## 2023-06-15 NOTE — ED CARE HANDOFF
Emergency Department Sign Out Note        Sign out and transfer of care from [facility]  See Separate Emergency Department note  The patient, Lauri Lo, was evaluated by the previous provider for intentional overdose      Workup Completed:  Results Reviewed     Procedure Component Value Units Date/Time    Basic metabolic panel [[id number]]  (Abnormal) Collected: 06/15/23 1729    Lab Status: Final result Specimen: Blood from Arm, Left Updated: 06/15/23 1802     Sodium 139 mmol/L      Potassium 3 7 mmol/L      Chloride 112 mmol/L      CO2 21 mmol/L      ANION GAP 6 mmol/L      BUN 5 mg/dL      Creatinine 0 60 mg/dL      Glucose 89 mg/dL      Calcium 8 5 mg/dL      eGFR 120 ml/min/1 73sq m     Narrative:      [place] guidelines for Chronic Kidney Disease (CKD):   •  Stage 1 with normal or high GFR (GFR > 90 mL/min/1 73 square meters)  •  Stage 2 Mild CKD (GFR = 60-89 mL/min/1 73 square meters)  •  Stage 3A Moderate CKD (GFR = 45-59 mL/min/1 73 square meters)  •  Stage 3B Moderate CKD (GFR = 30-44 mL/min/1 73 square meters)  •  Stage 4 Severe CKD (GFR = 15-29 mL/min/1 73 square meters)  •  Stage 5 End Stage CKD (GFR <15 mL/min/1 73 square meters)  Note: GFR calculation is accurate only with a steady state creatinine    Protime-INR [[id number]]  (Normal) Collected: 06/15/23 0713    Lab Status: Final result Specimen: Blood from Arm, Right Updated: 06/15/23 0750     Protime 13 3 seconds      INR 1 01    APTT [[id number]]  (Abnormal) Collected: 06/15/23 0713    Lab Status: Final result Specimen: Blood from Arm, Right Updated: 06/15/23 0750     PTT 19 seconds     Blood gas, venous [[id number]]  (Abnormal) Collected: 06/15/23 0558    Lab Status: Final result Specimen: Blood from Arm, Right Updated: 06/15/23 0620     pH, [place] 7 300     pCO2, [place] 35 2 mm Hg      pO2, [place] 71 9 mm Hg      HCO3, [place] 16 9 mmol/L      Base Excess, [place] -8 5 mmol/L      O2 Content, [place] 17 7 ml/dL      O2 HGB, VENOUS 87 9 %     Ethanol [[id number]]  (Abnormal) Collected: 06/15/23 0445    Lab Status: Final result Specimen: Blood from Arm, Left Updated: 06/15/23 0618     Ethanol Lvl 129 mg/dL     Rapid drug screen, urine [[id number]]  (Normal) Collected: 06/15/23 0529    Lab Status: Final result Specimen: Urine, Catheter Updated: 06/15/23 3830     Amph/Meth UR Negative     Barbiturate Ur Negative     Benzodiazepine Urine Negative     Cocaine Urine Negative     Methadone Urine Negative     Opiate Urine Negative     PCP Ur Negative     THC Urine Negative     Oxycodone Urine Negative    Narrative:      FOR MEDICAL PURPOSES ONLY  IF CONFIRMATION NEEDED PLEASE CONTACT THE LAB WITHIN 5 DAYS  Drug Screen Cutoff Levels:  AMPHETAMINE/METHAMPHETAMINES  1000 ng/mL  BARBITURATES     200 ng/mL  BENZODIAZEPINES     200 ng/mL  COCAINE      300 ng/mL  METHADONE      300 ng/mL  OPIATES      300 ng/mL  PHENCYCLIDINE     25 ng/mL  THC       50 ng/mL  OXYCODONE      100 ng/mL    Urine Microscopic [[id number]]  (Abnormal) Collected: 06/15/23 0531    Lab Status: Final result Specimen: Urine, Indwelling Abreu Catheter Updated: 06/15/23 0600     RBC, UA 2-4 /hpf      WBC, UA None Seen /hpf      Epithelial Cells Occasional /hpf      Bacteria, UA None Seen /hpf      MUCUS THREADS Occasional     Hyaline Casts, UA 0-3 /lpf     Acetaminophen level-If concentration is detectable, please discuss with medical  on call  [[id number]]  (Abnormal) Collected: 06/15/23 0513    Lab Status: Final result Specimen: Blood from Arm, Right Updated: 06/15/23 0542     Acetaminophen Level <10 ug/mL     Narrative:      Slightly Hemolyzed:Results may be affected      Comprehensive metabolic panel [[id number]]  (Abnormal) Collected: 06/15/23 0513    Lab Status: Final result Specimen: Blood from Arm, Right Updated: 06/15/23 0542     Sodium 140 mmol/L      Potassium 3 8 mmol/L      Chloride 113 mmol/L      CO2 18 mmol/L      ANION GAP 9 mmol/L      BUN 10 mg/dL Creatinine 0 62 mg/dL      Glucose 103 mg/dL      Calcium 7 2 mg/dL      Corrected Calcium 7 7 mg/dL      AST 18 U/L      ALT 14 U/L      Alkaline Phosphatase 60 U/L      Total Protein 5 6 g/dL      Albumin 3 4 g/dL      Total Bilirubin 0 19 mg/dL      eGFR 119 ml/min/1 73sq m     Narrative:      Slightly Hemolyzed:Results may be affected  [place] guidelines for Chronic Kidney Disease (CKD):   •  Stage 1 with normal or high GFR (GFR > 90 mL/min/1 73 square meters)  •  Stage 2 Mild CKD (GFR = 60-89 mL/min/1 73 square meters)  •  Stage 3A Moderate CKD (GFR = 45-59 mL/min/1 73 square meters)  •  Stage 3B Moderate CKD (GFR = 30-44 mL/min/1 73 square meters)  •  Stage 4 Severe CKD (GFR = 15-29 mL/min/1 73 square meters)  •  Stage 5 End Stage CKD (GFR <15 mL/min/1 73 square meters)  Note: GFR calculation is accurate only with a steady state creatinine    Magnesium [[id number]]  (Normal) Collected: 06/15/23 0513    Lab Status: Final result Specimen: Blood from Arm, Right Updated: 06/15/23 0542     Magnesium 1 9 mg/dL     Narrative:      Slightly Hemolyzed:Results may be affected  Salicylate level [[id number]]  (Normal) Collected: 06/15/23 0513    Lab Status: Final result Specimen: Blood from Arm, Right Updated: 41/87/54 6673     Salicylate Lvl <5 mg/dL     Narrative:      Slightly Hemolyzed:Results may be affected      TSH, 3rd generation with Free T4 reflex [[id number]]  (Normal) Collected: 06/15/23 0445    Lab Status: Final result Specimen: Blood from Arm, Left Updated: 06/15/23 0535     TSH 3RD GENERATON 1 928 uIU/mL     hCG, qualitative pregnancy [[id number]]  (Normal) Collected: 06/15/23 0445    Lab Status: Final result Specimen: Blood from Arm, Left Updated: 06/15/23 0535     Preg, Serum Negative    Urine Macroscopic, POC [[id number]]  (Abnormal) Collected: 06/15/23 0531    Lab Status: Final result Specimen: Urine Updated: 06/15/23 0533     Color, UA Yellow     Clarity, UA Clear     pH, UA 6 0     Leukocytes, UA Negative     Nitrite, UA Negative     Protein, UA Negative mg/dl      Glucose, UA Negative mg/dl      Ketones, UA Trace mg/dl      Urobilinogen, UA 0 2 E U /dl      Bilirubin, UA Negative     Occult Blood, UA Trace     Specific [place], UA 1 015    Narrative:      CLINITEK RESULT    CBC and differential [[id number]] Collected: 06/15/23 0445    Lab Status: Final result Specimen: Blood from Arm, Left Updated: 06/15/23 0500     WBC 8 51 Thousand/uL      RBC 4 59 Million/uL      Hemoglobin 15 0 g/dL      Hematocrit 43 8 %      MCV 95 fL      MCH 32 7 pg      MCHC 34 2 g/dL      RDW 12 3 %      MPV 10 9 fL      Platelets 237 Thousands/uL      nRBC 0 /100 WBCs      Neutrophils Relative 60 %      Immat GRANS % 0 %      Lymphocytes Relative 28 %      Monocytes Relative 8 %      Eosinophils Relative 3 %      Basophils Relative 1 %      Neutrophils Absolute 5 13 Thousands/µL      Immature Grans Absolute 0 03 Thousand/uL      Lymphocytes Absolute 2 40 Thousands/µL      Monocytes Absolute 0 65 Thousand/µL      Eosinophils Absolute 0 22 Thousand/µL      Basophils Absolute 0 08 Thousands/µL     Fingerstick Glucose (POCT) [[id number]]  (Normal) Collected: 06/15/23 0448    Lab Status: Final result Updated: 06/15/23 0449     POC Glucose 105 mg/dl         No orders to display         ED Course / Workup Pending (followup): Patient was seen last night by overnight PA for intentional overdose with possibly naproxen/Robaxin or both  Patient was brought into the ER and toxicology was consulted  They recommended a 6-hour observation  Patient was medically cleared  She signed a 201 and was awaiting placement during time of signout and transfer of care to me  Discussed case with psychiatrist from [facility] Dr Judd Mast who is concerned that patient's kidney function may worsen overnight due to her overdose    He is inquiring whether patient will need to be admitted to the medicine floor to have her kidney function rechecked  I discussed this concern with  Dr Ritchie Hodges who does not feel that this is the case  Dr Ritchie Hodges reports that considering patient's initial creatinine was normal and she is not acidotic she is medically cleared  She does note that patient may have a second BMP for reassurance  Discussed this with Dr Stephanie Bob who is agreeable to repeat BMP in the ER  Repeat BMP in the ER reveals creatinine of 0 60  Relayed this information to Dr Stephanie Bob via TT  Patient was not excepted at [address] [place] [address]  EMTALA was signed  Patient was transferred to [address] [place] [address] and accepted by Dr Arby Osler  Patient has remained stable while under my care in the ER and is stable during time of transfer  ED Course as of 06/15/23 2254   Thu Karlo 15, 2023   1512 S/o from [address] [place] [address]  Patient presented today with intentional overdose of either naproxen, Robaxin, or both  Patient medically cleared by tox  Signed 201 and is awaiting placement  200 Dr Keturah Hooker Discussed case with psychiatrist from [address] [place] [address] Dr Stephanie Bob who is concerned that patient's kidney function may worsen overnight due to her overdose  He is inquiring whether patient will need to be admitted to the medicine floor to have her kidney function rechecked  I discussed this concern with  Dr Ritchie Hodges who does not feel that this is the case  Dr Ritchie Hodges reports that considering patient's initial creatinine was normal and she is not acidotic she is medically cleared  She does note that patient may have a second BMP for reassurance  Discussed this with Dr Stephanie Bob who is agreeable to repeat BMP in the ER  Repeat BMP in the ER reveals creatinine of 0 60  Relayed this information to Dr Stephanie Bob via TT   1939 Patient is accepted at [address] [place] [address] by Dr Arby Osler       Procedures  [facility]        Disposition  Final diagnoses:   Alcohol intoxication ([facility] [address] )   Suicide attempt [facility])   Intentional overdose, initial encounter [facility])     Time reflects when diagnosis was documented in both MDM as applicable and the Disposition within this note     Time User Action Codes Description Comment    6/15/2023  6:49 AM Amrita Frankel Add [T50 902A] Intentional drug overdose ([facility] )     6/15/2023  6:49 AM Amrita Frankel Add [F10 929] Alcohol intoxication ([facility] )     6/15/2023  6:49 AM Amrita Frankel Add [T14 91XA] Suicide attempt ([place] [address] )     6/15/2023  9:43 AM Taina Liming A Add [T50 902A] Intentional overdose, initial encounter ([place] [address] )     6/15/2023 11:20 AM Taina Liming A Modify [F10 929] Alcohol intoxication ([place] [address] )     6/15/2023 11:20 AM Taina Liming A Remove [T50 902A] Intentional drug overdose ([place] [address] )     6/15/2023 11:20 AM Taina Liming A Modify [F10 929] Alcohol intoxication ([place] [address] )     6/15/2023 11:20 AM Taina Liming A Modify [T50 902A] Intentional overdose, initial encounter ([place] [address] )     6/15/2023  7:21 PM Guicho Mcmanus Add [R39 15] Urgency of urination       ED Disposition     ED Disposition   Transfer to [address] [place] [address]   --    Date/Time   Thu Karlo 15, 2023 11:21 AM    Comment   Shira Robbins should be transferred out to behavioral health and has been medically cleared             MD Documentation    Shivani Sandoval Most Recent Value   Patient Condition The patient has been stabilized such that within reasonable medical probability, no material deterioration of the patient condition or the condition of the unborn child(alexander) is likely to result from the transfer   Reason for Transfer Level of Care needed not available at this facility   Benefits of Transfer Specialized equipment and/or services available at the receiving facility (Include comment)________________________   Risks of Transfer Potential for delay in receiving treatment, Potential deterioration of medical condition, Increased discomfort during transfer, Possible worsening of condition or death during transfer   Accepting Physician Dr Alondra Simons   [facility]ing [facility] Name, [facility]   Sending MD Dr Marcos Kenny PA-C   Provider Certification General risk, such as traffic hazards, adverse weather conditions, rough terrain or turbulence, possible failure of equipment (including vehicle or aircraft), or consequences of actions of persons outside the control of the transport personnel, Unanticipated needs of medical equipment and personnel during transport, Risk of worsening condition, The possibility of a transport vehicle being unavailable      RN Documentation    [address] Jolanta Song Name, [facility]      Follow-up Information    None       Discharge Medication List as of 6/15/2023  8:28 PM      CONTINUE these medications which have NOT CHANGED    Details   albuterol (Ventolin HFA) 90 mcg/act inhaler Inhale 2 puffs every 6 (six) hours as needed for wheezing, Starting Fri 12/3/2021, Normal      FLUoxetine (PROzac) 40 MG capsule Take 1 capsule (40 mg total) by mouth daily, Starting Mon 4/24/2023, Normal      !! medroxyPROGESTERone (DEPO-PROVERA) 150 mg/mL injection Inject 1 mL (150 mg total) into a muscle every 3 (three) months, Starting Thu 7/15/2021, Normal      !! medroxyPROGESTERone (DEPO-PROVERA) 150 mg/mL injection Inject 1 mL (150 mg total) into a muscle every 3 (three) months, Starting Wed 10/19/2022, Normal       !! - Potential duplicate medications found  Please discuss with provider  No discharge procedures on file         ED Provider  Electronically Signed by     Leighann Deluna PA-C  06/15/23 4059

## 2023-06-15 NOTE — ED PROVIDER NOTES
"History  Chief Complaint   Patient presents with   • Overdose - Intentional     Arrives EMS, patient reports taking \"whole bottle of robaxin\" in attempt to go to sleep and not wake up  Per EMS, original dispatch was for naproxen overdose after patient made post online  Per Ems patient inconsistent with story  Vomiting yellow bile on EMS arrival  Patient reports her anxiety is bad today  Patient reports having one beer earlier tonight  Patient nodding off in triage, patient tells this RN she took \"like 40\" naproxen  The patient is a 28-year-old female with history of depression, asthma, MS, seizure who presents to the ED for evaluation following reported purposeful overdose  The patient reportedly made a post online alluding to overdosing/suicide  Someone who saw the post call 911, and EMS arrived to find the patient vomiting  She had reported to EMS that she was anxious, and admitted to drinking 1 beer  The patient is a poor historian due to altered mental status  The patient agrees to have taken Robaxin and Naprosyn, though is inconsistent with her replies  Patient does have a bottle of Robaxin, 60 pills, from 2020 which is empty which EMS reports they found on scene  Prior to Admission Medications   Prescriptions Last Dose Informant Patient Reported? Taking?    FLUoxetine (PROzac) 40 MG capsule   No No   Sig: Take 1 capsule (40 mg total) by mouth daily   albuterol (Ventolin HFA) 90 mcg/act inhaler   No No   Sig: Inhale 2 puffs every 6 (six) hours as needed for wheezing   medroxyPROGESTERone (DEPO-PROVERA) 150 mg/mL injection   No No   Sig: Inject 1 mL (150 mg total) into a muscle every 3 (three) months   medroxyPROGESTERone (DEPO-PROVERA) 150 mg/mL injection   No No   Sig: Inject 1 mL (150 mg total) into a muscle every 3 (three) months      Facility-Administered Medications Last Administration Doses Remaining   medroxyPROGESTERone (DEPO-PROVERA) IM injection 150 mg 4/11/2023  4:15 PM     " Past Medical History:   Diagnosis Date   • Asthma     Albuterol prn   • Body mass index (BMI) less than 19 in adult 12/2/2019   • Depression     hasn't required treatment since 2018   • Multiple sclerosis ([facility] [address] ) 2018    follows poorly with neurology, manages with Robaxin   • Seizure ([facility] [address] )     possibly r/t MS diagnosis, has never been on meds, convulsive, last episode 9/2020       Past Surgical History:   Procedure Laterality Date   • CLOSED REDUCTION FINGER FRACTURE Left    • FL LUMBAR PUNCTURE DIAGNOSTIC  12/5/2019   • TONSILLECTOMY Bilateral 4405   • UMBILICAL HERNIA REPAIR  2016       Family History   Problem Relation Age of Onset   • Depression Mother    • Cancer Mother         thyroid   • Diabetes Father    • Hypertension Father    • Hypertension Paternal Grandfather         TYPE 2   • Diabetes Paternal Grandfather    • Cancer Paternal Grandfather         pancreatic, lung   • Coronary artery disease Paternal Grandmother    • Drug abuse Cousin    • Breast cancer Neg Hx      I have reviewed and agree with the history as documented  E-Cigarette/Vaping   • E-Cigarette Use Never User      E-Cigarette/Vaping Substances     Social History     Tobacco Use   • Smoking status: Every Day     Packs/day: 0 25     Years: 9 00     Total pack years: 2 25     Types: Cigarettes   • Smokeless tobacco: Current   Vaping Use   • Vaping Use: Never used   Substance Use Topics   • Alcohol use: Not Currently     Comment: socially   • Drug use: Not Currently     Types: Marijuana, Cocaine, MDMA (ecstacy)       Review of Systems   Unable to perform ROS: Mental status change       Physical Exam  Physical Exam  Vitals and nursing note reviewed  Constitutional:       General: She is not in acute distress  Appearance: She is well-developed  She is ill-appearing  She is not toxic-appearing  HENT:      Head: Normocephalic and atraumatic        Mouth/Throat:      Mouth: Mucous membranes are moist    Eyes:      Extraocular Movements: Extraocular movements intact  Conjunctiva/sclera: Conjunctivae normal       Pupils: Pupils are equal, round, and reactive to light  Cardiovascular:      Rate and Rhythm: Regular rhythm  Tachycardia present  Heart sounds: No murmur heard  Pulmonary:      Effort: Pulmonary effort is normal  No respiratory distress  Breath sounds: Normal breath sounds  Abdominal:      Palpations: Abdomen is soft  Tenderness: There is no abdominal tenderness  There is no guarding or rebound  Musculoskeletal:         General: No swelling  Normal range of motion  Cervical back: Neck supple  No rigidity  Skin:     General: Skin is warm and dry  Capillary Refill: Capillary refill takes less than 2 seconds  Findings: Bruising (healing 1 cm bruise to RLE) present  Neurological:      Mental Status: She is lethargic  GCS: GCS eye subscore is 3  GCS verbal subscore is 2  GCS motor subscore is 6  Cranial Nerves: No facial asymmetry  Comments: Moves extremities equally   Protecting airway   Psychiatric:         Mood and Affect: Mood normal          Vital Signs  ED Triage Vitals   Temperature Pulse Respirations Blood Pressure SpO2   06/15/23 0434 06/15/23 0429 06/15/23 0429 06/15/23 0429 06/15/23 0500   98 7 °F (37 1 °C) (!) 139 18 127/93 98 %      Temp Source Heart Rate Source Patient Position - Orthostatic VS BP Location FiO2 (%)   06/15/23 0434 06/15/23 0429 06/15/23 0429 06/15/23 0429 --   Oral Monitor Lying Right arm       Pain Score       --                  Vitals:    06/15/23 0615 06/15/23 0622 06/15/23 0630 06/15/23 0641   BP: 119/68 116/69 118/71 111/69   Pulse: 105 (!) 106 (!) 116 (!) 109   Patient Position - Orthostatic VS: Lying  Lying Lying         Visual Acuity      ED Medications  Medications   diazepam (VALIUM) injection 5 mg (5 mg Intravenous Given 6/15/23 0447)   sodium chloride 0 9 % bolus 1,000 mL (0 mL Intravenous Stopped 6/15/23 0515)   sodium chloride 0 9 % bolus 1,000 mL (0 mL Intravenous Stopped 6/15/23 0552)       Diagnostic Studies  Results Reviewed     Procedure Component Value Units Date/Time    Protime-INR [[id number]]     Lab Status: No result Specimen: Blood     APTT [[id number]]     Lab Status: No result Specimen: Blood     Blood gas, venous [[id number]]  (Abnormal) Collected: 06/15/23 0558    Lab Status: Final result Specimen: Blood from Arm, Right Updated: 06/15/23 0620     pH, [place] 7 300     pCO2, [place] 35 2 mm Hg      pO2, [place] 71 9 mm Hg      HCO3, [place] 16 9 mmol/L      Base Excess, [place] -8 5 mmol/L      O2 Content, [place] 17 7 ml/dL      O2 HGB, VENOUS 87 9 %     Ethanol [[id number]]  (Abnormal) Collected: 06/15/23 0445    Lab Status: Final result Specimen: Blood from Arm, Left Updated: 06/15/23 0618     Ethanol Lvl 129 mg/dL     Rapid drug screen, urine [[id number]]  (Normal) Collected: 06/15/23 0529    Lab Status: Final result Specimen: Urine, Catheter Updated: 06/15/23 0807     Amph/Meth UR Negative     Barbiturate Ur Negative     Benzodiazepine Urine Negative     Cocaine Urine Negative     Methadone Urine Negative     Opiate Urine Negative     PCP Ur Negative     THC Urine Negative     Oxycodone Urine Negative    Narrative:      FOR MEDICAL PURPOSES ONLY  IF CONFIRMATION NEEDED PLEASE CONTACT THE LAB WITHIN 5 DAYS      Drug Screen Cutoff Levels:  AMPHETAMINE/METHAMPHETAMINES  1000 ng/mL  BARBITURATES     200 ng/mL  BENZODIAZEPINES     200 ng/mL  COCAINE      300 ng/mL  METHADONE      300 ng/mL  OPIATES      300 ng/mL  PHENCYCLIDINE     25 ng/mL  THC       50 ng/mL  OXYCODONE      100 ng/mL    Urine Microscopic [[id number]]  (Abnormal) Collected: 06/15/23 0531    Lab Status: Final result Specimen: Urine, Indwelling Abreu Catheter Updated: 06/15/23 0600     RBC, UA 2-4 /hpf      WBC, UA None Seen /hpf      Epithelial Cells Occasional /hpf      Bacteria, UA None Seen /hpf      MUCUS THREADS Occasional     Hyaline Casts, UA 0-3 /lpf     Acetaminophen level-If concentration is detectable, please discuss with medical  on call  [[id number]]  (Abnormal) Collected: 06/15/23 0513    Lab Status: Final result Specimen: Blood from Arm, Right Updated: 06/15/23 0542     Acetaminophen Level <10 ug/mL     Narrative:      Slightly Hemolyzed:Results may be affected  Comprehensive metabolic panel [[id number]]  (Abnormal) Collected: 06/15/23 0513    Lab Status: Final result Specimen: Blood from Arm, Right Updated: 06/15/23 0542     Sodium 140 mmol/L      Potassium 3 8 mmol/L      Chloride 113 mmol/L      CO2 18 mmol/L      ANION GAP 9 mmol/L      BUN 10 mg/dL      Creatinine 0 62 mg/dL      Glucose 103 mg/dL      Calcium 7 2 mg/dL      Corrected Calcium 7 7 mg/dL      AST 18 U/L      ALT 14 U/L      Alkaline Phosphatase 60 U/L      Total Protein 5 6 g/dL      Albumin 3 4 g/dL      Total Bilirubin 0 19 mg/dL      eGFR 119 ml/min/1 73sq m     Narrative:      Slightly Hemolyzed:Results may be affected  [place] guidelines for Chronic Kidney Disease (CKD):   •  Stage 1 with normal or high GFR (GFR > 90 mL/min/1 73 square meters)  •  Stage 2 Mild CKD (GFR = 60-89 mL/min/1 73 square meters)  •  Stage 3A Moderate CKD (GFR = 45-59 mL/min/1 73 square meters)  •  Stage 3B Moderate CKD (GFR = 30-44 mL/min/1 73 square meters)  •  Stage 4 Severe CKD (GFR = 15-29 mL/min/1 73 square meters)  •  Stage 5 End Stage CKD (GFR <15 mL/min/1 73 square meters)  Note: GFR calculation is accurate only with a steady state creatinine    Magnesium [[id number]]  (Normal) Collected: 06/15/23 0513    Lab Status: Final result Specimen: Blood from Arm, Right Updated: 06/15/23 0542     Magnesium 1 9 mg/dL     Narrative:      Slightly Hemolyzed:Results may be affected      Salicylate level [[id number]]  (Normal) Collected: 06/15/23 0513    Lab Status: Final result Specimen: Blood from Arm, Right Updated: 51/10/27 0234     Salicylate Lvl <5 mg/dL     Narrative:      Slightly Hemolyzed:Results may be affected      TSH, 3rd generation with Free T4 reflex [[id number]]  (Normal) Collected: 06/15/23 0445    Lab Status: Final result Specimen: Blood from Arm, Left Updated: 06/15/23 0535     TSH 3RD GENERATON 1 928 uIU/mL     hCG, qualitative pregnancy [[id number]]  (Normal) Collected: 06/15/23 0445    Lab Status: Final result Specimen: Blood from Arm, Left Updated: 06/15/23 0535     Preg, Serum Negative    Urine Macroscopic, POC [[id number]]  (Abnormal) Collected: 06/15/23 0531    Lab Status: Final result Specimen: Urine Updated: 06/15/23 0533     Color, UA Yellow     Clarity, UA Clear     pH, UA 6 0     Leukocytes, UA Negative     Nitrite, UA Negative     Protein, UA Negative mg/dl      Glucose, UA Negative mg/dl      Ketones, UA Trace mg/dl      Urobilinogen, UA 0 2 E U /dl      Bilirubin, UA Negative     Occult Blood, UA Trace     Specific [place], UA 1 015    Narrative:      CLINITEK RESULT    CBC and differential [[id number]] Collected: 06/15/23 0445    Lab Status: Final result Specimen: Blood from Arm, Left Updated: 06/15/23 0500     WBC 8 51 Thousand/uL      RBC 4 59 Million/uL      Hemoglobin 15 0 g/dL      Hematocrit 43 8 %      MCV 95 fL      MCH 32 7 pg      MCHC 34 2 g/dL      RDW 12 3 %      MPV 10 9 fL      Platelets 470 Thousands/uL      nRBC 0 /100 WBCs      Neutrophils Relative 60 %      Immat GRANS % 0 %      Lymphocytes Relative 28 %      Monocytes Relative 8 %      Eosinophils Relative 3 %      Basophils Relative 1 %      Neutrophils Absolute 5 13 Thousands/µL      Immature Grans Absolute 0 03 Thousand/uL      Lymphocytes Absolute 2 40 Thousands/µL      Monocytes Absolute 0 65 Thousand/µL      Eosinophils Absolute 0 22 Thousand/µL      Basophils Absolute 0 08 Thousands/µL     Fingerstick Glucose (POCT) [[id number]]  (Normal) Collected: 06/15/23 0448    Lab Status: Final result Updated: 06/15/23 0449     POC Glucose 105 mg/dl                  No orders to display Procedures  Procedures         ED Course  ED Course as of 06/15/23 0649   Thu Karlo 15, 2023   0445 EKG: ST at 132 BPM, normal axis, incomplete RBBB, Qtc 426, no acute ischemic changes as interpreted by me    0512 POC Glucose: 105   0512 Hemoglobin: 15 0   0559 Anion Gap: 9       Medical Decision Making  DDx including but not limited to: intentional overdose, accidental overdose, metabolic abnormality, depression, suicide attempt, substance abuse, aspiration pneumonitis  Will obtain CBC, CMP, urine drug screen, coma panel,TSH, EKG, urine pregnancy, magnesium    Labs grossly unremarkable  Patient's vital signs have improved  She remains in no acute distress, continues to maintain airway  Is somewhat more responsive on reexamination  Care turned over to [facility] pending coags, medical clearance and crisis evaluation  Patient will require psychiatric admission      Alcohol intoxication [facility]): acute illness or injury  Intentional drug overdose [facility]): acute illness or injury  Suicide attempt [facility]): acute illness or injury  Amount and/or Complexity of Data Reviewed  External Data Reviewed: labs and notes  Labs: ordered  Decision-making details documented in ED Course  Risk  Prescription drug management            Disposition  Final diagnoses:   Intentional drug overdose ([facility] )   Alcohol intoxication ([facility] )   Suicide attempt [facility])     Time reflects when diagnosis was documented in both MDM as applicable and the Disposition within this note     Time User Action Codes Description Comment    6/15/2023  6:49 AM Horton Bhutanese Add [T50 902A] Intentional drug overdose ([facility] [address] )     6/15/2023  6:49 AM Horton Bhutanese Add [F10 929] Alcohol intoxication ([facility] [address] )     6/15/2023  6:49 AM Horton Bhutanese Add [place] Suicide attempt [facility])       ED Disposition     None      Follow-up Information    None         Patient's Medications   Discharge Prescriptions    No medications on file       No discharge procedures on file     PDMP Review       Value Time User    PDMP Reviewed  Yes 10/16/2020 11:39 PM Lex Redding DO          ED Provider  Electronically Signed by           Harjit Gamez PA-C  06/15/23 9564
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
## 2023-06-15 NOTE — ED NOTES
Pt keeps making statements on how she was to just wants  go to sleep  When RN asked if she wants to go to sleep and not wake up the patient shook her head yes        Carlos Still, RN  06/15/23 5407

## 2023-06-15 NOTE — CONSULTS
INTERPROFESSIONAL (PHONE) [facility]  Martha Cruz 28 y o  female MRN: [id number]  Unit/Bed#: ED-03 Encounter: [id number]       Reason for Consult / Principal Problem: Ingestion    Inpatient consult to Toxicology  Consult performed by: Elgin Aquino MD  Consult ordered by: Susan العلي PA-C        06/15/23    ASSESSMENT:  methocarbamol ingestion  NSAID ingestion  Acute alcohol intoxication    RECOMMENDATIONS:  Continue supportive care measures, including airway monitoring, head of bed elevation, aspiration and seizure precautions, cardiac telemetry, and continuous pulse oximetry  Methocarbamol's mechanism of action is not well-defined, but toxicity is usually limited to CNS depression  Treatment is supportive as above  Effects are likely compounded by other CNS depressants  NSAIDs (ibuprofen, naproxen) can cause nausea, vomiting, and abdominal pain (gastritis) but can also cause seizure, coma, metabolic acidosis and MAYA in severe toxicity  Treatment is supportive including IVF hydration and other supportive measures as above  Patient should be observed for at least 6 hours post ingestion to ensure mental status returns to baseline  If patient demonstrates normal vital signs, physical exam, and ambulation and is without continued symptoms, patient is appropriate for psychiatric disposition from a toxicology perspective  For further questions, please contact the medical  on call via [place] [employer] or throughl the [facility]  Service or Patient [employer]       Please see additional teaching note below:    Discussion: Ibuprofen and other NSAIDs   Medical Toxicology   [address]      Common agents: Ibuprofen, naproxen, ketorolac, diclofenac, indomethacin, meloxicam, celecoxib     Background and mechanism of toxicity: Nonsteroidal anti-inflammatory drugs (NSAIDs) are commonly utilized for their analgesic, anti-inflammatory, and anti-pyretic properties  Their effects are mediated by inhibition of the cyclooxygenase enzymes (SABA-1 and SABA-2) leading to decreased prostaglandin production and therefore decreased pain and inflammation  Prostaglandins also maintain GI mucosal integrity and regulate renal blood flow, so acute or chronic exposure may affect these organ systems  NSAIDs also inhibit thromboxane A2, thereby affecting platelets, too  Clinical presentation: Most NSAID overdoses are asymptomatic or present with mild GI symptoms including nausea, vomiting, and abdominal pain  With massive overdose, seizures, coma/CNS depression, metabolic acidosis, and acute renal failure are possible  Electrolyte derangements (hypokalemia, hyponatremia), hypotension, and dysrhythmias have been reported in addition to apnea in pediatric patients  Cardiopulmonary arrest is rare  Diagnosis: Clinical based on history; CBC, CMP, and co-ingestion levels recommended in addition to EKG     Treatment: Supportive care is the mainstay of treatment with airway monitoring, head of bed elevation, aspiration precautions, and cardiac monitoring  Activated charcoal can be given if the patient presents one hour after ingestion and does not exhibit contraindications to its administration  IVF hydration can be used as needed for hypotension and MAYA  Treat seizures as needed with benzodiazepines  NSAIDs are highly protein-bound and unlikely to have meaningful clearance via hemodialysis  References:    MARCUS Garcia  Nonsteroidal anti-inflammatory drugs  In: Tino Mcburney  Poisoning & Drug Overdose  7th ed  [facility] Education; 2018: [phone]     Hx and PE limited by the dynamics of a phone consultation  I have not personally interviewed or evaluated the patient, but only advised based on the information provided to me  Primary provider is responsible for all clinical decisions       Pertinent history, physical exam and clinical findings and course discussed: Enriqueta Conde is a 28y o  year old female who presents with intentional ingestion of methocarbamol, ibuprofen, and/or naproxen in the setting of acute alcohol intoxication  Patient continues to be somnolent but arouses to voice  Received 2L NS and a dose of diazepam     Review of systems and physical exam not performed by me  Historical Information   Past Medical History:   Diagnosis Date   • Asthma     Albuterol prn   • Body mass index (BMI) less than 19 in adult 12/2/2019   • Depression     hasn't required treatment since 2018   • Multiple sclerosis ([facility] [address] [facility] 2018    follows poorly with neurology, manages with Robaxin   • Seizure ([facility] [address] )     possibly r/t MS diagnosis, has never been on meds, convulsive, last episode 9/2020     Past Surgical History:   Procedure Laterality Date   • CLOSED REDUCTION FINGER FRACTURE Left    • FL LUMBAR PUNCTURE DIAGNOSTIC  12/5/2019   • TONSILLECTOMY Bilateral 7620   • UMBILICAL HERNIA REPAIR  2016     Social History   Social History     Substance and Sexual Activity   Alcohol Use Not Currently    Comment: socially     Social History     Substance and Sexual Activity   Drug Use Not Currently   • Types: Marijuana, Cocaine, MDMA (ecstacy)     Social History     Tobacco Use   Smoking Status Every Day   • Packs/day: 0 25   • Years: 9 00   • Total pack years: 2 25   • Types: Cigarettes   Smokeless Tobacco Current     Family History   Problem Relation Age of Onset   • Depression Mother    • Cancer Mother         thyroid   • Diabetes Father    • Hypertension Father    • Hypertension Paternal Grandfather         TYPE 2   • Diabetes Paternal Grandfather    • Cancer Paternal Grandfather         pancreatic, lung   • Coronary artery disease Paternal Grandmother    • Drug abuse Cousin    • Breast cancer Neg Hx         Prior to Admission medications    Medication Sig Start Date End Date Taking?  Authorizing Provider   albuterol (Ventolin HFA) 90 mcg/act inhaler Inhale 2 puffs every 6 (six) hours as needed for wheezing 12/3/21   Davy Mohr MD   FLUoxetine (PROzac) 40 MG capsule Take 1 capsule (40 mg total) by mouth daily 4/24/23   DANIEL Childress   medroxyPROGESTERone (DEPO-PROVERA) 150 mg/mL injection Inject 1 mL (150 mg total) into a muscle every 3 (three) months 7/15/21   DANIEL Flaherty   medroxyPROGESTERone (DEPO-PROVERA) 150 mg/mL injection Inject 1 mL (150 mg total) into a muscle every 3 (three) months 10/19/22   Yoseph Glover MD       Current Facility-Administered Medications   Medication Dose Route Frequency   • medroxyPROGESTERone (DEPO-PROVERA) IM injection 150 mg  150 mg Intramuscular Q3 Months       Allergies   Allergen Reactions   • Clindamycin Throat Swelling   • Onion - Food Allergy Anaphylaxis and Swelling     Swelling of throat  • Gabapentin Diarrhea, Hives and Itching   • Sumatriptan Swelling       Objective       Intake/Output Summary (Last 24 hours) at 6/15/2023 1030  Last data filed at 6/15/2023 0552  Gross per 24 hour   Intake 2000 ml   Output 50 ml   Net 1950 ml       Invasive Devices:   Peripheral IV 06/15/23 Left Antecubital (Active)       Peripheral IV 06/15/23 Right Antecubital (Active)   Site Assessment WDL; Clean;Dry; Intact 06/15/23 0446   Dressing Type Transparent 06/15/23 0446   Line Status Blood return noted; Flushed 06/15/23 0446   Dressing Status Clean;Dry; Intact 06/15/23 0446       Urethral Catheter Latex; Temperature probe (Active)   Amt returned on insertion(mL) 50 mL 06/15/23 0551   Reasons to continue Urinary Catheter  Accurate I&O assessment in critically ill patients (48 hr  max) 06/15/23 0551   Site Assessment Clean;Skin intact; Patent 06/15/23 0551   Collection Container Standard drainage bag 06/15/23 0551   Securement Method Securing device (Describe) 06/15/23 0551       Vitals   Vitals:    06/15/23 0715 06/15/23 0800 06/15/23 0900 06/15/23 0930   BP: 114/71 101/61 99/59 111/61   TempSrc:       Pulse: 94 90 90 98   Resp: 17 18 18 18   Patient Position - Orthostatic VS: Lying Lying Lying Lying   Temp: 98 4 °F (36 9 °C)  98 8 °F (37 1 °C) 99 1 °F (37 3 °C)         EKG, Pathology, and/or Other Studies: I have personally reviewed pertinent reports  Lab Results: I have personally reviewed pertinent reports  Labs:    Results from last 7 days   Lab Units 06/15/23  0445   EOS PCT % 3   HEMATOCRIT % 43 8   HEMOGLOBIN g/dL 15 0   LYMPHS PCT % 28   MONOS PCT % 8   NEUTROS PCT % 60   PLATELETS Thousands/uL 341   WBC Thousand/uL 8 51      Results from last 7 days   Lab Units 06/15/23  0513   ALK PHOS U/L 60   ALT U/L 14   AST U/L 18   BUN mg/dL 10   CALCIUM mg/dL 7 2*   CHLORIDE mmol/L 113*   CO2 mmol/L 18*   CREATININE mg/dL 0 62   POTASSIUM mmol/L 3 8   MAGNESIUM mg/dL 1 9   SODIUM mmol/L 140      Results from last 7 days   Lab Units 06/15/23  0713   INR  1 01   PTT seconds 19*         0   Lab Value Date/Time    TROPONINI <0 01 01/09/2019 0959     Results from last 7 days   Lab Units 06/15/23  0558   HCO3 TIMMY mmol/L 16 9*   O2 CONTENT TIMMY ml/dL 17 7   PCO2 TIMMY mm Hg 35 2*   PH TIMMY  7 300   PO2 TIMMY mm Hg 71 9*     Results from last 7 days   Lab Units 06/15/23  0513 06/15/23  0445   ACETAMINOPHEN LVL ug/mL <10*  --    ETHANOL LVL mg/dL  --  129*     Invalid input(s):       Imaging Studies: I have personally reviewed pertinent reports  Counseling / Coordination of Care  Total time spent today 14 minutes  This was a phone consultation

## 2023-06-15 NOTE — ED CARE HANDOFF
Emergency Department Sign Out Note        Sign out and transfer of care from [facility]  See Separate Emergency Department note  The patient, Claudia Child, was evaluated by the previous provider for intentional overdose  Workup Completed:  VBG  Ethanol  UDS  Urine  Acetaminophen level  CMP  Magnesium  Salicylate level  TSH  HCG qualitative pregnancy  CBC  Fingerstick glucose  EKG    ED Course / Workup Pending (followup):    0631 Per sign out, intentional overdose of unknown medication  Waiting for observation until sober then can see Crisis  [address] text sent to Toxicology in regards to patient's case to see how long to observe given possible medications she may have taken  2411 Spoke with Dr Wojciech Acevedo  Will observe for 6 hours from when she came in     [phone] Patient seen by Crisis  201 signed    [phone] Patient reporting headache  She states this feels similar to headaches she has had in the past  She is also reporting nausea and vomiting which she attributes to taking the medication she took last night  Zofran given and patient now requesting to eat  Diet ordered  1518     Patient signed out to Yeison Galindo PA-C awaiting placement  Patient stable  ED Course as of 06/15/23 1518   Thu Karlo 15, 2023   3911 Per sign out, intentional overdose on unknown medication  Waiting for observation until sober then can see Crisis  [address] text sent to Toxicology in regards to patient's case  6659 Spoke with Dr Wojciech Acevedo  Will observe for 6 hours from when she came in    [phone] signed   [phone] Patient reporting headache  She states this feels similar to headaches she has had in the past  She is also reporting nausea and vomiting which she attributes to taking the medication she took last night  Zofran given and patient now requesting to eat  Diet ordered       Procedures  Medical Decision Making  Alcohol intoxication [facility]): acute illness or injury  Intentional overdose, initial encounter [facility]): acute illness or injury  Suicide attempt [facility]): acute illness or injury  Amount and/or Complexity of Data Reviewed  Independent Historian:      Details: Patient is historian  Labs: ordered  ECG/medicine tests: ordered and independent interpretation performed  Risk  OTC drugs  Prescription drug management  Decision regarding hospitalization  Disposition  Final diagnoses:   Alcohol intoxication ([place] [address] )   Suicide attempt [facility])   Intentional overdose, initial encounter [facility])     Time reflects when diagnosis was documented in both MDM as applicable and the Disposition within this note     Time User Action Codes Description Comment    6/15/2023  6:49 AM Vanessa Garb Add [T50 902A] Intentional drug overdose ([place] [address] )     6/15/2023  6:49 AM Vanessa Garb Add [F10 929] Alcohol intoxication ([place] [address] )     6/15/2023  6:49 AM Vanessa Garb Add [T14 91XA] Suicide attempt ([place] [address] )     6/15/2023  9:43 AM Glorine Brands A Add [T50 902A] Intentional overdose, initial encounter ([place] [address] )     6/15/2023 11:20 AM [employer] A Modify [F10 929] Alcohol intoxication ([place] [address] )     6/15/2023 11:20 AM [employer] A Remove [T50 902A] Intentional drug overdose ([place] [address] )     6/15/2023 11:20 AM [employer] A Modify [F10 929] Alcohol intoxication ([place] [address] )     6/15/2023 11:20 AM [employer] A Modify [T50 902A] Intentional overdose, initial encounter [facility])       ED Disposition     ED Disposition   Transfer to [facility]    Condition   --    Date/Time   u Karlo 15, 2023 11:21 AM    Comment   Nile Lucas should be transferred out to behavioral health and has been medically cleared  MD Documentation    [facility] Most Recent Value   Sending MD Dr Pk Chambers    None       Patient's Medications   Discharge Prescriptions    No medications on file     No discharge procedures on file         ED Provider  Electronically Signed by     Kate Tiwari KAROLINE  06/15/23 1513

## 2023-06-16 PROBLEM — Z00.8 MEDICAL CLEARANCE FOR PSYCHIATRIC ADMISSION: Status: ACTIVE | Noted: 2023-06-16

## 2023-06-16 PROBLEM — F33.2 SEVERE EPISODE OF RECURRENT MAJOR DEPRESSIVE DISORDER, WITHOUT PSYCHOTIC FEATURES (HCC): Status: ACTIVE | Noted: 2023-06-16

## 2023-06-16 PROBLEM — Z72.0 TOBACCO USE: Status: ACTIVE | Noted: 2023-06-16

## 2023-06-16 PROBLEM — E87.6 HYPOKALEMIA: Status: ACTIVE | Noted: 2023-06-16

## 2023-06-16 LAB
25(OH)D3 SERPL-MCNC: 8.4 NG/ML (ref 30–100)
ALBUMIN SERPL BCP-MCNC: 3.7 G/DL (ref 3.5–5)
ALP SERPL-CCNC: 61 U/L (ref 34–104)
ALT SERPL W P-5'-P-CCNC: 12 U/L (ref 7–52)
ANION GAP SERPL CALCULATED.3IONS-SCNC: 9 MMOL/L (ref 4–13)
AST SERPL W P-5'-P-CCNC: 11 U/L (ref 13–39)
ATRIAL RATE: 71 BPM
ATRIAL RATE: 74 BPM
BACTERIA UR QL AUTO: ABNORMAL /HPF
BASOPHILS # BLD AUTO: 0.05 THOUSANDS/ÂΜL (ref 0–0.1)
BASOPHILS NFR BLD AUTO: 1 % (ref 0–1)
BILIRUB SERPL-MCNC: 0.4 MG/DL (ref 0.2–1)
BILIRUB UR QL STRIP: NEGATIVE
BUN SERPL-MCNC: 4 MG/DL (ref 5–25)
CALCIUM SERPL-MCNC: 8.9 MG/DL (ref 8.4–10.2)
CHLORIDE SERPL-SCNC: 108 MMOL/L (ref 96–108)
CHOLEST SERPL-MCNC: 151 MG/DL
CLARITY UR: CLEAR
CO2 SERPL-SCNC: 22 MMOL/L (ref 21–32)
COLOR UR: ABNORMAL
CREAT SERPL-MCNC: 0.69 MG/DL (ref 0.6–1.3)
EOSINOPHIL # BLD AUTO: 0.24 THOUSAND/ÂΜL (ref 0–0.61)
EOSINOPHIL NFR BLD AUTO: 4 % (ref 0–6)
ERYTHROCYTE [DISTWIDTH] IN BLOOD BY AUTOMATED COUNT: 11.9 % (ref 11.6–15.1)
FOLATE SERPL-MCNC: 5.3 NG/ML
GFR SERPL CREATININE-BSD FRML MDRD: 115 ML/MIN/1.73SQ M
GLUCOSE P FAST SERPL-MCNC: 88 MG/DL (ref 65–99)
GLUCOSE SERPL-MCNC: 88 MG/DL (ref 65–140)
GLUCOSE UR STRIP-MCNC: NEGATIVE MG/DL
HCT VFR BLD AUTO: 39 % (ref 34.8–46.1)
HDLC SERPL-MCNC: 33 MG/DL
HGB BLD-MCNC: 13.7 G/DL (ref 11.5–15.4)
HGB UR QL STRIP.AUTO: 10
IMM GRANULOCYTES # BLD AUTO: 0.02 THOUSAND/UL (ref 0–0.2)
IMM GRANULOCYTES NFR BLD AUTO: 0 % (ref 0–2)
KETONES UR STRIP-MCNC: NEGATIVE MG/DL
LDLC SERPL CALC-MCNC: 96 MG/DL (ref 0–100)
LEUKOCYTE ESTERASE UR QL STRIP: NEGATIVE
LYMPHOCYTES # BLD AUTO: 1.93 THOUSANDS/ÂΜL (ref 0.6–4.47)
LYMPHOCYTES NFR BLD AUTO: 29 % (ref 14–44)
MCH RBC QN AUTO: 32.7 PG (ref 26.8–34.3)
MCHC RBC AUTO-ENTMCNC: 35.1 G/DL (ref 31.4–37.4)
MCV RBC AUTO: 93 FL (ref 82–98)
MONOCYTES # BLD AUTO: 0.52 THOUSAND/ÂΜL (ref 0.17–1.22)
MONOCYTES NFR BLD AUTO: 8 % (ref 4–12)
MUCOUS THREADS UR QL AUTO: ABNORMAL
NEUTROPHILS # BLD AUTO: 3.8 THOUSANDS/ÂΜL (ref 1.85–7.62)
NEUTS SEG NFR BLD AUTO: 58 % (ref 43–75)
NITRITE UR QL STRIP: NEGATIVE
NON-SQ EPI CELLS URNS QL MICRO: ABNORMAL /HPF
NONHDLC SERPL-MCNC: 118 MG/DL
NRBC BLD AUTO-RTO: 0 /100 WBCS
P AXIS: 67 DEGREES
P AXIS: 71 DEGREES
PH UR STRIP.AUTO: 6 [PH]
PLATELET # BLD AUTO: 226 THOUSANDS/UL (ref 149–390)
PMV BLD AUTO: 9.5 FL (ref 8.9–12.7)
POTASSIUM SERPL-SCNC: 3.1 MMOL/L (ref 3.5–5.3)
PR INTERVAL: 130 MS
PR INTERVAL: 130 MS
PROT SERPL-MCNC: 6.3 G/DL (ref 6.4–8.4)
PROT UR STRIP-MCNC: NEGATIVE MG/DL
QRS AXIS: 74 DEGREES
QRS AXIS: 75 DEGREES
QRSD INTERVAL: 100 MS
QRSD INTERVAL: 100 MS
QT INTERVAL: 418 MS
QT INTERVAL: 426 MS
QTC INTERVAL: 454 MS
QTC INTERVAL: 472 MS
RBC # BLD AUTO: 4.19 MILLION/UL (ref 3.81–5.12)
RBC #/AREA URNS AUTO: ABNORMAL /HPF
SODIUM SERPL-SCNC: 139 MMOL/L (ref 135–147)
SP GR UR STRIP.AUTO: 1.02 (ref 1–1.04)
T WAVE AXIS: -5 DEGREES
T WAVE AXIS: -6 DEGREES
TRIGL SERPL-MCNC: 112 MG/DL
TSH SERPL DL<=0.05 MIU/L-ACNC: 1.59 UIU/ML (ref 0.45–4.5)
UROBILINOGEN UA: NEGATIVE MG/DL
VENTRICULAR RATE: 71 BPM
VENTRICULAR RATE: 74 BPM
VIT B12 SERPL-MCNC: 195 PG/ML (ref 180–914)
WBC # BLD AUTO: 6.56 THOUSAND/UL (ref 4.31–10.16)
WBC #/AREA URNS AUTO: ABNORMAL /HPF

## 2023-06-16 PROCEDURE — 85025 COMPLETE CBC W/AUTO DIFF WBC: CPT | Performed by: PSYCHIATRY & NEUROLOGY

## 2023-06-16 PROCEDURE — 84443 ASSAY THYROID STIM HORMONE: CPT | Performed by: PSYCHIATRY & NEUROLOGY

## 2023-06-16 PROCEDURE — 99223 1ST HOSP IP/OBS HIGH 75: CPT | Performed by: PSYCHIATRY & NEUROLOGY

## 2023-06-16 PROCEDURE — 80061 LIPID PANEL: CPT | Performed by: PSYCHIATRY & NEUROLOGY

## 2023-06-16 PROCEDURE — 80053 COMPREHEN METABOLIC PANEL: CPT | Performed by: PSYCHIATRY & NEUROLOGY

## 2023-06-16 PROCEDURE — 82746 ASSAY OF FOLIC ACID SERUM: CPT | Performed by: PSYCHIATRY & NEUROLOGY

## 2023-06-16 PROCEDURE — 82607 VITAMIN B-12: CPT | Performed by: PSYCHIATRY & NEUROLOGY

## 2023-06-16 PROCEDURE — 81001 URINALYSIS AUTO W/SCOPE: CPT | Performed by: PSYCHIATRY & NEUROLOGY

## 2023-06-16 PROCEDURE — 82306 VITAMIN D 25 HYDROXY: CPT | Performed by: PSYCHIATRY & NEUROLOGY

## 2023-06-16 PROCEDURE — 99253 IP/OBS CNSLTJ NEW/EST LOW 45: CPT | Performed by: PHYSICIAN ASSISTANT

## 2023-06-16 PROCEDURE — 93005 ELECTROCARDIOGRAM TRACING: CPT

## 2023-06-16 PROCEDURE — 93010 ELECTROCARDIOGRAM REPORT: CPT | Performed by: INTERNAL MEDICINE

## 2023-06-16 RX ORDER — NICOTINE 21 MG/24HR
14 PATCH, TRANSDERMAL 24 HOURS TRANSDERMAL DAILY
Status: DISCONTINUED | OUTPATIENT
Start: 2023-06-16 | End: 2023-06-21 | Stop reason: HOSPADM

## 2023-06-16 RX ORDER — POLYETHYLENE GLYCOL 3350 17 G/17G
17 POWDER, FOR SOLUTION ORAL DAILY PRN
Status: DISCONTINUED | OUTPATIENT
Start: 2023-06-16 | End: 2023-06-21 | Stop reason: HOSPADM

## 2023-06-16 RX ORDER — POTASSIUM CHLORIDE 20 MEQ/1
40 TABLET, EXTENDED RELEASE ORAL ONCE
Status: COMPLETED | OUTPATIENT
Start: 2023-06-16 | End: 2023-06-16

## 2023-06-16 RX ORDER — BISACODYL 10 MG
10 SUPPOSITORY, RECTAL RECTAL DAILY PRN
Status: DISCONTINUED | OUTPATIENT
Start: 2023-06-16 | End: 2023-06-21 | Stop reason: HOSPADM

## 2023-06-16 RX ADMIN — NICOTINE 14 MG: 14 PATCH, EXTENDED RELEASE TRANSDERMAL at 11:57

## 2023-06-16 RX ADMIN — HYDROXYZINE HYDROCHLORIDE 50 MG: 50 TABLET, FILM COATED ORAL at 21:11

## 2023-06-16 RX ADMIN — FLUOXETINE 40 MG: 20 CAPSULE ORAL at 08:24

## 2023-06-16 RX ADMIN — Medication 3 MG: at 22:04

## 2023-06-16 RX ADMIN — POTASSIUM CHLORIDE 40 MEQ: 1500 TABLET, EXTENDED RELEASE ORAL at 11:57

## 2023-06-16 NOTE — ED NOTES
Pt vitals deferred at this time  Pt is sleeping comfortably  Pt's RR WNL       Nancy Remy, RN  06/15/23 2009

## 2023-06-16 NOTE — TREATMENT PLAN
TREATMENT PLAN REVIEW - [address] Saurabh Mosquera 28 y o  1990 female MRN: [id number]    [address] Room / Bed: [facility] 385/[facility] 385[facility] Encounter: [id number]          Admit Date/Time:  6/15/2023  8:47 PM    Treatment Team: Attending Provider: Abdulaziz Lee MD; Care Manager: Bebo Michelle; Patient Care Technician: Pearlean Primrose; Registered Nurse: Ruth Ramos RN; Patient Care Assistant: Jenny Astudillo;  : Laverne Wang    Diagnosis: Principal Problem:    Severe episode of recurrent major depressive disorder, without psychotic features ([facility] [address] )  Active Problems:    Medical clearance for psychiatric admission      Patient Strengths/Assets: cooperative, good support system, patient is on a voluntary commitment    Patient Barriers/Limitations: difficulty adapting    Short Term Goals: decrease in depressive symptoms, decrease in anxiety symptoms, decrease in suicidal thoughts, mood stabilization    Long Term Goals: improvement in anxiety, resolution of depressive symptoms, stabilization of mood, free of suicidal thoughts    Progress Towards Goals: starting psychiatric medications as prescribed    Recommended Treatment: medication management, patient medication education, group therapy, milieu therapy, continued Behavioral Health psychiatric evaluation/assessment process    Treatment Frequency: daily medication monitoring, group and milieu therapy daily, monitoring through interdisciplinary rounds, monitoring through weekly patient care conferences    Expected Discharge Date:  5-7 midnights    Discharge Plan: referrals as indicated    Treatment Plan Created/Updated By: Flaquito Steen DO

## 2023-06-16 NOTE — CONSULTS
[facility]  Consult  Name: Cathren Claude 28 y o  female I MRN: [id number]  Unit/Bed#: -01 I Date of Admission: 6/15/2023   Date of Service: 6/16/2023 I Hospital Day: 1    Inpatient consult for Medical Clearance for [facility] patient  Consult performed by: Bebeto Higgins PA-C  Consult ordered by: Duard Apley, MD          Assessment/Plan   Medical clearance for psychiatric admission  Assessment & Plan  · VSS  Reviewed admission labs with the exception of vitamin d, b12, and folate  Patient is medically cleared for admission to the [facility] for treatment of the underlying psychiatric illness  SLIM will sign off  Please call with questions or concerns    Tobacco use  Assessment & Plan  · Encourage cessation  · Offer nicotine replacement    Hypokalemia  Assessment & Plan  · Potassium at 3 1 today, likely secondary to vomiting yesterday  · Replete with potassium chloride 40 mEq once  · Recheck a m  BMP    Mild persistent asthma without complication  Assessment & Plan  · No acute exacerbation, oxygenating well on room air  · Continue pre hospital albuterol PRN         Counseling / Coordination of Care Time: 30 minutes  Greater than 50% of total time spent on patient counseling and coordination of care  Collaboration of Care: Were Recommendations Directly Discussed with Primary Treatment Team? - No     History of Present Illness:    Cathren Claude is a 28 y o  female who is originally admitted to the psychiatry service due to suicide attempt by intentional overdose  We are consulted for medical clearance for admission to [address] [place] [address] and treatment of underlying psychiatric illness  This patient has a past medical history significant for MS, asthma, and seizures  Per chart review, she initially presented to the ED for intentional overdose of naproxen or robaxin  Toxicology was consulted and recommended monitoring patient for 6 hours   Patient remained stable while in the ED      On evaluation, patient denies any current headache, chest pain, abdominal pain, nausea, vomiting, diarrhea, urinary symptoms  Review of Systems:    Review of Systems   Constitutional: Negative for activity change, chills, diaphoresis and fever  HENT: Negative for congestion, rhinorrhea, sinus pressure, sinus pain and sore throat  Eyes: Negative for visual disturbance  Respiratory: Negative for cough, shortness of breath and wheezing  Cardiovascular: Negative for chest pain and palpitations  Gastrointestinal: Negative for abdominal distention, abdominal pain, constipation, diarrhea, nausea and vomiting  Genitourinary: Negative for dysuria, frequency, hematuria and urgency  Musculoskeletal: Negative for arthralgias, back pain and myalgias  Skin: Negative for rash  Neurological: Negative for dizziness, weakness, light-headedness and headaches  Past Medical and Surgical History:     Past Medical History:   Diagnosis Date   • Asthma     Albuterol prn   • Body mass index (BMI) less than 19 in adult 12/2/2019   • Depression     hasn't required treatment since 2018   • Multiple sclerosis ([facility] [address] ) 2018    follows poorly with neurology, manages with Robaxin   • Seizure [facility])     possibly r/t MS diagnosis, has never been on meds, convulsive, last episode 9/2020       Past Surgical History:   Procedure Laterality Date   • CLOSED REDUCTION FINGER FRACTURE Left    • FL LUMBAR PUNCTURE DIAGNOSTIC  12/5/2019   • TONSILLECTOMY Bilateral 7408   • UMBILICAL HERNIA REPAIR  2016       Meds/Allergies:    all medications and allergies reviewed    Allergies: Allergies   Allergen Reactions   • Clindamycin Throat Swelling   • Onion - Food Allergy Anaphylaxis and Swelling     Swelling of throat       • Gabapentin Diarrhea, Hives and Itching   • Sumatriptan Swelling       Social History:     Marital Status: Single    Substance Use History:   Social History     Substance and Sexual Activity   Alcohol Use Not "Currently    Comment: socially     Social History     Tobacco Use   Smoking Status Every Day   • Packs/day: 0 50   • Years: 9 00   • Total pack years: 4 50   • Types: Cigarettes   Smokeless Tobacco Current     Social History     Substance and Sexual Activity   Drug Use Not Currently   • Types: Marijuana, Cocaine, MDMA (ecstacy)       Family History:    non-contributory    Physical Exam:     Vitals:   Blood Pressure: (!) 174/109 (06/16/23 0743)  Pulse: 97 (06/16/23 0743)  Temperature: 98 °F (36 7 °C) (06/16/23 0743)  Temp Source: Temporal (06/16/23 0743)  Respirations: 16 (06/16/23 0743)  Height: 5' 4\" (162 6 cm) (06/15/23 2047)  Weight - Scale: 52 8 kg (116 lb 6 4 oz) (06/15/23 2047)  SpO2: 97 % (06/16/23 0743)    Physical Exam  Constitutional:       General: She is not in acute distress  Appearance: Normal appearance  She is not ill-appearing, toxic-appearing or diaphoretic  HENT:      Head: Normocephalic and atraumatic  Nose: Nose normal  No congestion or rhinorrhea  Mouth/Throat:      Mouth: Mucous membranes are moist    Eyes:      General: No scleral icterus  Extraocular Movements: Extraocular movements intact  Cardiovascular:      Rate and Rhythm: Normal rate and regular rhythm  Heart sounds: Normal heart sounds  No murmur heard  Pulmonary:      Effort: Pulmonary effort is normal  No respiratory distress  Breath sounds: Normal breath sounds  No wheezing  Abdominal:      General: Abdomen is flat  Bowel sounds are normal  There is no distension  Palpations: Abdomen is soft  Tenderness: There is no abdominal tenderness  Musculoskeletal:      Right lower leg: No edema  Left lower leg: No edema  Skin:     General: Skin is warm and dry  Coloration: Skin is not jaundiced  Neurological:      General: No focal deficit present  Mental Status: She is alert and oriented to person, place, and time     Psychiatric:         Mood and Affect: Mood normal          " "Thought Content: Thought content normal          Additional Data:     Lab Results: I have personally reviewed pertinent reports  Results from last 7 days   Lab Units 06/16/23  0640   WBC Thousand/uL 6 56   HEMOGLOBIN g/dL 13 7   HEMATOCRIT % 39 0   PLATELETS Thousands/uL 226   NEUTROS PCT % 58   LYMPHS PCT % 29   MONOS PCT % 8   EOS PCT % 4     Results from last 7 days   Lab Units 06/16/23  0640   SODIUM mmol/L 139   POTASSIUM mmol/L 3 1*   CHLORIDE mmol/L 108   CO2 mmol/L 22   BUN mg/dL 4*   CREATININE mg/dL 0 69   ANION GAP mmol/L 9   CALCIUM mg/dL 8 9   ALBUMIN g/dL 3 7   TOTAL BILIRUBIN mg/dL 0 40   ALK PHOS U/L 61   ALT U/L 12   AST U/L 11*   GLUCOSE RANDOM mg/dL 88     Results from last 7 days   Lab Units 06/15/23  0713   INR  1 01         No results found for: \"HGBA1C\"  Results from last 7 days   Lab Units 06/15/23  0448   POC GLUCOSE mg/dl 105       EKG, Pathology, and Other Studies Reviewed on Admission:   · EKG on 06/15/23 shows sinus tachycardia, bilateral atrial enlargement, Incomplete RBBB, , QTc 426    ** Please Note: This note has been constructed using a voice recognition system   **  "

## 2023-06-16 NOTE — TREATMENT TEAM
06/16/23 1100   Activity/Group Checklist   Group   (recovery group)   Attendance Attended   Attendance Duration (min) 46-60   Interactions Interacted appropriately   Affect/Mood Appropriate   Goals Achieved Discussed coping strategies; Discussed self-esteem issues; Able to listen to others; Able to engage in interactions; Able to self-disclose; Able to recieve feedback
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
## 2023-06-16 NOTE — PROGRESS NOTES
06/16/23 1523   Team Meeting   Meeting Type Tx Team Meeting   Team Members Present   Team Members Present Physician;Nurse;   Physician Team Member Pablo pineda   Nursing Team Member Viky   Social Work Team Member Gloria   Patient/Family Present   Patient Present Yes   Patient's Family Present No     Treatment plan reviewed with patient  Patient is in agreement with treatment plan

## 2023-06-16 NOTE — PLAN OF CARE
Problem: Ineffective Coping  Goal: Identifies healthy coping skills  Outcome: Not Progressing  Goal: Demonstrates healthy coping skills  Outcome: Not Progressing
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
## 2023-06-16 NOTE — DISCHARGE INSTR - APPOINTMENTS
Christy Henderson or Merissa, our Eric and Naina, will be calling you after your discharge, on the phone number that you provided  They will be available as an additional support, if needed  If you wish to speak with one of them, you may contact Purvi Amin at [phone] or Obdulio Vizcarra at [phone]
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
## 2023-06-16 NOTE — DISCHARGE INSTR - OTHER ORDERS
You are being discharged to your home at [address] [facility] Crisis Intervention: [address] Crisis Intervention: [phone]  *National Suicide Prevention Lifeline:  2-940.913.7679  *Peer Support Talk Line (Seven days a week, 1:00 PM - 9:00 PM) Call: 338.388.4963 or Text: [address] on [address] [facility]) HELPLINE: 860.670.2970/Website: www michell org  *Substance Abuse and [facility] [employer], which is a confidential, free, 24-hour-a-day, 365-day-a-year, information service for individuals and family members facing mental health and/or substance use disorders  This service provides referrals to local treatment facilities, support groups, and community-based organizations  Callers can also order free publications and other information  Call 3-667.553.9712/Website: www [facility] gov  *[facility] 2-1-1: This is a toll free, confidential, 24-hour-a-day service which connects you to a community  in your area who can help you find services and resources that are available to you locally and provide critical services that can improve and save lives  Call: 80  /Website: https://[employer]am[employer]Path.[employer] net/      If you or someone you know is struggling or in crisis, help is available  Call or text 99 135879 or chat [employer] org  You can also reach Crisis Text Line by texting [facility] to 589928

## 2023-06-16 NOTE — PLAN OF CARE
Problem: Ineffective Coping  Goal: Identifies healthy coping skills  Outcome: Progressing  Goal: Demonstrates healthy coping skills  Outcome: Progressing     Problem: Depression  Goal: Treatment Goal: Demonstrate behavioral control of depressive symptoms, verbalize feelings of improved mood/affect, and adopt new coping skills prior to discharge  Outcome: Progressing  Goal: Verbalize thoughts and feelings  Description: Interventions:  - Assess and re-assess patient's level of risk   - Engage patient in 1:1 interactions, daily, for a minimum of 15 minutes   - Encourage patient to express feelings, fears, frustrations, hopes   Outcome: Progressing  Goal: Refrain from harming self  Description: Interventions:  - Monitor patient closely, per order   - Supervise medication ingestion, monitor effects and side effects   Outcome: Progressing  Goal: Refrain from isolation  Description: Interventions:  - Develop a trusting relationship   - Encourage socialization   Outcome: Progressing     Problem: Ineffective Coping  Goal: Participates in unit activities  Description: Interventions:  - Provide therapeutic environment   - Provide required programming   - Redirect inappropriate behaviors   Outcome: Progressing     Problem: DISCHARGE PLANNING - CARE MANAGEMENT  Goal: Discharge to post-acute care or home with appropriate resources  Description: INTERVENTIONS:  - Conduct assessment to determine patient/family and health care team treatment goals, and need for post-acute services based on payer coverage, community resources, and patient preferences, and barriers to discharge  - Address psychosocial, clinical, and financial barriers to discharge as identified in assessment in conjunction with the patient/family and health care team  - Arrange appropriate level of post-acute services according to patient’s   needs and preference and payer coverage in collaboration with the physician and health care team  - Communicate with and update the patient/family, physician, and health care team regarding progress on the discharge plan  - Arrange appropriate transportation to post-acute venues  Outcome: Progressing

## 2023-06-16 NOTE — ASSESSMENT & PLAN NOTE
· Potassium at 3 1 today, likely secondary to vomiting yesterday  · Replete with potassium chloride 40 mEq once  · Recheck a m   BMP

## 2023-06-17 PROCEDURE — 99232 SBSQ HOSP IP/OBS MODERATE 35: CPT | Performed by: STUDENT IN AN ORGANIZED HEALTH CARE EDUCATION/TRAINING PROGRAM

## 2023-06-17 RX ORDER — HYDROCHLOROTHIAZIDE 25 MG/1
12.5 TABLET ORAL DAILY
Status: DISCONTINUED | OUTPATIENT
Start: 2023-06-18 | End: 2023-06-17

## 2023-06-17 RX ORDER — HYDROCHLOROTHIAZIDE 25 MG/1
12.5 TABLET ORAL DAILY
Status: DISCONTINUED | OUTPATIENT
Start: 2023-06-17 | End: 2023-06-19

## 2023-06-17 RX ADMIN — NICOTINE 14 MG: 14 PATCH, EXTENDED RELEASE TRANSDERMAL at 08:37

## 2023-06-17 RX ADMIN — HYDROCHLOROTHIAZIDE 12.5 MG: 25 TABLET ORAL at 14:54

## 2023-06-17 RX ADMIN — FLUOXETINE 40 MG: 20 CAPSULE ORAL at 08:37

## 2023-06-17 RX ADMIN — Medication 3 MG: at 21:16

## 2023-06-17 NOTE — NURSING NOTE
Pt  BP was 153/106 at 2113  This writer contacted Yenny Jimenezma in regards to situation  Provider recommended 50 mg Atarax po  One hour later, Pt  BP was 160/100  Contacted provider again, no new orders at this time

## 2023-06-17 NOTE — PROGRESS NOTES
"Progress Note - [address] [facility] 28 y o  female MRN: [id number]   Unit/Bed#: U 385-01 Encounter: [id number]    Behavior over the last 24 hours: improved  Germán Cisse is seen today for follow-up  She states that she is feeling \"okay \"  She admits regret that she overdosed and attempted to take her own life  She states that she is doing better now that she has restarted the medication  She states that she believes it was stopping the medication that drove her to attempt suicide  She states she is also been dealing with significant stressors with her social life, as she states she is filing a PFA against her ex-boyfriend  She states she had to move her 2 daughters out of the house to live with their father in [place] because of her fears their exposure to him  She states that she is eating and sleeping well, denies thoughts to herself or anyone else, denies any hallucinations  Patient reports her only concern is that she been having some dizziness  She states that she does not feel vertigo, but feels off  We discussed her recent change of blood pressure medicine, and this could be a side effect  She states that her blood pressure has continued to be high, and this has been something she has been dealing with for several months  She states that she was also diagnosed with MS, but has been better since she has stopped treatment with her neurologist   She states that she felt the MS medication she was taking was making her more fatigued  Per nursing staff, Has been taking medications  Follows directions appropriately  Participates appropriately in milieu      Sleep: improved  Appetite: normal  Medication side effects: Yes - dizziness from hypertension   ROS: reports dizziness, all other systems are negative    Mental Status Evaluation:    Appearance:  age appropriate, dressed in hospital attire, underweight   Behavior:  pleasant, cooperative   Speech:  normal rate and volume   Mood:  " improved, less depressed   Affect:  normal range and intensity   Thought Process:  organized, logical, coherent, goal directed, linear   Associations: intact associations   Thought Content:  no overt delusions   Perceptual Disturbances: no auditory hallucinations, no visual hallucinations, denies auditory hallucinations when asked   Risk Potential: Suicidal ideation - None at present, status post suicide attempt by overdose on medications, remorseful about suicide attempt, contracts for safety on the unit, would talk to staff if not feeling safe on the unit  Homicidal ideation - None at present  Potential for aggression - No   Sensorium:  oriented to person, place and time/date   Memory:  recent and remote memory grossly intact   Consciousness:  alert and awake   Attention/Concentration: attention span and concentration are age appropriate   Insight:  improving   Judgment: improving   Gait/Station: normal gait/station, normal balance   Motor Activity: no abnormal movements     Vital signs in last 24 hours:    Temp:  [97 5 °F (36 4 °C)-98 6 °F (37 °C)] 97 5 °F (36 4 °C)  HR:  [72-95] 86  Resp:  [16-18] 16  BP: (136-160)/() 144/94    Laboratory results: I have personally reviewed all pertinent laboratory/tests results    Results from the past 24 hours:   Recent Results (from the past 24 hour(s))   Urinalysis    Collection Time: 06/16/23 10:11 PM   Result Value Ref Range    Clarity, UA Clear Clear, Other    Color, UA Straw Straw, Yellow, Pale Yellow    Specific [place], UA 1 020 1 003 - 1 040    pH, UA 6 0 4 5, 5 0, 5 5, 6 0, 6 5, 7 0, 7 5, 8 0    Glucose, UA Negative Negative mg/dl    Ketones, UA Negative Negative mg/dl    Occult Blood, UA 10 0 (A) Negative    Protein, UA Negative Negative mg/dl    Nitrite, UA Negative Negative    Bilirubin, UA Negative Negative    Leukocytes, UA Negative Negative    WBC, UA 0-1 (A) None Seen, 2-4, 5-60 /hpf    RBC, UA 0-1 (A) None Seen, 2-4 /hpf    Bacteria, UA Occasional None Seen, Occasional /hpf    Epithelial Cells Occasional None Seen, Occasional /hpf    MUCUS THREADS Occasional (A) None Seen    UROBILINOGEN UA Negative 1 0, Negative mg/dL       Suicide/Homicide Risk Assessment:    Risk of Harm to Self:   Nursing Suicide Risk Assessment Last 24 hours: C-SSRS Risk (Since Last Contact)  Calculated C-SSRS Risk Score (Since Last Contact): No Risk Indicated  Current Specific Risk Factors include: recent suicidal gesture, diagnosis of depression, health problems, worries about finances or work  Protective Factors: no current suicidal ideation, taking medications as ordered on the unit, improved depressive symptoms, having a desire to be alive, responsibilities and duties to others  Based on today's assessment, Ana Jones presents the following risk of harm to self: moderate    Risk of Harm to Others:  Nursing Homicide Risk Assessment: Violence Risk to Others: Denies within past 6 months  Based on today's assessment, Ana Jones presents the following risk of harm to others: none    The following interventions are recommended: behavioral checks every 7 minutes, continued hospitalization on locked unit    Progress Toward Goals: improving gradually, insight is gradually improving, mood is stabilizing, less depressed    Assessment/Plan   Principal Problem:    Severe episode of recurrent major depressive disorder, without psychotic features ([facility] [address] )  Active Problems:    Medical clearance for psychiatric admission    Hypokalemia    Tobacco use      Recommended Treatment:     Planned medication and treatment changes:     All current active medications have been reviewed  Encourage group therapy, milieu therapy and occupational therapy  Behavioral Health checks every 7 minutes  Continue current medications:    Current Facility-Administered Medications   Medication Dose Route Frequency Provider Last Rate   • acetaminophen  650 mg Oral Q6H PRN Linus Andrea MD     • acetaminophen  650 mg Oral Q4H PRN Marce REYES Nicole Berg MD     • acetaminophen  975 mg Oral Q6H PRN Leydi Edwards MD     • albuterol  2 puff Inhalation Q6H PRN Leydi Edwards MD     • aluminum-magnesium hydroxide-simethicone  30 mL Oral Q4H PRN Leydi Edwards MD     • haloperidol lactate  2 5 mg Intramuscular Q4H PRN Max 4/day Leydi Edwards MD      And   • LORazepam  1 mg Intramuscular Q4H PRN Max 4/day Leydi Edwards MD      And   • benztropine  0 5 mg Intramuscular Q4H PRN Max 4/day Leydi Edwards MD     • haloperidol lactate  5 mg Intramuscular Q4H PRN Max 4/day Leydi Edwards MD      And   • LORazepam  2 mg Intramuscular Q4H PRN Max 4/day Leydi Edwards MD      And   • benztropine  1 mg Intramuscular Q4H PRN Max 4/day Leydi Edwards MD     • benztropine  1 mg Oral Q4H PRN Max 6/day Leydi Edwards MD     • bisacodyl  10 mg Rectal Daily PRN Leydi Edwards MD     • hydrOXYzine HCL  50 mg Oral Q6H PRN Max 4/day Leydi Edwards MD      Or   • diphenhydrAMINE  50 mg Intramuscular Q6H PRN Leydi Edwards MD     • FLUoxetine  40 mg Oral Daily Leydi Edwards MD     • haloperidol  1 mg Oral Q6H PRN Leydi Edwards MD     • haloperidol  2 5 mg Oral Q4H PRN Max 4/day Leydi Edwards MD     • haloperidol  5 mg Oral Q4H PRN Max 4/day Leydi Edwards MD     • hydrochlorothiazide  12 5 mg Oral Daily Nusrat Llanes PA-C     • hydrOXYzine HCL  100 mg Oral Q6H PRN Max 4/day Leydi Edwards MD      Or   • LORazepam  2 mg Intramuscular Q6H PRN Leydi Edwards MD     • hydrOXYzine HCL  25 mg Oral Q6H PRN Max 4/day Leydi Edwards MD     • melatonin  3 mg Oral HS Leydi Edwards MD     • nicotine  14 mg Transdermal Daily Nusrat Llanes PA-C     • nicotine polacrilex  4 mg Oral Q2H PRN Leydi Edwards MD     • polyethylene glycol  17 g Oral Daily PRN Leydi Edwards MD     • senna-docusate sodium  1 tablet Oral Daily PRN Leydi Edwards MD     • traZODone  50 mg Oral HS PRN Leydi Edwards MD       Risks / Benefits of Treatment:    Risks, benefits, and possible side effects of medications explained to patient and patient verbalizes understanding and agreement for treatment  Counseling / Coordination of Care: Total floor / unit time spent today 35 minutes  Greater than 50% of total time was spent with the patient and / or family counseling and / or coordination of care  A description of counseling / coordination of care:  Patient's progress discussed with staff in treatment team meeting  Medications, treatment progress and treatment plan reviewed with patient  Patient's diagnosis and treatment indicated reviewed with patient  Educated on importance of medication and treatment compliance      Marguerite Bernard DO 06/17/23

## 2023-06-17 NOTE — PLAN OF CARE
Problem: Depression  Goal: Treatment Goal: Demonstrate behavioral control of depressive symptoms, verbalize feelings of improved mood/affect, and adopt new coping skills prior to discharge  Outcome: Progressing  Goal: Verbalize thoughts and feelings  Description: Interventions:  - Assess and re-assess patient's level of risk   - Engage patient in 1:1 interactions, daily, for a minimum of 15 minutes   - Encourage patient to express feelings, fears, frustrations, hopes   Interventions:  - Assess and re-assess patient's lethality and potential for self-injury  - Engage patient in 1:1 interactions, daily, for a minimum of 15 minutes  - Encourage patient to express feelings, fears, frustrations, hopes  - Establish rapport/trust with patient   Outcome: Progressing  Goal: Refrain from harming self  Description: Interventions:  - Monitor patient closely, per order   - Supervise medication ingestion, monitor effects and side effects   Outcome: Progressing  Goal: Refrain from isolation  Description: Interventions:  - Develop a trusting relationship   - Encourage socialization   Outcome: Progressing  Goal: Refrain from self-neglect  Outcome: Progressing  Goal: Complete daily ADLs, including personal hygiene independently, as able  Description: Interventions:  - Observe, teach, and assist patient with ADLS  -  Monitor and promote a balance of rest/activity, with adequate nutrition and elimination   Outcome: Progressing     Problem: Ineffective Coping  Goal: Cooperates with admission process  Description: Interventions:   - Complete admission process  Outcome: Progressing  Goal: Identifies ineffective coping skills  Outcome: Progressing  Goal: Identifies healthy coping skills  Outcome: Progressing  Goal: Demonstrates healthy coping skills  Outcome: Progressing  Goal: Patient/Family participate in treatment and DC plans  Description: Interventions:  - Provide therapeutic environment  Outcome: Progressing  Goal: Patient/Family verbalizes awareness of resources  Outcome: Progressing  Goal: Understands least restrictive measures  Description: Interventions:  - Utilize least restrictive behavior  Outcome: Progressing  Goal: Free from restraint events  Description: - Utilize least restrictive measures   - Provide behavioral interventions   - Redirect inappropriate behaviors   Outcome: Progressing     Problem: Risk for Self Injury/Neglect  Goal: Verbalize thoughts and feelings  Description: Interventions:  - Assess and re-assess patient's level of risk   - Engage patient in 1:1 interactions, daily, for a minimum of 15 minutes   - Encourage patient to express feelings, fears, frustrations, hopes   Interventions:  - Assess and re-assess patient's lethality and potential for self-injury  - Engage patient in 1:1 interactions, daily, for a minimum of 15 minutes  - Encourage patient to express feelings, fears, frustrations, hopes  - Establish rapport/trust with patient   Outcome: Progressing  Goal: Treatment Goal: Remain safe during length of stay, learn and adopt new coping skills, and be free of self-injurious ideation, impulses and acts at the time of discharge  Outcome: Progressing  Goal: Refrain from harming self  Description: Interventions:  - Monitor patient closely, per order  - Develop a trusting relationship  - Supervise medication ingestion, monitor effects and side effects   Outcome: Progressing  Goal: Attend and participate in unit activities, including therapeutic, recreational, and educational groups  Description: Interventions:  - Provide therapeutic and educational activities daily, encourage attendance and participation, and document same in the medical record  - Obtain collateral information, encourage visitation and family involvement in care   Outcome: Progressing  Goal: Recognize maladaptive responses and adopt new coping mechanisms  Outcome: Progressing     Problem: Anxiety  Goal: Anxiety is at manageable level  Description: Interventions:  - Assess and monitor patient's anxiety level  - Monitor for signs and symptoms (heart palpitations, chest pain, shortness of breath, headaches, nausea, feeling jumpy, restlessness, irritable, apprehensive)  - Collaborate with interdisciplinary team and initiate plan and interventions as ordered    - [place] patient to unit/surroundings  - Explain treatment plan  - Encourage participation in care  - Encourage verbalization of concerns/fears  - Identify coping mechanisms  - Assist in developing anxiety-reducing skills  - Administer/offer alternative therapies  - Limit or eliminate stimulants  Outcome: Progressing     Problem: DISCHARGE PLANNING  Goal: Discharge to home or other facility with appropriate resources  Description: INTERVENTIONS:  - Identify barriers to discharge w/patient and caregiver  - Arrange for needed discharge resources and transportation as appropriate  - Identify discharge learning needs (meds, wound care, etc )  - Refer to Case Management Department for coordinating discharge planning if the patient needs post-hospital services based on physician/advanced practitioner order or complex needs related to functional status, cognitive ability, or social support system  Outcome: Progressing

## 2023-06-17 NOTE — NURSING NOTE
Pt has been pleasant and cooperative throughout the day  She is visible on the unit and social with peers  Pt reports sleeping much better last night  She denies any issues with sleep or appetite  Pt does voice concern regarding her elevated BP  Medical provider contacted and a new order for Hydrochlorothiazide obtained  Education provided  Pt denies any SI/HI/AH/VH and denies any depression/anxiety  Staff availability reinforced

## 2023-06-17 NOTE — PROGRESS NOTES
06/16/23 1400   Activity/Group Checklist   Group Other (Comment)  (Group Art Therapy/Psychodynamic, Creative Exploration of Relationship with Affirmations)   Attendance Attended   Attendance Duration (min) Greater than 60   Interactions Interacted appropriately   Affect/Mood Appropriate   Goals Achieved Discussed coping strategies; Able to listen to others; Able to engage in interactions; Able to recieve feedback; Able to give feedback to another  (Able to engage materials and directive; full participation with discussion)

## 2023-06-18 LAB
ANION GAP SERPL CALCULATED.3IONS-SCNC: 9 MMOL/L (ref 4–13)
BUN SERPL-MCNC: 8 MG/DL (ref 5–25)
CALCIUM SERPL-MCNC: 9.3 MG/DL (ref 8.4–10.2)
CHLORIDE SERPL-SCNC: 104 MMOL/L (ref 96–108)
CO2 SERPL-SCNC: 26 MMOL/L (ref 21–32)
CREAT SERPL-MCNC: 0.74 MG/DL (ref 0.6–1.3)
GFR SERPL CREATININE-BSD FRML MDRD: 107 ML/MIN/1.73SQ M
GLUCOSE P FAST SERPL-MCNC: 92 MG/DL (ref 65–99)
GLUCOSE SERPL-MCNC: 92 MG/DL (ref 65–140)
POTASSIUM SERPL-SCNC: 3.5 MMOL/L (ref 3.5–5.3)
SODIUM SERPL-SCNC: 139 MMOL/L (ref 135–147)

## 2023-06-18 PROCEDURE — 80048 BASIC METABOLIC PNL TOTAL CA: CPT | Performed by: PHYSICIAN ASSISTANT

## 2023-06-18 PROCEDURE — 99232 SBSQ HOSP IP/OBS MODERATE 35: CPT | Performed by: STUDENT IN AN ORGANIZED HEALTH CARE EDUCATION/TRAINING PROGRAM

## 2023-06-18 RX ORDER — LANOLIN ALCOHOL/MO/W.PET/CERES
3 CREAM (GRAM) TOPICAL
Status: DISCONTINUED | OUTPATIENT
Start: 2023-06-18 | End: 2023-06-21 | Stop reason: HOSPADM

## 2023-06-18 RX ADMIN — HYDROCHLOROTHIAZIDE 12.5 MG: 25 TABLET ORAL at 08:27

## 2023-06-18 RX ADMIN — Medication 3 MG: at 21:16

## 2023-06-18 RX ADMIN — FLUOXETINE 40 MG: 20 CAPSULE ORAL at 08:27

## 2023-06-18 RX ADMIN — NICOTINE 14 MG: 14 PATCH, EXTENDED RELEASE TRANSDERMAL at 08:27

## 2023-06-18 NOTE — NURSING NOTE
Patient remained visible on the unit throughout the evening  Cooperative with routine  Denied any unmet needs  HS medication compliant

## 2023-06-18 NOTE — NURSING NOTE
Pt has been pleasant and cooperative  She is visible on the unit and social with peers  Pt observed to be tearful on the phone after talking to her kids  Emotional support provided  She denies any issues with sleep or appetite  Pt is hopeful for discharge soon early this upcoming week  Pt denies any SI/HI/AH/VH  Staff availability reinforced

## 2023-06-18 NOTE — PROGRESS NOTES
"Progress Note - [address] [facility] 28 y o  female MRN: [id number]   Unit/Bed#: -01 Encounter: [id number]    Behavior over the last 24 hours: improved  Sarah Silverman is seen today for follow-up  She states that she is feeling \"pretty good \"  She states that she talked with her children who are staying with their father  She states that the conversation went well, and she is hopeful to see them soon  She states that her mother is watching her house, as Sarah Silverman has video cameras around the house that her mother has tapped into  She states that there was some concern that her ex-boyfriend would come to the house, and she states she is grateful that he does not know she is in the hospital right now  She admits that she regrets her suicide attempt, and denies thoughts to herself or anyone else  She states that she feels positive and future oriented, she is looking forward to her new job she will be starting, as well as the court for the PFA she is filing against her ex-boyfriend  She states that that will be on Thursday, and she is hopeful for discharge before that  She states overall she feels she is doing well, appreciates restarting her medication, and does want to follow-up with a psychiatrist after discharge  She states she is no longer experiencing side effects from her hydrochlorothiazide  Per nursing staff, Has been taking medications  Follows directions appropriately  Participates appropriately in milieu      Sleep: improved  Appetite: normal  Medication side effects: No - dizziness has resolved   ROS: no complaints, all other systems are negative    Mental Status Evaluation:    Appearance:  age appropriate, dressed in hospital attire, underweight   Behavior:  pleasant, cooperative   Speech:  normal rate and volume   Mood:  improved, less depressed   Affect:  normal range and intensity   Thought Process:  organized, logical, coherent, goal directed, linear   Associations: intact " associations   Thought Content:  no overt delusions   Perceptual Disturbances: no auditory hallucinations, no visual hallucinations, denies auditory hallucinations when asked   Risk Potential: Suicidal ideation - None at present, status post suicide attempt by overdose on medications, remorseful about suicide attempt, contracts for safety on the unit, would talk to staff if not feeling safe on the unit  Homicidal ideation - None at present  Potential for aggression - No   Sensorium:  oriented to person, place and time/date   Memory:  recent and remote memory grossly intact   Consciousness:  alert and awake   Attention/Concentration: attention span and concentration are age appropriate   Insight:  improving   Judgment: improving   Gait/Station: normal gait/station, normal balance   Motor Activity: no abnormal movements     Vital signs in last 24 hours:    Temp:  [97 5 °F (36 4 °C)] 97 5 °F (36 4 °C)  HR:  [] 93  Resp:  [16] 16  BP: (145-153)/() 146/104    Laboratory results: I have personally reviewed all pertinent laboratory/tests results    Results from the past 24 hours:   Recent Results (from the past 24 hour(s))   Basic metabolic panel    Collection Time: 06/18/23  6:17 AM   Result Value Ref Range    Sodium 139 135 - 147 mmol/L    Potassium 3 5 3 5 - 5 3 mmol/L    Chloride 104 96 - 108 mmol/L    CO2 26 21 - 32 mmol/L    ANION GAP 9 4 - 13 mmol/L    BUN 8 5 - 25 mg/dL    Creatinine 0 74 0 60 - 1 30 mg/dL    Glucose 92 65 - 140 mg/dL    Glucose, Fasting 92 65 - 99 mg/dL    Calcium 9 3 8 4 - 10 2 mg/dL    eGFR 107 ml/min/1 73sq m       Suicide/Homicide Risk Assessment:    Risk of Harm to Self:   Nursing Suicide Risk Assessment Last 24 hours: C-SSRS Risk (Since Last Contact)  Calculated C-SSRS Risk Score (Since Last Contact): No Risk Indicated  Current Specific Risk Factors include: recent suicidal gesture, diagnosis of depression, health problems, worries about finances or work  Protective Factors: no current suicidal ideation, taking medications as ordered on the unit, improved depressive symptoms, having a desire to be alive, responsibilities and duties to others  Based on today's assessment, Leena Rutherford presents the following risk of harm to self: moderate    Risk of Harm to Others:  Nursing Homicide Risk Assessment: Violence Risk to Others: Denies within past 6 months  Based on today's assessment, Leena Rutherford presents the following risk of harm to others: none    The following interventions are recommended: behavioral checks every 7 minutes, continued hospitalization on locked unit    Progress Toward Goals: improving gradually, insight is gradually improving, mood is stabilizing, less depressed    Assessment/Plan   Principal Problem:    Severe episode of recurrent major depressive disorder, without psychotic features ([facility] [address] )  Active Problems:    Medical clearance for psychiatric admission    Hypokalemia    Tobacco use  Seem to be making improvements, appears future and goal oriented, regrets suicide attempt  Recommended Treatment:     Planned medication and treatment changes:     All current active medications have been reviewed  Encourage group therapy, milieu therapy and occupational therapy  Behavioral Health checks every 7 minutes  Continue current medications:    Current Facility-Administered Medications   Medication Dose Route Frequency Provider Last Rate   • acetaminophen  650 mg Oral Q6H PRN Love MD Isa     • acetaminophen  650 mg Oral Q4H PRN Love MD Isa     • acetaminophen  975 mg Oral Q6H PRN Love MD Isa     • albuterol  2 puff Inhalation Q6H PRN Love MD Isa     • aluminum-magnesium hydroxide-simethicone  30 mL Oral Q4H PRN Love MD Isa     • haloperidol lactate  2 5 mg Intramuscular Q4H PRN Max 4/day Debra Moss MD      And   • LORazepam  1 mg Intramuscular Q4H PRN Max 4/day Debra Moss MD      And   • benztropine  0 5 mg Intramuscular Q4H PRN Max 4/day Khang Soto MD     • haloperidol lactate  5 mg Intramuscular Q4H PRN Max 4/day Khang Soto MD      And   • LORazepam  2 mg Intramuscular Q4H PRN Max 4/day Khang Soto MD      And   • benztropine  1 mg Intramuscular Q4H PRN Max 4/day Khang Soto MD     • benztropine  1 mg Oral Q4H PRN Max 6/day Khang Soto MD     • bisacodyl  10 mg Rectal Daily PRN Khang Soto MD     • hydrOXYzine HCL  50 mg Oral Q6H PRN Max 4/day Khang Soto MD      Or   • diphenhydrAMINE  50 mg Intramuscular Q6H PRN Khang Soto MD     • FLUoxetine  40 mg Oral Daily Khang Soto MD     • haloperidol  1 mg Oral Q6H PRN Khang Soto MD     • haloperidol  2 5 mg Oral Q4H PRN Max 4/day Khang Soto MD     • haloperidol  5 mg Oral Q4H PRN Max 4/day Khang Soto MD     • hydrochlorothiazide  12 5 mg Oral Daily Felecia KAROLINE Rodriguez     • hydrOXYzine HCL  100 mg Oral Q6H PRN Max 4/day Khang Soto MD      Or   • LORazepam  2 mg Intramuscular Q6H PRN Khang Soto MD     • hydrOXYzine HCL  25 mg Oral Q6H PRN Max 4/day Khang Soto MD     • melatonin  3 mg Oral HS PRN Christiano Montano DO     • nicotine  14 mg Transdermal Daily Felecia SYLVESTER RodriguezC     • nicotine polacrilex  4 mg Oral Q2H PRN Khang Soto MD     • polyethylene glycol  17 g Oral Daily PRN Khang Soto MD     • senna-docusate sodium  1 tablet Oral Daily PRN Khang Soto MD     • traZODone  50 mg Oral HS PRN Khang Soto MD       Risks / Benefits of Treatment:    Risks, benefits, and possible side effects of medications explained to patient and patient verbalizes understanding and agreement for treatment  Counseling / Coordination of Care: Total floor / unit time spent today 35 minutes  Greater than 50% of total time was spent with the patient and / or family counseling and / or coordination of care   A description of counseling / coordination of care:  Patient's progress discussed with staff in treatment team meeting  Medications, treatment progress and treatment plan reviewed with patient  Patient's diagnosis and treatment indicated reviewed with patient  Educated on importance of medication and treatment compliance      Brenna Dickey,  06/18/23

## 2023-06-18 NOTE — PLAN OF CARE
Problem: Depression  Goal: Treatment Goal: Demonstrate behavioral control of depressive symptoms, verbalize feelings of improved mood/affect, and adopt new coping skills prior to discharge  Outcome: Progressing  Goal: Verbalize thoughts and feelings  Description: Interventions:  - Assess and re-assess patient's level of risk   - Engage patient in 1:1 interactions, daily, for a minimum of 15 minutes   - Encourage patient to express feelings, fears, frustrations, hopes   Interventions:  - Assess and re-assess patient's lethality and potential for self-injury  - Engage patient in 1:1 interactions, daily, for a minimum of 15 minutes  - Encourage patient to express feelings, fears, frustrations, hopes  - Establish rapport/trust with patient   Outcome: Progressing  Goal: Refrain from harming self  Description: Interventions:  - Monitor patient closely, per order   - Supervise medication ingestion, monitor effects and side effects   Outcome: Progressing  Goal: Refrain from isolation  Description: Interventions:  - Develop a trusting relationship   - Encourage socialization   Outcome: Progressing  Goal: Refrain from self-neglect  Outcome: Progressing  Goal: Complete daily ADLs, including personal hygiene independently, as able  Description: Interventions:  - Observe, teach, and assist patient with ADLS  -  Monitor and promote a balance of rest/activity, with adequate nutrition and elimination   Outcome: Progressing     Problem: Ineffective Coping  Goal: Cooperates with admission process  Description: Interventions:   - Complete admission process  Outcome: Progressing  Goal: Identifies ineffective coping skills  Outcome: Progressing  Goal: Identifies healthy coping skills  Outcome: Progressing  Goal: Demonstrates healthy coping skills  Outcome: Progressing  Goal: Patient/Family participate in treatment and DC plans  Description: Interventions:  - Provide therapeutic environment  Outcome: Progressing  Goal: Patient/Family verbalizes awareness of resources  Outcome: Progressing  Goal: Understands least restrictive measures  Description: Interventions:  - Utilize least restrictive behavior  Outcome: Progressing  Goal: Free from restraint events  Description: - Utilize least restrictive measures   - Provide behavioral interventions   - Redirect inappropriate behaviors   Outcome: Progressing     Problem: Risk for Self Injury/Neglect  Goal: Verbalize thoughts and feelings  Description: Interventions:  - Assess and re-assess patient's level of risk   - Engage patient in 1:1 interactions, daily, for a minimum of 15 minutes   - Encourage patient to express feelings, fears, frustrations, hopes   Interventions:  - Assess and re-assess patient's lethality and potential for self-injury  - Engage patient in 1:1 interactions, daily, for a minimum of 15 minutes  - Encourage patient to express feelings, fears, frustrations, hopes  - Establish rapport/trust with patient   Outcome: Progressing  Goal: Treatment Goal: Remain safe during length of stay, learn and adopt new coping skills, and be free of self-injurious ideation, impulses and acts at the time of discharge  Outcome: Progressing  Goal: Refrain from harming self  Description: Interventions:  - Monitor patient closely, per order  - Develop a trusting relationship  - Supervise medication ingestion, monitor effects and side effects   Outcome: Progressing  Goal: Attend and participate in unit activities, including therapeutic, recreational, and educational groups  Description: Interventions:  - Provide therapeutic and educational activities daily, encourage attendance and participation, and document same in the medical record  - Obtain collateral information, encourage visitation and family involvement in care   Outcome: Progressing  Goal: Recognize maladaptive responses and adopt new coping mechanisms  Outcome: Progressing     Problem: Anxiety  Goal: Anxiety is at manageable level  Description: Interventions:  - Assess and monitor patient's anxiety level  - Monitor for signs and symptoms (heart palpitations, chest pain, shortness of breath, headaches, nausea, feeling jumpy, restlessness, irritable, apprehensive)  - Collaborate with interdisciplinary team and initiate plan and interventions as ordered    - [place] patient to unit/surroundings  - Explain treatment plan  - Encourage participation in care  - Encourage verbalization of concerns/fears  - Identify coping mechanisms  - Assist in developing anxiety-reducing skills  - Administer/offer alternative therapies  - Limit or eliminate stimulants  Outcome: Progressing     Problem: DISCHARGE PLANNING  Goal: Discharge to home or other facility with appropriate resources  Description: INTERVENTIONS:  - Identify barriers to discharge w/patient and caregiver  - Arrange for needed discharge resources and transportation as appropriate  - Identify discharge learning needs (meds, wound care, etc )  - Refer to Case Management Department for coordinating discharge planning if the patient needs post-hospital services based on physician/advanced practitioner order or complex needs related to functional status, cognitive ability, or social support system  Outcome: Progressing

## 2023-06-19 PROBLEM — K52.9 CHRONIC DIARRHEA: Status: RESOLVED | Noted: 2020-11-10 | Resolved: 2023-06-19

## 2023-06-19 PROBLEM — F43.12 POST-TRAUMATIC STRESS DISORDER, CHRONIC: Chronic | Status: ACTIVE | Noted: 2023-04-07

## 2023-06-19 PROBLEM — F33.2 MAJOR DEPRESSIVE DISORDER, RECURRENT, SEVERE WITHOUT PSYCHOTIC FEATURES (HCC): Chronic | Status: ACTIVE | Noted: 2023-06-16

## 2023-06-19 PROBLEM — F43.10 PTSD (POST-TRAUMATIC STRESS DISORDER): Chronic | Status: ACTIVE | Noted: 2023-04-07

## 2023-06-19 PROBLEM — R63.4 WEIGHT LOSS: Status: RESOLVED | Noted: 2020-11-10 | Resolved: 2023-06-19

## 2023-06-19 PROBLEM — F41.1 GAD (GENERALIZED ANXIETY DISORDER): Chronic | Status: ACTIVE | Noted: 2023-06-19

## 2023-06-19 PROCEDURE — 99232 SBSQ HOSP IP/OBS MODERATE 35: CPT | Performed by: PSYCHIATRY & NEUROLOGY

## 2023-06-19 RX ORDER — HYDROCHLOROTHIAZIDE 25 MG/1
25 TABLET ORAL DAILY
Status: DISCONTINUED | OUTPATIENT
Start: 2023-06-20 | End: 2023-06-20

## 2023-06-19 RX ORDER — HYDROCHLOROTHIAZIDE 25 MG/1
12.5 TABLET ORAL ONCE
Status: COMPLETED | OUTPATIENT
Start: 2023-06-19 | End: 2023-06-19

## 2023-06-19 RX ADMIN — HYDROCHLOROTHIAZIDE 12.5 MG: 25 TABLET ORAL at 12:17

## 2023-06-19 RX ADMIN — HYDROCHLOROTHIAZIDE 12.5 MG: 25 TABLET ORAL at 08:11

## 2023-06-19 RX ADMIN — FLUOXETINE 40 MG: 20 CAPSULE ORAL at 08:11

## 2023-06-19 RX ADMIN — Medication 3 MG: at 21:13

## 2023-06-19 RX ADMIN — NICOTINE 14 MG: 14 PATCH, EXTENDED RELEASE TRANSDERMAL at 08:11
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
## 2023-06-19 NOTE — PROGRESS NOTES
06/19/23 0915   Team Meeting   Meeting Type Daily Rounds   Team Members Present   Team Members Present Physician;Nurse;   Physician Team Member [id number] Team Member SHANDA CARDOSO [facility] Management Team Member Tracy   Patient/Family Present   Patient Present No   Patient's Family Present No     Pt pleasant and cooperative  Tearful at times while speaking with her children on the phone  PRN melatonin  Reporting positive mood changes d/t being back on meds  Discharge to be determined

## 2023-06-19 NOTE — NURSING NOTE
"PRN melatonin 3mg was effective  Patient is sleeping  Patient in the community and social with peers  Patient denies SI/AVH  Patient reported, \"I'm feel like I can take on the world now that I'm back on my meds  \" Patient reported that she was unable to get an appointment with a doctor for refills  Staff to maintain continuous rounding for safety and support    "
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
## 2023-06-19 NOTE — PLAN OF CARE
Problem: Depression  Goal: Treatment Goal: Demonstrate behavioral control of depressive symptoms, verbalize feelings of improved mood/affect, and adopt new coping skills prior to discharge  Outcome: Progressing  Goal: Verbalize thoughts and feelings  Description: Interventions:  - Assess and re-assess patient's level of risk   - Engage patient in 1:1 interactions, daily, for a minimum of 15 minutes   - Encourage patient to express feelings, fears, frustrations, hopes   Interventions:  - Assess and re-assess patient's lethality and potential for self-injury  - Engage patient in 1:1 interactions, daily, for a minimum of 15 minutes  - Encourage patient to express feelings, fears, frustrations, hopes  - Establish rapport/trust with patient   Outcome: Progressing  Goal: Refrain from harming self  Description: Interventions:  - Monitor patient closely, per order   - Supervise medication ingestion, monitor effects and side effects   Outcome: Progressing  Goal: Refrain from isolation  Description: Interventions:  - Develop a trusting relationship   - Encourage socialization   Outcome: Progressing  Goal: Refrain from self-neglect  Outcome: Progressing  Goal: Complete daily ADLs, including personal hygiene independently, as able  Description: Interventions:  - Observe, teach, and assist patient with ADLS  -  Monitor and promote a balance of rest/activity, with adequate nutrition and elimination   Outcome: Progressing     Problem: Ineffective Coping  Goal: Cooperates with admission process  Description: Interventions:   - Complete admission process  Outcome: Progressing  Goal: Identifies ineffective coping skills  Outcome: Progressing  Goal: Identifies healthy coping skills  Outcome: Progressing  Goal: Demonstrates healthy coping skills  Outcome: Progressing  Goal: Patient/Family participate in treatment and DC plans  Description: Interventions:  - Provide therapeutic environment  Outcome: Progressing  Goal: Patient/Family verbalizes awareness of resources  Outcome: Progressing  Goal: Understands least restrictive measures  Description: Interventions:  - Utilize least restrictive behavior  Outcome: Progressing  Goal: Free from restraint events  Description: - Utilize least restrictive measures   - Provide behavioral interventions   - Redirect inappropriate behaviors   Outcome: Progressing

## 2023-06-19 NOTE — NURSING NOTE
Patient c/o difficulty sleeping  PRN melatonin 3mg was administered  Will monitor for effectiveness

## 2023-06-19 NOTE — NURSING NOTE
"Patient woke up pleasant, calm and cooperative  Patient was compliant with mornings vitals and scheduled medications  Patient denies any increased depression and anxiety  Patient reports, \"feelings much better and happy that blood pressure isn't high since starting BP med  \" Patient denied thoughts of SI  Staff to maintain continuous rounding for safety and support    "

## 2023-06-19 NOTE — PROGRESS NOTES
06/17/23 1430   Activity/Group Checklist   Group Other (Comment)  (OPEN STUDIO Art Therapy/Social Group)   Attendance Attended   Attendance Duration (min) Greater than 60   Interactions Interacted appropriately   Affect/Mood Appropriate   Goals Achieved Able to listen to others; Able to engage in interactions

## 2023-06-19 NOTE — NURSING NOTE
Pt is calm and cooperative upon approach  Patient is medication and meal compliant  Pt expressed excitement for discharge  Pt attended all groups  Pt stated that they felt that the groups are helping  Pt also observed talking on the phone and utilizing skills developed in group  Pt denies SI, HI, AH, and VH  Pt is medication and meal compliant

## 2023-06-20 ENCOUNTER — TELEPHONE (OUTPATIENT)
Dept: FAMILY MEDICINE CLINIC | Facility: CLINIC | Age: 33
End: 2023-06-20

## 2023-06-20 PROBLEM — Z00.8 MEDICAL CLEARANCE FOR PSYCHIATRIC ADMISSION: Status: RESOLVED | Noted: 2023-06-16 | Resolved: 2023-06-20

## 2023-06-20 PROCEDURE — 99232 SBSQ HOSP IP/OBS MODERATE 35: CPT | Performed by: PSYCHIATRY & NEUROLOGY

## 2023-06-20 RX ORDER — AMLODIPINE BESYLATE 5 MG/1
5 TABLET ORAL DAILY
Status: DISCONTINUED | OUTPATIENT
Start: 2023-06-21 | End: 2023-06-21 | Stop reason: HOSPADM

## 2023-06-20 RX ADMIN — Medication 3 MG: at 21:11

## 2023-06-20 RX ADMIN — FLUOXETINE 40 MG: 20 CAPSULE ORAL at 08:20

## 2023-06-20 RX ADMIN — HYDROCHLOROTHIAZIDE 25 MG: 25 TABLET ORAL at 08:20

## 2023-06-20 NOTE — SOCIAL WORK
This writer met with patient and facilitated family meeting with patient and her father  Father is in aggreement with discharge tomorrow  Patient's family transport her home day of discharge  Patient is in agreement with discharge  Patient will contact her PCP for f/u appointment  Patient is scheduled for OP behavioral health appointment at [facility] with Dr Sebastian Chamberlain  Patient is scheduled for d/c at 1100/

## 2023-06-20 NOTE — TREATMENT TEAM
06/20/23 1300   Activity/Group Checklist   Group   (recovery group)   Attendance Attended   Attendance Duration (min) 46-60   Interactions Interacted appropriately   Affect/Mood Appropriate   Goals Achieved Discussed coping strategies; Discussed self-esteem issues; Able to listen to others; Able to engage in interactions; Able to reflect/comment on own behavior;Able to self-disclose; Able to recieve feedback     Patient is accepting responsibility for her recovery and past actions

## 2023-06-20 NOTE — PLAN OF CARE
Problem: Depression  Goal: Treatment Goal: Demonstrate behavioral control of depressive symptoms, verbalize feelings of improved mood/affect, and adopt new coping skills prior to discharge  Outcome: Progressing  Goal: Verbalize thoughts and feelings  Description: Interventions:  - Assess and re-assess patient's level of risk   - Engage patient in 1:1 interactions, daily, for a minimum of 15 minutes   - Encourage patient to express feelings, fears, frustrations, hopes   Interventions:  - Assess and re-assess patient's lethality and potential for self-injury  - Engage patient in 1:1 interactions, daily, for a minimum of 15 minutes  - Encourage patient to express feelings, fears, frustrations, hopes  - Establish rapport/trust with patient   Outcome: Progressing  Goal: Refrain from harming self  Description: Interventions:  - Monitor patient closely, per order   - Supervise medication ingestion, monitor effects and side effects   Outcome: Progressing  Goal: Refrain from isolation  Description: Interventions:  - Develop a trusting relationship   - Encourage socialization   Outcome: Progressing  Goal: Refrain from self-neglect  Outcome: Progressing  Goal: Complete daily ADLs, including personal hygiene independently, as able  Description: Interventions:  - Observe, teach, and assist patient with ADLS  -  Monitor and promote a balance of rest/activity, with adequate nutrition and elimination   Outcome: Progressing     Problem: Ineffective Coping  Goal: Cooperates with admission process  Description: Interventions:   - Complete admission process  Outcome: Progressing  Goal: Identifies ineffective coping skills  Outcome: Progressing  Goal: Identifies healthy coping skills  Outcome: Progressing  Goal: Demonstrates healthy coping skills  Outcome: Progressing  Goal: Patient/Family participate in treatment and DC plans  Description: Interventions:  - Provide therapeutic environment  Outcome: Progressing  Goal: Patient/Family verbalizes awareness of resources  Outcome: Progressing  Goal: Understands least restrictive measures  Description: Interventions:  - Utilize least restrictive behavior  Outcome: Progressing  Goal: Free from restraint events  Description: - Utilize least restrictive measures   - Provide behavioral interventions   - Redirect inappropriate behaviors   Outcome: Progressing     Problem: Risk for Self Injury/Neglect  Goal: Verbalize thoughts and feelings  Description: Interventions:  - Assess and re-assess patient's level of risk   - Engage patient in 1:1 interactions, daily, for a minimum of 15 minutes   - Encourage patient to express feelings, fears, frustrations, hopes   Interventions:  - Assess and re-assess patient's lethality and potential for self-injury  - Engage patient in 1:1 interactions, daily, for a minimum of 15 minutes  - Encourage patient to express feelings, fears, frustrations, hopes  - Establish rapport/trust with patient   Outcome: Progressing  Goal: Treatment Goal: Remain safe during length of stay, learn and adopt new coping skills, and be free of self-injurious ideation, impulses and acts at the time of discharge  Outcome: Progressing  Goal: Refrain from harming self  Description: Interventions:  - Monitor patient closely, per order  - Develop a trusting relationship  - Supervise medication ingestion, monitor effects and side effects   Outcome: Progressing  Goal: Recognize maladaptive responses and adopt new coping mechanisms  Outcome: Progressing     Problem: Anxiety  Goal: Anxiety is at manageable level  Description: Interventions:  - Assess and monitor patient's anxiety level  - Monitor for signs and symptoms (heart palpitations, chest pain, shortness of breath, headaches, nausea, feeling jumpy, restlessness, irritable, apprehensive)  - Collaborate with interdisciplinary team and initiate plan and interventions as ordered    - [place] patient to unit/surroundings  - Explain treatment plan  - Encourage participation in care  - Encourage verbalization of concerns/fears  - Identify coping mechanisms  - Assist in developing anxiety-reducing skills  - Administer/offer alternative therapies  - Limit or eliminate stimulants  Outcome: Progressing     Problem: DISCHARGE PLANNING  Goal: Discharge to home or other facility with appropriate resources  Description: INTERVENTIONS:  - Identify barriers to discharge w/patient and caregiver  - Arrange for needed discharge resources and transportation as appropriate  - Identify discharge learning needs (meds, wound care, etc )  - Refer to Case Management Department for coordinating discharge planning if the patient needs post-hospital services based on physician/advanced practitioner order or complex needs related to functional status, cognitive ability, or social support system  Outcome: Progressing

## 2023-06-20 NOTE — NURSING NOTE
PT observed calm, cooperative upon approach, interacting with other peers in the day room  Denies SI/HI/VH/AH, looking forward to discharge per planned, no concerns during this shift  PRN Melatonin 3mg given for insomnia at 2113

## 2023-06-20 NOTE — PROGRESS NOTES
06/20/23 0816   Team Meeting   Meeting Type Daily Rounds   Team Members Present   Team Members Present Physician;Nurse;   Physician Team Member [address] Team Member [place]ck[place]   Social Work Team Member [id number]   Patient/Family Present   Patient Present No   Patient's Family Present No     Patient is compliant with medications  Need family meeting prior to discharge  She attends groups   D/C 6/21

## 2023-06-20 NOTE — NURSING NOTE
Patient is calm and cooperative upon approach  Patient is visible in dayroom, eating breakfast  Patient denies SI/HI/AH/VH  Patient is compliant with meds and meals

## 2023-06-20 NOTE — PROGRESS NOTES
"Progress Note - Behavioral Health   Lior AlejandroManny 28 y o  female MRN: [id number]  Unit/Bed#: -01 Encounter: [id number]    Assessment/Plan   Principal Problem:    Major depressive disorder, recurrent, severe without psychotic features ([facility] [address] )  Active Problems:    Post-traumatic stress disorder, chronic    Medical clearance for psychiatric admission    Hypokalemia    Tobacco use    ERNESTO (generalized anxiety disorder)      Recommended Treatment:   • No psychopharmacologic changes necessary at this time; will continue to assess for further optimization  • Continue with current medications:  o prozac 40mg daily for anxiety and depression  • Encourage patient to participate in nonverbal forms of therapy including journaling and art/music therapy, and encourage participation in group, occupational, and milieu therapy  • Continue frequent safety checks and vitals per unit protocol  • Continue to collaborate with CM/SW to assist with collateral, disposition planning, and the implementation of an individualized, patient-centered plan of care  • Continue medical management by medical team as indicated  • Case discussed with treatment team   • Disposition: To be determined, coordinating with case management, will return to previous living arrangement, discharge date 6/21/23    Legal Status: 201  ------------------------------------------------------------    Subjective: All documentation including nursing notes, medication history to ensure medication adherence on the unit, labs, and vitals were reviewed  Over the past 24 hours per nursing report, Rosalie Lombardo has been cooperative on the unit and compliant with medications  She has been social with peers and visible on the unit  Rosalie Lombardo was evaluated this morning for continuity of care and no acute distress was noted throughout the evaluation  She reports feeling \"good  \" Rosalie Lombardo notes having adequate sleep  She endorses having good appetite   Rosalie Lombardo has been  taking all " "medications as prescribed and reports no side effects  She does report increased urinary frequency due to thiazide, per discussion with pharmacy will be discussed with medicine  Patient feels her anxiety and depression and both improving and she is future oriented and feeling more positively  Today, Jay Jay Conner is consenting for safety on the unit  Jay Jay Conner denies suicidal ideations, and denies homicidal ideations  Regarding hallucinations, Jay Jay Conner denies auditory and visual hallucinations  VS: Reviewed, within normal limits     Progress Toward Goals:  Overall improvement - anxiety and depression improving, future oriented    Psychiatric Review of Systems:  Behavior over the last 24 hours:  unchanged  Sleep: normal  Appetite: normal  Medication side effects: No   ROS: denies any shortness of breath or chest pain, all other systems are negative    Vital signs in last 24 hours:  Temp:  [97 5 °F (36 4 °C)-97 8 °F (36 6 °C)] 97 8 °F (36 6 °C)  HR:  [79-85] 85  Resp:  [16-18] 18  BP: (137-138)/(84-86) 137/84    Laboratory results:  I have personally reviewed all pertinent laboratory/tests results  No results found for this or any previous visit (from the past 48 hour(s))        Mental Status Evaluation:    Appearance:  age appropriate, dressed appropriately, adequate grooming   Behavior:  cooperative, calm   Speech:  normal rate, normal volume, normal pitch   Mood:  \"good\"   Affect:  reactive   Thought Process:  organized, logical, goal directed   Associations: intact associations   Thought Content:  no overt delusions   Perceptual Disturbances: Denies auditory or visual hallucinations and Does not appear to be responding to internal stimuli   Risk Potential: Suicidal ideation - None at present  Homicidal ideation - None  Potential for aggression - No   Sensorium:  oriented to person, place and time/date   Memory:  recent and remote memory grossly intact   Consciousness:  alert and awake   Attention/Concentration: attention " span and concentration are age appropriate   Insight:  improving   Judgment: improving   Gait/Station: normal gait/station   Motor Activity: no abnormal movements       Current Medications:  Current Facility-Administered Medications   Medication Dose Route Frequency Provider Last Rate   • acetaminophen  650 mg Oral Q6H PRN Joe Cortes MD     • acetaminophen  650 mg Oral Q4H PRN Joe Cortes MD     • acetaminophen  975 mg Oral Q6H PRN Joe Cortes MD     • albuterol  2 puff Inhalation Q6H PRN Joe Cortes MD     • aluminum-magnesium hydroxide-simethicone  30 mL Oral Q4H PRN Joe Cortes MD     • haloperidol lactate  2 5 mg Intramuscular Q4H PRN Max 4/day Joe Cortes MD      And   • LORazepam  1 mg Intramuscular Q4H PRN Max 4/day Joe Cortes MD      And   • benztropine  0 5 mg Intramuscular Q4H PRN Max 4/day Joe Cortes MD     • haloperidol lactate  5 mg Intramuscular Q4H PRN Max 4/day Joe Cortes MD      And   • LORazepam  2 mg Intramuscular Q4H PRN Max 4/day Joe Cortes MD      And   • benztropine  1 mg Intramuscular Q4H PRN Max 4/day Joe Cortes MD     • benztropine  1 mg Oral Q4H PRN Max 6/day Joe Cortes MD     • bisacodyl  10 mg Rectal Daily PRN Joe Cortes MD     • hydrOXYzine HCL  50 mg Oral Q6H PRN Max 4/day Joe Cortes MD      Or   • diphenhydrAMINE  50 mg Intramuscular Q6H PRN Joe Cortes MD     • FLUoxetine  40 mg Oral Daily Joe Cortes MD     • haloperidol  1 mg Oral Q6H PRN Joe Cortes MD     • haloperidol  2 5 mg Oral Q4H PRN Max 4/day Joe Cortes MD     • haloperidol  5 mg Oral Q4H PRN Max 4/day Joe Cortes MD     • hydrochlorothiazide  25 mg Oral Daily DANIEL Gong     • hydrOXYzine HCL  100 mg Oral Q6H PRN Max 4/day Joe Cortes MD      Or   • LORazepam  2 mg Intramuscular Q6H PRN Joe Cortes MD     • hydrOXYzine HCL  25 mg Oral Q6H PRN Max 4/day Joe Cortes MD • melatonin  3 mg Oral HS PRN Paul Montano DO     • nicotine  14 mg Transdermal Daily Kyrie Caro PAUlisesC     • nicotine polacrilex  4 mg Oral Q2H PRN Christina Rae MD     • polyethylene glycol  17 g Oral Daily PRN Christina Rae MD     • senna-docusate sodium  1 tablet Oral Daily PRN Christina Rae MD     • traZODone  50 mg Oral HS PRN Christina Rae MD         Suicide/Homicide Risk Assessment:  Risk of Harm to Self:   • Nursing Suicide Risk Assessment Last 24 hours: C-SSRS Risk (Since Last Contact)  • Calculated C-SSRS Risk Score (Since Last Contact): No Risk Indicated  • Current Specific Risk Factors include: diagnosis of depression  • Based on today's assessment, Gabbi Tran presents the following risk of harm to self: low    Risk of Harm to Others:  • Nursing Homicide Risk Assessment: Violence Risk to Others: Denies within past 6 months  • Current Specific Risk Factors include: multiple stressors  • Based on today's assessment, Gabbi Tran presents the following risk of harm to others: low    The following interventions are recommended: behavioral checks every 7 minutes, continued hospitalization on locked unit    Behavioral Health Medications: All current active meds have been reviewed  Changes as in plan section above  Risks, benefits and possible side effects of Medications:   Risks, benefits, and possible side effects of medications explained to patient and patient verbalizes understanding  Counseling / Coordination of Care:  Patient's progress discussed with staff in treatment team meeting  Medications, treatment progress and treatment plan reviewed with patient  Lynda Hartmann MD 06/20/23  Psychiatry Resident, PGY-II    This note was completed in part utilizing [email or web address] Direct Software   Grammatical, translation, syntax errors, random word insertions, spelling mistakes, and incomplete sentences may be an occasional consequence of this system secondary to software limitations with voice recognition, ambient noise, and hardware issues  If you have any questions or concerns about the content, text, or information contained within the body of this dictation, please contact the provider for clarification

## 2023-06-20 NOTE — PROGRESS NOTES
NEELY Group Note     06/18/23 1430   Activity/Group Checklist   Group Life Skills  (Teamwork and Communication)   Attendance Attended   Attendance Duration (min) Greater than 60   Interactions Interacted appropriately  (participated with encouragement)   Affect/Mood Appropriate;Bright   Goals Achieved Identified feelings; Discussed coping strategies; Able to listen to others; Able to engage in interactions; Able to reflect/comment on own behavior;Able to self-disclose; Able to recieve feedback; Able to give feedback to another

## 2023-06-21 VITALS
WEIGHT: 115.6 LBS | OXYGEN SATURATION: 96 % | HEIGHT: 64 IN | DIASTOLIC BLOOD PRESSURE: 83 MMHG | HEART RATE: 85 BPM | TEMPERATURE: 97.6 F | BODY MASS INDEX: 19.74 KG/M2 | SYSTOLIC BLOOD PRESSURE: 126 MMHG | RESPIRATION RATE: 16 BRPM

## 2023-06-21 LAB
ANION GAP SERPL CALCULATED.3IONS-SCNC: 9 MMOL/L
BUN SERPL-MCNC: 10 MG/DL (ref 5–25)
CALCIUM SERPL-MCNC: 9.5 MG/DL (ref 8.4–10.2)
CHLORIDE SERPL-SCNC: 103 MMOL/L (ref 96–108)
CO2 SERPL-SCNC: 25 MMOL/L (ref 21–32)
CREAT SERPL-MCNC: 0.69 MG/DL (ref 0.6–1.3)
GFR SERPL CREATININE-BSD FRML MDRD: 115 ML/MIN/1.73SQ M
GLUCOSE P FAST SERPL-MCNC: 86 MG/DL (ref 65–99)
GLUCOSE SERPL-MCNC: 86 MG/DL (ref 65–140)
POTASSIUM SERPL-SCNC: 3.3 MMOL/L (ref 3.5–5.3)
SODIUM SERPL-SCNC: 137 MMOL/L (ref 135–147)

## 2023-06-21 PROCEDURE — 99238 HOSP IP/OBS DSCHRG MGMT 30/<: CPT | Performed by: PSYCHIATRY & NEUROLOGY

## 2023-06-21 PROCEDURE — 80048 BASIC METABOLIC PNL TOTAL CA: CPT | Performed by: NURSE PRACTITIONER

## 2023-06-21 RX ORDER — POTASSIUM CHLORIDE 20 MEQ/1
40 TABLET, EXTENDED RELEASE ORAL ONCE
Status: COMPLETED | OUTPATIENT
Start: 2023-06-21 | End: 2023-06-21

## 2023-06-21 RX ORDER — FLUOXETINE HYDROCHLORIDE 40 MG/1
40 CAPSULE ORAL DAILY
Qty: 30 CAPSULE | Refills: 1 | Status: SHIPPED | OUTPATIENT
Start: 2023-06-22

## 2023-06-21 RX ORDER — AMLODIPINE BESYLATE 5 MG/1
5 TABLET ORAL DAILY
Qty: 30 TABLET | Refills: 0 | Status: SHIPPED | OUTPATIENT
Start: 2023-06-22

## 2023-06-21 RX ADMIN — POTASSIUM CHLORIDE 40 MEQ: 1500 TABLET, EXTENDED RELEASE ORAL at 08:53

## 2023-06-21 RX ADMIN — AMLODIPINE BESYLATE 5 MG: 5 TABLET ORAL at 08:53

## 2023-06-21 RX ADMIN — FLUOXETINE 40 MG: 20 CAPSULE ORAL at 08:53

## 2023-06-21 NOTE — PLAN OF CARE
Problem: Depression  Goal: Treatment Goal: Demonstrate behavioral control of depressive symptoms, verbalize feelings of improved mood/affect, and adopt new coping skills prior to discharge  Outcome: Completed  Goal: Verbalize thoughts and feelings  Description: Interventions:  - Assess and re-assess patient's level of risk   - Engage patient in 1:1 interactions, daily, for a minimum of 15 minutes   - Encourage patient to express feelings, fears, frustrations, hopes   Interventions:  - Assess and re-assess patient's lethality and potential for self-injury  - Engage patient in 1:1 interactions, daily, for a minimum of 15 minutes  - Encourage patient to express feelings, fears, frustrations, hopes  - Establish rapport/trust with patient   Outcome: Completed  Goal: Refrain from harming self  Description: Interventions:  - Monitor patient closely, per order   - Supervise medication ingestion, monitor effects and side effects   Outcome: Completed  Goal: Refrain from isolation  Description: Interventions:  - Develop a trusting relationship   - Encourage socialization   Outcome: Completed  Goal: Refrain from self-neglect  Outcome: Completed  Goal: Complete daily ADLs, including personal hygiene independently, as able  Description: Interventions:  - Observe, teach, and assist patient with ADLS  -  Monitor and promote a balance of rest/activity, with adequate nutrition and elimination   Outcome: Completed     Problem: Ineffective Coping  Goal: Cooperates with admission process  Description: Interventions:   - Complete admission process  Outcome: Completed  Goal: Identifies ineffective coping skills  Outcome: Completed  Goal: Identifies healthy coping skills  Outcome: Completed  Goal: Demonstrates healthy coping skills  Outcome: Completed  Goal: Patient/Family participate in treatment and DC plans  Description: Interventions:  - Provide therapeutic environment  Outcome: Completed  Goal: Patient/Family verbalizes awareness of resources  Outcome: Completed  Goal: Understands least restrictive measures  Description: Interventions:  - Utilize least restrictive behavior  Outcome: Completed  Goal: Free from restraint events  Description: - Utilize least restrictive measures   - Provide behavioral interventions   - Redirect inappropriate behaviors   Outcome: Completed     Problem: Risk for Self Injury/Neglect  Goal: Verbalize thoughts and feelings  Description: Interventions:  - Assess and re-assess patient's level of risk   - Engage patient in 1:1 interactions, daily, for a minimum of 15 minutes   - Encourage patient to express feelings, fears, frustrations, hopes   Interventions:  - Assess and re-assess patient's lethality and potential for self-injury  - Engage patient in 1:1 interactions, daily, for a minimum of 15 minutes  - Encourage patient to express feelings, fears, frustrations, hopes  - Establish rapport/trust with patient   Outcome: Completed  Goal: Treatment Goal: Remain safe during length of stay, learn and adopt new coping skills, and be free of self-injurious ideation, impulses and acts at the time of discharge  Outcome: Completed  Goal: Refrain from harming self  Description: Interventions:  - Monitor patient closely, per order  - Develop a trusting relationship  - Supervise medication ingestion, monitor effects and side effects   Outcome: Completed  Goal: Attend and participate in unit activities, including therapeutic, recreational, and educational groups  Description: Interventions:  - Provide therapeutic and educational activities daily, encourage attendance and participation, and document same in the medical record  - Obtain collateral information, encourage visitation and family involvement in care   Outcome: Completed  Goal: Recognize maladaptive responses and adopt new coping mechanisms  Outcome: Completed     Problem: Anxiety  Goal: Anxiety is at manageable level  Description: Interventions:  - Assess and monitor patient's anxiety level  - Monitor for signs and symptoms (heart palpitations, chest pain, shortness of breath, headaches, nausea, feeling jumpy, restlessness, irritable, apprehensive)  - Collaborate with interdisciplinary team and initiate plan and interventions as ordered    - [place] patient to unit/surroundings  - Explain treatment plan  - Encourage participation in care  - Encourage verbalization of concerns/fears  - Identify coping mechanisms  - Assist in developing anxiety-reducing skills  - Administer/offer alternative therapies  - Limit or eliminate stimulants  Outcome: Completed     Problem: DISCHARGE PLANNING  Goal: Discharge to home or other facility with appropriate resources  Description: INTERVENTIONS:  - Identify barriers to discharge w/patient and caregiver  - Arrange for needed discharge resources and transportation as appropriate  - Identify discharge learning needs (meds, wound care, etc )  - Refer to Case Management Department for coordinating discharge planning if the patient needs post-hospital services based on physician/advanced practitioner order or complex needs related to functional status, cognitive ability, or social support system  Outcome: Completed

## 2023-06-21 NOTE — BH TRANSITION RECORD
Contact Information: If you have any questions, concerns, pended studies, tests and/or procedures, or emergencies regarding your inpatient behavioral health visit  Please contact [facility] behavioral health unit 3P [phone] and ask to speak to a , nurse or physician  A contact is available 24 hours/ 7 days a week at this number  Summary of Procedures Performed During your Stay:  Below is a list of major procedures performed during your hospital stay and a summary of results:  - Cardiac Procedures/Studies: EKG: Normal sinus rhythm with incomplete right bundle branch block    Pending Studies (From admission, onward)    None        Please follow up on the above pending studies with your PCP and/or referring provider

## 2023-06-21 NOTE — NURSING NOTE
Pt appeared calm as staff escorted her off Unit 3P at 1100to the lobby of the hospital, where members of his immediate family were waiting to great her  Immediately prior to exiting the unit, pt was given all personal belongings and discharge instructions  Pt verbally acknowledged understanding of all education material, schedule of follow-up care, and medication instructions

## 2023-06-21 NOTE — QUICK NOTE
Potassium at 3 3 today  Likely secondary to recent hydrochlorothiazide increase  Hydrochlorothiazide has been discontinued  Will give potassium chloride 40 mEq today  Patient can follow-up with PCP upon discharge for routine monitoring

## 2023-06-21 NOTE — NURSING NOTE
Pt is calm, cooperative, and pleasant upon approach  Social and visible in the milieu with staff and peers  Denies SI, HI, hallucinations and pain  Expresses readiness/ excitement over discharge  Pt c/o insomnia, given melatonin 3mg PRN at 2111  One hour later pt asleep, effective  Compliant with unit rules and snack  Pt denies unmet needs/ concerns at this time

## 2023-06-21 NOTE — PROGRESS NOTES
06/20/23 1400   Activity/Group Checklist   Group Other (Comment)  (Group Art Therapy/Psychodynamic,Open Choice with Discussion)   Attendance Attended   Attendance Duration (min) Greater than 60   Interactions Interacted appropriately   Affect/Mood Appropriate   Goals Achieved Discussed coping strategies; Increased hopefulness; Able to listen to others; Able to engage in interactions; Able to recieve feedback; Able to give feedback to another  (Able to engage materials; full participation with discussion)

## 2023-06-21 NOTE — PROGRESS NOTES
06/21/23 0826   Team Meeting   Meeting Type Daily Rounds   Team Members Present   Team Members Present Physician;Nurse;   Physician Team Member Timoteo Brannon   Nursing Team Member Aparna Christie   Social Work Team Member [address]   Patient/Family Present   Patient Present No   Patient's Family Present No     Patient is compliant with medications  D/C today at 1100

## 2023-06-21 NOTE — NURSING NOTE
Pt calm,  Pleasant, and cooperative, visible, preparing for discharge  Pt denies all psych symptoms and is med and meal compliant, aside from Nicotine patch which was refused because pt says it gives her a rash  Pt currently getting belongings in order, no unmet needs expressed

## 2023-06-22 NOTE — CASE MANAGEMENT
Case Management Discharge Planning Note    Patient name Fady Angel  Location [facility]92-04 MRN [id number]  : 1990 Date 2023       Current Admission Date: 6/15/2023  Current Admission Diagnosis:Major depressive disorder, recurrent, severe without psychotic features [facility])   Patient Active Problem List    Diagnosis Date Noted   • ERNESTO (generalized anxiety disorder) 2023   • Major depressive disorder, recurrent, severe without psychotic features ([facility] [address] ) 2023   • Hypokalemia 2023   • Tobacco use 2023   • Post-traumatic stress disorder, chronic 2023   • Seizure-like activity ([facility] [address] ) 2023   • Elevated blood pressure reading 2023   • Left leg weakness 2022   • Hoarseness 2022   • Bruises easily 2021   • Generalized body aches 11/15/2021   • Allergic reaction 2021   • Word finding problem 10/28/2021   • Ambulatory dysfunction 10/28/2021   • Exposure to COVID-19 virus 2020   • Urgency of urination 11/10/2020   • Acute midline low back pain without sciatica 2020   • MS (multiple sclerosis) ([facility] [address] ) 2020   • Numbness and tingling of both legs 2019   • Palpitation 2019   • Neurogenic bladder 2019   • Vertigo 2019   • Migraine without aura and without status migrainosus, not intractable 10/03/2019   • Syncopal episode 10/03/2019   • Encounter for well adult exam with abnormal findings 2019   • Ventral hernia without obstruction or gangrene 2019   • Other headache syndrome 2019   • Tobacco abuse    • Umbilical hernia without obstruction and without gangrene 2016   • Mild persistent asthma without complication       LOS (days): 6  Geometric Mean LOS (GMLOS) (days):   Days to GMLOS:     OBJECTIVE:  Risk of Unplanned Readmission Score: 17 3         Current admission status: Inpatient Psych   Preferred Pharmacy:   [address],[facility], [address]  [address] [address]  Phone: [phone] Fax: [phone]    Primary Care Provider: Louisa Luna MD    Primary Insurance: Floreen Lowdewey  Secondary Insurance: [address] [place] [address]    DISCHARGE DETAILS:    Discharge planning discussed with[de-identified] Patient and Parents          Patient is discharged from the unit on this day  Denied SI/HI, psychosis and/or A/V  No questions verbalized  Patient is in agreement with discharge  Patient provided with after care appointment, discharge instructions and medication regime reviewed  Affect appropriate  Accompanied to the lobby where pt was met by family members   Patient transported to home by family   All belongings returned                Contacts  Patient Contacts: Eliezer Lucas  Relationship to Patient[de-identified] Family  Contact Method: Phone  Phone Number: [phone]  Reason/Outcome: Emergency Contact, Continuity of Care              Other Referral/Resources/Interventions Provided:  Referrals Provided[de-identified] Psychiatrist    Would you like to participate in our [address] service program?  : No - Declined

## 2023-06-29 ENCOUNTER — TELEPHONE (OUTPATIENT)
Dept: OBGYN CLINIC | Facility: CLINIC | Age: 33
End: 2023-06-29

## 2023-07-10 ENCOUNTER — OCCMED (OUTPATIENT)
Dept: URGENT CARE | Facility: MEDICAL CENTER | Age: 33
End: 2023-07-10

## 2023-07-10 ENCOUNTER — APPOINTMENT (OUTPATIENT)
Dept: LAB | Facility: MEDICAL CENTER | Age: 33
End: 2023-07-10

## 2023-07-10 ENCOUNTER — TELEPHONE (OUTPATIENT)
Dept: FAMILY MEDICINE CLINIC | Facility: CLINIC | Age: 33
End: 2023-07-10

## 2023-07-10 DIAGNOSIS — Z02.1 PRE-EMPLOYMENT HEALTH SCREENING EXAMINATION: Primary | ICD-10-CM

## 2023-07-10 DIAGNOSIS — Z02.1 PRE-EMPLOYMENT HEALTH SCREENING EXAMINATION: ICD-10-CM

## 2023-07-10 PROCEDURE — 86480 TB TEST CELL IMMUN MEASURE: CPT

## 2023-07-10 PROCEDURE — 36415 COLL VENOUS BLD VENIPUNCTURE: CPT

## 2023-07-10 NOTE — TELEPHONE ENCOUNTER
Left a message to schedule appointment Patient is due for physical and ED follow up. Letter previously sent.

## 2023-07-12 LAB
GAMMA INTERFERON BACKGROUND BLD IA-ACNC: 0.04 IU/ML
M TB IFN-G BLD-IMP: NEGATIVE
M TB IFN-G CD4+ BCKGRND COR BLD-ACNC: -0.01 IU/ML
M TB IFN-G CD4+ BCKGRND COR BLD-ACNC: 0.01 IU/ML
MITOGEN IGNF BCKGRD COR BLD-ACNC: >10 IU/ML

## 2023-07-19 NOTE — ASSESSMENT & PLAN NOTE
· VSS  Reviewed admission labs with the exception of vitamin d, b12, and folate  Patient is medically cleared for admission to the [facility] for treatment of the underlying psychiatric illness  SLIM will sign off   Please call with questions or concerns show

## 2023-08-01 ENCOUNTER — TELEPHONE (OUTPATIENT)
Dept: PSYCHIATRY | Facility: CLINIC | Age: 33
End: 2023-08-01

## 2023-08-01 NOTE — LETTER
23     Kylesavanna Teo   : 1990   [address] [address]       It is the policy of [facility] to monitor and manage appointments that have been no-showed or cancelled with less than 48-hour notice. This is necessary to ensure that we are able to provide timely access for all patients to our providers. Undue numbers of unutilized appointments delays necessary medical care for all patients. Dear Jay Bruce : We are sorry that you missed your appointment with Ramses Walter MD on 2023. Your health and follow-up care are important to us. As this was a New Patient appointment, you will need to contact the Office at [phone] if you would like to reschedule. Please be aware that our office policy states that if you 'no show' two Medication Management  appointments in a row without prior notice of cancellation, or three or more in a calendar year, you may be discharged from Medication Management  treatment. We want to bring this to your attention now to prevent an interruption of your care. If you have any questions about this policy, please call us at the number above. If we do not hear from you within 10 business days to make a follow up appointment, we will assume you are no longer interested in care here. Thank you in advance for your cooperation and assistance.        Sincerely,      [facility] Support Staff

## 2023-08-08 ENCOUNTER — OFFICE VISIT (OUTPATIENT)
Dept: OBGYN CLINIC | Facility: CLINIC | Age: 33
End: 2023-08-08

## 2023-08-08 VITALS
DIASTOLIC BLOOD PRESSURE: 108 MMHG | SYSTOLIC BLOOD PRESSURE: 168 MMHG | WEIGHT: 113.4 LBS | BODY MASS INDEX: 19.47 KG/M2

## 2023-08-08 DIAGNOSIS — Z11.3 SCREEN FOR STD (SEXUALLY TRANSMITTED DISEASE): ICD-10-CM

## 2023-08-08 DIAGNOSIS — N76.0 BV (BACTERIAL VAGINOSIS): ICD-10-CM

## 2023-08-08 DIAGNOSIS — N89.8 VAGINAL DISCHARGE: Primary | ICD-10-CM

## 2023-08-08 DIAGNOSIS — Z30.09 UNWANTED FERTILITY: ICD-10-CM

## 2023-08-08 DIAGNOSIS — N89.8 VAGINAL ODOR: ICD-10-CM

## 2023-08-08 DIAGNOSIS — B96.89 BV (BACTERIAL VAGINOSIS): ICD-10-CM

## 2023-08-08 PROBLEM — R29.898 LEFT LEG WEAKNESS: Status: RESOLVED | Noted: 2022-07-26 | Resolved: 2023-08-08

## 2023-08-08 PROBLEM — T78.40XA ALLERGIC REACTION: Status: RESOLVED | Noted: 2021-11-11 | Resolved: 2023-08-08

## 2023-08-08 PROBLEM — R23.3 BRUISES EASILY: Status: RESOLVED | Noted: 2021-12-03 | Resolved: 2023-08-08

## 2023-08-08 PROBLEM — F41.1 GAD (GENERALIZED ANXIETY DISORDER): Chronic | Status: RESOLVED | Noted: 2023-06-19 | Resolved: 2023-08-08

## 2023-08-08 PROBLEM — R55 SYNCOPE: Status: RESOLVED | Noted: 2019-10-03 | Resolved: 2023-08-08

## 2023-08-08 PROBLEM — R47.89 WORD FINDING PROBLEM: Status: RESOLVED | Noted: 2021-10-28 | Resolved: 2023-08-08

## 2023-08-08 PROBLEM — M54.50 ACUTE MIDLINE LOW BACK PAIN WITHOUT SCIATICA: Status: RESOLVED | Noted: 2020-09-17 | Resolved: 2023-08-08

## 2023-08-08 PROBLEM — R56.9 SEIZURE-LIKE ACTIVITY (HCC): Status: RESOLVED | Noted: 2023-01-13 | Resolved: 2023-08-08

## 2023-08-08 PROBLEM — R20.0 NUMBNESS AND TINGLING OF BOTH LEGS: Status: RESOLVED | Noted: 2019-12-19 | Resolved: 2023-08-08

## 2023-08-08 PROBLEM — Z00.01 ENCOUNTER FOR WELL ADULT EXAM WITH ABNORMAL FINDINGS: Status: RESOLVED | Noted: 2019-07-11 | Resolved: 2023-08-08

## 2023-08-08 PROBLEM — R26.2 AMBULATORY DYSFUNCTION: Status: RESOLVED | Noted: 2021-10-28 | Resolved: 2023-08-08

## 2023-08-08 PROBLEM — R03.0 ELEVATED BLOOD PRESSURE READING: Status: RESOLVED | Noted: 2023-01-13 | Resolved: 2023-08-08

## 2023-08-08 PROBLEM — K43.9 VENTRAL HERNIA WITHOUT OBSTRUCTION OR GANGRENE: Status: RESOLVED | Noted: 2019-06-13 | Resolved: 2023-08-08

## 2023-08-08 PROBLEM — Z20.822 EXPOSURE TO COVID-19 VIRUS: Status: RESOLVED | Noted: 2020-11-30 | Resolved: 2023-08-08

## 2023-08-08 PROBLEM — E87.6 HYPOKALEMIA: Status: RESOLVED | Noted: 2023-06-16 | Resolved: 2023-08-08

## 2023-08-08 PROBLEM — R20.2 NUMBNESS AND TINGLING OF BOTH LEGS: Status: RESOLVED | Noted: 2019-12-19 | Resolved: 2023-08-08

## 2023-08-08 PROBLEM — R39.15 URGENCY OF URINATION: Status: RESOLVED | Noted: 2020-11-10 | Resolved: 2023-08-08

## 2023-08-08 PROBLEM — Z72.0 TOBACCO ABUSE: Status: RESOLVED | Noted: 2017-03-06 | Resolved: 2023-08-08

## 2023-08-08 PROBLEM — G44.89 OTHER HEADACHE SYNDROME: Status: RESOLVED | Noted: 2019-06-13 | Resolved: 2023-08-08

## 2023-08-08 PROBLEM — F33.2 MAJOR DEPRESSIVE DISORDER, RECURRENT, SEVERE WITHOUT PSYCHOTIC FEATURES (HCC): Chronic | Status: RESOLVED | Noted: 2023-06-16 | Resolved: 2023-08-08

## 2023-08-08 PROBLEM — Z72.0 TOBACCO USE: Status: RESOLVED | Noted: 2023-06-16 | Resolved: 2023-08-08

## 2023-08-08 PROBLEM — R52 GENERALIZED BODY ACHES: Status: RESOLVED | Noted: 2021-11-15 | Resolved: 2023-08-08

## 2023-08-08 PROBLEM — R00.2 PALPITATION: Status: RESOLVED | Noted: 2019-12-02 | Resolved: 2023-08-08

## 2023-08-08 PROBLEM — R42 VERTIGO: Status: RESOLVED | Noted: 2019-11-29 | Resolved: 2023-08-08

## 2023-08-08 PROBLEM — R49.0 HOARSENESS: Status: RESOLVED | Noted: 2022-07-26 | Resolved: 2023-08-08

## 2023-08-08 LAB
BV WHIFF TEST VAG QL: ABNORMAL
CLUE CELLS SPEC QL WET PREP: ABNORMAL
PH SMN: 5 [PH]
SL AMB POCT WET MOUNT: ABNORMAL
T VAGINALIS VAG QL WET PREP: ABNORMAL
YEAST VAG QL WET PREP: ABNORMAL

## 2023-08-08 PROCEDURE — 87210 SMEAR WET MOUNT SALINE/INK: CPT | Performed by: NURSE PRACTITIONER

## 2023-08-08 PROCEDURE — 99213 OFFICE O/P EST LOW 20 MIN: CPT | Performed by: NURSE PRACTITIONER

## 2023-08-08 PROCEDURE — 87491 CHLMYD TRACH DNA AMP PROBE: CPT | Performed by: NURSE PRACTITIONER

## 2023-08-08 PROCEDURE — 87591 N.GONORRHOEAE DNA AMP PROB: CPT | Performed by: NURSE PRACTITIONER

## 2023-08-08 RX ORDER — METRONIDAZOLE 500 MG/1
500 TABLET ORAL 2 TIMES DAILY
Qty: 14 TABLET | Refills: 0 | Status: SHIPPED | OUTPATIENT
Start: 2023-08-08 | End: 2023-08-15

## 2023-08-08 NOTE — LETTER
8/10/2023    To Braeden Enciso  : 1990      This letter is to advise you that your recent CULTURES for gonorrhea and chlamydia were reviewed by me and are NORMAL. Please contact the office for an appointment if you have any additional concerns.     [address] [facility]

## 2023-08-08 NOTE — PROGRESS NOTES
PROBLEM GYNECOLOGICAL VISIT    Nika Kebede is a 28 y.o. female who presents today with complaint of vaginal discharge and odor. Her general medical history has been reviewed and she reports it as follows:    Past Medical History:   Diagnosis Date   • Asthma    • Depression    • Hypertension    • Migraine    • Multiple sclerosis ([address]) 2018   • Neurogenic bladder    • PTSD (post-traumatic stress disorder)    • Seizure ([address])     possibly r/t MS diagnosis, likely stress-related   • Vertigo      Past Surgical History:   Procedure Laterality Date   • CLOSED REDUCTION FINGER FRACTURE Left    • FL LUMBAR PUNCTURE DIAGNOSTIC  2019   • TONSILLECTOMY Bilateral 9505   • UMBILICAL HERNIA REPAIR  2016     OB History        3    Para   2    Term   1       1    AB   1    Living   2       SAB   1    IAB   0    Ectopic   0    Multiple   0    Live Births   2               Social History     Tobacco Use   • Smoking status: Every Day     Packs/day: 0.15     Types: Cigarettes   • Smokeless tobacco: Never   Vaping Use   • Vaping Use: Never used   Substance Use Topics   • Alcohol use: Yes     Comment: couple times/year   • Drug use: Not Currently     Types: Cocaine, MDMA (ecstacy)     Comment: last used      Social History     Substance and Sexual Activity   Sexual Activity Yes   • Partners: Male   • Birth control/protection: Injection       Current Outpatient Medications   Medication Instructions   • albuterol (Ventolin HFA) 90 mcg/act inhaler 2 puffs, Inhalation, Every 6 hours PRN   • amLODIPine (NORVASC) 5 mg, Oral, Daily   • FLUoxetine (PROZAC) 40 mg, Oral, Daily       History of Present Illness:   Reports for the past week she has been experiencing vaginal discharge and odor for the past week. She denies vaginal itching/irritation. Denies pelvic pain or dysuria. She desires to initiate contraception with Mirena IUD. Her last injection of Depoprovera ws 2023.     Review of Systems:  Review of Systems   Constitutional: Negative. Gastrointestinal: Negative. Genitourinary: Positive for vaginal discharge. Negative for dysuria, pelvic pain and vaginal pain. Vaginal odor       Physical Exam:  BP (!) 168/108   Wt 51.4 kg (113 lb 6.4 oz)   LMP  (LMP Unknown)   BMI 19.47 kg/m²   Physical Exam  Constitutional:       General: She is not in acute distress. Genitourinary:      Vulva exam comments: Normal.      Vaginal discharge present. No vaginal erythema. Abdominal:      Palpations: Abdomen is soft. Tenderness: There is no abdominal tenderness. Neurological:      Mental Status: She is alert. Skin:     General: Skin is warm and dry. Vitals reviewed. Point of Care Testing:   -Wet mount: + clue cells, no trichomonads, few WBC's, pH=5.0   -KOH mount: no hyphae   -Whiff: positive    Assessment:   1. BV. Plan:   1. Cultures ordered: GC/CT. 2. Bloodwork ordered: anti-HIV, anti-HCV, hepBsAg, syphilis panel. 3. Given Rx Flagyl. 4. Desires Mirena IUD. We will obtain insurance authorization and once device is delivered here to clinic, we will then contact patient to schedule insertion procedure. 5. Return to office in 7 months for annual GYN exam.   6. Patient's depression screening was assessed with a PHQ-2 score of 0. Their PHQ-9 score was 0. Clinically patient does not have depression. No treatment is required. Reviewed with patient that test results are [facility] immediately, but that they will not necessarily be reviewed by me immediately. Explained that I will review results at my earliest opportunity and contact patient appropriately.

## 2023-08-08 NOTE — PATIENT INSTRUCTIONS
Thank you for your confidence in our team.   We appreciate you and welcome your feedback. If you receive a survey from us, please take a few moments to let us know how we are doing.    Sincerely,  Ole Gilman

## 2023-08-09 LAB
C TRACH DNA SPEC QL NAA+PROBE: NEGATIVE
N GONORRHOEA DNA SPEC QL NAA+PROBE: NEGATIVE

## 2023-08-24 ENCOUNTER — TELEPHONE (OUTPATIENT)
Dept: OBGYN CLINIC | Facility: CLINIC | Age: 33
End: 2023-08-24

## 2023-09-28 ENCOUNTER — TELEPHONE (OUTPATIENT)
Dept: OBGYN CLINIC | Facility: CLINIC | Age: 33
End: 2023-09-28

## 2023-10-31 ENCOUNTER — TELEPHONE (OUTPATIENT)
Dept: FAMILY MEDICINE CLINIC | Facility: CLINIC | Age: 33
End: 2023-10-31

## 2024-01-01 ENCOUNTER — HOSPITAL ENCOUNTER (EMERGENCY)
Facility: HOSPITAL | Age: 34
Discharge: LEFT BEFORE DISCHARGE | End: 2024-01-01
Attending: EMERGENCY MEDICINE
Payer: MEDICARE

## 2024-01-01 VITALS
SYSTOLIC BLOOD PRESSURE: 145 MMHG | OXYGEN SATURATION: 97 % | HEART RATE: 87 BPM | RESPIRATION RATE: 18 BRPM | TEMPERATURE: 98.4 F | DIASTOLIC BLOOD PRESSURE: 89 MMHG

## 2024-01-01 DIAGNOSIS — T50.902A INTENTIONAL OVERDOSE, INITIAL ENCOUNTER (HCC): Primary | ICD-10-CM

## 2024-01-01 LAB
2HR DELTA HS TROPONIN: 1 NG/L
4HR DELTA HS TROPONIN: 2 NG/L
ALBUMIN SERPL BCP-MCNC: 4.2 G/DL (ref 3.5–5)
ALP SERPL-CCNC: 73 U/L (ref 34–104)
ALT SERPL W P-5'-P-CCNC: 14 U/L (ref 7–52)
AMPHETAMINES SERPL QL SCN: NEGATIVE
ANION GAP SERPL CALCULATED.3IONS-SCNC: 9 MMOL/L
APAP SERPL-MCNC: <2 UG/ML (ref 10–20)
AST SERPL W P-5'-P-CCNC: 13 U/L (ref 13–39)
ATRIAL RATE: 101 BPM
ATRIAL RATE: 103 BPM
ATRIAL RATE: 82 BPM
ATRIAL RATE: 83 BPM
BARBITURATES UR QL: NEGATIVE
BASOPHILS # BLD AUTO: 0.06 THOUSANDS/ÂΜL (ref 0–0.1)
BASOPHILS NFR BLD AUTO: 1 % (ref 0–1)
BENZODIAZ UR QL: NEGATIVE
BILIRUB DIRECT SERPL-MCNC: 0.06 MG/DL (ref 0–0.2)
BILIRUB SERPL-MCNC: 0.2 MG/DL (ref 0.2–1)
BUN SERPL-MCNC: 7 MG/DL (ref 5–25)
CALCIUM SERPL-MCNC: 8.9 MG/DL (ref 8.4–10.2)
CARDIAC TROPONIN I PNL SERPL HS: 4 NG/L
CARDIAC TROPONIN I PNL SERPL HS: 5 NG/L
CARDIAC TROPONIN I PNL SERPL HS: 6 NG/L
CHLORIDE SERPL-SCNC: 111 MMOL/L (ref 96–108)
CO2 SERPL-SCNC: 22 MMOL/L (ref 21–32)
COCAINE UR QL: NEGATIVE
CREAT SERPL-MCNC: 0.55 MG/DL (ref 0.6–1.3)
EOSINOPHIL # BLD AUTO: 0.13 THOUSAND/ÂΜL (ref 0–0.61)
EOSINOPHIL NFR BLD AUTO: 2 % (ref 0–6)
ERYTHROCYTE [DISTWIDTH] IN BLOOD BY AUTOMATED COUNT: 12.8 % (ref 11.6–15.1)
ETHANOL EXG-MCNC: 0.09 MG/DL
ETHANOL SERPL-MCNC: 263 MG/DL
EXT PREGNANCY TEST URINE: NEGATIVE
EXT. CONTROL: NORMAL
GFR SERPL CREATININE-BSD FRML MDRD: 123 ML/MIN/1.73SQ M
GLUCOSE SERPL-MCNC: 96 MG/DL (ref 65–140)
HCT VFR BLD AUTO: 44.3 % (ref 34.8–46.1)
HGB BLD-MCNC: 15.1 G/DL (ref 11.5–15.4)
IMM GRANULOCYTES # BLD AUTO: 0.02 THOUSAND/UL (ref 0–0.2)
IMM GRANULOCYTES NFR BLD AUTO: 0 % (ref 0–2)
LYMPHOCYTES # BLD AUTO: 2.31 THOUSANDS/ÂΜL (ref 0.6–4.47)
LYMPHOCYTES NFR BLD AUTO: 32 % (ref 14–44)
MAGNESIUM SERPL-MCNC: 2.1 MG/DL (ref 1.9–2.7)
MCH RBC QN AUTO: 32.1 PG (ref 26.8–34.3)
MCHC RBC AUTO-ENTMCNC: 34.1 G/DL (ref 31.4–37.4)
MCV RBC AUTO: 94 FL (ref 82–98)
METHADONE UR QL: NEGATIVE
MONOCYTES # BLD AUTO: 0.5 THOUSAND/ÂΜL (ref 0.17–1.22)
MONOCYTES NFR BLD AUTO: 7 % (ref 4–12)
NEUTROPHILS # BLD AUTO: 4.27 THOUSANDS/ÂΜL (ref 1.85–7.62)
NEUTS SEG NFR BLD AUTO: 58 % (ref 43–75)
NRBC BLD AUTO-RTO: 0 /100 WBCS
OPIATES UR QL SCN: NEGATIVE
OXYCODONE+OXYMORPHONE UR QL SCN: NEGATIVE
P AXIS: 64 DEGREES
P AXIS: 65 DEGREES
P AXIS: 75 DEGREES
P AXIS: 79 DEGREES
PCP UR QL: NEGATIVE
PLATELET # BLD AUTO: 207 THOUSANDS/UL (ref 149–390)
PMV BLD AUTO: 11.5 FL (ref 8.9–12.7)
POTASSIUM SERPL-SCNC: 3.9 MMOL/L (ref 3.5–5.3)
PR INTERVAL: 120 MS
PR INTERVAL: 130 MS
PR INTERVAL: 142 MS
PR INTERVAL: 148 MS
PROT SERPL-MCNC: 6.9 G/DL (ref 6.4–8.4)
QRS AXIS: 79 DEGREES
QRS AXIS: 81 DEGREES
QRS AXIS: 86 DEGREES
QRS AXIS: 92 DEGREES
QRSD INTERVAL: 104 MS
QRSD INTERVAL: 104 MS
QRSD INTERVAL: 94 MS
QRSD INTERVAL: 94 MS
QT INTERVAL: 366 MS
QT INTERVAL: 380 MS
QT INTERVAL: 386 MS
QT INTERVAL: 412 MS
QTC INTERVAL: 446 MS
QTC INTERVAL: 474 MS
QTC INTERVAL: 481 MS
QTC INTERVAL: 505 MS
RBC # BLD AUTO: 4.7 MILLION/UL (ref 3.81–5.12)
SALICYLATES SERPL-MCNC: <5 MG/DL (ref 3–20)
SODIUM SERPL-SCNC: 142 MMOL/L (ref 135–147)
T WAVE AXIS: 45 DEGREES
T WAVE AXIS: 46 DEGREES
T WAVE AXIS: 53 DEGREES
T WAVE AXIS: 68 DEGREES
THC UR QL: NEGATIVE
VENTRICULAR RATE: 101 BPM
VENTRICULAR RATE: 103 BPM
VENTRICULAR RATE: 82 BPM
VENTRICULAR RATE: 83 BPM
WBC # BLD AUTO: 7.29 THOUSAND/UL (ref 4.31–10.16)

## 2024-01-01 PROCEDURE — 99285 EMERGENCY DEPT VISIT HI MDM: CPT | Performed by: PHYSICIAN ASSISTANT

## 2024-01-01 PROCEDURE — 85025 COMPLETE CBC W/AUTO DIFF WBC: CPT | Performed by: PHYSICIAN ASSISTANT

## 2024-01-01 PROCEDURE — 80048 BASIC METABOLIC PNL TOTAL CA: CPT | Performed by: PHYSICIAN ASSISTANT

## 2024-01-01 PROCEDURE — 81025 URINE PREGNANCY TEST: CPT | Performed by: PHYSICIAN ASSISTANT

## 2024-01-01 PROCEDURE — 99285 EMERGENCY DEPT VISIT HI MDM: CPT

## 2024-01-01 PROCEDURE — 84484 ASSAY OF TROPONIN QUANT: CPT | Performed by: PHYSICIAN ASSISTANT

## 2024-01-01 PROCEDURE — 80143 DRUG ASSAY ACETAMINOPHEN: CPT | Performed by: PHYSICIAN ASSISTANT

## 2024-01-01 PROCEDURE — 80307 DRUG TEST PRSMV CHEM ANLYZR: CPT | Performed by: PHYSICIAN ASSISTANT

## 2024-01-01 PROCEDURE — 83735 ASSAY OF MAGNESIUM: CPT | Performed by: PHYSICIAN ASSISTANT

## 2024-01-01 PROCEDURE — 93005 ELECTROCARDIOGRAM TRACING: CPT

## 2024-01-01 PROCEDURE — 82077 ASSAY SPEC XCP UR&BREATH IA: CPT | Performed by: PHYSICIAN ASSISTANT

## 2024-01-01 PROCEDURE — 82075 ASSAY OF BREATH ETHANOL: CPT | Performed by: PHYSICIAN ASSISTANT

## 2024-01-01 PROCEDURE — 36415 COLL VENOUS BLD VENIPUNCTURE: CPT | Performed by: PHYSICIAN ASSISTANT

## 2024-01-01 PROCEDURE — 80076 HEPATIC FUNCTION PANEL: CPT | Performed by: PHYSICIAN ASSISTANT

## 2024-01-01 PROCEDURE — 80179 DRUG ASSAY SALICYLATE: CPT | Performed by: PHYSICIAN ASSISTANT
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
## 2024-01-01 NOTE — ED NOTES
Assumed pt care at this time. Pt sleeping in stretcher. No distress, unlabored breathing.     Leandra Carey RN  01/01/24 6157
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
## 2024-01-01 NOTE — ED NOTES
Pt requested staff hold onto cell phone. Pt cell phone labeled and on clipboard at this time.      Carlie Fowler RN  01/01/24 8288

## 2024-01-01 NOTE — ED CARE HANDOFF
"Emergency Department Sign Out Note        Sign out and transfer of care from Joanie Domingo PA-C. See Separate Emergency Department note.     The patient, Christine Thomas, was evaluated by the previous provider for overdose with Fluoxetine.    Workup Completed:  Ethanol  Troponin  CBC  EKG  Poison control contacted    ED Course / Workup Pending (followup):  0613 - Per sign out, apparently only ingested Fluoxetine not Amlodipine. Ingested 30 mins PTA. Waiting for labs. Needs to be seen by Crisis. Per poison control, side effects of Fluoxetine ingestion are dizziness, agitation, seizures, nausea and vomiting. They recommend treating agitation and seizures with benzodiazepines. Need to trend EKG 4-6 hours. Look for QTc prolongation of >500. Would need to give Magnesium 2g. Side effects of Amlodipine is hypotension. Would need to give fluids +/- glucagon or pressors.     0616 WBC: 7.29    0616 Hemoglobin: 15.1    0616 Platelet Count: 207    0616 PREGNANCY TEST URINE: Negative    0616 hs TnI 0hr: 4 - Will need delta troponin.     0616 MEDICAL ALCOHOL(!): 263    0737 [place] text sent to Toxicology due to qtc being 505.    0746 Spoke with Dr. Brock from Toxicology - \"Magnesium does not shorten the QT unless the prolonged QT is due to hypomagnesemia. Also, she has an incomplete RBBB, which increases her QRS making her QT seem longer than it really is. If you factor in the QRS of 104, and look at her JT interval, it's probably close to normal. So, mag is of no benefit as long as her mag level on labs is wnl.\" - will check a magnesium level.    0800 Delta 2hr hsTnI: 1    0843 Basic metabolic panel(!)    0843 Hepatic function panel    0844 ACETAMINOPHEN LEVEL(!): <2    0844 SALICYLATE LEVEL: <5    0913 Magnesium: 2.1    1013 Delta 4hr hsTnI: 2    1133 EXTBreath Alcohol: 0.085    1138 Patient cleared to be seen by Crisis.    1241 Patient seen by Crisis - \"ED 27 does not want to sign 201. She states it doesn't work for her but " "also she does not follow through with treatment after inpatient. She also does not want partial because she says that didn't work either. I recommend she goes inpatient. She knew she could die from taking those pills and she's trying to minimize. Can you go ahead and order psych consult. I did tell that would happen.    1459 Patient signed out to  waiting for psychiatry consult. Patient stable.                ED Course as of 01/01/24 1506   Mon Jan 01, 2024   0613 Per sign out, apparently only ingested Fluoxetine not Amlodipine. Ingested 30 mins PTA. Waiting for labs. Needs to be seen by Crisis.    0616 WBC: 7.29   0616 Hemoglobin: 15.1   0616 Platelet Count: 207   0616 PREGNANCY TEST URINE: Negative   0616 hs TnI 0hr: 4  Will need delta troponin.    0616 MEDICAL ALCOHOL(!): 263   0737 [place] text sent to Toxicology due to qtc being 505.   0746 Spoke with Dr. Brock from Toxicology - \"Magnesium does not shorten the QT unless the prolonged QT is due to hypomagnesemia. Also, she has an incomplete RBBB, which increases her QRS making her QT seem longer than it really is. If you factor in the QRS of 104, and look at her JT interval, it's probably close to normal. So, mag is of no benefit as long as her mag level on labs is wnl.\" - will check a magnesium level.   0800 Delta 2hr hsTnI: 1   0843 Basic metabolic panel(!)   0843 Hepatic function panel   0844 ACETAMINOPHEN LEVEL(!): <2   0844 SALICYLATE LEVEL: <5   0913 Magnesium: 2.1   1013 Delta 4hr hsTnI: 2   1133 EXTBreath Alcohol: 0.085   1138 Patient cleared to be seen by Crisis.   1241 Patient seen by Crisis - \"ED 27 does not want to sign 201. She states it doesn't work for her but also she does not follow through with treatment after inpatient. She also does not want partial because she says that didn't work either. I recommend she goes inpatient. She knew she could die from taking those pills and she's trying to minimize. Can you go ahead and order psych consult. I " did tell that would happen.   1459 Patient signed out to  waiting for psychiatry consult. Patient stable.     ECG 12 Lead Documentation Only    Date/Time: 1/1/2024 7:22 AM    Performed by: Allyn Thomas PA-C  Authorized by: Allyn Thomas PA-C    Indications / Diagnosis:  Delta ekg  ECG reviewed by me, the ED Provider: yes (Also reviewed by Dr. Hinton)    Patient location:  ED  Previous ECG:     Previous ECG:  Compared to current    Comparison ECG info:  Qtc is now 505 up from 474    Similarity:  Changes noted  Interpretation:     Interpretation: abnormal    Rate:     ECG rate:  103    ECG rate assessment: tachycardic    Rhythm:     Rhythm: sinus tachycardia    Ectopy:     Ectopy: none    QRS:     QRS intervals:  Normal  Conduction:     Conduction: abnormal      Abnormal conduction: incomplete RBBB    ST segments:     ST segments:  Normal  T waves:     T waves: inverted      Inverted:  AVR and V1  ECG 12 Lead Documentation Only    Date/Time: 1/1/2024 9:33 AM    Performed by: Allyn Thomas PA-C  Authorized by: Allyn Thomas PA-C    Indications / Diagnosis:  Four hour ekg  ECG reviewed by me, the ED Provider: yes (Also reviewed by Dr. Qureshi)    Patient location:  ED  Previous ECG:     Previous ECG:  Compared to current    Comparison ECG info:  Flipped t waves in lead V3, flattening t wave in lead III    Similarity:  Changes noted  Interpretation:     Interpretation: abnormal    Rate:     ECG rate:  83    ECG rate assessment: normal    Rhythm:     Rhythm: sinus rhythm    Ectopy:     Ectopy: none    QRS:     QRS intervals:  Normal  Conduction:     Conduction: abnormal      Abnormal conduction: incomplete RBBB    ST segments:     ST segments:  Normal  T waves:     T waves: flattening and inverted      Flattening:  III    Inverted:  AVR, V1 and V3  ECG 12 Lead Documentation Only    Date/Time: 1/1/2024 11:17 AM    Performed by: Allyn Thomas PA-C  Authorized by: Allyn  Robert Thomas PA-C    Indications / Diagnosis:  Clearing ekg  ECG reviewed by me, the ED Provider: yes (Also reviewed by Dr. Vizcarra)    Patient location:  ED  Previous ECG:     Previous ECG:  Compared to current    Similarity:  No change  Interpretation:     Interpretation: abnormal    Rate:     ECG rate:  82    ECG rate assessment: normal    Rhythm:     Rhythm: sinus rhythm    Ectopy:     Ectopy: none    QRS:     QRS intervals:  Normal  Conduction:     Conduction: abnormal    ST segments:     ST segments:  Normal  T waves:     T waves: inverted      Inverted:  AVR, V1 and V3    Medical Decision Making  Problems Addressed:  Intentional overdose, initial encounter (HCC): acute illness or injury    Amount and/or Complexity of Data Reviewed  Independent Historian:      Details: Patient is historian  Labs: ordered. Decision-making details documented in ED Course.  ECG/medicine tests: ordered and independent interpretation performed.            Disposition  Final diagnoses:   Intentional overdose, initial encounter (HCC)     Time reflects when diagnosis was documented in both MDM as applicable and the Disposition within this note       Time User Action Codes Description Comment    1/1/2024 12:50 PM Allyn Thomas Add [T50.902A] Intentional overdose, initial encounter (HCC)           ED Disposition       None          Follow-up Information    None       Patient's Medications   Discharge Prescriptions    No medications on file     No discharge procedures on file.       ED Provider  Electronically Signed by     Allyn Thomas PA-C  01/01/24 7343
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
## 2024-01-02 NOTE — ED NOTES
Spoke with Ирина at [facility] regarding patient and elopement.  Ирина said mother can reach out to them and as soon as patient is located they can start the 302 petition.   Called mother to inform her she can call the county as soon as patient is located.

## 2024-01-02 NOTE — ED NOTES
RN went into room to have patient change into paper scrubs and move patient into behavior health secured holding. Patient not in room. All bathrooms checked. [facility] called and informed of elopement. Supervisor and security notified. Patient cell phone left in department.     eLandra Carey RN  01/01/24 [id number]
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
## 2024-02-26 ENCOUNTER — TELEPHONE (OUTPATIENT)
Dept: OBGYN CLINIC | Facility: CLINIC | Age: 34
End: 2024-02-26

## 2024-02-28 ENCOUNTER — TELEPHONE (OUTPATIENT)
Dept: OBGYN CLINIC | Facility: CLINIC | Age: 34
End: 2024-02-28

## 2024-03-01 ENCOUNTER — HOSPITAL ENCOUNTER (EMERGENCY)
Facility: HOSPITAL | Age: 34
Discharge: HOME | End: 2024-03-01
Attending: EMERGENCY MEDICINE
Payer: MEDICARE

## 2024-03-01 VITALS
TEMPERATURE: 98.2 F | DIASTOLIC BLOOD PRESSURE: 104 MMHG | HEART RATE: 97 BPM | WEIGHT: 109 LBS | SYSTOLIC BLOOD PRESSURE: 155 MMHG | BODY MASS INDEX: 18.71 KG/M2 | OXYGEN SATURATION: 100 % | RESPIRATION RATE: 14 BRPM

## 2024-03-01 DIAGNOSIS — G35 MULTIPLE SCLEROSIS (HCC): ICD-10-CM

## 2024-03-01 DIAGNOSIS — M54.9 BACK PAIN: Primary | ICD-10-CM

## 2024-03-01 LAB
ALBUMIN SERPL BCP-MCNC: 4.2 G/DL (ref 3.5–5)
ALP SERPL-CCNC: 72 U/L (ref 34–104)
ALT SERPL W P-5'-P-CCNC: 13 U/L (ref 7–52)
ANION GAP SERPL CALCULATED.3IONS-SCNC: 7 MMOL/L
AST SERPL W P-5'-P-CCNC: 14 U/L (ref 13–39)
BASOPHILS # BLD AUTO: 0.05 THOUSANDS/ÂΜL (ref 0–0.1)
BASOPHILS NFR BLD AUTO: 1 % (ref 0–1)
BILIRUB SERPL-MCNC: 0.38 MG/DL (ref 0.2–1)
BUN SERPL-MCNC: 9 MG/DL (ref 5–25)
CALCIUM SERPL-MCNC: 9 MG/DL (ref 8.4–10.2)
CHLORIDE SERPL-SCNC: 107 MMOL/L (ref 96–108)
CO2 SERPL-SCNC: 27 MMOL/L (ref 21–32)
CREAT SERPL-MCNC: 0.7 MG/DL (ref 0.6–1.3)
EOSINOPHIL # BLD AUTO: 0.26 THOUSAND/ÂΜL (ref 0–0.61)
EOSINOPHIL NFR BLD AUTO: 4 % (ref 0–6)
ERYTHROCYTE [DISTWIDTH] IN BLOOD BY AUTOMATED COUNT: 12.1 % (ref 11.6–15.1)
EXT PREGNANCY TEST URINE: NEGATIVE
EXT. CONTROL: NORMAL
GFR SERPL CREATININE-BSD FRML MDRD: 114 ML/MIN/1.73SQ M
GLUCOSE SERPL-MCNC: 82 MG/DL (ref 65–140)
HCT VFR BLD AUTO: 42.2 % (ref 34.8–46.1)
HGB BLD-MCNC: 14.5 G/DL (ref 11.5–15.4)
IMM GRANULOCYTES # BLD AUTO: 0.01 THOUSAND/UL (ref 0–0.2)
IMM GRANULOCYTES NFR BLD AUTO: 0 % (ref 0–2)
LYMPHOCYTES # BLD AUTO: 2.09 THOUSANDS/ÂΜL (ref 0.6–4.47)
LYMPHOCYTES NFR BLD AUTO: 29 % (ref 14–44)
MCH RBC QN AUTO: 32.8 PG (ref 26.8–34.3)
MCHC RBC AUTO-ENTMCNC: 34.4 G/DL (ref 31.4–37.4)
MCV RBC AUTO: 96 FL (ref 82–98)
MONOCYTES # BLD AUTO: 0.45 THOUSAND/ÂΜL (ref 0.17–1.22)
MONOCYTES NFR BLD AUTO: 6 % (ref 4–12)
NEUTROPHILS # BLD AUTO: 4.46 THOUSANDS/ÂΜL (ref 1.85–7.62)
NEUTS SEG NFR BLD AUTO: 60 % (ref 43–75)
NRBC BLD AUTO-RTO: 0 /100 WBCS
PLATELET # BLD AUTO: 233 THOUSANDS/UL (ref 149–390)
PMV BLD AUTO: 9.7 FL (ref 8.9–12.7)
POTASSIUM SERPL-SCNC: 3.8 MMOL/L (ref 3.5–5.3)
PROT SERPL-MCNC: 7.3 G/DL (ref 6.4–8.4)
RBC # BLD AUTO: 4.42 MILLION/UL (ref 3.81–5.12)
SODIUM SERPL-SCNC: 141 MMOL/L (ref 135–147)
WBC # BLD AUTO: 7.32 THOUSAND/UL (ref 4.31–10.16)

## 2024-03-01 PROCEDURE — 96375 TX/PRO/DX INJ NEW DRUG ADDON: CPT

## 2024-03-01 PROCEDURE — 80053 COMPREHEN METABOLIC PANEL: CPT | Performed by: EMERGENCY MEDICINE

## 2024-03-01 PROCEDURE — 36415 COLL VENOUS BLD VENIPUNCTURE: CPT | Performed by: EMERGENCY MEDICINE

## 2024-03-01 PROCEDURE — 99284 EMERGENCY DEPT VISIT MOD MDM: CPT | Performed by: EMERGENCY MEDICINE

## 2024-03-01 PROCEDURE — 81025 URINE PREGNANCY TEST: CPT | Performed by: EMERGENCY MEDICINE

## 2024-03-01 PROCEDURE — 96365 THER/PROPH/DIAG IV INF INIT: CPT

## 2024-03-01 PROCEDURE — 85025 COMPLETE CBC W/AUTO DIFF WBC: CPT | Performed by: EMERGENCY MEDICINE

## 2024-03-01 PROCEDURE — 99284 EMERGENCY DEPT VISIT MOD MDM: CPT

## 2024-03-01 RX ORDER — KETOROLAC TROMETHAMINE 30 MG/ML
15 INJECTION, SOLUTION INTRAMUSCULAR; INTRAVENOUS ONCE
Status: COMPLETED | OUTPATIENT
Start: 2024-03-01 | End: 2024-03-01

## 2024-03-01 RX ADMIN — KETOROLAC TROMETHAMINE 15 MG: 30 INJECTION, SOLUTION INTRAMUSCULAR; INTRAVENOUS at 13:00

## 2024-03-01 RX ADMIN — SODIUM CHLORIDE 1000 MG: 0.9 INJECTION, SOLUTION INTRAVENOUS at 13:01

## 2024-03-01 RX ADMIN — SODIUM CHLORIDE 1000 ML: 0.9 INJECTION, SOLUTION INTRAVENOUS at 12:56

## 2024-03-01 NOTE — Clinical Note
Christine Thomas was seen and treated in our emergency department on 3/1/2024.                Diagnosis:     Christine  .    She may return on this date: 03/04/2024         If you have any questions or concerns, please don't hesitate to call.      Karsten Mckenna, DO    ______________________________           _______________          _______________  Hospital Representative                              Date                                Time

## 2024-03-01 NOTE — ED PROVIDER NOTES
"History  Chief Complaint   Patient presents with    Back Pain     Chronic lumbar back pain similar to MS exacerbation in the past, reports repetitive lifting at work recently     Patient is a 33-year-old female presenting for concerns of back pain and multiple sclerosis flare.  She states she was exerting herself at work yesterday, began having neck pain shooting down her back and her upper extremities with a failure to have \"rubber bands wrapped around them\".  She states she has had similar episodes in the past.  Has done steroids and followed up with her neurologist.  She does state that she has missed a few appointments recently due to having temporary homelessness.      Back Pain  Associated symptoms: headaches    Associated symptoms: no abdominal pain, no chest pain, no dysuria, no fever, no numbness and no weakness        Prior to Admission Medications   Prescriptions Last Dose Informant Patient Reported? Taking?   FLUoxetine (PROzac) 40 MG capsule   No No   Sig: Take 1 capsule (40 mg total) by mouth daily Do not start before June 22, 2023.   albuterol (Ventolin HFA) 90 mcg/act inhaler   No No   Sig: Inhale 2 puffs every 6 (six) hours as needed for wheezing   amLODIPine (NORVASC) 5 mg tablet   No No   Sig: Take 1 tablet (5 mg total) by mouth daily Do not start before June 22, 2023.      Facility-Administered Medications: None       Past Medical History:   Diagnosis Date    Asthma     Depression     Hypertension     Migraine     Multiple sclerosis (HCC) 2018    Neurogenic bladder     PTSD (post-traumatic stress disorder)     Seizure (HCC)     possibly r/t MS diagnosis, likely stress-related    Vertigo        Past Surgical History:   Procedure Laterality Date    CLOSED REDUCTION FINGER FRACTURE Left 2008    FL LUMBAR PUNCTURE DIAGNOSTIC  12/05/2019    TONSILLECTOMY Bilateral 1994    UMBILICAL HERNIA REPAIR  2016       Family History   Problem Relation Age of Onset    Depression Mother     Cancer Mother         " thyroid    Diabetes Father     Hypertension Father     Coronary artery disease Paternal Grandmother     Hypertension Paternal Grandfather         TYPE 2    Diabetes Paternal Grandfather     Cancer Paternal Grandfather         pancreatic, lung    Drug abuse Cousin     Breast cancer Neg Hx     Colon cancer Neg Hx     Ovarian cancer Neg Hx      I have reviewed and agree with the history as documented.    E-Cigarette/Vaping    E-Cigarette Use Never User      E-Cigarette/Vaping Substances     Social History     Tobacco Use    Smoking status: Every Day     Current packs/day: 0.25     Types: Cigarettes    Smokeless tobacco: Never   Vaping Use    Vaping status: Never Used   Substance Use Topics    Alcohol use: Yes     Comment: couple times/year    Drug use: Not Currently       Review of Systems   Constitutional: Negative.  Negative for chills and fever.   HENT: Negative.  Negative for rhinorrhea, sore throat, trouble swallowing and voice change.    Eyes: Negative.  Negative for pain and visual disturbance.   Respiratory: Negative.  Negative for cough, shortness of breath and wheezing.    Cardiovascular: Negative.  Negative for chest pain and palpitations.   Gastrointestinal:  Negative for abdominal pain, diarrhea, nausea and vomiting.   Genitourinary: Negative.  Negative for dysuria and frequency.   Musculoskeletal:  Positive for back pain. Negative for neck pain and neck stiffness.   Skin: Negative.  Negative for rash.   Neurological:  Positive for headaches. Negative for dizziness, facial asymmetry, speech difficulty, weakness, light-headedness and numbness.       Physical Exam  Physical Exam  Vitals and nursing note reviewed.   Constitutional:       General: She is not in acute distress.     Appearance: She is well-developed.   HENT:      Head: Normocephalic and atraumatic.   Eyes:      Conjunctiva/sclera: Conjunctivae normal.      Pupils: Pupils are equal, round, and reactive to light.   Neck:      Trachea: No tracheal  deviation.   Cardiovascular:      Rate and Rhythm: Normal rate and regular rhythm.   Pulmonary:      Effort: Pulmonary effort is normal. No respiratory distress.      Breath sounds: Normal breath sounds. No wheezing or rales.   Abdominal:      General: Bowel sounds are normal. There is no distension.      Palpations: Abdomen is soft.      Tenderness: There is no abdominal tenderness. There is no guarding or rebound.   Musculoskeletal:         General: No tenderness or deformity. Normal range of motion.      Cervical back: Normal range of motion and neck supple.   Skin:     General: Skin is warm and dry.      Capillary Refill: Capillary refill takes less than 2 seconds.      Findings: No rash.   Neurological:      General: No focal deficit present.      Mental Status: She is alert and oriented to person, place, and time.      GCS: GCS eye subscore is 4. GCS verbal subscore is 5. GCS motor subscore is 6.      Cranial Nerves: Cranial nerves 2-12 are intact.      Sensory: Sensation is intact.      Motor: Motor function is intact.      Coordination: Coordination is intact.      Gait: Gait is intact.   Psychiatric:         Behavior: Behavior normal.         Vital Signs  ED Triage Vitals [03/01/24 1212]   Temperature Pulse Respirations Blood Pressure SpO2   98.2 °F (36.8 °C) 97 14 (!) 155/104 100 %      Temp src Heart Rate Source Patient Position - Orthostatic VS BP Location FiO2 (%)   -- Monitor Sitting Left arm --      Pain Score       7           Vitals:    03/01/24 1212   BP: (!) 155/104   Pulse: 97   Patient Position - Orthostatic VS: Sitting         Visual Acuity      ED Medications  Medications   sodium chloride 0.9 % bolus 1,000 mL (0 mL Intravenous Stopped 3/1/24 1421)   methylPREDNISolone sodium succ 1000 mg/250 mL sodium chloride IVPB (0 mg Intravenous Stopped 3/1/24 1421)   ketorolac (TORADOL) injection 15 mg (15 mg Intravenous Given 3/1/24 1300)       Diagnostic Studies  Results Reviewed       Procedure  Component Value Units Date/Time    Comprehensive metabolic panel [[id number]] Collected: 03/01/24 1256    Lab Status: Final result Specimen: Blood from Arm, Left Updated: 03/01/24 1318     Sodium 141 mmol/L      Potassium 3.8 mmol/L      Chloride 107 mmol/L      CO2 27 mmol/L      ANION GAP 7 mmol/L      BUN 9 mg/dL      Creatinine 0.70 mg/dL      Glucose 82 mg/dL      Calcium 9.0 mg/dL      AST 14 U/L      ALT 13 U/L      Alkaline Phosphatase 72 U/L      Total Protein 7.3 g/dL      Albumin 4.2 g/dL      Total Bilirubin 0.38 mg/dL      eGFR 114 ml/min/1.73sq m     Narrative:      National Kidney Disease Foundation guidelines for Chronic Kidney Disease (CKD):     Stage 1 with normal or high GFR (GFR > 90 mL/min/1.73 square meters)    Stage 2 Mild CKD (GFR = 60-89 mL/min/1.73 square meters)    Stage 3A Moderate CKD (GFR = 45-59 mL/min/1.73 square meters)    Stage 3B Moderate CKD (GFR = 30-44 mL/min/1.73 square meters)    Stage 4 Severe CKD (GFR = 15-29 mL/min/1.73 square meters)    Stage 5 End Stage CKD (GFR <15 mL/min/1.73 square meters)  Note: GFR calculation is accurate only with a steady state creatinine    CBC and differential [[id number]] Collected: 03/01/24 1256    Lab Status: Final result Specimen: Blood from Arm, Left Updated: 03/01/24 1302     WBC 7.32 Thousand/uL      RBC 4.42 Million/uL      Hemoglobin 14.5 g/dL      Hematocrit 42.2 %      MCV 96 fL      MCH 32.8 pg      MCHC 34.4 g/dL      RDW 12.1 %      MPV 9.7 fL      Platelets 233 Thousands/uL      nRBC 0 /100 WBCs      Neutrophils Relative 60 %      Immat GRANS % 0 %      Lymphocytes Relative 29 %      Monocytes Relative 6 %      Eosinophils Relative 4 %      Basophils Relative 1 %      Neutrophils Absolute 4.46 Thousands/µL      Immature Grans Absolute 0.01 Thousand/uL      Lymphocytes Absolute 2.09 Thousands/µL      Monocytes Absolute 0.45 Thousand/µL      Eosinophils Absolute 0.26 Thousand/µL      Basophils Absolute 0.05 Thousands/µL     POCT  pregnancy, urine [[id number]]  (Normal) Resulted: 03/01/24 1249    Lab Status: Final result Updated: 03/01/24 1251     EXT Preg Test, Ur Negative     Control Valid                   No orders to display              Procedures  Procedures         ED Course                               SBIRT 20yo+      Flowsheet Row Most Recent Value   Initial Alcohol Screen: US AUDIT-C     1. How often do you have a drink containing alcohol? 0 Filed at: 03/01/2024 1216   2. How many drinks containing alcohol do you have on a typical day you are drinking?  0 Filed at: 03/01/2024 1216   3b. FEMALE Any Age, or MALE 65+: How often do you have 4 or more drinks on one occassion? 0 Filed at: 03/01/2024 1216   Audit-C Score 0 Filed at: 03/01/2024 1216   WESLEY: How many times in the past year have you...    Used an illegal drug or used a prescription medication for non-medical reasons? Never Filed at: 03/01/2024 1216                      Medical Decision Making  Labs okay. Patient unable to obtain MRI today,states she is unable to stay overnight in Hospital to obtain MRI in AM. Spoke with MRI, openings on Monday at 0715 and 1400. Reviewed case again with Neurolgy, given repeat location of symptoms, feels it is unlikely to be new lesion from MS. Does not need steroids beyond initial dose in ED. Patient will call their neurolgist to help coordinate MRI, if needed, as outpatient. Patient feeling better in ED, Will return to ED if symptoms change or worsen. Follow up and return precautions reviewed. Patient agreeable with plan.     Amount and/or Complexity of Data Reviewed  Labs: ordered.    Risk  Prescription drug management.             Disposition  Final diagnoses:   Back pain   Multiple sclerosis (HCC)     Time reflects when diagnosis was documented in both MDM as applicable and the Disposition within this note       Time User Action Codes Description Comment    3/1/2024  3:20 PM Karsten Mckenna Add [M54.9] Back pain     3/1/2024  3:20 PM  Karsten Mckenna Add [G35] Multiple sclerosis (HCC)           ED Disposition       ED Disposition   Discharge    Condition   Stable    Date/Time   Fri Mar 1, 2024 1520    Comment   Christine Thomas 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.                   Follow-up Information       Follow up With Specialties Details Why Contact Info Additional Information    [facility] In 1 week  [address], [address] [place] [address] [phone] [facility], [address], [place] [address], [place], [place], [address] [phone]    [facility]   [address]  [address] [facility] [address] [phone] [facility], [address], [address] [place], [place], [address] [phone]            Discharge Medication List as of 3/1/2024  3:21 PM        CONTINUE these medications which have NOT CHANGED    Details   albuterol (Ventolin HFA) 90 mcg/act inhaler Inhale 2 puffs every 6 (six) hours as needed for wheezing, Starting Fri 12/3/2021, Normal      amLODIPine (NORVASC) 5 mg tablet Take 1 tablet (5 mg total) by mouth daily Do not start before June 22, 2023., Starting Thu 6/22/2023, Normal      FLUoxetine (PROzac) 40 MG capsule Take 1 capsule (40 mg total) by mouth daily Do not start before June 22, 2023., Starting Thu 6/22/2023, Normal             No discharge procedures on file.    PDMP Review         Value Time User    PDMP Reviewed  Yes 6/20/2023 12:48 PM Lubna Aquino MD            ED Provider  Electronically Signed by             Karsten Mckenna DO  03/01/24 2007

## 2024-03-25 ENCOUNTER — TELEPHONE (OUTPATIENT)
Dept: OBGYN CLINIC | Facility: CLINIC | Age: 34
End: 2024-03-25

## 2024-04-04 ENCOUNTER — APPOINTMENT (EMERGENCY)
Dept: RADIOLOGY | Facility: HOSPITAL | Age: 34
End: 2024-04-04

## 2024-04-04 ENCOUNTER — HOSPITAL ENCOUNTER (EMERGENCY)
Facility: HOSPITAL | Age: 34
Discharge: HOME | End: 2024-04-04
Attending: EMERGENCY MEDICINE

## 2024-04-04 ENCOUNTER — APPOINTMENT (EMERGENCY)
Dept: CT IMAGING | Facility: HOSPITAL | Age: 34
End: 2024-04-04

## 2024-04-04 VITALS
TEMPERATURE: 97.9 F | OXYGEN SATURATION: 100 % | SYSTOLIC BLOOD PRESSURE: 152 MMHG | HEART RATE: 96 BPM | DIASTOLIC BLOOD PRESSURE: 103 MMHG | RESPIRATION RATE: 18 BRPM
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
## 2024-04-04 DIAGNOSIS — R22.0 JAW SWELLING: ICD-10-CM

## 2024-04-04 DIAGNOSIS — S49.91XA ARM INJURY, RIGHT, INITIAL ENCOUNTER: ICD-10-CM

## 2024-04-04 PROCEDURE — 70486 CT MAXILLOFACIAL W/O DYE: CPT

## 2024-04-04 PROCEDURE — 73090 X-RAY EXAM OF FOREARM: CPT

## 2024-04-04 PROCEDURE — 99284 EMERGENCY DEPT VISIT MOD MDM: CPT | Performed by: PHYSICIAN ASSISTANT

## 2024-04-04 PROCEDURE — 99284 EMERGENCY DEPT VISIT MOD MDM: CPT

## 2024-04-04 RX ORDER — IBUPROFEN 600 MG/1
600 TABLET ORAL EVERY 6 HOURS PRN
Qty: 30 TABLET | Refills: 0 | Status: SHIPPED | OUTPATIENT
Start: 2024-04-04

## 2024-04-04 RX ORDER — IBUPROFEN 600 MG/1
600 TABLET ORAL ONCE
Status: COMPLETED | OUTPATIENT
Start: 2024-04-04 | End: 2024-04-04

## 2024-04-04 RX ADMIN — IBUPROFEN 600 MG: 600 TABLET, FILM COATED ORAL at 04:37
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
## 2024-04-04 NOTE — DISCHARGE INSTRUCTIONS
Please follow up with family medicine if symptoms continue for pain management.    Take tylenol and motrin for pain. Take as directed on the box.    Can use lidoderm patch or lidoderm gel over the counter for pain.    Apply ice to the area 15-20 minutes up to 4 times a day to help with swelling.    Please return if symptoms worsen or new symptoms develop.

## 2024-05-07 ENCOUNTER — TELEPHONE (OUTPATIENT)
Dept: OBGYN CLINIC | Facility: CLINIC | Age: 34
End: 2024-05-07

## 2024-07-30 ENCOUNTER — OFFICE VISIT (OUTPATIENT)
Dept: FAMILY MEDICINE CLINIC | Facility: CLINIC | Age: 34
End: 2024-07-30
Payer: MEDICARE

## 2024-07-30 VITALS
TEMPERATURE: 97.9 F | BODY MASS INDEX: 19.31 KG/M2 | DIASTOLIC BLOOD PRESSURE: 80 MMHG | SYSTOLIC BLOOD PRESSURE: 120 MMHG | HEIGHT: 63 IN | OXYGEN SATURATION: 99 % | WEIGHT: 109 LBS | HEART RATE: 83 BPM

## 2024-07-30 DIAGNOSIS — Z00.01 ENCOUNTER FOR WELL ADULT EXAM WITH ABNORMAL FINDINGS: Primary | ICD-10-CM

## 2024-07-30 DIAGNOSIS — Z23 ENCOUNTER FOR IMMUNIZATION: ICD-10-CM

## 2024-07-30 DIAGNOSIS — K42.9 PERIUMBILICAL HERNIA: ICD-10-CM

## 2024-07-30 DIAGNOSIS — Z13.220 SCREENING, LIPID: ICD-10-CM

## 2024-07-30 DIAGNOSIS — N91.2 AMENORRHEA: ICD-10-CM

## 2024-07-30 DIAGNOSIS — Z23 NEED FOR PNEUMOCOCCAL 20-VALENT CONJUGATE VACCINATION: ICD-10-CM

## 2024-07-30 DIAGNOSIS — F17.200 TOBACCO USE DISORDER: ICD-10-CM

## 2024-07-30 DIAGNOSIS — Z13.29 THYROID DISORDER SCREEN: ICD-10-CM

## 2024-07-30 LAB — SL AMB POCT URINE HCG: NORMAL

## 2024-07-30 PROCEDURE — 99214 OFFICE O/P EST MOD 30 MIN: CPT | Performed by: FAMILY MEDICINE

## 2024-07-30 PROCEDURE — 99395 PREV VISIT EST AGE 18-39: CPT | Performed by: FAMILY MEDICINE

## 2024-07-30 PROCEDURE — 90471 IMMUNIZATION ADMIN: CPT | Performed by: FAMILY MEDICINE

## 2024-07-30 PROCEDURE — 90677 PCV20 VACCINE IM: CPT | Performed by: FAMILY MEDICINE

## 2024-07-30 PROCEDURE — 81025 URINE PREGNANCY TEST: CPT | Performed by: FAMILY MEDICINE

## 2024-07-30 RX ORDER — NICOTINE 21 MG/24HR
1 PATCH, TRANSDERMAL 24 HOURS TRANSDERMAL EVERY 24 HOURS
Qty: 28 PATCH | Refills: 0 | Status: SHIPPED | OUTPATIENT
Start: 2024-07-30

## 2024-07-30 NOTE — PROGRESS NOTES
Adult Annual Physical  Name: Christine Thomas      : 1990      MRN: [id number]  Encounter Provider: Bandar Maier MD  Encounter Date: 2024   Encounter department: [facility]    Assessment & Plan   1. Encounter for well adult exam with abnormal findings  Assessment & Plan:  Advice and education were given regarding nutrition, aerobic exercises, weight-bearing exercises, cardiovascular risk reduction, fall risk reduction, and age-appropriate supplements.     The patient was counseled regarding instructions for management, risk factor reductions, prognosis, risks and benefits of treatment options, patient and family education, and importance of compliance with treatment.  Patient due for Pneumovax 20 discussed with patient she agreed she tolerated well without side effect  Patient due for Pap smear screening for cervical cancer screening discussed with the patient she will call her GYN for appointment  2. Tobacco use disorder  Assessment & Plan:  Chronic uncontrolled patient is smoking since she was 15 years old 1 pack a day interested in quitting smoking aware of the complication of smoking discussed medication including nicotine patch will start with 21 mg 1 patch a day for 28 days proper use and possible side effect review  Orders:  -     nicotine (NICODERM CQ) 21 mg/24 hr TD 24 hr patch; Place 1 patch on the skin over 24 hours every 24 hours  3. Amenorrhea  Assessment & Plan:  New diagnosis patient the last menstruation was on Sara 15 urine pregnancy in the office is negative recommend to do beta-hCG we will follow-up with the result accordingly  Orders:  -     POCT urine HCG  -     hCG, quantitative; Future  4. Periumbilical hernia  Assessment & Plan:  Symptomatic patient had history of umbilical hernia repair  recommend to do CAT scan of the abdomen and will follow-up with the result accordingly in case get worse to go to the emergency room  Orders:  -     CT  abdomen wo contrast; Future; Expected date: 07/30/2024  5. Encounter for immunization  -     Pneumococcal Conjugate Vaccine 20-valent (Pcv20)  6. Need for pneumococcal 20-valent conjugate vaccination  -     Pneumococcal Conjugate Vaccine 20-valent (Pcv20)  7. Screening, lipid  -     Lipid Panel with Direct LDL reflex; Future  8. Thyroid disorder screen  -     TSH, 3rd generation with Free T4 reflex; Future    Immunizations and preventive care screenings were discussed with patient today. Appropriate education was printed on patient's after visit summary.    Counseling:  Alcohol/drug use: discussed moderation in alcohol intake, the recommendations for healthy alcohol use, and avoidance of illicit drug use.  Dental Health: discussed importance of regular tooth brushing, flossing, and dental visits.  Injury prevention: discussed safety/seat belts, safety helmets, smoke detectors, carbon dioxide detectors, and smoking near bedding or upholstery.  Sexual health: discussed sexually transmitted diseases, partner selection, use of condoms, avoidance of unintended pregnancy, and contraceptive alternatives.  Exercise: the importance of regular exercise/physical activity was discussed. Recommend exercise 3-5 times per week for at least 30 minutes.       Depression Screening and Follow-up Plan: Patient was screened for depression during today's encounter. They screened negative with a PHQ-2 score of 0.    Tobacco Cessation Counseling: Tobacco cessation counseling was provided. The patient is sincerely urged to quit consumption of tobacco. She is ready to quit tobacco. Nicotine patch was prescribed.         History of Present Illness   Patient here for well exam and concerned about her menstruation last menstruation was in Sara 15 she is sexually active without protection and she is worried about being pregnant also patient known to have history of umbilical hernia status postrepair in 2012 she noticed bulging above the  "umbilical button and sometimes become painful hide past medical surgical family and social history reviewed with the patient    Adult Annual Physical:  Patient presents for annual physical.     Diet and Physical Activity:  - Diet/Nutrition:. no special diet  - Exercise: no formal exercise.    Depression Screening:  - PHQ-2 Score: 0    General Health:  - Sleep: 7-8 hours of sleep on average.  - Hearing: normal hearing bilateral ears.  - Vision: wears glasses.  - Dental: brushes teeth twice daily.    /GYN Health:  - Follows with GYN: yes.   - Menopause: premenopausal.   - History of STDs: no    Review of Systems   Constitutional:  Negative for chills and fever.   HENT:  Negative for ear pain and sore throat.    Eyes:  Negative for pain and visual disturbance.   Respiratory:  Negative for cough and shortness of breath.    Cardiovascular:  Negative for chest pain and palpitations.   Gastrointestinal:  Negative for abdominal pain, constipation, diarrhea and vomiting.   Genitourinary:  Positive for menstrual problem. Negative for dysuria and hematuria.   Musculoskeletal:  Negative for arthralgias and back pain.   Skin:  Negative for color change and rash.   Neurological:  Negative for seizures and syncope.   All other systems reviewed and are negative.        Objective     /80 (BP Location: Left arm, Patient Position: Sitting)   Pulse 83   Temp 97.9 °F (36.6 °C) (Tympanic)   Ht 5' 3\" (1.6 m)   Wt 49.4 kg (109 lb)   LMP 06/16/2024 (Approximate)   SpO2 99%   Breastfeeding No   BMI 19.31 kg/m²     Physical Exam  Vitals and nursing note reviewed.   Constitutional:       General: She is not in acute distress.     Appearance: She is well-developed. She is not diaphoretic.   HENT:      Head: Normocephalic.      Right Ear: Tympanic membrane and external ear normal.      Left Ear: Tympanic membrane and external ear normal.      Nose: No rhinorrhea.      Mouth/Throat:      Pharynx: No posterior oropharyngeal " erythema.   Eyes:      General:         Right eye: No discharge.         Left eye: No discharge.      Conjunctiva/sclera: Conjunctivae normal.   Neck:      Vascular: No JVD.   Cardiovascular:      Rate and Rhythm: Normal rate and regular rhythm.      Heart sounds: Normal heart sounds. No murmur heard.     No gallop.   Pulmonary:      Effort: Pulmonary effort is normal. No respiratory distress.      Breath sounds: Normal breath sounds. No stridor. No wheezing or rales.   Chest:      Chest wall: No tenderness.   Abdominal:      General: There is no distension.      Palpations: Abdomen is soft. There is no mass.      Tenderness: There is no abdominal tenderness. There is no rebound.      Hernia: A hernia is present.       Musculoskeletal:         General: No tenderness.      Cervical back: Normal range of motion and neck supple.   Lymphadenopathy:      Cervical: No cervical adenopathy.   Skin:     General: Skin is warm.      Findings: No erythema or rash.   Neurological:      Mental Status: She is alert and oriented to person, place, and time.

## 2024-07-30 NOTE — ASSESSMENT & PLAN NOTE
New diagnosis patient the last menstruation was on Sara 15 urine pregnancy in the office is negative recommend to do beta-hCG we will follow-up with the result accordingly

## 2024-07-30 NOTE — ASSESSMENT & PLAN NOTE
Advice and education were given regarding nutrition, aerobic exercises, weight-bearing exercises, cardiovascular risk reduction, fall risk reduction, and age-appropriate supplements.     The patient was counseled regarding instructions for management, risk factor reductions, prognosis, risks and benefits of treatment options, patient and family education, and importance of compliance with treatment.  Patient due for Pneumovax 20 discussed with patient she agreed she tolerated well without side effect  Patient due for Pap smear screening for cervical cancer screening discussed with the patient she will call her GYN for appointment

## 2024-07-30 NOTE — ASSESSMENT & PLAN NOTE
Chronic uncontrolled patient is smoking since she was 15 years old 1 pack a day interested in quitting smoking aware of the complication of smoking discussed medication including nicotine patch will start with 21 mg 1 patch a day for 28 days proper use and possible side effect review

## 2024-07-30 NOTE — ASSESSMENT & PLAN NOTE
Symptomatic patient had history of umbilical hernia repair 2012 recommend to do CAT scan of the abdomen and will follow-up with the result accordingly in case get worse to go to the emergency room

## 2024-07-31 LAB
B-HCG SERPL QL: <1 MIU/ML
CHOLEST SERPL-MCNC: 138 MG/DL
CHOLEST/HDLC SERPL: 3 {RATIO}
HDLC SERPL-MCNC: 46 MG/DL (ref 23–92)
LDLC SERPL CALC-MCNC: 77 MG/DL
NONHDLC SERPL-MCNC: 92 MG/DL
TRIGL SERPL-MCNC: 74 MG/DL
TSH SERPL-ACNC: 2.81 UIU/ML (ref 0.45–5.33)

## 2024-08-01 ENCOUNTER — TELEPHONE (OUTPATIENT)
Dept: FAMILY MEDICINE CLINIC | Facility: CLINIC | Age: 34
End: 2024-08-01

## 2024-08-01 NOTE — TELEPHONE ENCOUNTER
Called patient and unable to leave message. Unable to leave message due to phone being out of service. [place] [employer] message sent to patient

## 2024-09-11 ENCOUNTER — HOSPITAL ENCOUNTER (EMERGENCY)
Facility: HOSPITAL | Age: 34
Discharge: HOME | End: 2024-09-11
Attending: EMERGENCY MEDICINE | Admitting: EMERGENCY MEDICINE
Payer: MEDICARE

## 2024-09-11 ENCOUNTER — TELEPHONE (OUTPATIENT)
Dept: FAMILY MEDICINE CLINIC | Facility: CLINIC | Age: 34
End: 2024-09-11

## 2024-09-11 VITALS
BODY MASS INDEX: 19.1 KG/M2 | WEIGHT: 107.81 LBS | DIASTOLIC BLOOD PRESSURE: 99 MMHG | RESPIRATION RATE: 18 BRPM | SYSTOLIC BLOOD PRESSURE: 177 MMHG | OXYGEN SATURATION: 95 % | TEMPERATURE: 99.3 F | HEART RATE: 105 BPM

## 2024-09-11 DIAGNOSIS — R68.89 FLU-LIKE SYMPTOMS: Primary | ICD-10-CM

## 2024-09-11 LAB
FLUAV RNA RESP QL NAA+PROBE: NEGATIVE
FLUBV RNA RESP QL NAA+PROBE: NEGATIVE
RSV RNA RESP QL NAA+PROBE: NEGATIVE
SARS-COV-2 RNA RESP QL NAA+PROBE: NEGATIVE

## 2024-09-11 PROCEDURE — 94640 AIRWAY INHALATION TREATMENT: CPT

## 2024-09-11 PROCEDURE — 99283 EMERGENCY DEPT VISIT LOW MDM: CPT

## 2024-09-11 PROCEDURE — 0241U HB NFCT DS VIR RESP RNA 4 TRGT: CPT | Performed by: EMERGENCY MEDICINE

## 2024-09-11 PROCEDURE — 99284 EMERGENCY DEPT VISIT MOD MDM: CPT | Performed by: EMERGENCY MEDICINE

## 2024-09-11 RX ORDER — ALBUTEROL SULFATE 90 UG/1
2 AEROSOL, METERED RESPIRATORY (INHALATION) EVERY 4 HOURS PRN
Qty: 18 G | Refills: 0 | Status: SHIPPED | OUTPATIENT
Start: 2024-09-11

## 2024-09-11 RX ORDER — GUAIFENESIN 600 MG/1
600 TABLET, EXTENDED RELEASE ORAL ONCE
Status: COMPLETED | OUTPATIENT
Start: 2024-09-11 | End: 2024-09-11

## 2024-09-11 RX ORDER — FLUTICASONE PROPIONATE 50 MCG
1 SPRAY, SUSPENSION (ML) NASAL DAILY
Qty: 16 G | Refills: 0 | Status: SHIPPED | OUTPATIENT
Start: 2024-09-11

## 2024-09-11 RX ORDER — GUAIFENESIN 600 MG/1
1200 TABLET, EXTENDED RELEASE ORAL EVERY 12 HOURS SCHEDULED
Qty: 30 TABLET | Refills: 0 | Status: SHIPPED | OUTPATIENT
Start: 2024-09-11

## 2024-09-11 RX ORDER — BENZONATATE 100 MG/1
100 CAPSULE ORAL EVERY 8 HOURS
Qty: 21 CAPSULE | Refills: 0 | Status: SHIPPED | OUTPATIENT
Start: 2024-09-11 | End: 2024-09-21

## 2024-09-11 RX ORDER — ACETAMINOPHEN 325 MG/1
975 TABLET ORAL ONCE
Status: COMPLETED | OUTPATIENT
Start: 2024-09-11 | End: 2024-09-11

## 2024-09-11 RX ORDER — IPRATROPIUM BROMIDE AND ALBUTEROL SULFATE 2.5; .5 MG/3ML; MG/3ML
3 SOLUTION RESPIRATORY (INHALATION) ONCE
Status: COMPLETED | OUTPATIENT
Start: 2024-09-11 | End: 2024-09-11

## 2024-09-11 RX ORDER — IBUPROFEN 600 MG/1
600 TABLET, FILM COATED ORAL EVERY 6 HOURS PRN
Qty: 30 TABLET | Refills: 0 | Status: SHIPPED | OUTPATIENT
Start: 2024-09-11 | End: 2024-09-21

## 2024-09-11 RX ORDER — BENZONATATE 100 MG/1
100 CAPSULE ORAL ONCE
Status: COMPLETED | OUTPATIENT
Start: 2024-09-11 | End: 2024-09-11

## 2024-09-11 RX ORDER — KETOROLAC TROMETHAMINE 30 MG/ML
15 INJECTION, SOLUTION INTRAMUSCULAR; INTRAVENOUS ONCE
Status: DISCONTINUED | OUTPATIENT
Start: 2024-09-11 | End: 2024-09-11 | Stop reason: HOSPADM

## 2024-09-11 RX ADMIN — GUAIFENESIN 600 MG: 600 TABLET ORAL at 04:03

## 2024-09-11 RX ADMIN — IPRATROPIUM BROMIDE AND ALBUTEROL SULFATE 3 ML: 2.5; .5 SOLUTION RESPIRATORY (INHALATION) at 04:03

## 2024-09-11 RX ADMIN — ACETAMINOPHEN 975 MG: 325 TABLET ORAL at 04:18

## 2024-09-11 RX ADMIN — BENZONATATE 100 MG: 100 CAPSULE ORAL at 04:03

## 2024-09-11 NOTE — ED NOTES
Patient reports that her chest is feeling much  better after the breathing treatment.     Lo Peralta RN  09/11/24 0428

## 2024-09-11 NOTE — ED NOTES
Gertrude called and scheduled an appt for 11-6-20, concerned as it is two months post-op       Pregnancy test noted to be positive - provider made aware and toradol held. Patient informed of test results and is following up with her OBGYN. No vaginal bleeding, abdominal pain or cramping.     Lo Peralta RN  09/11/24 0421

## 2024-09-11 NOTE — ED PROVIDER NOTES
1. Flu-like symptoms      ED Disposition       ED Disposition   Discharge    Condition   Stable    Date/Time   Wed Sep 11, 2024  3:52 AM    Comment   Christine Thomas 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.                   Assessment & Plan       Medical Decision Making  33-year-old female presents with flulike symptoms.  She reports symptoms began earlier today and have continued.  Exam is consistent with viral etiology.  COVID/flu testing has been obtained and supportive care medications have been provided.  She will be following up with her primary care provider.    Amount and/or Complexity of Data Reviewed  Labs: ordered.    Risk  OTC drugs.  Prescription drug management.                       Medications   ketorolac (TORADOL) injection 15 mg (15 mg Intramuscular Not Given 9/11/24 0423)   benzonatate (TESSALON PERLES) capsule 100 mg (100 mg Oral Given 9/11/24 0403)   guaiFENesin (MUCINEX) 12 hr tablet 600 mg (600 mg Oral Given 9/11/24 0403)   ipratropium-albuterol (DUO-NEB) 0.5-2.5 mg/3 mL inhalation solution 3 mL (3 mL Nebulization Given 9/11/24 0403)   acetaminophen (TYLENOL) tablet 975 mg (975 mg Oral Given 9/11/24 0418)       History of Present Illness       33-year old female presents with a 1 day history of URI symptoms.  She complains of subjective fevers along with runny nose, sore throat, dry cough, and bodyaches.  She reports taking NyQuil with minimal improvement of symptoms.  She was around possible sick individuals when she was at a bar several days ago.  She reports that her child now has a similar type of cough.  No GI symptoms or other acute issues.      Flu Symptoms  Presenting symptoms: cough, fever, myalgias, rhinorrhea, shortness of breath and sore throat    Presenting symptoms: no fatigue    Severity:  Moderate  Onset quality:  Gradual  Duration:  1 day  Progression:  Worsening  Chronicity:  New  Relieved by:  Nothing  Worsened by:  Nothing  Ineffective treatments:  OTC medications  Associated symptoms:  chills and nasal congestion          Review of Systems   Constitutional:  Positive for chills and fever. Negative for fatigue.   HENT:  Positive for congestion, rhinorrhea and sore throat.    Respiratory:  Positive for cough and shortness of breath.    Musculoskeletal:  Positive for myalgias.   All other systems reviewed and are negative.          Objective     ED Triage Vitals [09/11/24 0350]   Temperature Pulse Blood Pressure Respirations SpO2 Patient Position - Orthostatic VS   99.3 °F (37.4 °C) 105 (!) 177/99 18 95 % Lying      Temp Source Heart Rate Source BP Location FiO2 (%) Pain Score    Tympanic Monitor Left arm -- --        Physical Exam  Vitals and nursing note reviewed.   Constitutional:       General: She is not in acute distress.     Appearance: Normal appearance. She is well-developed. She is ill-appearing. She is not toxic-appearing or diaphoretic.   HENT:      Head: Normocephalic and atraumatic.      Right Ear: External ear normal.      Left Ear: External ear normal.      Nose: Congestion present.      Mouth/Throat:      Mouth: Mucous membranes are moist.      Pharynx: Oropharynx is clear.   Eyes:      Conjunctiva/sclera: Conjunctivae normal.      Pupils: Pupils are equal, round, and reactive to light.   Cardiovascular:      Rate and Rhythm: Normal rate and regular rhythm.      Heart sounds: Normal heart sounds.   Pulmonary:      Effort: Pulmonary effort is normal. No respiratory distress.      Breath sounds: Wheezing (few scattered) present.   Abdominal:      General: Bowel sounds are normal.      Palpations: Abdomen is soft.      Tenderness: There is no abdominal tenderness. There is no guarding.   Musculoskeletal:         General: No tenderness or deformity.      Cervical back: Normal range of motion and neck supple. No rigidity.   Skin:     General: Skin is warm and dry.      Capillary Refill: Capillary refill takes less than 2 seconds.      Findings: No rash.   Neurological:      General: No  focal deficit present.      Mental Status: She is alert and oriented to person, place, and time.   Psychiatric:         Mood and Affect: Mood normal.         Behavior: Behavior normal.         Labs Reviewed   COVID19, INFLUENZA A/B, RSV PCR, [facility] - Normal    Narrative:     This test has been performed using the CoV-2/Flu/RSV plus assay on the Smart Pipe GeneXpert platform. This test has been validated by the  and verified by the performing laboratory.     This test is designed to amplify and detect the following: nucleocapsid (N), envelope (E), and RNA-dependent RNA polymerase (RdRP) genes of the SARS-CoV-2 genome; matrix (M), basic polymerase (PB2), and acidic protein (PA) segments of the influenza A genome; matrix (M) and non-structural protein (NS) segments of the influenza B genome, and the nucleocapsid genes of RSV A and RSV B.     Positive results are indicative of the presence of Flu A, Flu B, RSV, and/or SARS-CoV-2 RNA. Positive results for SARS-CoV-2 or suspected novel influenza should be reported to state, local, or federal health departments according to local reporting requirements.      All results should be assessed in conjunction with clinical presentation and other laboratory markers for clinical management.     FOR PEDIATRIC PATIENTS - copy/paste COVID Guidelines URL to browser: [email or web address]        No orders to display       Procedures       Ricky Toure, DO  09/11/24 0359       Ricky Toure, DO  09/11/24 0545

## 2024-09-21 ENCOUNTER — APPOINTMENT (EMERGENCY)
Dept: ULTRASOUND IMAGING | Facility: HOSPITAL | Age: 34
End: 2024-09-21
Payer: MEDICARE

## 2024-09-21 ENCOUNTER — HOSPITAL ENCOUNTER (EMERGENCY)
Facility: HOSPITAL | Age: 34
Discharge: HOME | End: 2024-09-21
Attending: EMERGENCY MEDICINE
Payer: MEDICARE

## 2024-09-21 VITALS
DIASTOLIC BLOOD PRESSURE: 84 MMHG | SYSTOLIC BLOOD PRESSURE: 137 MMHG | TEMPERATURE: 98.5 F | OXYGEN SATURATION: 100 % | HEART RATE: 74 BPM | RESPIRATION RATE: 18 BRPM

## 2024-09-21 DIAGNOSIS — O26.899 ABDOMINAL PAIN IN PREGNANCY: Primary | ICD-10-CM

## 2024-09-21 DIAGNOSIS — R10.9 ABDOMINAL PAIN IN PREGNANCY: Primary | ICD-10-CM

## 2024-09-21 LAB
ABO GROUP BLD: NORMAL
ALBUMIN SERPL BCG-MCNC: 4.3 G/DL (ref 3.5–5)
ALP SERPL-CCNC: 49 U/L (ref 34–104)
ALT SERPL W P-5'-P-CCNC: 11 U/L (ref 7–52)
ANION GAP SERPL CALCULATED.3IONS-SCNC: 8 MMOL/L (ref 4–13)
AST SERPL W P-5'-P-CCNC: 11 U/L (ref 13–39)
B-HCG SERPL-ACNC: ABNORMAL MIU/ML (ref 0–5)
BACTERIA UR QL AUTO: NORMAL /HPF
BASOPHILS # BLD AUTO: 0.07 THOUSANDS/ΜL (ref 0–0.1)
BASOPHILS NFR BLD AUTO: 1 % (ref 0–1)
BILIRUB SERPL-MCNC: 0.57 MG/DL (ref 0.2–1)
BILIRUB UR QL STRIP: NEGATIVE
BLD GP AB SCN SERPL QL: NEGATIVE
BUN SERPL-MCNC: 8 MG/DL (ref 5–25)
CALCIUM SERPL-MCNC: 9.4 MG/DL (ref 8.4–10.2)
CHLORIDE SERPL-SCNC: 104 MMOL/L (ref 96–108)
CLARITY UR: CLEAR
CO2 SERPL-SCNC: 24 MMOL/L (ref 21–32)
COLOR UR: YELLOW
CREAT SERPL-MCNC: 0.61 MG/DL (ref 0.6–1.3)
EOSINOPHIL # BLD AUTO: 0.16 THOUSAND/ΜL (ref 0–0.61)
EOSINOPHIL NFR BLD AUTO: 1 % (ref 0–6)
ERYTHROCYTE [DISTWIDTH] IN BLOOD BY AUTOMATED COUNT: 11.8 % (ref 11.6–15.1)
EXT PREGNANCY TEST URINE: POSITIVE
EXT. CONTROL: ABNORMAL
GFR SERPL CREATININE-BSD FRML MDRD: 119 ML/MIN/1.73SQ M
GLUCOSE SERPL-MCNC: 85 MG/DL (ref 65–140)
GLUCOSE UR STRIP-MCNC: NEGATIVE MG/DL
HCT VFR BLD AUTO: 41 % (ref 34.8–46.1)
HGB BLD-MCNC: 13.8 G/DL (ref 11.5–15.4)
HGB UR QL STRIP.AUTO: NEGATIVE
IMM GRANULOCYTES # BLD AUTO: 0.03 THOUSAND/UL (ref 0–0.2)
IMM GRANULOCYTES NFR BLD AUTO: 0 % (ref 0–2)
KETONES UR STRIP-MCNC: NEGATIVE MG/DL
LEUKOCYTE ESTERASE UR QL STRIP: 100
LYMPHOCYTES # BLD AUTO: 2.43 THOUSANDS/ΜL (ref 0.6–4.47)
LYMPHOCYTES NFR BLD AUTO: 21 % (ref 14–44)
MCH RBC QN AUTO: 32.5 PG (ref 26.8–34.3)
MCHC RBC AUTO-ENTMCNC: 33.7 G/DL (ref 31.4–37.4)
MCV RBC AUTO: 97 FL (ref 82–98)
MONOCYTES # BLD AUTO: 0.67 THOUSAND/ΜL (ref 0.17–1.22)
MONOCYTES NFR BLD AUTO: 6 % (ref 4–12)
NEUTROPHILS # BLD AUTO: 8.07 THOUSANDS/ΜL (ref 1.85–7.62)
NEUTS SEG NFR BLD AUTO: 71 % (ref 43–75)
NITRITE UR QL STRIP: NEGATIVE
NON-SQ EPI CELLS URNS QL MICRO: NORMAL /HPF
NRBC BLD AUTO-RTO: 0 /100 WBCS
PH UR STRIP.AUTO: 7 [PH]
PLATELET # BLD AUTO: 245 THOUSANDS/UL (ref 149–390)
PMV BLD AUTO: 9.4 FL (ref 8.9–12.7)
POTASSIUM SERPL-SCNC: 3.7 MMOL/L (ref 3.5–5.3)
PROT SERPL-MCNC: 7.4 G/DL (ref 6.4–8.4)
PROT UR STRIP-MCNC: NEGATIVE MG/DL
RBC # BLD AUTO: 4.24 MILLION/UL (ref 3.81–5.12)
RBC #/AREA URNS AUTO: NORMAL /HPF
RH BLD: POSITIVE
SODIUM SERPL-SCNC: 136 MMOL/L (ref 135–147)
SP GR UR STRIP.AUTO: 1.01 (ref 1–1.04)
SPECIMEN EXPIRATION DATE: NORMAL
UROBILINOGEN UA: NEGATIVE MG/DL
WBC # BLD AUTO: 11.43 THOUSAND/UL (ref 4.31–10.16)
WBC #/AREA URNS AUTO: NORMAL /HPF

## 2024-09-21 PROCEDURE — 81025 URINE PREGNANCY TEST: CPT | Performed by: PHYSICIAN ASSISTANT

## 2024-09-21 PROCEDURE — 81001 URINALYSIS AUTO W/SCOPE: CPT | Performed by: PHYSICIAN ASSISTANT

## 2024-09-21 PROCEDURE — 86900 BLOOD TYPING SEROLOGIC ABO: CPT | Performed by: PHYSICIAN ASSISTANT

## 2024-09-21 PROCEDURE — 86850 RBC ANTIBODY SCREEN: CPT | Performed by: PHYSICIAN ASSISTANT

## 2024-09-21 PROCEDURE — 99285 EMERGENCY DEPT VISIT HI MDM: CPT | Performed by: PHYSICIAN ASSISTANT

## 2024-09-21 PROCEDURE — 36415 COLL VENOUS BLD VENIPUNCTURE: CPT | Performed by: PHYSICIAN ASSISTANT

## 2024-09-21 PROCEDURE — 99284 EMERGENCY DEPT VISIT MOD MDM: CPT

## 2024-09-21 PROCEDURE — 87086 URINE CULTURE/COLONY COUNT: CPT | Performed by: PHYSICIAN ASSISTANT

## 2024-09-21 PROCEDURE — 84702 CHORIONIC GONADOTROPIN TEST: CPT | Performed by: PHYSICIAN ASSISTANT

## 2024-09-21 PROCEDURE — 86901 BLOOD TYPING SEROLOGIC RH(D): CPT | Performed by: PHYSICIAN ASSISTANT

## 2024-09-21 PROCEDURE — 85025 COMPLETE CBC W/AUTO DIFF WBC: CPT | Performed by: PHYSICIAN ASSISTANT

## 2024-09-21 PROCEDURE — 76801 OB US < 14 WKS SINGLE FETUS: CPT

## 2024-09-21 PROCEDURE — 81003 URINALYSIS AUTO W/O SCOPE: CPT | Performed by: PHYSICIAN ASSISTANT

## 2024-09-21 PROCEDURE — 80053 COMPREHEN METABOLIC PANEL: CPT | Performed by: PHYSICIAN ASSISTANT

## 2024-09-21 RX ORDER — ACETAMINOPHEN 325 MG/1
975 TABLET ORAL ONCE
Status: COMPLETED | OUTPATIENT
Start: 2024-09-21 | End: 2024-09-21

## 2024-09-21 RX ADMIN — ACETAMINOPHEN 975 MG: 325 TABLET ORAL at 14:54

## 2024-09-21 NOTE — ED PROVIDER NOTES
No diagnosis found.  ED Disposition       None          Assessment & Plan       Medical Decision Making  Differential diagnosis includes but not limited to: Ectopic pregnancy, ovarian cyst, round ligament pain    Problems Addressed:  Abdominal pain in pregnancy: acute illness or injury    Amount and/or Complexity of Data Reviewed  Labs: ordered. Decision-making details documented in ED Course.  Radiology: ordered.    Risk  OTC drugs.                     Medications   acetaminophen (TYLENOL) tablet 975 mg (has no administration in time range)       History of Present Illness       33-year-old female presents the emergency department with complaints of lower abdominal pain.  States the symptoms started last night.  Describes some lower right and left-sided discomfort which is having a vague cramping or pulling sensation.  Denies vaginal bleeding or discomfort.  States that she is pregnant by history.  LMP 8/8/2024.  Has not had a confirmed IUP on ultrasound at this time.      History provided by:  Patient   used: No    Abdominal Pain  Pain location:  RLQ and LLQ  Pain quality: cramping and tugging    Pain radiates to:  Does not radiate  Pain severity:  Mild  Onset quality:  Gradual  Timing:  Constant  Progression:  Waxing and waning  Chronicity:  New  Relieved by:  Nothing  Ineffective treatments:  None tried  Associated symptoms: nausea    Associated symptoms: no anorexia, no belching, no chest pain, no chills, no constipation, no cough, no diarrhea, no dysuria, no fatigue, no fever, no flatus, no hematuria, no shortness of breath, no sore throat, no vaginal bleeding, no vaginal discharge and no vomiting        Review of Systems   Constitutional:  Negative for activity change, chills, fatigue and fever.   HENT:  Negative for congestion, ear pain and sore throat.    Eyes:  Negative for pain.   Respiratory:  Negative for cough, chest tightness, shortness of breath and wheezing.    Cardiovascular:   Negative for chest pain.   Gastrointestinal:  Positive for abdominal pain and nausea. Negative for anorexia, constipation, diarrhea, flatus and vomiting.   Endocrine: Negative for cold intolerance and heat intolerance.   Genitourinary:  Negative for difficulty urinating, dysuria, flank pain, hematuria, urgency, vaginal bleeding and vaginal discharge.   Musculoskeletal:  Negative for back pain and myalgias.   Skin:  Negative for color change, rash and wound.   Allergic/Immunologic: Negative for food allergies.   Neurological:  Negative for dizziness, syncope, weakness, numbness and headaches.   Psychiatric/Behavioral:  Negative for agitation and behavioral problems.    All other systems reviewed and are negative.          Objective     ED Triage Vitals [09/21/24 1318]   Temperature Pulse Blood Pressure Respirations SpO2 Patient Position - Orthostatic VS   98.5 °F (36.9 °C) 81 133/98 19 97 % Sitting      Temp Source Heart Rate Source BP Location FiO2 (%) Pain Score    Oral Monitor Right arm -- --        Physical Exam  Vitals and nursing note reviewed.   Constitutional:       Appearance: Normal appearance. She is well-developed.   HENT:      Head: Normocephalic and atraumatic.      Right Ear: Hearing and external ear normal.      Left Ear: Hearing and external ear normal.      Nose: Nose normal.      Mouth/Throat:      Mouth: Mucous membranes are normal.   Eyes:      General: Lids are normal.      Extraocular Movements: EOM normal.      Conjunctiva/sclera: Conjunctivae normal.   Neck:      Trachea: Trachea normal.   Cardiovascular:      Rate and Rhythm: Normal rate and regular rhythm.      Heart sounds: Normal heart sounds. No murmur heard.     No friction rub. No gallop.   Pulmonary:      Effort: Pulmonary effort is normal. No respiratory distress.      Breath sounds: Normal breath sounds. No decreased breath sounds, wheezing, rhonchi or rales.   Abdominal:      Tenderness: There is abdominal tenderness in the right  lower quadrant and left lower quadrant. There is no right CVA tenderness or left CVA tenderness.   Musculoskeletal:         General: Normal range of motion.      Cervical back: Normal range of motion.   Skin:     General: Skin is warm and dry.      Findings: No erythema or rash.   Neurological:      General: No focal deficit present.      Mental Status: She is alert and oriented to person, place, and time.   Psychiatric:         Mood and Affect: Mood and affect and mood normal.         Behavior: Behavior normal.         Labs Reviewed   CBC AND DIFFERENTIAL - Abnormal       Result Value    WBC 11.43 (*)     RBC 4.24      Hemoglobin 13.8      Hematocrit 41.0      MCV 97      MCH 32.5      MCHC 33.7      RDW 11.8      MPV 9.4      Platelets 245      nRBC 0      Segmented % 71      Immature Grans % 0      Lymphocytes % 21      Monocytes % 6      Eosinophils Relative 1      Basophils Relative 1      Absolute Neutrophils 8.07 (*)     Absolute Immature Grans 0.03      Absolute Lymphocytes 2.43      Absolute Monocytes 0.67      Eosinophils Absolute 0.16      Basophils Absolute 0.07     UA W REFLEX TO MICROSCOPIC WITH REFLEX TO CULTURE - Abnormal    Color, UA Yellow      Clarity, UA Clear      Specific Gravity, UA 1.010      pH, UA 7.0      Leukocytes, .0 (*)     Nitrite, UA Negative      Protein, UA Negative      Glucose, UA Negative      Ketones, UA Negative      Bilirubin, UA Negative      Occult Blood, UA Negative      URINE COMMENT        UROBILINOGEN UA Negative     COMPREHENSIVE METABOLIC PANEL - Abnormal    Sodium 136      Potassium 3.7      Chloride 104      CO2 24      ANION GAP 8      BUN 8      Creatinine 0.61      Glucose 85      Calcium 9.4      AST 11 (*)     ALT 11      Alkaline Phosphatase 49      Total Protein 7.4      Albumin 4.3      Total Bilirubin 0.57      eGFR 119      Narrative:     National Kid[employer] Disease Foundation guidelines for Chronic Kidney Disease (CKD):     Stage 1 with normal or high  GFR (GFR > 90 mL/min/1.73 square meters)    Stage 2 Mild CKD (GFR = 60-89 mL/min/1.73 square meters)    Stage 3A Moderate CKD (GFR = 45-59 mL/min/1.73 square meters)    Stage 3B Moderate CKD (GFR = 30-44 mL/min/1.73 square meters)    Stage 4 Severe CKD (GFR = 15-29 mL/min/1.73 square meters)    Stage 5 End Stage CKD (GFR <15 mL/min/1.73 square meters)  Note: GFR calculation is accurate only with a steady state creatinine   POCT PREGNANCY, URINE - Abnormal    EXT Preg Test, Ur Positive (*)     Control Valid     URINE CULTURE   URINE MICROSCOPIC    RBC, UA None Seen      WBC, UA 2-4      Epithelial Cells Occasional      Bacteria, UA None Seen      URINE COMMENT       HCG, QUANTITATIVE   TYPE AND SCREEN   ABORH RECHECK     US OB < 14 weeks single or first gestation level 1    (Results Pending)       Procedures    ED Medication and Procedure Management   Prior to Admission Medications   Prescriptions Last Dose Informant Patient Reported? Taking?   albuterol (PROVENTIL HFA,VENTOLIN HFA) 90 mcg/act inhaler   No No   Sig: Inhale 2 puffs every 4 (four) hours as needed for wheezing   albuterol (Ventolin HFA) 90 mcg/act inhaler  Self No No   Sig: Inhale 2 puffs every 6 (six) hours as needed for wheezing   fluticasone (FLONASE) 50 mcg/act nasal spray   No No   Si spray into each nostril daily   guaiFENesin (MUCINEX) 600 mg 12 hr tablet   No No   Sig: Take 2 tablets (1,200 mg total) by mouth every 12 (twelve) hours      Facility-Administered Medications: None     Patient's Medications   Discharge Prescriptions    No medications on file     No discharge procedures on file.     Vinita Fajardo PA-C  [phone]

## 2024-09-23 LAB — BACTERIA UR CULT: NORMAL

## 2024-09-26 ENCOUNTER — PATIENT OUTREACH (OUTPATIENT)
Dept: OBGYN CLINIC | Facility: CLINIC | Age: 34
End: 2024-09-26

## 2024-09-26 ENCOUNTER — ULTRASOUND (OUTPATIENT)
Dept: OBGYN CLINIC | Facility: CLINIC | Age: 34
End: 2024-09-26

## 2024-09-26 VITALS
SYSTOLIC BLOOD PRESSURE: 120 MMHG | BODY MASS INDEX: 19.31 KG/M2 | HEART RATE: 85 BPM | WEIGHT: 109 LBS | HEIGHT: 63 IN | DIASTOLIC BLOOD PRESSURE: 71 MMHG

## 2024-09-26 DIAGNOSIS — N91.2 AMENORRHEA: Primary | ICD-10-CM

## 2024-09-26 DIAGNOSIS — Z59.819 HOUSING INSTABILITY: ICD-10-CM

## 2024-09-26 DIAGNOSIS — N89.8 VAGINAL DISCHARGE: ICD-10-CM

## 2024-09-26 DIAGNOSIS — R11.2 NAUSEA AND VOMITING, UNSPECIFIED VOMITING TYPE: ICD-10-CM

## 2024-09-26 LAB
BV WHIFF TEST VAG QL: NEGATIVE
CLUE CELLS SPEC QL WET PREP: NEGATIVE
PH SMN: 3.5 [PH]
SL AMB POCT WET MOUNT: NEGATIVE
T VAGINALIS VAG QL WET PREP: NEGATIVE
YEAST VAG QL WET PREP: NEGATIVE

## 2024-09-26 RX ORDER — PYRIDOXINE HCL (VITAMIN B6) 25 MG
25 TABLET ORAL DAILY
Qty: 30 TABLET | Refills: 2 | Status: SHIPPED | OUTPATIENT
Start: 2024-09-26 | End: 2024-12-25

## 2024-09-26 SDOH — ECONOMIC STABILITY - HOUSING INSECURITY: HOUSING INSTABILITY UNSPECIFIED: Z59.819

## 2024-09-26 NOTE — PROGRESS NOTES
"[facility]  Obstetrics & Gynecology  2024    S: 33 y.o.  who presents for viability scan. Her LMP was 2024 and she is 7 weeks and 0 days by her LMP. She reports having some vaginal discharge. She denies cramping or vaginal bleeding. This pregnancy was unplanned but desired. Previous pregnancies significant for  vaginal delivery. Current medications: none. Smoking/drinking/illicit drug use: denies. She also reports having housing instability. She reports that her home is about to be in foreclosure, and that she was told that she would have to leave on . Her ex had his name on the house, and there was an agreement that he would help cover the expense of housing despite separation. However, he never made payments. He also took their car. She is also currently unemployed and actively looking for employment. She was asking for assistance for all these social concerns.      O:  Vitals:    24 1255   BP: 120/71   BP Location: Left arm   Patient Position: Sitting   Cuff Size: Standard   Pulse: 85   Weight: 49.4 kg (109 lb)   Height: 5' 3\" (1.6 m)     Body mass index is 19.31 kg/m².      Imaging:  FIRST TRIMESTER OBSTETRIC ULTRASOUND    Mayco Sanches MD  24    INDICATION: Amenorrhea, viability    COMPARISON: None     TECHNIQUE:   Transvaginal imaging was performed to assess the gestation, myometrial/endometrial architecture and ovarian parenchymal detail.    The study includes volumetric sweeps and traditional still imaging technique.      FINDINGS:     A single intrauterine gestation is identified.  Cardiac activity is detected at 114.      YOLK SAC:  Present and normal in size and appearance.  MEAN CROWN RUMP LENGTH:  6.13 mm = 6 weeks 2 days, consistent with LMP  AMNIOTIC FLUID/SAC SHAPE:  Within expected normal range.     UTERUS/ADNEXA:   No adnexal mass or pathologic cyst.  No free fluid identified.     IMPRESSION:     Final FAIZA: 5/15/2025 by LMP  Gestational age: " 7w0d  Fetal cardiac activity detected.  Right ovarian cysts sized 1.42 x 1.43 x 1.42 cm noted. Normal left ovary       Media Information    Document Information    Clinical Image - Mobile Device      09/26/2024 1:47 PM   Attached To:   Ultrasound on 9/26/24 with Mayco Sanches MD   Source Information    Mayco Sanches MD  [facility]   Document History         Media Information    Document Information    Clinical Image - Mobile Device      09/26/2024 1:48 PM   Attached To:   Ultrasound on 9/26/24 with Mayco Sanches MD   Source Information    Mayco Sanches MD  [facility]   Document History           Media Information    Document Information    Clinical Image - Mobile Device      09/26/2024 1:48 PM   Attached To:   Ultrasound on 9/26/24 with Mayco Sanches MD   Source Information    Mayco Sanches MD  [facility]   Document History        P:  1. Amenorrhea  -     HIV 1/2 AG/AB w Reflex [facility] for 2 yr old and above; Future  -     Hepatitis C antibody; Future  -     UA (URINE) with reflex to Scope; Future  -     Urine culture; Future  -     Hepatitis B surface antigen; Future  -     CBC and differential; Future  -     Rubella antibody, IgG; Future  -     Type and screen; Future  -     RPR-Syphilis Screening (Total Syphilis IGG/IGM); Future  -     Hepatitis B surface antibody; Future  -     Anemia Panel w/Reflex, OB; Future  -     AMB US OB < 14 weeks single or first gestation level 1  2. Nausea and vomiting, unspecified vomiting type  -     doxylamine (UNISOM) 25 MG tablet; Take 0.5 tablets (12.5 mg total) by mouth daily at bedtime as needed for sleep  -     pyridoxine (B-6) 25 MG tablet; Take 1 tablet (25 mg total) by mouth daily  3. Vaginal discharge  Assessment & Plan:  POCT wet mount done, which was negative  Orders:  -     POCT wet mount  4. Housing instability  -     Ambulatory Referral to Social Work Care Management Program;  Future        Mayco Sanches MD  PGY-I, OB/GYN  9/26/2024, 5:05 PM

## 2024-09-26 NOTE — PROGRESS NOTES
ISRAEL FERNANDEZ was referred by Dr. Sanches to see pt for housing insecurities. ISRAEL FERNANDEZ met with pt today at her viability scan. Pt reports that she and her ex partner are foreclosing on their home in November. There was a realitor that was interested in buying it so that pt could retreive some money back however pt reports that there is a lein on the property now and some other legal issues. Pt needs to speak with a , ISRAEL FERNANDEZ provided her with the information for [facility] legal services. Pt has attempted to obtain assistance through several agencies but there is no funding at this time. Pt is hoping to retreive a few thousand from a sale of the house instead of foreclosure so that she can use that to rent.    Pts current partner and his mother recently moved her from [place] but are staying with family. Pts partner is looking for a home to buy.     Pt with hx of MH dx, PTSD, she attended [employer] two years ago and reports she completed the program and it was very beneficial.     Pt has a 9yo and 12yo at home. She is currently not employed, she is looking for work.     ISRAEL FERNANDEZ advised her that if she becomes worried about possible street homelessness she should call 2-1-1 to be placed on the shelter wait list.     Will f/u at pts intake.

## 2024-09-27 NOTE — PROGRESS NOTES
Ultrasound Probe Disinfection    A transvaginal ultrasound was performed.   Prior to use, disinfection was performed with   Probe serial number [id number]   was used    Rosario Patino MA  09/27/24  9:00 AM

## 2024-10-09 ENCOUNTER — INITIAL PRENATAL (OUTPATIENT)
Dept: OBGYN CLINIC | Facility: CLINIC | Age: 34
End: 2024-10-09

## 2024-10-09 VITALS
BODY MASS INDEX: 19.42 KG/M2 | WEIGHT: 109.6 LBS | DIASTOLIC BLOOD PRESSURE: 88 MMHG | HEIGHT: 63 IN | HEART RATE: 80 BPM | SYSTOLIC BLOOD PRESSURE: 132 MMHG

## 2024-10-09 DIAGNOSIS — Z59.9 FINANCIAL DIFFICULTIES: Primary | ICD-10-CM

## 2024-10-09 DIAGNOSIS — Z59.819 HOUSING INSTABILITY: ICD-10-CM

## 2024-10-09 DIAGNOSIS — Z59.12 INADEQUATE HOUSING UTILITIES: ICD-10-CM

## 2024-10-09 DIAGNOSIS — Z34.91 PRENATAL CARE IN FIRST TRIMESTER: ICD-10-CM

## 2024-10-09 DIAGNOSIS — Z59.82 INABILITY TO ACQUIRE TRANSPORTATION: ICD-10-CM

## 2024-10-09 DIAGNOSIS — Z59.41 FOOD INSECURITY: ICD-10-CM

## 2024-10-09 DIAGNOSIS — Z31.430 ENCOUNTER OF FEMALE FOR TESTING FOR GENETIC DISEASE CARRIER STATUS FOR PROCREATIVE MANAGEMENT: ICD-10-CM

## 2024-10-09 DIAGNOSIS — Z3A.08 8 WEEKS GESTATION OF PREGNANCY: ICD-10-CM

## 2024-10-09 PROCEDURE — T1001 NURSING ASSESSMENT/EVALUATN: HCPCS

## 2024-10-09 SDOH — ECONOMIC STABILITY - TRANSPORTATION SECURITY: TRANSPORTATION INSECURITY: Z59.82

## 2024-10-09 SDOH — ECONOMIC STABILITY - INCOME SECURITY: PROBLEM RELATED TO HOUSING AND ECONOMIC CIRCUMSTANCES, UNSPECIFIED: Z59.9

## 2024-10-09 SDOH — ECONOMIC STABILITY - HOUSING INSECURITY: HOUSING INSTABILITY UNSPECIFIED: Z59.819

## 2024-10-09 SDOH — ECONOMIC STABILITY - FOOD INSECURITY: FOOD INSECURITY: Z59.41

## 2024-10-09 SDOH — ECONOMIC STABILITY - HOUSING INSECURITY: INADEQUATE HOUSING UTILITIES: Z59.12

## 2024-10-09 NOTE — PATIENT INSTRUCTIONS
WARNING SIGNS DURING PREGNANCY  Call our office at [phone] for any of the followin. Vaginal bleeding  2. Sharp abdominal pain that does not go away  3. Fever (more than 100.4 and is not relieved by Tylenol)  4. Persistent vomiting lasting greater than 24 hours  5. Chest pain   6. Pain or burning when you urinate  7. Severe headache that doesn't resolve with Tylenol  8. Blurred vision or seeing spots in your vision  9. Sudden swelling of your face or hands  10. Redness, swelling or pain in a leg  11. A sudden weight gain in just a few days  12. Decrease in your baby's movement (after 28 weeks or the 6th month of pregnancy)  13. A loss of watery fluid from your vagina - can be a gush, a trickle or continuous wetness  14. After 20 weeks of pregnancy, rhythmic cramping (greater than 4 per hour) or menstrual like low/pelvic pain

## 2024-10-09 NOTE — LETTER
10/9/2024      This letter is to confirm that Christine Thomas is pregnant and is under our care.  Her Estimated Date of Delivery: 5/15/25.    If you have any questions or concerns, please contact our office.  Thank you,    DANIEL Mcbride

## 2024-10-09 NOTE — LETTER
"    [facility] REFERRAL      Date: 10/9/2024    Patient name: Christine Thomas    YOB: 1990    Estimated Date of Delivery: 5/15/25      /88 (BP Location: Right arm, Patient Position: Sitting, Cuff Size: Standard)   Pulse 80   Ht 5' 3\" (1.6 m)   Wt 49.7 kg (109 lb 9.6 oz)   LMP 08/08/2024 (Approximate)   BMI 19.41 kg/m²         Thank you,  DANIEL Mcbride            [facility] locations:   1. [facility]       [address] [place] [address]       Phone: [phone]       Fax: [phone]     2. [facility]       [address] [address]       Phone: [phone]       Fax: [phone]      "

## 2024-10-09 NOTE — PROGRESS NOTES
OBSTETRIC INTAKE VISIT    Christine Thomas presents today for initial OB visit and intake at 8w6d. LMP - 24.  History obtained from patient and she reports it as follows:    Past Medical History:   Diagnosis Date    Asthma     Depression     Hypertension     Migraine     Multiple sclerosis (HCC) 2018    Neurogenic bladder     PTSD (post-traumatic stress disorder)     Seizure (HCC)     possibly r/t MS diagnosis, likely stress-related    Vertigo      Past Surgical History:   Procedure Laterality Date    CLOSED REDUCTION FINGER FRACTURE Left     FL LUMBAR PUNCTURE DIAGNOSTIC  2019    TONSILLECTOMY Bilateral 1994    UMBILICAL HERNIA REPAIR  2016     OB History    Para Term  AB Living   4 2 1 1 1 2   SAB IAB Ectopic Multiple Live Births   1 0 0 0 2      # Outcome Date GA Lbr Juan J/2nd Weight Sex Type Anes PTL Lv   4 Current            3 SAB 2023           2   32w0d   F Vag-Spont None  JD   1 Term     F Vag-Spont EPI  JD     Social History     Tobacco Use    Smoking status: Former     Types: Cigarettes    Smokeless tobacco: Never   Vaping Use    Vaping status: Never Used   Substance Use Topics    Alcohol use: Not Currently     Comment: couple times/year    Drug use: Not Currently       Current Outpatient Medications   Medication Instructions    albuterol (PROVENTIL HFA,VENTOLIN HFA) 90 mcg/act inhaler 2 puffs, Inhalation, Every 4 hours PRN    albuterol (Ventolin HFA) 90 mcg/act inhaler 2 puffs, Inhalation, Every 6 hours PRN    doxylamine (UNISOM) 12.5 mg, Oral, Daily at bedtime PRN    fluticasone (FLONASE) 50 mcg/act nasal spray 1 spray, Nasal, Daily    guaiFENesin (MUCINEX) 1,200 mg, Oral, Every 12 hours scheduled    pyridoxine (B-6) 25 mg, Oral, Daily       Allergies   Allergen Reactions    Clindamycin Throat Swelling    Onion - Food Allergy Anaphylaxis and Swelling     Swelling of throat.      Gabapentin Diarrhea, Hives and Itching    Sumatriptan Swelling       Vitals: BP  "132/88 (BP Location: Right arm, Patient Position: Sitting, Cuff Size: Standard)   Pulse 80   Ht 5' 3\" (1.6 m)   Wt 49.7 kg (109 lb 9.6 oz)   LMP 08/08/2024 (Approximate)   BMI 19.41 kg/m²     Review of Systems:  Denies vaginal bleeding or leaking fluid.  Denies abdominal/pelvic pain or contractions.    Plan:  1. OB labwork ordered today to include Prenatal Panel, HCV antibody, Hgb fractionation cascade, Prenatal Carrier Screen Panel.  Other labs include Glucose 1H PG.  Advised patient to have drawn within the next few days.  2. Will help pt schedule ultrasound with MFM.  3. Return 10/29/24 for H&P prenatal visit.  4. Referrals placed for WIC, , and [facility].  5. Given vaccines: Wants Flu vaccine at next visit.  6. Patient's depression screening was assessed with a PHQ-2 score of 0. Clinically patient does not have depression. No treatment is required.   in to see patient.  7. Reviewed the following educational topics with patient:   -routine prenatal visit/ultrasound/labwork schedule   -hospital for delivery and office phone/answering service contact information   -nutritional demands of pregnancy, healthy dietary habits   -listeria, toxoplasmosis, seafood precautions   -weight gain expectations (based on pre-pregnant BMI)   -exercise, rest, and sexual activity during pregnancy   -abstinence from alcohol, tobacco, and illegal drugs   -common discomforts of pregnancy and appropriate management   -OTC medications safe to use in pregnancy   -genetic screening options   -vaccines in pregnancy   -symptoms to report to OB provider    -signs of PTL and pre-eclampsia    -vaginal bleeding/leaking of fluid    -severe n/v-unable to tolerate ANY food/fluids for more than 24 hours      "

## 2024-10-11 ENCOUNTER — PATIENT OUTREACH (OUTPATIENT)
Dept: OBGYN CLINIC | Facility: CLINIC | Age: 34
End: 2024-10-11

## 2024-10-15 NOTE — PROGRESS NOTES
ISRAEL FERNANDEZ attempted to outreach by phone to f/u with pts housing concerns. Left  for a return call.

## 2024-10-23 ENCOUNTER — TELEPHONE (OUTPATIENT)
Dept: OBGYN CLINIC | Facility: CLINIC | Age: 34
End: 2024-10-23

## 2024-10-29 ENCOUNTER — TELEPHONE (OUTPATIENT)
Dept: OBGYN CLINIC | Facility: CLINIC | Age: 34
End: 2024-10-29

## 2024-10-29 ENCOUNTER — PATIENT OUTREACH (OUTPATIENT)
Dept: OBGYN CLINIC | Facility: CLINIC | Age: 34
End: 2024-10-29

## 2024-10-30 NOTE — PROGRESS NOTES
ISRAEL CM attempted to outreach via phone to get an update from pt, ISRAEL CM left  for a return call.

## 2024-10-31 ENCOUNTER — LAB (OUTPATIENT)
Dept: LAB | Facility: HOSPITAL | Age: 34
End: 2024-10-31
Payer: MEDICARE

## 2024-10-31 ENCOUNTER — HOSPITAL ENCOUNTER (EMERGENCY)
Facility: HOSPITAL | Age: 34
Discharge: HOME | End: 2024-10-31
Attending: EMERGENCY MEDICINE
Payer: MEDICARE

## 2024-10-31 ENCOUNTER — TELEPHONE (OUTPATIENT)
Dept: OBGYN CLINIC | Facility: CLINIC | Age: 34
End: 2024-10-31

## 2024-10-31 ENCOUNTER — HOSPITAL ENCOUNTER (EMERGENCY)
Facility: HOSPITAL | Age: 34
Discharge: HOME | End: 2024-10-31
Attending: EMERGENCY MEDICINE | Admitting: EMERGENCY MEDICINE
Payer: MEDICARE

## 2024-10-31 VITALS
OXYGEN SATURATION: 98 % | RESPIRATION RATE: 18 BRPM | BODY MASS INDEX: 19.29 KG/M2 | DIASTOLIC BLOOD PRESSURE: 88 MMHG | SYSTOLIC BLOOD PRESSURE: 141 MMHG | HEART RATE: 88 BPM | WEIGHT: 108.91 LBS | TEMPERATURE: 97.9 F

## 2024-10-31 VITALS
TEMPERATURE: 98.6 F | SYSTOLIC BLOOD PRESSURE: 121 MMHG | OXYGEN SATURATION: 98 % | WEIGHT: 108.47 LBS | BODY MASS INDEX: 19.21 KG/M2 | RESPIRATION RATE: 20 BRPM | DIASTOLIC BLOOD PRESSURE: 87 MMHG | HEART RATE: 82 BPM

## 2024-10-31 DIAGNOSIS — O20.0 THREATENED MISCARRIAGE: ICD-10-CM

## 2024-10-31 DIAGNOSIS — O46.8X9 SUBCHORIONIC HEMORRHAGE: ICD-10-CM

## 2024-10-31 DIAGNOSIS — O46.90 VAGINAL BLEEDING DURING PREGNANCY: Primary | ICD-10-CM

## 2024-10-31 DIAGNOSIS — O20.0 THREATENED MISCARRIAGE: Primary | ICD-10-CM

## 2024-10-31 DIAGNOSIS — N39.0 UTI (URINARY TRACT INFECTION): ICD-10-CM

## 2024-10-31 DIAGNOSIS — N93.9 VAGINAL BLEEDING: Primary | ICD-10-CM

## 2024-10-31 LAB
B-HCG SERPL-ACNC: ABNORMAL MIU/ML (ref 0–5)
BACTERIA UR QL AUTO: ABNORMAL /HPF
BILIRUB UR QL STRIP: NEGATIVE
CLARITY UR: ABNORMAL
COLOR UR: YELLOW
GLUCOSE UR STRIP-MCNC: NEGATIVE MG/DL
HGB UR QL STRIP.AUTO: 250
KETONES UR STRIP-MCNC: NEGATIVE MG/DL
LEUKOCYTE ESTERASE UR QL STRIP: NEGATIVE
MUCOUS THREADS UR QL AUTO: ABNORMAL
NITRITE UR QL STRIP: NEGATIVE
NON-SQ EPI CELLS URNS QL MICRO: ABNORMAL /HPF
PH UR STRIP.AUTO: 7 [PH]
PROT UR STRIP-MCNC: NEGATIVE MG/DL
RBC #/AREA URNS AUTO: ABNORMAL /HPF
SP GR UR STRIP.AUTO: 1.01 (ref 1–1.04)
UROBILINOGEN UA: NEGATIVE MG/DL
WBC #/AREA URNS AUTO: ABNORMAL /HPF

## 2024-10-31 PROCEDURE — 87086 URINE CULTURE/COLONY COUNT: CPT

## 2024-10-31 PROCEDURE — 76815 OB US LIMITED FETUS(S): CPT | Performed by: EMERGENCY MEDICINE

## 2024-10-31 PROCEDURE — 99283 EMERGENCY DEPT VISIT LOW MDM: CPT

## 2024-10-31 PROCEDURE — 36415 COLL VENOUS BLD VENIPUNCTURE: CPT

## 2024-10-31 PROCEDURE — 81003 URINALYSIS AUTO W/O SCOPE: CPT

## 2024-10-31 PROCEDURE — 99284 EMERGENCY DEPT VISIT MOD MDM: CPT | Performed by: EMERGENCY MEDICINE

## 2024-10-31 PROCEDURE — 99284 EMERGENCY DEPT VISIT MOD MDM: CPT

## 2024-10-31 PROCEDURE — 81001 URINALYSIS AUTO W/SCOPE: CPT

## 2024-10-31 PROCEDURE — 84702 CHORIONIC GONADOTROPIN TEST: CPT

## 2024-10-31 RX ORDER — CEPHALEXIN 500 MG/1
500 CAPSULE ORAL EVERY 12 HOURS SCHEDULED
Qty: 14 CAPSULE | Refills: 0 | Status: SHIPPED | OUTPATIENT
Start: 2024-10-31 | End: 2024-11-07

## 2024-10-31 NOTE — TELEPHONE ENCOUNTER
PT called stating ER stated for her to contact the office and schedule an ultrasound.  Spoke with Lupe Carey who stated to cancel M US appointment, have PT go get labs done today and repeat on Monday, schedule an US 7-10 days at our office.  PT was advise of such and was scheduled for 11/11/24 at 10:30 and PT agreed.

## 2024-10-31 NOTE — ED PROVIDER NOTES
Time reflects when diagnosis was documented in both MDM as applicable and the Disposition within this note       Time User Action Codes Description Comment    10/31/2024 12:15 PM Rosario Holcomb Add [N93.9] Vaginal bleeding     10/31/2024 12:15 PM Rosario Holcomb Add [N39.0] UTI (urinary tract infection)           ED Disposition       ED Disposition   Discharge    Condition   Stable    Date/Time   u Oct 31, 2024 12:16 PM    Comment   Christine Thomas 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.                   Assessment & Plan       Medical Decision Making  33 years old female pregnant 12 weeks by LMP and 11.4 week by U/S presents complaining of mild vaginal bleeding that started  today morning with a big clot. Denies pelvic /abdominal pain or cramps. Denies nausea, vomiting, dizziness or lightheadedness. Type and screening blood A+.  She had the first ultrasound on 9/29/2024 A single intrauterine gestation is identified with cardiac activity detected at 114. She is being following with OB/GYN.   Differentials includes: Miscarriage ,ectopic pregnancy, UTI.   UA is positive for blood and bacteria.  Keflex antibiotic prescribed for 7 days and close follow-up with OB/GYN.  Precautions were discussed with patient on bite to make an appointment with OB/GYN.   All the questions were answered to patient's satisfaction    Amount and/or Complexity of Data Reviewed  Labs: ordered.    Risk  Prescription drug management.             Medications - No data to display    ED Risk Strat Scores                           SBIRT 20yo+      Flowsheet Row Most Recent Value   Initial Alcohol Screen: US AUDIT-C     1. How often do you have a drink containing alcohol? 0 Filed at: 10/31/2024 1115   2. How many drinks containing alcohol do you have on a typical day you are drinking?  0 Filed at: 10/31/2024 1115   3a. Male UNDER 65: How often do you have five or more drinks on one occasion? 0 Filed at: 10/31/2024 1115   3b. FEMALE Any Age, or MALE 65+: How  often do you have 4 or more drinks on one occassion? 0 Filed at: 10/31/2024 1115   Audit-C Score 0 Filed at: 10/31/2024 1115   WESLEY: How many times in the past year have you...    Used an illegal drug or used a prescription medication for non-medical reasons? Never Filed at: 10/31/2024 1115                            History of Present Illness       Chief Complaint   Patient presents with    Vaginal Bleeding - Pregnant     11 weeks. This AM felt like water leaking, went to bathroom and saw a blood clot         Past Medical History:   Diagnosis Date    Asthma     Depression     Hypertension     Migraine     Multiple sclerosis (HCC) 2018    Neurogenic bladder     PTSD (post-traumatic stress disorder)     Seizure (HCC)     possibly r/t MS diagnosis, likely stress-related    Vertigo       Past Surgical History:   Procedure Laterality Date    CLOSED REDUCTION FINGER FRACTURE Left 2008    FL LUMBAR PUNCTURE DIAGNOSTIC  12/05/2019    TONSILLECTOMY Bilateral 1994    UMBILICAL HERNIA REPAIR  2016      Family History   Problem Relation Age of Onset    Depression Mother     Cancer Mother         thyroid    Diabetes Father     Hypertension Father     Coronary artery disease Paternal Grandmother     Hypertension Paternal Grandfather         TYPE 2    Diabetes Paternal Grandfather     Cancer Paternal Grandfather         pancreatic, lung    Drug abuse Cousin     Breast cancer Neg Hx     Colon cancer Neg Hx     Ovarian cancer Neg Hx       Social History     Tobacco Use    Smoking status: Former     Types: Cigarettes    Smokeless tobacco: Never   Vaping Use    Vaping status: Never Used   Substance Use Topics    Alcohol use: Not Currently     Comment: couple times/year    Drug use: Not Currently      E-Cigarette/Vaping    E-Cigarette Use Never User       E-Cigarette/Vaping Substances    Nicotine No     THC No     CBD No     Flavoring No     Other No     Unknown No       I have reviewed and agree with the history as documented.      HPI    Review of Systems   Constitutional:  Negative for chills and fever.   HENT:  Negative for ear pain and sore throat.    Eyes:  Negative for pain and visual disturbance.   Respiratory:  Negative for cough and shortness of breath.    Cardiovascular:  Negative for chest pain and palpitations.   Gastrointestinal:  Negative for abdominal pain and vomiting.   Genitourinary:  Negative for dysuria and hematuria.   Musculoskeletal:  Negative for arthralgias and back pain.   Skin:  Negative for color change and rash.   Neurological:  Negative for seizures and syncope.   All other systems reviewed and are negative.          Objective       ED Triage Vitals [10/31/24 1114]   Temperature Pulse Blood Pressure Respirations SpO2 Patient Position - Orthostatic VS   98.6 °F (37 °C) 82 121/87 20 98 % Sitting      Temp Source Heart Rate Source BP Location FiO2 (%) Pain Score    Oral Monitor Left arm -- --      Vitals      Date and Time Temp Pulse SpO2 Resp BP Pain Score FACES Pain Rating User   10/31/24 1114 98.6 °F (37 °C) 82 98 % 20 121/87 -- --             Physical Exam  Vitals and nursing note reviewed.   Constitutional:       General: She is not in acute distress.     Appearance: She is well-developed.   HENT:      Head: Normocephalic and atraumatic.   Eyes:      Conjunctiva/sclera: Conjunctivae normal.   Cardiovascular:      Rate and Rhythm: Normal rate and regular rhythm.      Heart sounds: No murmur heard.  Pulmonary:      Effort: Pulmonary effort is normal. No respiratory distress.      Breath sounds: Normal breath sounds.   Abdominal:      Palpations: Abdomen is soft.      Tenderness: There is no abdominal tenderness.   Musculoskeletal:         General: No swelling.      Cervical back: Neck supple.   Skin:     General: Skin is warm and dry.      Capillary Refill: Capillary refill takes less than 2 seconds.   Neurological:      General: No focal deficit present.      Mental Status: She is alert and oriented to  person, place, and time.   Psychiatric:         Mood and Affect: Mood normal.         Results Reviewed       Procedure Component Value Units Date/Time    Urine Microscopic [[id number]]  (Abnormal) Collected: 10/31/24 1130    Lab Status: Final result Specimen: Urine, Other Updated: 10/31/24 1157     RBC, UA 10-20 /hpf      WBC, UA 0-1 /hpf      Epithelial Cells Occasional /hpf      Bacteria, UA Moderate /hpf      MUCUS THREADS Occasional     URINE COMMENT --    UA w Reflex to Microscopic w Reflex to Culture [[id number]]  (Abnormal) Collected: 10/31/24 1130    Lab Status: Final result Specimen: Urine, Other Updated: 10/31/24 114     Color, UA Yellow     Clarity, UA Slightly Cloudy     Specific Gravity, UA 1.010     pH, UA 7.0     Leukocytes, UA Negative     Nitrite, UA Negative     Protein, UA Negative mg/dl      Glucose, UA Negative mg/dl      Ketones, UA Negative mg/dl      Bilirubin, UA Negative     Occult Blood, .0     URINE COMMENT --     UROBILINOGEN UA Negative mg/dL     Urine culture [[id number]] Collected: 10/31/24 1130    Lab Status: In process Specimen: Urine, Other Updated: 10/31/24 1143            No orders to display       Procedures    ED Medication and Procedure Management   Prior to Admission Medications   Prescriptions Last Dose Informant Patient Reported? Taking?   albuterol (PROVENTIL HFA,VENTOLIN HFA) 90 mcg/act inhaler   No No   Sig: Inhale 2 puffs every 4 (four) hours as needed for wheezing   albuterol (Ventolin HFA) 90 mcg/act inhaler  Self No No   Sig: Inhale 2 puffs every 6 (six) hours as needed for wheezing   doxylamine (UNISOM) 25 MG tablet   No No   Sig: Take 0.5 tablets (12.5 mg total) by mouth daily at bedtime as needed for sleep   fluticasone (FLONASE) 50 mcg/act nasal spray   No No   Si spray into each nostril daily   guaiFENesin (MUCINEX) 600 mg 12 hr tablet   No No   Sig: Take 2 tablets (1,200 mg total) by mouth every 12 (twelve) hours   pyridoxine (B-6) 25 MG tablet   No No    Sig: Take 1 tablet (25 mg total) by mouth daily      Facility-Administered Medications: None     Discharge Medication List as of 10/31/2024 12:20 PM        CONTINUE these medications which have NOT CHANGED    Details   !! albuterol (PROVENTIL HFA,VENTOLIN HFA) 90 mcg/act inhaler Inhale 2 puffs every 4 (four) hours as needed for wheezing, Starting Wed 9/11/2024, Print      !! albuterol (Ventolin HFA) 90 mcg/act inhaler Inhale 2 puffs every 6 (six) hours as needed for wheezing, Starting Fri 12/3/2021, Normal      doxylamine (UNISOM) 25 MG tablet Take 0.5 tablets (12.5 mg total) by mouth daily at bedtime as needed for sleep, Starting Thu 9/26/2024, Until Wed 12/25/2024 at 2359, Normal      fluticasone (FLONASE) 50 mcg/act nasal spray 1 spray into each nostril daily, Starting Wed 9/11/2024, Print      guaiFENesin (MUCINEX) 600 mg 12 hr tablet Take 2 tablets (1,200 mg total) by mouth every 12 (twelve) hours, Starting Wed 9/11/2024, Print      pyridoxine (B-6) 25 MG tablet Take 1 tablet (25 mg total) by mouth daily, Starting Thu 9/26/2024, Until Wed 12/25/2024, Normal       !! - Potential duplicate medications found. Please discuss with provider.        No discharge procedures on file.  ED SEPSIS DOCUMENTATION   Time reflects when diagnosis was documented in both MDM as applicable and the Disposition within this note       Time User Action Codes Description Comment    10/31/2024 12:15 PM Rosario Holcomb Add [N93.9] Vaginal bleeding     10/31/2024 12:15 PM Rosario Holcomb [N39.0] UTI (urinary tract infection)                  Rosario Holcomb MD  10/31/24 125

## 2024-10-31 NOTE — TELEPHONE ENCOUNTER
Patient came into the office stated ER told her she needed to come and be seen asap due to bleeding. Informed patient to wait a few minutes so I can speak to clinical coordinator. Patient did not wait and left.

## 2024-10-31 NOTE — Clinical Note
Christine Thomas was seen and treated in our emergency department on 10/31/2024.                Diagnosis:     Christine  is off the rest of the shift today, may return to work on return date.    She may return on this date: 11/04/2024         If you have any questions or concerns, please don't hesitate to call.      Rosario Holcomb MD    ______________________________           _______________          _______________  Hospital Representative                              Date                                Time

## 2024-11-01 ENCOUNTER — TELEPHONE (OUTPATIENT)
Dept: FAMILY MEDICINE CLINIC | Facility: CLINIC | Age: 34
End: 2024-11-01

## 2024-11-01 LAB — BACTERIA UR CULT: NORMAL

## 2024-11-01 NOTE — DISCHARGE INSTRUCTIONS
Return to the emergency department if you do develop any signs or symptoms of hemorrhage which would look like 1-2 pads being soaked per hour for 2 to 4 hours or if you are having symptoms with heavy bleeding such as lightheadedness, passing out, chest pain or shortness of breath.

## 2024-11-01 NOTE — ED PROVIDER NOTES
"Time reflects when diagnosis was documented in both MDM as applicable and the Disposition within this note       Time User Action Codes Description Comment    10/31/2024 10:46 PM Mark Wolf [O46.90] Vaginal bleeding during pregnancy     10/31/2024 10:47 PM Mark Wolf [O46.8X9] Subchorionic hemorrhage           ED Disposition       ED Disposition   Discharge    Condition   Stable    Date/Time   u Oct 31, 2024 10:46 PM    Comment   Christine Thomas 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.                   Assessment & Plan       Medical Decision Making  Patient has a very reassuring exam.  No active hemorrhage at this time per her report.  States that it was only a small amount of blood that reoccurred tonight.  Bedside ultrasound confirms a live IUP with good fetal movement and heart rate of 175.  I did identify what appears to be a small subchorionic hemorrhage that is probably the source of the bleeding.  Did discuss with the patient the diagnosis and expected management with just supportive care with hydration, Tylenol as needed for cramping and good follow-up with OB/GYN.  Explained return to ER precautions if she does develop any signs or symptoms of severe hemorrhage.      Prior to discharge, discharge instructions were discussed with patient at bedside. Patient was provided both verbal and written instructions. Patient is understanding of the discharge instructions and is agreeable to plan of care. Return precautions were discussed with patient bedside, patient verbalized understanding of signs and symptoms that would necessitate return to the ED. All questions were answered. Patient was comfortable with the plan of care and discharged to home.     Portions of this chart may have been written with voice recognition software.  Occasional grammatical errors, wrong word or \"sound a like\" substitutions may have occurred due to software limitations.  Please read carefully and use context to recognize " where substitutions have occurred.                Medications - No data to display    ED Risk Strat Scores                           SBIRT 22yo+      Flowsheet Row Most Recent Value   Initial Alcohol Screen: US AUDIT-C     1. How often do you have a drink containing alcohol? 0 Filed at: 10/31/2024 2143   2. How many drinks containing alcohol do you have on a typical day you are drinking?  0 Filed at: 10/31/2024 2143   3b. FEMALE Any Age, or MALE 65+: How often do you have 4 or more drinks on one occassion? 0 Filed at: 10/31/2024 2143   Audit-C Score 0 Filed at: 10/31/2024 2143   WESLEY: How many times in the past year have you...    Used an illegal drug or used a prescription medication for non-medical reasons? Never Filed at: 10/31/2024 2143                            History of Present Illness       Chief Complaint   Patient presents with    Vaginal Bleeding - Pregnant     Pt reports she is 11 weeks pregnant and started with bleeding and passing clots today. Also reports abd cramping. Reports was seen earlier at  but never did an . Pt concerned for a miscarriage.        Past Medical History:   Diagnosis Date    Asthma     Depression     Hypertension     Migraine     Multiple sclerosis (HCC) 2018    Neurogenic bladder     PTSD (post-traumatic stress disorder)     Seizure (HCC)     possibly r/t MS diagnosis, likely stress-related    Vertigo       Past Surgical History:   Procedure Laterality Date    CLOSED REDUCTION FINGER FRACTURE Left 2008    FL LUMBAR PUNCTURE DIAGNOSTIC  12/05/2019    TONSILLECTOMY Bilateral 1994    UMBILICAL HERNIA REPAIR  2016      Family History   Problem Relation Age of Onset    Depression Mother     Cancer Mother         thyroid    Diabetes Father     Hypertension Father     Coronary artery disease Paternal Grandmother     Hypertension Paternal Grandfather         TYPE 2    Diabetes Paternal Grandfather     Cancer Paternal Grandfather         pancreatic, lung    Drug abuse Cousin      Breast cancer Neg Hx     Colon cancer Neg Hx     Ovarian cancer Neg Hx       Social History     Tobacco Use    Smoking status: Former     Types: Cigarettes    Smokeless tobacco: Never   Vaping Use    Vaping status: Never Used   Substance Use Topics    Alcohol use: Not Currently     Comment: couple times/year    Drug use: Not Currently      E-Cigarette/Vaping    E-Cigarette Use Never User       E-Cigarette/Vaping Substances    Nicotine No     THC No     CBD No     Flavoring No     Other No     Unknown No       I have reviewed and agree with the history as documented.     Patient presents for recurrent bleeding.  She is around 11 weeks pregnant.  Went to [facility] earlier this morning after she experienced an episode of bleeding that woke her up from sleep at around 5 AM.  It was improved by the time she arrived at the hospital a few hours later.  They did do a urinalysis that showed possible infection and started her on Keflex.  Patient states that they did not do a bedside ultrasound or formal ultrasound at the time.  She called OB/GYN to discuss her next appointment but they could not get her in any sooner than what she was already scheduled.  She returned here tonight for a another episode of bright red blood when she was using the bathroom to urinate.  Denies any other symptoms except for some very mild cramping that is currently not present.  Patient was concerned for fetal viability.      History provided by:  Patient   used: No        Review of Systems   Constitutional:  Negative for chills, diaphoresis and fever.   Respiratory:  Negative for cough, chest tightness and shortness of breath.    Cardiovascular:  Negative for chest pain.   Gastrointestinal:  Positive for abdominal pain. Negative for blood in stool, constipation, diarrhea, nausea and vomiting.   Genitourinary:  Positive for vaginal bleeding. Negative for difficulty urinating, dysuria, flank pain, frequency, hematuria  and urgency.   Skin:  Negative for color change, pallor, rash and wound.   Allergic/Immunologic: Negative for immunocompromised state.   Neurological:  Negative for dizziness, light-headedness and headaches.   All other systems reviewed and are negative.          Objective       ED Triage Vitals [10/31/24 2142]   Temperature Pulse Blood Pressure Respirations SpO2 Patient Position - Orthostatic VS   97.9 °F (36.6 °C) 88 141/88 18 98 % Sitting      Temp Source Heart Rate Source BP Location FiO2 (%) Pain Score    Oral Monitor Right arm -- --      Vitals      Date and Time Temp Pulse SpO2 Resp BP Pain Score FACES Pain Rating User   10/31/24 2142 97.9 °F (36.6 °C) 88 98 % 18 141/88 -- -- AA            Physical Exam  Vitals and nursing note reviewed.   Constitutional:       General: She is not in acute distress.     Appearance: She is well-developed. She is not ill-appearing, toxic-appearing or diaphoretic.   HENT:      Head: Normocephalic and atraumatic.      Right Ear: External ear normal.      Left Ear: External ear normal.      Nose: Nose normal. No congestion or rhinorrhea.   Eyes:      General: No scleral icterus.        Right eye: No discharge.         Left eye: No discharge.      Conjunctiva/sclera: Conjunctivae normal.      Pupils: Pupils are equal, round, and reactive to light.   Neck:      Trachea: No tracheal deviation.   Cardiovascular:      Rate and Rhythm: Normal rate and regular rhythm.      Pulses: Normal pulses.   Pulmonary:      Effort: Pulmonary effort is normal. No tachypnea, accessory muscle usage or respiratory distress.      Breath sounds: No stridor.   Abdominal:      General: There is no distension.      Palpations: Abdomen is soft.      Tenderness: There is no abdominal tenderness. There is no guarding or rebound.   Musculoskeletal:         General: No tenderness or deformity. Normal range of motion.      Cervical back: Normal range of motion and neck supple.   Skin:     General: Skin is warm  and dry.   Neurological:      General: No focal deficit present.      Mental Status: She is alert and oriented to person, place, and time. She is not disoriented.      Gait: Gait normal.   Psychiatric:         Speech: Speech normal.         Behavior: Behavior normal.         Results Reviewed       None            No orders to display       POC Pelvic US    Date/Time: 10/31/2024 10:44 PM    Performed by: Mark Wolf Jr., DO  Authorized by: Mark Wolf Jr., DO    Patient location:  ED  Other Assisting Provider: No    Procedure details:     Exam Type:  Diagnostic    Indications: evaluate for IUP, pregnant with abdominal pain and pregnant with vaginal bleeding      Assessment for: confirm intrauterine pregnancy and evaluate fetal viability      Technique:  Transabdominal obstetric (HCG+) exam    Views obtained: uterus (transverse and sagittal)      Image quality: diagnostic      Image availability:  Images available in PACS  Uterine findings:     Intrauterine pregnancy: identified      Single gestation: identified      Fetal heart rate: identified      Fetal heart rate (bpm):  175  Other findings:     Free pelvic fluid: not identified      Free peritoneal fluid: not identified    Interpretation:     Ultrasound impressions: normal      Pregnancy findings: intrauterine pregnancy (IUP)    Comments:      Small subchorionic hematoma noted on exam      ED Medication and Procedure Management   Prior to Admission Medications   Prescriptions Last Dose Informant Patient Reported? Taking?   albuterol (PROVENTIL HFA,VENTOLIN HFA) 90 mcg/act inhaler   No No   Sig: Inhale 2 puffs every 4 (four) hours as needed for wheezing   albuterol (Ventolin HFA) 90 mcg/act inhaler  Self No No   Sig: Inhale 2 puffs every 6 (six) hours as needed for wheezing   cephalexin (KEFLEX) 500 mg capsule   No No   Sig: Take 1 capsule (500 mg total) by mouth every 12 (twelve) hours for 7 days   doxylamine (UNISOM) 25 MG tablet   No No   Sig: Take  0.5 tablets (12.5 mg total) by mouth daily at bedtime as needed for sleep   fluticasone (FLONASE) 50 mcg/act nasal spray   No No   Si spray into each nostril daily   guaiFENesin (MUCINEX) 600 mg 12 hr tablet   No No   Sig: Take 2 tablets (1,200 mg total) by mouth every 12 (twelve) hours   pyridoxine (B-6) 25 MG tablet   No No   Sig: Take 1 tablet (25 mg total) by mouth daily      Facility-Administered Medications: None     Discharge Medication List as of 10/31/2024 10:49 PM        CONTINUE these medications which have NOT CHANGED    Details   !! albuterol (PROVENTIL HFA,VENTOLIN HFA) 90 mcg/act inhaler Inhale 2 puffs every 4 (four) hours as needed for wheezing, Starting 2024, Print      !! albuterol (Ventolin HFA) 90 mcg/act inhaler Inhale 2 puffs every 6 (six) hours as needed for wheezing, Starting Fri 12/3/2021, Normal      cephalexin (KEFLEX) 500 mg capsule Take 1 capsule (500 mg total) by mouth every 12 (twelve) hours for 7 days, Starting Thu 10/31/2024, Until u 2024, Normal      doxylamine (UNISOM) 25 MG tablet Take 0.5 tablets (12.5 mg total) by mouth daily at bedtime as needed for sleep, Starting 2024, Until 2024 at 2359, Normal      fluticasone (FLONASE) 50 mcg/act nasal spray 1 spray into each nostril daily, Starting 2024, Print      guaiFENesin (MUCINEX) 600 mg 12 hr tablet Take 2 tablets (1,200 mg total) by mouth every 12 (twelve) hours, Starting 2024, Print      pyridoxine (B-6) 25 MG tablet Take 1 tablet (25 mg total) by mouth daily, Starting Thu 2024, Until 2024, Normal       !! - Potential duplicate medications found. Please discuss with provider.        No discharge procedures on file.  ED SEPSIS DOCUMENTATION   Time reflects when diagnosis was documented in both MDM as applicable and the Disposition within this note       Time User Action Codes Description Comment    10/31/2024 10:46 PM Mark Wolf [O46.90] Vaginal  bleeding during pregnancy     10/31/2024 10:47 PM Mark Wolf Add [O46.8X9] Subchorionic hemorrhage                  Mark Wolf Jr., DO  10/31/24 3949

## 2024-11-11 ENCOUNTER — ULTRASOUND (OUTPATIENT)
Dept: OBGYN CLINIC | Facility: CLINIC | Age: 34
End: 2024-11-11

## 2024-11-11 VITALS
SYSTOLIC BLOOD PRESSURE: 122 MMHG | HEART RATE: 84 BPM | BODY MASS INDEX: 19.34 KG/M2 | DIASTOLIC BLOOD PRESSURE: 76 MMHG | WEIGHT: 109.2 LBS

## 2024-11-11 DIAGNOSIS — O20.0 THREATENED MISCARRIAGE: Primary | ICD-10-CM

## 2024-11-11 DIAGNOSIS — Z32.01 POSITIVE PREGNANCY TEST: ICD-10-CM

## 2024-11-11 PROBLEM — G43.009 MIGRAINE WITHOUT AURA AND WITHOUT STATUS MIGRAINOSUS, NOT INTRACTABLE: Status: RESOLVED | Noted: 2019-10-03 | Resolved: 2024-11-11

## 2024-11-11 PROBLEM — Z00.01 ENCOUNTER FOR WELL ADULT EXAM WITH ABNORMAL FINDINGS: Status: RESOLVED | Noted: 2024-07-30 | Resolved: 2024-11-11

## 2024-11-11 PROBLEM — N91.2 AMENORRHEA: Status: RESOLVED | Noted: 2024-07-30 | Resolved: 2024-11-11

## 2024-11-11 PROBLEM — Z59.819 HOUSING INSTABILITY: Status: RESOLVED | Noted: 2024-09-26 | Resolved: 2024-11-11

## 2024-11-11 PROBLEM — F43.12 POST-TRAUMATIC STRESS DISORDER, CHRONIC: Chronic | Status: RESOLVED | Noted: 2023-04-07 | Resolved: 2024-11-11

## 2024-11-11 PROBLEM — F17.200 TOBACCO USE DISORDER: Status: RESOLVED | Noted: 2024-07-30 | Resolved: 2024-11-11

## 2024-11-11 PROBLEM — N31.9 NEUROGENIC BLADDER: Status: RESOLVED | Noted: 2019-11-29 | Resolved: 2024-11-11

## 2024-11-11 PROBLEM — R11.2 NAUSEA AND VOMITING: Status: RESOLVED | Noted: 2024-09-26 | Resolved: 2024-11-11

## 2024-11-11 PROBLEM — N89.8 VAGINAL DISCHARGE: Status: RESOLVED | Noted: 2024-09-26 | Resolved: 2024-11-11

## 2024-11-11 PROCEDURE — 76817 TRANSVAGINAL US OBSTETRIC: CPT | Performed by: NURSE PRACTITIONER

## 2024-11-11 PROCEDURE — 99213 OFFICE O/P EST LOW 20 MIN: CPT | Performed by: NURSE PRACTITIONER

## 2024-11-11 RX ORDER — VITAMIN A ACETATE, .BETA.-CAROTENE, ASCORBIC ACID, CHOLECALCIFEROL, .ALPHA.-TOCOPHEROL ACETATE, DL-, THIAMINE MONONITRATE, RIBOFLAVIN, NIACINAMIDE, PYRIDOXINE HYDROCHLORIDE, FOLIC ACID, CYANOCOBALAMIN, CALCIUM CARBONATE, FERROUS FUMARATE, ZINC OXIDE, CUPRIC OXIDE 9.9; 120; 920; 200; 400; 2; 12; 27; 1; 20; 10; 3; 1.84; 3080; 25 MG/1; MG/1; [IU]/1; MG/1; [IU]/1; MG/1; UG/1; MG/1; MG/1; MG/1; MG/1; MG/1; MG/1; [IU]/1; MG/1
1 TABLET, FILM COATED ORAL DAILY
Qty: 90 TABLET | Refills: 4 | Status: SHIPPED | OUTPATIENT
Start: 2024-11-11

## 2024-11-11 NOTE — PROGRESS NOTES
Christine Thomas is a  who presents today for viability/dating ultrasound.  Patient's last menstrual period was 2024 (approximate).  She is not sure about her LMP.  She had ultrasound performed her in clinic on 2024 and ultrasound noted SIUP consistent with 7w0d and + fetal cardiac activity.  EDC was established as 5/15/2025.  Since then she has experienced vaginal bleeding which she reports as heavy and with a blood clot on 10/31/2024.  She was evaluated in the ER and bedside ultrasound confirmed fetal viability.    /76 (BP Location: Left arm, Patient Position: Sitting, Cuff Size: Standard)   Pulse 84   Wt 49.5 kg (109 lb 3.2 oz)   LMP 2024 (Approximate)   BMI 19.34 kg/m²   Abdomen soft and non-tender.    Transvaginal Pelvic Ultrasound: SIUP noted with CRL consistent with 13w0d and + fetal cardiac activity; gross fetal movement noted; small PHYLLIS is noted.    Return in 1 weeks for OB intake.  Rx prenatal vitamins sent to pharmacy.    Current Outpatient Medications   Medication Instructions    albuterol (Ventolin HFA) 90 mcg/act inhaler 2 puffs, Inhalation, Every 6 hours PRN    doxylamine (UNISOM) 12.5 mg, Oral, Daily at bedtime PRN    pyridoxine (B-6) 25 mg, Oral, Daily       DANIEL Babcock  [facility] [address] [address]  Dept: [phone]  Dept Fax: [phone]  Loc Appt: [phone]  Loc: [phone]  Loc Fax: [phone]

## 2024-11-11 NOTE — PATIENT INSTRUCTIONS
Thank you for your confidence in our team.   We appreciate you and welcome your feedback.   If you receive a survey from us, please take a few moments to let us know how we are doing.   Sincerely,  DANIEL Babcock

## 2024-11-14 ENCOUNTER — PATIENT MESSAGE (OUTPATIENT)
Dept: OBGYN CLINIC | Facility: CLINIC | Age: 34
End: 2024-11-14

## 2024-11-15 ENCOUNTER — INITIAL PRENATAL (OUTPATIENT)
Dept: OBGYN CLINIC | Facility: CLINIC | Age: 34
End: 2024-11-15

## 2024-11-15 ENCOUNTER — TELEPHONE (OUTPATIENT)
Age: 34
End: 2024-11-15

## 2024-11-15 ENCOUNTER — APPOINTMENT (OUTPATIENT)
Dept: LAB | Facility: HOSPITAL | Age: 34
End: 2024-11-15
Payer: MEDICARE

## 2024-11-15 VITALS — WEIGHT: 107.8 LBS | DIASTOLIC BLOOD PRESSURE: 79 MMHG | BODY MASS INDEX: 19.1 KG/M2 | SYSTOLIC BLOOD PRESSURE: 126 MMHG

## 2024-11-15 DIAGNOSIS — G35 MS (MULTIPLE SCLEROSIS) (HCC): ICD-10-CM

## 2024-11-15 DIAGNOSIS — Z11.51 SCREENING FOR HPV (HUMAN PAPILLOMAVIRUS): ICD-10-CM

## 2024-11-15 DIAGNOSIS — Z34.92 PRENATAL CARE IN SECOND TRIMESTER: ICD-10-CM

## 2024-11-15 DIAGNOSIS — Z12.4 SCREENING FOR CERVICAL CANCER: Primary | ICD-10-CM

## 2024-11-15 DIAGNOSIS — O20.0 THREATENED MISCARRIAGE: ICD-10-CM

## 2024-11-15 DIAGNOSIS — O20.9 VAGINAL BLEEDING AFFECTING EARLY PREGNANCY: ICD-10-CM

## 2024-11-15 DIAGNOSIS — Z3A.14 14 WEEKS GESTATION OF PREGNANCY: ICD-10-CM

## 2024-11-15 LAB — B-HCG SERPL-ACNC: ABNORMAL MIU/ML (ref 0–5)

## 2024-11-15 PROCEDURE — 99214 OFFICE O/P EST MOD 30 MIN: CPT | Performed by: NURSE PRACTITIONER

## 2024-11-15 PROCEDURE — G0145 SCR C/V CYTO,THINLAYER,RESCR: HCPCS | Performed by: NURSE PRACTITIONER

## 2024-11-15 PROCEDURE — 90471 IMMUNIZATION ADMIN: CPT | Performed by: NURSE PRACTITIONER

## 2024-11-15 PROCEDURE — 84702 CHORIONIC GONADOTROPIN TEST: CPT

## 2024-11-15 PROCEDURE — G0476 HPV COMBO ASSAY CA SCREEN: HCPCS | Performed by: NURSE PRACTITIONER

## 2024-11-15 PROCEDURE — 90656 IIV3 VACC NO PRSV 0.5 ML IM: CPT | Performed by: NURSE PRACTITIONER

## 2024-11-15 PROCEDURE — 87591 N.GONORRHOEAE DNA AMP PROB: CPT | Performed by: NURSE PRACTITIONER

## 2024-11-15 PROCEDURE — 87491 CHLMYD TRACH DNA AMP PROBE: CPT | Performed by: NURSE PRACTITIONER

## 2024-11-15 PROCEDURE — 36415 COLL VENOUS BLD VENIPUNCTURE: CPT

## 2024-11-15 NOTE — TELEPHONE ENCOUNTER
[facility] called in regarding scheduling appointment for referral that was sent. There was nothing available till December. Please follow up to schedule, thank you. Ok to contact the patient to get scheduled.

## 2024-11-15 NOTE — PROGRESS NOTES
Christine Thomas presents today for routine OB visit at 14w1d.  Blood Pressure: 126/79  Wt=48.9 kg (107 lb 12.8 oz); Body mass index is 19.1 kg/m².; TWG=2.631 kg (5 lb 12.8 oz)  Fetal Heart Rate: 152  Abdomen: gravid, soft, non-tender.  She reports n/v well-managed with Unisom/vitamin B6.  Denies uterine contractions or persistent cramping.  Denies vaginal leaking of fluid but reports continues with vaginal bleeding which is sometimes bright red and other times dark brown.  Will get MFM consultation/ultrasound ASAP.  Order placed.  Reviewed common discomforts of pregnancy in second trimester and warning signs.  Advised to continue medications and return in 4 weeks.      Current Outpatient Medications   Medication Instructions    albuterol (Ventolin HFA) 90 mcg/act inhaler 2 puffs, Inhalation, Every 6 hours PRN    doxylamine (UNISOM) 12.5 mg, Oral, Daily at bedtime PRN    Prenatal Vit-Fe Fumarate-FA (Prenatal Plus Vitamin/Mineral) 27-1 MG TABS 1 tablet, Oral, Daily    pyridoxine (B-6) 25 mg, Oral, Daily       Laboratory workup: initial OB labs (ordered 9/26/24)    Genetic Screening: needs to schedule w/MFM    Vaccinations: influenza (given 11/15/24); Tdap (will offer after 27 weeks); RSV (will offer b/w 51w0r-64g4r during RSV season); COVID-19 (recommended 11/15/24 - patient desires to obtain)    Postpartum contraception: desires Depoprovera    Fetal Ultrasounds:  6/26/24 (7w0d) EDC confirmed  11/11/24 (13w4d) WNL      G4 Problems (from 10/09/24 to present)       Problem Noted Diagnosed Resolved    Vaginal bleeding affecting early pregnancy 11/15/2024 by DANIEL Babcock  No    Overview Signed 11/15/2024  2:06 PM by DANIEL Babcock   Needs MFM consultation/ultrasound         Multiple Sclerosis 1/29/2020 by Eryn Schuler MD  No    Overview Signed 11/15/2024  2:14 PM by DANIEL Babcock   Needs MFM consultation               Past Medical History:   Diagnosis Date    Asthma     Depression      Hypertension     Migraine     Multiple sclerosis (HCC) 2018    Neurogenic bladder     PTSD (post-traumatic stress disorder)     Seizure (HCC)     possibly r/t MS diagnosis, likely stress-related    Vertigo      Past Surgical History:   Procedure Laterality Date    CLOSED REDUCTION FINGER FRACTURE Left     FL LUMBAR PUNCTURE DIAGNOSTIC  2019    TONSILLECTOMY Bilateral 1994    UMBILICAL HERNIA REPAIR  2016     Family History   Problem Relation Age of Onset    Depression Mother     Cancer Mother         thyroid    Diabetes Father     Hypertension Father     Coronary artery disease Paternal Grandmother     Hypertension Paternal Grandfather         TYPE 2    Diabetes Paternal Grandfather     Cancer Paternal Grandfather         pancreatic, lung    Drug abuse Cousin     Breast cancer Neg Hx     Colon cancer Neg Hx     Ovarian cancer Neg Hx      OB History          4    Para   2    Term   1       1    AB   1    Living   2         SAB   1    IAB   0    Ectopic   0    Multiple   0    Live Births   2               Social History     Tobacco Use    Smoking status: Former     Types: Cigarettes    Smokeless tobacco: Never   Vaping Use    Vaping status: Never Used   Substance Use Topics    Alcohol use: Not Currently     Comment: couple times/year, none since pregnant    Drug use: Not Currently

## 2024-11-15 NOTE — PATIENT INSTRUCTIONS
Thank you for your confidence in our team.   We appreciate you and welcome your feedback.   If you receive a survey from us, please take a few moments to let us know how we are doing.   Sincerely,  DANIEL Babcock       WARNING SIGNS DURING PREGNANCY  Call our office at [phone] for any of the followin. Vaginal bleeding  2. Sharp abdominal pain that does not go away  3. Fever (more than 100.4 and is not relieved by Tylenol)  4. Persistent vomiting lasting greater than 24 hours  5. Chest pain   6. Pain or burning when you urinate  7. Severe headache that doesn't resolve with Tylenol  8. Blurred vision or seeing spots in your vision  9. Sudden swelling of your face or hands  10. Redness, swelling or pain in a leg  11. A sudden weight gain in just a few days  12. Decrease in your baby's movement (after 28 weeks or the 6th month of pregnancy)  13. A loss of watery fluid from your vagina - can be a gush, a trickle or continuous wetness  14. After 20 weeks of pregnancy, rhythmic cramping (greater than 4 per hour) or menstrual like low/pelvic pain

## 2024-11-16 ENCOUNTER — RESULTS FOLLOW-UP (OUTPATIENT)
Dept: OBGYN CLINIC | Facility: CLINIC | Age: 34
End: 2024-11-16

## 2024-11-16 DIAGNOSIS — Z83.3 FAMILY HISTORY OF DIABETES MELLITUS IN FIRST DEGREE RELATIVE: Primary | ICD-10-CM

## 2024-11-16 DIAGNOSIS — O09.899 HISTORY OF PRETERM DELIVERY, CURRENTLY PREGNANT: ICD-10-CM

## 2024-11-16 DIAGNOSIS — Z3A.14 14 WEEKS GESTATION OF PREGNANCY: ICD-10-CM

## 2024-11-18 ENCOUNTER — TELEPHONE (OUTPATIENT)
Dept: OBGYN CLINIC | Facility: CLINIC | Age: 34
End: 2024-11-18

## 2024-11-18 ENCOUNTER — TELEPHONE (OUTPATIENT)
Dept: PERINATAL CARE | Facility: OTHER | Age: 34
End: 2024-11-18

## 2024-11-18 LAB
HPV HR 12 DNA CVX QL NAA+PROBE: NEGATIVE
HPV16 DNA CVX QL NAA+PROBE: NEGATIVE
HPV18 DNA CVX QL NAA+PROBE: NEGATIVE

## 2024-11-18 NOTE — TELEPHONE ENCOUNTER
Called PT and left voice message stating BRE has left her voice message to schedule US appointment and give them a call as soon as possible.

## 2024-11-18 NOTE — TELEPHONE ENCOUNTER
Called patient to schedule follow up appointment Detailed U/S   Left voicemail request patient to call back to schedule at [phone].     We received a message from frandy Romero   [facility] called in regarding scheduling appointment for referral that was sent.

## 2024-11-18 NOTE — TELEPHONE ENCOUNTER
----- Message from DANIEL Mcbride sent at 11/18/2024  8:47 AM EST -----  Regarding: needs appt  Not sure why this patient doesn't have MFM ultrasound scheduled yet.  I ordered it at her last appointment.  Please assist her.  I want the ultrasound soon due to her having vaginal bleeding.    Thanks.

## 2024-11-19 ENCOUNTER — RESULTS FOLLOW-UP (OUTPATIENT)
Dept: OBGYN CLINIC | Facility: CLINIC | Age: 34
End: 2024-11-19

## 2024-11-19 LAB
C TRACH DNA SPEC QL NAA+PROBE: NEGATIVE
N GONORRHOEA DNA SPEC QL NAA+PROBE: NEGATIVE

## 2024-11-20 ENCOUNTER — TELEPHONE (OUTPATIENT)
Dept: PERINATAL CARE | Facility: CLINIC | Age: 34
End: 2024-11-20

## 2024-11-20 ENCOUNTER — APPOINTMENT (OUTPATIENT)
Dept: LAB | Facility: HOSPITAL | Age: 34
End: 2024-11-20
Payer: MEDICARE

## 2024-11-20 DIAGNOSIS — Z3A.14 14 WEEKS GESTATION OF PREGNANCY: ICD-10-CM

## 2024-11-20 LAB
ABO GROUP BLD: NORMAL
ALBUMIN SERPL BCG-MCNC: 3.6 G/DL (ref 3.5–5)
ALP SERPL-CCNC: 46 U/L (ref 34–104)
ALT SERPL W P-5'-P-CCNC: 36 U/L (ref 7–52)
ANION GAP SERPL CALCULATED.3IONS-SCNC: 6 MMOL/L (ref 4–13)
AST SERPL W P-5'-P-CCNC: 28 U/L (ref 13–39)
BACTERIA UR QL AUTO: ABNORMAL /HPF
BASOPHILS # BLD AUTO: 0.04 THOUSANDS/ÂΜL (ref 0–0.1)
BASOPHILS NFR BLD AUTO: 0 % (ref 0–1)
BILIRUB SERPL-MCNC: 0.29 MG/DL (ref 0.2–1)
BILIRUB UR QL STRIP: NEGATIVE
BLD GP AB SCN SERPL QL: NEGATIVE
BUN SERPL-MCNC: 6 MG/DL (ref 5–25)
CALCIUM SERPL-MCNC: 8.8 MG/DL (ref 8.4–10.2)
CHLORIDE SERPL-SCNC: 105 MMOL/L (ref 96–108)
CLARITY UR: CLEAR
CO2 SERPL-SCNC: 23 MMOL/L (ref 21–32)
COLOR UR: YELLOW
CREAT SERPL-MCNC: 0.5 MG/DL (ref 0.6–1.3)
EOSINOPHIL # BLD AUTO: 0.12 THOUSAND/ÂΜL (ref 0–0.61)
EOSINOPHIL NFR BLD AUTO: 1 % (ref 0–6)
ERYTHROCYTE [DISTWIDTH] IN BLOOD BY AUTOMATED COUNT: 12.1 % (ref 11.6–15.1)
GFR SERPL CREATININE-BSD FRML MDRD: 127 ML/MIN/1.73SQ M
GLUCOSE 1H P 50 G GLC PO SERPL-MCNC: 187 MG/DL (ref 70–134)
GLUCOSE SERPL-MCNC: 184 MG/DL (ref 65–140)
GLUCOSE UR STRIP-MCNC: ABNORMAL MG/DL
HCT VFR BLD AUTO: 35.3 % (ref 34.8–46.1)
HGB BLD-MCNC: 11.9 G/DL (ref 11.5–15.4)
HGB UR QL STRIP.AUTO: ABNORMAL
IMM GRANULOCYTES # BLD AUTO: 0.05 THOUSAND/UL (ref 0–0.2)
IMM GRANULOCYTES NFR BLD AUTO: 1 % (ref 0–2)
KETONES UR STRIP-MCNC: NEGATIVE MG/DL
LEUKOCYTE ESTERASE UR QL STRIP: NEGATIVE
LYMPHOCYTES # BLD AUTO: 1.71 THOUSANDS/ÂΜL (ref 0.6–4.47)
LYMPHOCYTES NFR BLD AUTO: 17 % (ref 14–44)
MCH RBC QN AUTO: 30.8 PG (ref 26.8–34.3)
MCHC RBC AUTO-ENTMCNC: 33.7 G/DL (ref 31.4–37.4)
MCV RBC AUTO: 92 FL (ref 82–98)
MONOCYTES # BLD AUTO: 0.46 THOUSAND/ÂΜL (ref 0.17–1.22)
MONOCYTES NFR BLD AUTO: 5 % (ref 4–12)
MUCOUS THREADS UR QL AUTO: ABNORMAL
NEUTROPHILS # BLD AUTO: 7.87 THOUSANDS/ÂΜL (ref 1.85–7.62)
NEUTS SEG NFR BLD AUTO: 76 % (ref 43–75)
NITRITE UR QL STRIP: NEGATIVE
NON-SQ EPI CELLS URNS QL MICRO: ABNORMAL /HPF
NRBC BLD AUTO-RTO: 0 /100 WBCS
PH UR STRIP.AUTO: 6 [PH]
PLATELET # BLD AUTO: 265 THOUSANDS/UL (ref 149–390)
PMV BLD AUTO: 9.7 FL (ref 8.9–12.7)
POTASSIUM SERPL-SCNC: 3.1 MMOL/L (ref 3.5–5.3)
PROT SERPL-MCNC: 6.4 G/DL (ref 6.4–8.4)
PROT UR STRIP-MCNC: ABNORMAL MG/DL
RBC # BLD AUTO: 3.86 MILLION/UL (ref 3.81–5.12)
RBC #/AREA URNS AUTO: ABNORMAL /HPF
RH BLD: POSITIVE
RUBV IGG SERPL IA-ACNC: 76.4 IU/ML
SODIUM SERPL-SCNC: 134 MMOL/L (ref 135–147)
SP GR UR STRIP.AUTO: 1.02 (ref 1–1.03)
SPECIMEN EXPIRATION DATE: NORMAL
TREPONEMA PALLIDUM IGG+IGM AB [PRESENCE] IN SERUM OR PLASMA BY IMMUNOASSAY: NORMAL
UROBILINOGEN UR STRIP-ACNC: <2 MG/DL
WBC # BLD AUTO: 10.25 THOUSAND/UL (ref 4.31–10.16)
WBC #/AREA URNS AUTO: ABNORMAL /HPF

## 2024-11-20 PROCEDURE — 83020 HEMOGLOBIN ELECTROPHORESIS: CPT

## 2024-11-20 PROCEDURE — 86706 HEP B SURFACE ANTIBODY: CPT

## 2024-11-20 PROCEDURE — 86762 RUBELLA ANTIBODY: CPT

## 2024-11-20 PROCEDURE — 87086 URINE CULTURE/COLONY COUNT: CPT

## 2024-11-20 PROCEDURE — 80053 COMPREHEN METABOLIC PANEL: CPT

## 2024-11-20 PROCEDURE — 36415 COLL VENOUS BLD VENIPUNCTURE: CPT

## 2024-11-20 PROCEDURE — 86850 RBC ANTIBODY SCREEN: CPT

## 2024-11-20 PROCEDURE — 86780 TREPONEMA PALLIDUM: CPT

## 2024-11-20 PROCEDURE — 86803 HEPATITIS C AB TEST: CPT

## 2024-11-20 PROCEDURE — 87389 HIV-1 AG W/HIV-1&-2 AB AG IA: CPT

## 2024-11-20 PROCEDURE — 81001 URINALYSIS AUTO W/SCOPE: CPT

## 2024-11-20 PROCEDURE — 82950 GLUCOSE TEST: CPT

## 2024-11-20 PROCEDURE — 85025 COMPLETE CBC W/AUTO DIFF WBC: CPT

## 2024-11-20 PROCEDURE — 86901 BLOOD TYPING SEROLOGIC RH(D): CPT

## 2024-11-20 PROCEDURE — 87340 HEPATITIS B SURFACE AG IA: CPT

## 2024-11-20 PROCEDURE — 86900 BLOOD TYPING SEROLOGIC ABO: CPT

## 2024-11-20 NOTE — TELEPHONE ENCOUNTER
Left voicemail informing patient we had a slight schedule change in our [place] office and needed to adjust the time of ultrasound a little bit. Appointment is now scheduled for 10:15 AM tomorrow in [place]. Requested she give our office a call back at [phone] with any questions or if she would need to reschedule.

## 2024-11-21 ENCOUNTER — ROUTINE PRENATAL (OUTPATIENT)
Dept: PERINATAL CARE | Facility: OTHER | Age: 34
End: 2024-11-21
Payer: MEDICARE

## 2024-11-21 VITALS
BODY MASS INDEX: 19.95 KG/M2 | WEIGHT: 112.6 LBS | HEART RATE: 99 BPM | SYSTOLIC BLOOD PRESSURE: 112 MMHG | DIASTOLIC BLOOD PRESSURE: 66 MMHG

## 2024-11-21 DIAGNOSIS — O24.419 GESTATIONAL DIABETES MELLITUS (GDM) IN SECOND TRIMESTER, GESTATIONAL DIABETES METHOD OF CONTROL UNSPECIFIED: Primary | ICD-10-CM

## 2024-11-21 DIAGNOSIS — Z3A.15 15 WEEKS GESTATION OF PREGNANCY: ICD-10-CM

## 2024-11-21 DIAGNOSIS — Z34.91 PRENATAL CARE IN FIRST TRIMESTER: ICD-10-CM

## 2024-11-21 DIAGNOSIS — Z36.3 ENCOUNTER FOR ANTENATAL SCREENING FOR MALFORMATIONS: ICD-10-CM

## 2024-11-21 DIAGNOSIS — Z3A.08 8 WEEKS GESTATION OF PREGNANCY: ICD-10-CM

## 2024-11-21 DIAGNOSIS — G35 MS (MULTIPLE SCLEROSIS) (HCC): ICD-10-CM

## 2024-11-21 DIAGNOSIS — O09.899 HISTORY OF PRETERM DELIVERY, CURRENTLY PREGNANT: Primary | ICD-10-CM

## 2024-11-21 DIAGNOSIS — Z36.0 ENCOUNTER FOR ANTENATAL SCREENING FOR CHROMOSOMAL ANOMALIES: ICD-10-CM

## 2024-11-21 DIAGNOSIS — O24.419 GESTATIONAL DIABETES MELLITUS (GDM) IN FIRST TRIMESTER, GESTATIONAL DIABETES METHOD OF CONTROL UNSPECIFIED: ICD-10-CM

## 2024-11-21 DIAGNOSIS — O20.9 VAGINAL BLEEDING AFFECTING EARLY PREGNANCY: ICD-10-CM

## 2024-11-21 PROBLEM — Z78.9 NOT IMMUNE TO HEPATITIS B VIRUS: Status: ACTIVE | Noted: 2024-11-21

## 2024-11-21 LAB
BACTERIA UR CULT: NORMAL
HBV SURFACE AB SER-ACNC: 3.21 MIU/ML
HBV SURFACE AG SER QL: NORMAL
HCV AB SER QL: NORMAL
HIV 1+2 AB+HIV1 P24 AG SERPL QL IA: NORMAL
HIV 2 AB SERPL QL IA: NORMAL
HIV1 AB SERPL QL IA: NORMAL
HIV1 P24 AG SERPL QL IA: NORMAL

## 2024-11-21 PROCEDURE — 76805 OB US >/= 14 WKS SNGL FETUS: CPT | Performed by: OBSTETRICS & GYNECOLOGY

## 2024-11-21 PROCEDURE — 36415 COLL VENOUS BLD VENIPUNCTURE: CPT | Performed by: OBSTETRICS & GYNECOLOGY

## 2024-11-21 PROCEDURE — 99204 OFFICE O/P NEW MOD 45 MIN: CPT | Performed by: OBSTETRICS & GYNECOLOGY

## 2024-11-21 RX ORDER — ASPIRIN 81 MG/1
162 TABLET ORAL DAILY
Qty: 180 TABLET | Refills: 2 | Status: SHIPPED | OUTPATIENT
Start: 2024-11-21

## 2024-11-21 NOTE — PROGRESS NOTES
"[facility]: Christine Thomas was seen today for nuchal translucency ultrasound.  See ultrasound report under \"OB Procedures\" tab.   Please don't hesitate to contact our office with any concerns or questions.  -Gena Castillo MD        "

## 2024-11-21 NOTE — PROGRESS NOTES
Patient chose to have [facility] [id number] Non-Invasive Prenatal Screen [id number] PLUS w/ SCA, WITH fetal sex.  Patient choose to be billed through insurance.     Patient given brochure and is aware [facility] will contact patient's insurance and coordinate coverage.  Provided [facility] contact information. General inquiries [phone], Cost estimates [phone] and [facility] Billing [phone]. Website [email or web address].     Blood collection tubes labeled with patient identifiers (name, medical record number, and date of birth).     Filled out [facility] order form. Patient chose to have blood drawn in our office at time of visit. NIPS was drawn from left arm with a straight needle by Destinee DAIGLE .      Maternal Fetal Medicine will have results in approximately 5-7 business days and will call patient or notify via [employer].  Patient aware viewing lab result online will reveal fetal sex if ordered.    Patient verbalized understanding of all instructions and no questions at this time.

## 2024-11-22 ENCOUNTER — PATIENT MESSAGE (OUTPATIENT)
Dept: OBGYN CLINIC | Facility: CLINIC | Age: 34
End: 2024-11-22

## 2024-11-22 DIAGNOSIS — O09.899 HISTORY OF PRETERM DELIVERY, CURRENTLY PREGNANT: Primary | ICD-10-CM

## 2024-11-22 DIAGNOSIS — G35 MS (MULTIPLE SCLEROSIS) (HCC): ICD-10-CM

## 2024-11-22 LAB
HGB A MFR BLD: 2.8 % (ref 1.8–3.2)
HGB A MFR BLD: 97.2 % (ref 96.4–98.8)
HGB F MFR BLD: 0 % (ref 0–2)
HGB FRACT BLD-IMP: NORMAL
HGB S MFR BLD: 0 %
LAB AP GYN PRIMARY INTERPRETATION: NORMAL
LAB AP LMP: NORMAL
Lab: NORMAL

## 2024-11-25 ENCOUNTER — ROUTINE PRENATAL (OUTPATIENT)
Dept: PERINATAL CARE | Facility: OTHER | Age: 34
End: 2024-11-25
Payer: MEDICARE

## 2024-11-25 ENCOUNTER — APPOINTMENT (OUTPATIENT)
Dept: PERINATAL CARE | Facility: CLINIC | Age: 34
End: 2024-11-25
Payer: MEDICARE

## 2024-11-25 VITALS
BODY MASS INDEX: 19.19 KG/M2 | SYSTOLIC BLOOD PRESSURE: 114 MMHG | HEART RATE: 75 BPM | WEIGHT: 112.4 LBS | DIASTOLIC BLOOD PRESSURE: 72 MMHG | HEIGHT: 64 IN

## 2024-11-25 DIAGNOSIS — O09.899 HISTORY OF PRETERM DELIVERY, CURRENTLY PREGNANT: Primary | ICD-10-CM

## 2024-11-25 DIAGNOSIS — Z36.86 ENCOUNTER FOR ANTENATAL SCREENING FOR CERVICAL LENGTH: ICD-10-CM

## 2024-11-25 DIAGNOSIS — Z3A.15 15 WEEKS GESTATION OF PREGNANCY: ICD-10-CM

## 2024-11-25 DIAGNOSIS — O24.410 DIET CONTROLLED GESTATIONAL DIABETES MELLITUS (GDM) IN SECOND TRIMESTER: Primary | ICD-10-CM

## 2024-11-25 PROCEDURE — 76815 OB US LIMITED FETUS(S): CPT | Performed by: OBSTETRICS & GYNECOLOGY

## 2024-11-25 PROCEDURE — 76817 TRANSVAGINAL US OBSTETRIC: CPT | Performed by: OBSTETRICS & GYNECOLOGY

## 2024-11-25 NOTE — PROGRESS NOTES
This patient received  care under my supervision on 24 at 15w4d gestational age at [facility].  The note is contained in the ultrasound report located under OB Procedures tab in Epic.  Please call our office at [phone] with questions.  -Thania Roger MD

## 2024-11-25 NOTE — PROGRESS NOTES
Ultrasound Probe Disinfection    A transvaginal ultrasound was performed.   Prior to use, disinfection was performed with High Level Disinfection Process (Transactivon).  Probe serial number [id number] was used.    Joslyn Gale  11/25/24  12:05 PM

## 2024-11-26 ENCOUNTER — RESULTS FOLLOW-UP (OUTPATIENT)
Facility: HOSPITAL | Age: 34
End: 2024-11-26

## 2024-11-26 ENCOUNTER — TELEPHONE (OUTPATIENT)
Dept: PERINATAL CARE | Facility: OTHER | Age: 34
End: 2024-11-26

## 2024-11-26 DIAGNOSIS — O24.419 GESTATIONAL DIABETES MELLITUS (GDM) IN SECOND TRIMESTER, GESTATIONAL DIABETES METHOD OF CONTROL UNSPECIFIED: Primary | ICD-10-CM

## 2024-11-26 LAB
CFDNA.FET/CFDNA.TOTAL SFR FETUS: NORMAL %
CITATION REF LAB TEST: NORMAL
FET 13+18+21+X+Y ANEUP PLAS.CFDNA: NEGATIVE
FET CHR 21 TS PLAS.CFDNA QL: NEGATIVE
FET CHR 21 TS PLAS.CFDNA QL: NEGATIVE
FET MS X RISK WBC.DNA+CFDNA QL: NOT DETECTED
FET SEX PLAS.CFDNA DOSAGE CFDNA: NORMAL
FET TS 13 RISK PLAS.CFDNA QL: NEGATIVE
FET X + Y ANEUP RISK PLAS.CFDNA SEQ-IMP: NOT DETECTED
GA EST FROM CONCEPTION DATE: NORMAL D
GESTATIONAL AGE > 9:: YES
LAB DIRECTOR NAME PROVIDER: NORMAL
LAB DIRECTOR NAME PROVIDER: NORMAL
LABORATORY COMMENT REPORT: NORMAL
LIMITATIONS OF THE TEST: NORMAL
NEGATIVE PREDICTIVE VALUE: NORMAL
PERFORMANCE CHARACTERISTICS: NORMAL
POSITIVE PREDICTIVE VALUE: NORMAL
REF LAB TEST METHOD: NORMAL
SL AMB NOTE:: NORMAL
TEST PERFORMANCE INFO SPEC: NORMAL

## 2024-12-02 ENCOUNTER — TELEPHONE (OUTPATIENT)
Age: 34
End: 2024-12-02

## 2024-12-02 ENCOUNTER — PATIENT MESSAGE (OUTPATIENT)
Dept: PERINATAL CARE | Facility: CLINIC | Age: 34
End: 2024-12-02

## 2024-12-03 ENCOUNTER — TELEMEDICINE (OUTPATIENT)
Facility: HOSPITAL | Age: 34
End: 2024-12-03
Payer: MEDICARE

## 2024-12-03 VITALS — BODY MASS INDEX: 19.12 KG/M2 | WEIGHT: 112 LBS | HEIGHT: 64 IN

## 2024-12-03 DIAGNOSIS — O24.419 GESTATIONAL DIABETES MELLITUS (GDM) IN SECOND TRIMESTER, GESTATIONAL DIABETES METHOD OF CONTROL UNSPECIFIED: Primary | ICD-10-CM

## 2024-12-03 DIAGNOSIS — Z3A.16 16 WEEKS GESTATION OF PREGNANCY: ICD-10-CM

## 2024-12-03 PROCEDURE — 99417 PROLNG OP E/M EACH 15 MIN: CPT | Performed by: NURSE PRACTITIONER

## 2024-12-03 PROCEDURE — 99215 OFFICE O/P EST HI 40 MIN: CPT | Performed by: NURSE PRACTITIONER

## 2024-12-03 RX ORDER — BLOOD SUGAR DIAGNOSTIC
STRIP MISCELLANEOUS
Qty: 100 STRIP | Refills: 4 | Status: SHIPPED | OUTPATIENT
Start: 2024-12-03

## 2024-12-03 RX ORDER — BLOOD-GLUCOSE METER
KIT MISCELLANEOUS
Qty: 1 KIT | Refills: 0 | Status: SHIPPED | OUTPATIENT
Start: 2024-12-03

## 2024-12-03 RX ORDER — LANCETS 33 GAUGE
EACH MISCELLANEOUS
Qty: 100 EACH | Refills: 4 | Status: SHIPPED | OUTPATIENT
Start: 2024-12-03

## 2024-12-03 NOTE — ASSESSMENT & PLAN NOTE
-Pre-pregnancy weight 102 lbs.  -Current weight 112 lbs.  -Recommended weight gain 28-40 lbs.  -Start GDM meal plan and follow up with dietitian.   Orders:    Lancets (OneTouch Delica Plus [id number]) MISC; Use 4 a day. GDM.    Blood Glucose Monitoring Suppl (OneTouch Verio Reflect) w/Device KIT; Dispense 1 kit per insurance formulary. GDM    glucose blood (OneTouch Verio) test strip; Test 4 times a day. GDM.    [facility] glucose flowsheet

## 2024-12-03 NOTE — PATIENT INSTRUCTIONS
-Check A1c.  -A1c goal is 5.6% or less.  -Follow up with dietitian.  -Keep 3 day food log to review with dietitian.  -Start self monitoring blood glucose (SMBG) fasting; 2 hours after start of each meal and with hypoglycemia.   -Glucose goals: fasting 60-95 mg/dL, 140 mg/dL or less 1 hour post meals, and 120 mg/dL or less 2 hours post meal.   -Report glucose readings weekly via SignNowhart every Tuesday.  -Start GDM meal plan with 3 meals and 3 snacks including recommended combination of carb, protein and fat per meal/snack.  -Please eat meal or snack every 2-3.5 hours while awake.  -No more than 8 to 10 hours of fasting overnight.  -Refer to [employer] Success MyPlate online as a reference.  -2nd/3rd trimester minimum total daily carbohydrates 175 grams paired with half grams in protein.   -Stay active if no restriction from your OB, walk up to 30 minutes a day.  -Always have glucose available to treat hypoglycemia. Use 15:15 rule.   -Refer to hypoglycemia patient education sheet. SMBG when experiencing signs and symptoms of hypoglycemia and prior to driving.   -Serial fetal growth ultrasounds.  -20 weeks detailed fetal growth ultrasound.  -22-24 weeks fetal echo.  -If diabetes related medications are started, at 32 weeks gestation; NST twice a week and JOEY weekly.   -Continue prenatal vitamin and baby aspirin as recommended.  -At 36 weeks gestation, stop baby aspirin.   -Continue follow-up with your OB and MFM as recommended.  -Stay in close contact with diabetes education team.  -Insulin requirements during pregnancy; basal/bolus concept and Metformin discussed.  -Very important to maintain tight glucose control during pregnancy to decrease risk factors including fetal macrosomia; birth injury; risk of ; polyhydramnios; pre-term labor; pre-eclampsia;  hypoglycemia; jaundice and stillbirth.   -Diabetes and pregnancy booklet; meal plan and hypoglycemia patient education.  -4 to 12 weeks postpartum complete  2-hour glucose tolerance test for diabetes screening.       Thank you for choosing us for your  care today.  If you have any questions about your ultrasound or care, please do not hesitate to contact us or your primary obstetrician.        Some general instructions for your pregnancy are:    Exercise: Aim for 150 minutes per week of regular exercise.  Walking is great!  Nutrition: Choose healthy sources of calcium, iron, and protein.  Avoid ultraprocessed foods and added sugar.  Learn about Preeclampsia: preeclampsia is a common, potentially serious high blood pressure complication in pregnancy.  A blood pressure of 140mmHg (systolic or top number) or 90mmHg (diastolic or bottom number) should be evaluated by your doctor.  Aspirin is sometimes prescribed in early pregnancy to prevent preeclampsia in women with risk factors - ask your obstetrician if you should be on this medication.  For more resources, visit:  [email or web address].[email or web address]  If you smoke, please try to quit completely but also try to reduce your smoking by as much as possible (as soon as possible).  Do not vape.  Please also avoid cannabis products.  Other warning signs to watch out for in pregnancy or postpartum: chest pain, obstructed breathing or shortness of breath, seizures, thoughts of hurting yourself or your baby, bleeding, a painful or swollen leg, fever, or headache (see [facility] POST-BIRTH Warning Signs campaign).  If these happen call 911.  Itching is also not normal in pregnancy and if you experience this, especially over your hands and feet, potentially worse at night, notify your doctors.

## 2024-12-03 NOTE — ASSESSMENT & PLAN NOTE
Orders:    Lancets (OneTouch Delica Plus [id number]) MISC; Use 4 a day. GDM.    Blood Glucose Monitoring Suppl (OneTouch Verio Reflect) w/Device KIT; Dispense 1 kit per insurance formulary. GDM    glucose blood (OneTouch Verio) test strip; Test 4 times a day. GDM.    [facility] glucose flowsheet

## 2024-12-03 NOTE — ASSESSMENT & PLAN NOTE
-Check A1c.  -A1c goal is 5.6% or less.  -Follow up with dietitian.  -Keep 3 day food log to review with dietitian.  -Start self monitoring blood glucose (SMBG) fasting; 2 hours after start of each meal and with hypoglycemia.   -Glucose goals: fasting 60-95 mg/dL, 140 mg/dL or less 1 hour post meals, and 120 mg/dL or less 2 hours post meal.   -Report glucose readings weekly via mySBXhart every Tuesday.  -Start GDM meal plan with 3 meals and 3 snacks including recommended combination of carb, protein and fat per meal/snack.  -Please eat meal or snack every 2-3.5 hours while awake.  -No more than 8 to 10 hours of fasting overnight.  -Refer to [employer] Success MyPlate online as a reference.  -2nd/3rd trimester minimum total daily carbohydrates 175 grams paired with half grams in protein.   -Stay active if no restriction from your OB, walk up to 30 minutes a day.  -Always have glucose available to treat hypoglycemia. Use 15:15 rule.   -Refer to hypoglycemia patient education sheet. SMBG when experiencing signs and symptoms of hypoglycemia and prior to driving.   -Serial fetal growth ultrasounds.  -20 weeks detailed fetal growth ultrasound.  -22-24 weeks fetal echo.  -If diabetes related medications are started, at 32 weeks gestation; NST twice a week and JOEY weekly.   -Continue prenatal vitamin and baby aspirin as recommended.  -At 36 weeks gestation, stop baby aspirin.   -Continue follow-up with your OB and MFM as recommended.  -Stay in close contact with diabetes education team.  -Insulin requirements during pregnancy; basal/bolus concept and Metformin discussed.  -Very important to maintain tight glucose control during pregnancy to decrease risk factors including fetal macrosomia; birth injury; risk of ; polyhydramnios; pre-term labor; pre-eclampsia;  hypoglycemia; jaundice and stillbirth.   -Diabetes and pregnancy booklet; meal plan and hypoglycemia patient education.  -4 to 12 weeks postpartum complete  "2-hour glucose tolerance test for diabetes screening.       No results found for: \"HGBA1C\"    "

## 2024-12-03 NOTE — PROGRESS NOTES
Virtual Regular Visit  Name: Christine Thomas      : 1990      MRN: [id number]  Encounter Provider: DANIEL Grossman  Encounter Date: 12/3/2024   Encounter department: [facility]      Verification of patient location:  Patient is located at Home in the following state in which I hold an active license [place] :  Assessment & Plan  Gestational diabetes mellitus (GDM) in second trimester, gestational diabetes method of control unspecified  -Check A1c.  -A1c goal is 5.6% or less.  -Follow up with dietitian.  -Keep 3 day food log to review with dietitian.  -Start self monitoring blood glucose (SMBG) fasting; 2 hours after start of each meal and with hypoglycemia.   -Glucose goals: fasting 60-95 mg/dL, 140 mg/dL or less 1 hour post meals, and 120 mg/dL or less 2 hours post meal.   -Report glucose readings weekly via [employer] every Tuesday.  -Start GDM meal plan with 3 meals and 3 snacks including recommended combination of carb, protein and fat per meal/snack.  -Please eat meal or snack every 2-3.5 hours while awake.  -No more than 8 to 10 hours of fasting overnight.  -Refer to [employer] Success MyPlate online as a reference.  -2nd/3rd trimester minimum total daily carbohydrates 175 grams paired with half grams in protein.   -Stay active if no restriction from your OB, walk up to 30 minutes a day.  -Always have glucose available to treat hypoglycemia. Use 15:15 rule.   -Refer to hypoglycemia patient education sheet. SMBG when experiencing signs and symptoms of hypoglycemia and prior to driving.   -Serial fetal growth ultrasounds.  -20 weeks detailed fetal growth ultrasound.  -22-24 weeks fetal echo.  -If diabetes related medications are started, at 32 weeks gestation; NST twice a week and JOEY weekly.   -Continue prenatal vitamin and baby aspirin as recommended.  -At 36 weeks gestation, stop baby aspirin.   -Continue follow-up with your OB and MFM as recommended.  -Stay in close contact with  "diabetes education team.  -Insulin requirements during pregnancy; basal/bolus concept and Metformin discussed.  -Very important to maintain tight glucose control during pregnancy to decrease risk factors including fetal macrosomia; birth injury; risk of ; polyhydramnios; pre-term labor; pre-eclampsia;  hypoglycemia; jaundice and stillbirth.   -Diabetes and pregnancy booklet; meal plan and hypoglycemia patient education.  -4 to 12 weeks postpartum complete 2-hour glucose tolerance test for diabetes screening.       No results found for: \"HGBA1C\"    16 weeks gestation of pregnancy    Orders:    Lancets (OneTouch Delica Plus [id number]) MISC; Use 4 a day. GDM.    Blood Glucose Monitoring Suppl (OneTouch Verio Reflect) w/Device KIT; Dispense 1 kit per insurance formulary. GDM    glucose blood (OneTouch Verio) test strip; Test 4 times a day. GDM.    [facility] glucose flowsheet    Body mass index (BMI) 19.9 or less, adult  -Pre-pregnancy weight 102 lbs.  -Current weight 112 lbs.  -Recommended weight gain 28-40 lbs.  -Start GDM meal plan and follow up with dietitian.   Orders:    Lancets (OneTouch Delica Plus [id number]) MISC; Use 4 a day. GDM.    Blood Glucose Monitoring Suppl (OneTouch Verio Reflect) w/Device KIT; Dispense 1 kit per insurance formulary. GDM    glucose blood (OneTouch Verio) test strip; Test 4 times a day. GDM.    [facility] glucose flowsheet                      Encounter provider DANIEL Grossman    The patient was identified by name and date of birth. Christine Thomas was informed that this is a telemedicine visit and that the visit is being conducted through the Epic Embedded platform. She agrees to proceed..  My office door was closed. No one else was in the room.  She acknowledged consent and understanding of privacy and security of the video platform. The patient has agreed to participate and understands they can discontinue the visit at any time.    Patient is aware this is a billable " "service.     History of Present Illness   Christine is a 34 yo  female, 16 5/7 weeks gestation who presents with GDM diagnosis. 1 hour ; pending A1c. No history of GDM. Having issues with nausea/vomiting. Has MS. Low BMI, weight gain up to date 10 lbs.   Wakes up at 7:30 AM; 9 AM buttered bagel with water; intolerance to juice; 3 PM grilled cheese with soup; 6:30 PM meat; rice with beans; 9 PM bedtime and salad then around 3 AM will be   HPI  Review of Systems   Constitutional:  Negative for fatigue and fever.   HENT:  Negative for congestion and trouble swallowing.    Eyes:  Negative for visual disturbance.   Cardiovascular:  Negative for chest pain, palpitations and leg swelling.   Gastrointestinal:  Positive for nausea and vomiting. Negative for constipation.   Endocrine: Negative for polydipsia, polyphagia and polyuria.   Genitourinary:  Negative for difficulty urinating and vaginal bleeding.   Neurological:  Negative for headaches.   Psychiatric/Behavioral:  Negative for sleep disturbance.        Objective   Ht 5' 4\" (1.626 m)   Wt 50.8 kg (112 lb)   LMP 2024 (Approximate)   BMI 19.22 kg/m²     Physical Exam  Constitutional:       Appearance: She is normal weight.   HENT:      Head: Normocephalic.      Nose: Nose normal.   Eyes:      Conjunctiva/sclera: Conjunctivae normal.   Pulmonary:      Effort: Pulmonary effort is normal.   Neurological:      Mental Status: She is alert and oriented to person, place, and time.   Psychiatric:         Thought Content: Thought content normal.         Judgment: Judgment normal.         Visit Time  Total Visit Duration: 46 minutes with patient and 10 minutes charting.   "

## 2024-12-04 DIAGNOSIS — G35 MS (MULTIPLE SCLEROSIS) (HCC): ICD-10-CM

## 2024-12-04 DIAGNOSIS — O09.899 HISTORY OF PRETERM DELIVERY, CURRENTLY PREGNANT: ICD-10-CM

## 2024-12-04 RX ORDER — ASPIRIN 81 MG/1
162 TABLET ORAL DAILY
Qty: 180 TABLET | Refills: 2 | Status: SHIPPED | OUTPATIENT
Start: 2024-12-04 | End: 2024-12-12

## 2024-12-12 ENCOUNTER — ROUTINE PRENATAL (OUTPATIENT)
Dept: PERINATAL CARE | Facility: CLINIC | Age: 34
End: 2024-12-12
Payer: MEDICARE

## 2024-12-12 ENCOUNTER — ROUTINE PRENATAL (OUTPATIENT)
Dept: OBGYN CLINIC | Facility: CLINIC | Age: 34
End: 2024-12-12

## 2024-12-12 VITALS
WEIGHT: 112.2 LBS | DIASTOLIC BLOOD PRESSURE: 64 MMHG | OXYGEN SATURATION: 98 % | SYSTOLIC BLOOD PRESSURE: 112 MMHG | HEIGHT: 64 IN | BODY MASS INDEX: 19.15 KG/M2 | HEART RATE: 80 BPM

## 2024-12-12 VITALS
WEIGHT: 115.4 LBS | HEART RATE: 97 BPM | HEIGHT: 64 IN | BODY MASS INDEX: 19.7 KG/M2 | DIASTOLIC BLOOD PRESSURE: 80 MMHG | SYSTOLIC BLOOD PRESSURE: 119 MMHG

## 2024-12-12 DIAGNOSIS — Z03.75 ENCOUNTER FOR SUSPECTED CERVICAL SHORTENING RULED OUT: ICD-10-CM

## 2024-12-12 DIAGNOSIS — Z3A.18 18 WEEKS GESTATION OF PREGNANCY: Primary | ICD-10-CM

## 2024-12-12 DIAGNOSIS — Z78.9 NOT IMMUNE TO HEPATITIS B VIRUS: ICD-10-CM

## 2024-12-12 DIAGNOSIS — O24.410 DIET CONTROLLED GESTATIONAL DIABETES MELLITUS (GDM) IN SECOND TRIMESTER: ICD-10-CM

## 2024-12-12 DIAGNOSIS — O09.899 HISTORY OF PRETERM DELIVERY, CURRENTLY PREGNANT: ICD-10-CM

## 2024-12-12 DIAGNOSIS — G35 MS (MULTIPLE SCLEROSIS) (HCC): ICD-10-CM

## 2024-12-12 PROCEDURE — 76817 TRANSVAGINAL US OBSTETRIC: CPT | Performed by: STUDENT IN AN ORGANIZED HEALTH CARE EDUCATION/TRAINING PROGRAM

## 2024-12-12 PROCEDURE — 76815 OB US LIMITED FETUS(S): CPT | Performed by: STUDENT IN AN ORGANIZED HEALTH CARE EDUCATION/TRAINING PROGRAM

## 2024-12-12 PROCEDURE — 99214 OFFICE O/P EST MOD 30 MIN: CPT | Performed by: OBSTETRICS & GYNECOLOGY

## 2024-12-12 NOTE — PROGRESS NOTES
Ultrasound Probe Disinfection    A transvaginal ultrasound was performed.   Prior to use, disinfection was performed with High Level Disinfection Process ([employer]).  Probe serial number [id number]: [id number] was used.    Clara Foreman  12/12/24  11:36 AM

## 2024-12-12 NOTE — PROGRESS NOTES
[facility]   PRENATAL VISIT  Name: Christine Thomas  MRN: [id number]  : 1990      Assessment/Plan:  Christine Thomas 33 y.o.  at 18w0d with an Estimated Date of Delivery: 5/15/25 presenting for routine prenatal visit.    No problem-specific Assessment & Plan notes found for this encounter.         16-18 weeks:  1) Aneuploidy screening: Sequential screen was completed.    No quad screen to be given.   2) Level II US ordered. Patient given referral to PNC.   Flu vaccine received.  RTC next week.    D/w Jovan Haney MD      Future Appointments   Date Time Provider Department Center   2025 10:15 AM  US 3 [facility]   2025 10:15 AM  US 3 [facility]   2025  3:30 PM DANIEL Babcock [facility]   2025 10:15 AM DANIEL Grossman [facility]   2025  8:30 AM Bandar Maier MD [facility]   Christine Thomas 33 y.o.  at 18w0d with an Estimated Date of Delivery: 5/15/25 for PN visit.   Patient denies vaginal bleeding.  Patient denies having uterine contractions  Patient denies recent trauma, inadequate hydration, vaginal discharge, genital lesions, and s/sx of infection  Patient does not have headache, blurry vision, epigastric pain, or edema  Fetal movement is normal    OBJECTIVE:  Vitals:    24 1557   BP: 119/80   Pulse: 97     Gen: no acute distress, nonlabored breathing     Fetal Heart Rate: 145    Physical Exam  General: in no apparent distress, well developed and well nourished, non-toxic, and in no respiratory distress and acyanotic  Cardiovascular: intact distals pulses  Lungs: non-labored breathing  Abdomen: Soft, Non-tender, Gravid  Lower extremities: nontender       Yen Mcbride MD   OB/GYN PGY-1  2024  4:50 PM

## 2024-12-12 NOTE — PROGRESS NOTES
This patient received  care under my supervision on 24 at 18w0d gestational age at [facility].  The note is contained in the ultrasound report located under OB Procedures tab in Epic.  Please call our office at [phone] with questions.  -Maggie Ziegler MD

## 2024-12-13 ENCOUNTER — PATIENT OUTREACH (OUTPATIENT)
Dept: OBGYN CLINIC | Facility: CLINIC | Age: 34
End: 2024-12-13

## 2024-12-13 NOTE — PROGRESS NOTES
ISRAEL CM outreached pt by phone today to check in on her housing situation, phone is not accepting calls at this time.

## 2024-12-16 ENCOUNTER — HOSPITAL ENCOUNTER (OUTPATIENT)
Facility: HOSPITAL | Age: 34
Discharge: HOME | End: 2024-12-17
Attending: OBSTETRICS & GYNECOLOGY | Admitting: OBSTETRICS & GYNECOLOGY
Payer: MEDICARE

## 2024-12-16 VITALS — SYSTOLIC BLOOD PRESSURE: 118 MMHG | HEART RATE: 83 BPM | DIASTOLIC BLOOD PRESSURE: 78 MMHG

## 2024-12-16 DIAGNOSIS — O26.899 ABDOMINAL CRAMPING AFFECTING PREGNANCY: Primary | ICD-10-CM

## 2024-12-16 DIAGNOSIS — R10.9 ABDOMINAL CRAMPING AFFECTING PREGNANCY: Primary | ICD-10-CM

## 2024-12-16 PROCEDURE — NC001 PR NO CHARGE: Performed by: OBSTETRICS & GYNECOLOGY

## 2024-12-16 RX ORDER — ACETAMINOPHEN 500 MG
1000 TABLET ORAL EVERY 8 HOURS PRN
Qty: 30 TABLET | Refills: 0 | Status: SHIPPED | OUTPATIENT
Start: 2024-12-16

## 2024-12-17 PROBLEM — O26.899 CRAMPING AFFECTING PREGNANCY, ANTEPARTUM: Status: ACTIVE | Noted: 2024-12-17

## 2024-12-17 PROBLEM — R10.9 CRAMPING AFFECTING PREGNANCY, ANTEPARTUM: Status: ACTIVE | Noted: 2024-12-17

## 2024-12-17 PROCEDURE — 59025 FETAL NON-STRESS TEST: CPT | Performed by: OBSTETRICS & GYNECOLOGY

## 2024-12-17 PROCEDURE — 76817 TRANSVAGINAL US OBSTETRIC: CPT

## 2024-12-17 PROCEDURE — 76817 TRANSVAGINAL US OBSTETRIC: CPT | Performed by: OBSTETRICS & GYNECOLOGY

## 2024-12-17 PROCEDURE — 99202 OFFICE O/P NEW SF 15 MIN: CPT

## 2024-12-17 NOTE — PROCEDURES
Christine Thomas, a  at 18w5d with an FAIZA of 5/15/2025, by Ultrasound, was seen at [facility] for the following procedure(s): $Procedure Type: US - Transvaginal]    Nonstress Test  Uterine Irritability: No  Contractions: Not present      Ultrasound Other  Fetal Presentation: Breech  Cervical Length: 3.44  Placenta Previa: No  Vasa Previa: No              Ultrasound Probe Disinfection    A transvaginal ultrasound was performed.   Prior to use, disinfection was performed with High Level Disinfection Process ([employer])    Jessica Wolfe MD  24  1:57 AM

## 2024-12-17 NOTE — PROGRESS NOTES
L&D Triage Note - OB/GYN  Christine Thomas 33 y.o. female MRN: [id number]  Unit/Bed#: LD TRIAGE 1- Encounter: [id number]    Patient is seen by [facility]    ASSESSMENT  Christine Thomas is a 33 y.o.  at 18w4d presenting with new onset abdominal cramping. Low suspicion for PTL as workup is negative. Stable for discharge.    PLAN  #1. Abdominal cramping: Rule out  labor  Speculum exam unremarkable  Microscopy negative  Cervical length: 3.44cm  SVE 0/0/-5  No contractions on monitoring  Discussed use of belly band and tylenol 1000 mg tid prn    #2. Elevated blood pressure w/o diagnosis:   Blood pressure noted to be 148/89 taken in the ED, on arrival to L&D noted to be 118/78  Noted BP of 141/88 in ED on 10/31/24 and again on 24 in ED BP of 152/103, 3/1/2024 in ED BP of 155/104  Patient with likely diagnosis of chronic hypertension, baseline preeclampsia labs ordered by OB office, patient not yet completed  Asymptomatic    Discharge instructions  Patient instructed to call if experiencing worsening contractions, vaginal bleeding, loss of fluid or decreased fetal movement.  Will follow up with OBGYN on 25.    D/w Dr. Oh  ______________    SUBJECTIVE    FAIZA: Estimated Date of Delivery: 5/15/25    HPI:  33 y.o.  18w4d presents with complaint of abdominal cramping that started about an hour prior to arrival. Feels like period-like cramping and pelvic pressure. She denies recent trauma or anything that could provoke the cramping. Reports drinking a lot of a water. Denies dysuria, hematuria, abnormal vaginal discharge, vulvar/vaginal itching.       Contractions: denies, more cramping than contractions  Leakage of fluid: denies  Vaginal Bleeding: denies  Fetal movement: present    Her obstetrical history is significant for  delivery at 32 weeks, GDM, multiple sclerosis    ROS:  Constitutional: Negative  Respiratory: Negative  Cardiovascular: Negative    Gastrointestinal:  Negative    OBJECTIVE:    Vitals:  /78   Pulse 83   LMP 08/08/2024 (Approximate)   There is no height or weight on file to calculate BMI.    Physical Exam  Vitals reviewed. Exam conducted with a chaperone present.   Constitutional:       General: She is not in acute distress.  HENT:      Head: Normocephalic.      Nose: Nose normal.      Mouth/Throat:      Pharynx: Oropharynx is clear.   Eyes:      Conjunctiva/sclera: Conjunctivae normal.   Cardiovascular:      Rate and Rhythm: Normal rate.   Pulmonary:      Effort: Pulmonary effort is normal.   Abdominal:      Palpations: Abdomen is soft.      Tenderness: There is no abdominal tenderness. There is no guarding or rebound.   Musculoskeletal:         General: No tenderness.      Right lower leg: No edema.      Left lower leg: No edema.   Skin:     General: Skin is warm and dry.      Coloration: Skin is not jaundiced.   Neurological:      Mental Status: She is alert.   Psychiatric:         Mood and Affect: Mood normal.         Behavior: Behavior normal.         Speculum exam:  Normal external female genitalia  Cervix fully visualized and not visibly dilated  Physiologic discharge  No bleeding, pooling, abnormal discharge, or lesions noted      Microscopy:     Infection:   - No clue cells    - No hyphae   - No trichomonads present    Membrane status   - No ferning   - Negative nitrazene   - No pooling     SVE:  0/0/-5    FHT:  148bpm    TOCO:   No contractions    IMAGING:       TVUS   Cervical length         - 3.75 cm         - 3.44 cm         - 3.74 cm   Presentation: breech   Placenta: Anterior            Labs: No results found for this or any previous visit (from the past 24 hours).        Jessica Wolfe MD  12/17/2024  12:09 AM

## 2025-01-02 ENCOUNTER — ROUTINE PRENATAL (OUTPATIENT)
Dept: PERINATAL CARE | Facility: OTHER | Age: 35
End: 2025-01-02
Payer: MEDICARE

## 2025-01-02 VITALS
SYSTOLIC BLOOD PRESSURE: 112 MMHG | BODY MASS INDEX: 19.97 KG/M2 | HEART RATE: 94 BPM | WEIGHT: 117 LBS | DIASTOLIC BLOOD PRESSURE: 72 MMHG | HEIGHT: 64 IN

## 2025-01-02 DIAGNOSIS — O09.899 HISTORY OF PRETERM DELIVERY, CURRENTLY PREGNANT: ICD-10-CM

## 2025-01-02 DIAGNOSIS — Z36.86 ENCOUNTER FOR ANTENATAL SCREENING FOR CERVICAL LENGTH: ICD-10-CM

## 2025-01-02 DIAGNOSIS — Z36.3 ENCOUNTER FOR ANTENATAL SCREENING FOR MALFORMATIONS: ICD-10-CM

## 2025-01-02 DIAGNOSIS — O24.410 DIET CONTROLLED GESTATIONAL DIABETES MELLITUS (GDM) IN SECOND TRIMESTER: Primary | ICD-10-CM

## 2025-01-02 DIAGNOSIS — Z3A.21 21 WEEKS GESTATION OF PREGNANCY: ICD-10-CM

## 2025-01-02 PROCEDURE — 76811 OB US DETAILED SNGL FETUS: CPT | Performed by: OBSTETRICS & GYNECOLOGY

## 2025-01-02 PROCEDURE — 76817 TRANSVAGINAL US OBSTETRIC: CPT | Performed by: OBSTETRICS & GYNECOLOGY

## 2025-01-02 PROCEDURE — 99214 OFFICE O/P EST MOD 30 MIN: CPT | Performed by: OBSTETRICS & GYNECOLOGY

## 2025-01-02 NOTE — PROGRESS NOTES
Ultrasound Probe Disinfection    A transvaginal ultrasound was performed.   Prior to use, disinfection was performed with High Level Disinfection Process ([employer]).  Probe serial number F3: [id number] was used.    Carolann LEGGETT, MARC  01/02/25  9:52 AM

## 2025-01-02 NOTE — PROGRESS NOTES
"[facility]: Christine Thomas was seen today for anatomic survey and cervical length screening ultrasound.  See ultrasound report under \"OB Procedures\" tab.   The time spent on this established patient on the encounter date included 6 minutes previsit service time reviewing records and precharting, 4 minutes face-to-face service time counseling regarding results and coordinating care, and  5 minutes charting, totalling 15 minutes.  Please don't hesitate to contact our office with any concerns or questions.  -Gena Castillo MD      "

## 2025-01-07 ENCOUNTER — ROUTINE PRENATAL (OUTPATIENT)
Dept: PERINATAL CARE | Facility: OTHER | Age: 35
End: 2025-01-07
Payer: MEDICARE

## 2025-01-07 VITALS
HEIGHT: 64 IN | WEIGHT: 118.6 LBS | BODY MASS INDEX: 20.25 KG/M2 | DIASTOLIC BLOOD PRESSURE: 62 MMHG | SYSTOLIC BLOOD PRESSURE: 118 MMHG | HEART RATE: 84 BPM

## 2025-01-07 DIAGNOSIS — Z3A.21 21 WEEKS GESTATION OF PREGNANCY: Primary | ICD-10-CM

## 2025-01-07 DIAGNOSIS — O24.410 DIET CONTROLLED GESTATIONAL DIABETES MELLITUS (GDM) IN SECOND TRIMESTER: ICD-10-CM

## 2025-01-07 DIAGNOSIS — O09.892 HISTORY OF PREMATURE DELIVERY, CURRENTLY PREGNANT, SECOND TRIMESTER: ICD-10-CM

## 2025-01-07 PROCEDURE — 76827 ECHO EXAM OF FETAL HEART: CPT | Performed by: OBSTETRICS & GYNECOLOGY

## 2025-01-07 PROCEDURE — 93325 DOPPLER ECHO COLOR FLOW MAPG: CPT | Performed by: OBSTETRICS & GYNECOLOGY

## 2025-01-07 PROCEDURE — 76825 ECHO EXAM OF FETAL HEART: CPT | Performed by: OBSTETRICS & GYNECOLOGY

## 2025-01-07 PROCEDURE — 76817 TRANSVAGINAL US OBSTETRIC: CPT | Performed by: OBSTETRICS & GYNECOLOGY

## 2025-01-07 NOTE — LETTER
January 7, 2025     Yardlie Toussaint-Foster, DO  [address]  [address]  [facility] [id number]    Patient: Christine Thomas   YOB: 1990   Date of Visit: 1/7/2025       Dear Dr. Toussaint-Foster:    Thank you for referring Christine Thomas to me for evaluation. Below are my notes for this consultation.    If you have questions, please do not hesitate to call me. I look forward to following your patient along with you.         Sincerely,        Doe Luna MD        CC: No Recipients    Doe Luna MD  1/7/2025 10:36 AM  Sign when Signing Visit  Please refer to the [facility] ultrasound report in Ob Procedures for additional information regarding today's visit

## 2025-01-07 NOTE — PROGRESS NOTES
Ultrasound Probe Disinfection    A transvaginal ultrasound was performed.   Prior to use, disinfection was performed with High Level Disinfection Process ([employer]).  Probe serial number F3: [id number] was used.    Carolann LEGGETT, MARC  01/07/25  9:44 AM

## 2025-01-09 ENCOUNTER — ROUTINE PRENATAL (OUTPATIENT)
Dept: OBGYN CLINIC | Facility: CLINIC | Age: 35
End: 2025-01-09

## 2025-01-09 VITALS
BODY MASS INDEX: 19.63 KG/M2 | SYSTOLIC BLOOD PRESSURE: 117 MMHG | HEIGHT: 64 IN | WEIGHT: 115 LBS | HEART RATE: 89 BPM | DIASTOLIC BLOOD PRESSURE: 78 MMHG

## 2025-01-09 DIAGNOSIS — R11.2 NAUSEA AND VOMITING, UNSPECIFIED VOMITING TYPE: ICD-10-CM

## 2025-01-09 DIAGNOSIS — Z34.92 PRENATAL CARE IN SECOND TRIMESTER: Primary | ICD-10-CM

## 2025-01-09 DIAGNOSIS — O24.410 GDM, CLASS A1: ICD-10-CM

## 2025-01-09 DIAGNOSIS — Z3A.22 22 WEEKS GESTATION OF PREGNANCY: ICD-10-CM

## 2025-01-09 PROBLEM — J45.909 ASTHMA DURING PREGNANCY: Status: ACTIVE | Noted: 2025-01-09

## 2025-01-09 PROBLEM — O99.519 ASTHMA DURING PREGNANCY: Status: ACTIVE | Noted: 2025-01-09

## 2025-01-09 PROCEDURE — 99213 OFFICE O/P EST LOW 20 MIN: CPT | Performed by: NURSE PRACTITIONER

## 2025-01-09 NOTE — PROGRESS NOTES
Christine Thomas presents today for routine OB visit at 22w0d.  Blood Pressure: 117/78  Wt=52.2 kg (115 lb); Body mass index is 19.74 kg/m².; TWG=5.897 kg (13 lb)  Fetal Heart Rate: 145; Fundal Height (cm): 20 cm  Abdomen: gravid, soft, non-tender.  She reports ventral wall hernia.  On exam, hernia is soft and easily retracts.  Reports asthma is well-controlled without need for Albuterol during pregnancy so far.  Additionally, reports n/v much decreased and well-managed with Unisom and vitamin B6.  Denies uterine contractions or persistent cramping.  Denies vaginal bleeding or leaking of fluid.  Reports fetal movement.  Scheduled for ultrasound 1/17/25.  Reviewed common discomforts of pregnancy in second trimester and warning signs.  Advised to continue medications and return in 4 weeks.      Current Outpatient Medications   Medication Instructions    albuterol (Ventolin HFA) 90 mcg/act inhaler 2 puffs, Inhalation, Every 6 hours PRN    doxylamine (UNISOM) 12.5 mg, Oral, Daily at bedtime PRN    Prenatal Vit-Fe Fumarate-FA (Prenatal Plus Vitamin/Mineral) 27-1 MG TABS 1 tablet, Oral, Daily    pyridoxine (B-6) 25 mg, Oral, Daily       Laboratory workup: initial OB labs (done 11/20/24)    Genetic Screening: NIPS negative, MSAFP ordered 1/9/25    Vaccinations: influenza (given 11/15/24); Tdap (will offer after 27 weeks); RSV (will offer b/w [id number] during RSV season); COVID-19 (recommended 11/15/24 - patient desires to obtain)    Postpartum contraception: desires Depoprovera    Fetal Ultrasounds:  6/26/24 (7w0d) EDC confirmed  11/11/24 (13w4d) WNL  11/21/24 (15w0d) no previa, luz elena WNL w/missed views  11/25/24 (15w4d) CL=3.98cm  12/12/24 (18w0d) CL=3.49cm  1/2/25 (21w0d) CL=4.6cm, no previa, luz elena WNL & complete  1/7/25 (21w5d) CL=3.5cm, fetal echo WNL      G4 Problems (from 10/09/24 to present)       Problem Noted Diagnosed Resolved    Asthma during pregnancy 1/9/2025 by DANIEL Babcock  No    Overview Signed  2025  4:19 PM by DANIEL Babcock   Albuterol prn         GDMA1 2024 by DANIEL Babcock  No    Overview Addendum 2025  5:36 PM by DANIEL Babcock   Dx @15 weeks  Needs consultation w/ DM Management program - done 12/3/24  Monitor blood sugars 4x/day and report weekly to DM program - in progress  Needs baseline PEC labs  Needs hgbA1C  Needs fetal echo - done WNL  Serial growth scans         Not immune to HBV 2024 by DANIEL Babcock  No    Overview Signed 2024 10:55 AM by DANIEL Babcock   Needs HBV vaccine booster         Family hx DM 2024 by DANIEL Babcock  No    Overview Addendum 2024  9:37 AM by DANIEL Babcock   Pt's father w/DM  Needs early GDM screen - done ABNORMAL         History of PTD 2024 by DANIEL Babcock  No    Overview Addendum 2025  5:37 PM by DANIEL Babcock   Needs MFM consultation - done 24  Serial CL measurements - done WNL         Vaginal bleeding affecting early pregnancy 11/15/2024 by DANIEL Babcock  No    Overview Addendum 2025  5:32 PM by DANIEL Babcock   Needs MFM consultation/ultrasound - done 24 WNL         Multiple Sclerosis 2020 by Eryn Schuler MD  No    Overview Addendum 2024 12:03 PM by DANIEL Babcock   Needs MFM consultation - done 24

## 2025-01-09 NOTE — PATIENT INSTRUCTIONS
Thank you for your confidence in our team.   We appreciate you and welcome your feedback.   If you receive a survey from us, please take a few moments to let us know how we are doing.   Sincerely,  DANIEL Babcock       WARNING SIGNS DURING PREGNANCY  Call our office at [phone] for any of the followin. Vaginal bleeding  2. Sharp abdominal pain that does not go away  3. Fever (more than 100.4 and is not relieved by Tylenol)  4. Persistent vomiting lasting greater than 24 hours  5. Chest pain   6. Pain or burning when you urinate  7. Severe headache that doesn't resolve with Tylenol  8. Blurred vision or seeing spots in your vision  9. Sudden swelling of your face or hands  10. Redness, swelling or pain in a leg  11. A sudden weight gain in just a few days  12. Decrease in your baby's movement (after 28 weeks or the 6th month of pregnancy)  13. A loss of watery fluid from your vagina - can be a gush, a trickle or continuous wetness  14. After 20 weeks of pregnancy, rhythmic cramping (greater than 4 per hour) or menstrual like low/pelvic pain

## 2025-01-10 ENCOUNTER — APPOINTMENT (OUTPATIENT)
Dept: LAB | Facility: HOSPITAL | Age: 35
End: 2025-01-10
Payer: MEDICARE

## 2025-01-10 DIAGNOSIS — O24.419 GESTATIONAL DIABETES MELLITUS (GDM) IN SECOND TRIMESTER, GESTATIONAL DIABETES METHOD OF CONTROL UNSPECIFIED: ICD-10-CM

## 2025-01-10 DIAGNOSIS — Z34.92 PRENATAL CARE IN SECOND TRIMESTER: ICD-10-CM

## 2025-01-10 DIAGNOSIS — Z3A.14 14 WEEKS GESTATION OF PREGNANCY: ICD-10-CM

## 2025-01-10 LAB
CREAT UR-MCNC: 112.6 MG/DL
PROT UR-MCNC: 19.3 MG/DL
PROT/CREAT UR: 0.2 MG/G{CREAT} (ref 0–0.1)

## 2025-01-10 PROCEDURE — 82105 ALPHA-FETOPROTEIN SERUM: CPT

## 2025-01-10 PROCEDURE — 84156 ASSAY OF PROTEIN URINE: CPT

## 2025-01-10 PROCEDURE — 83036 HEMOGLOBIN GLYCOSYLATED A1C: CPT

## 2025-01-10 PROCEDURE — 82570 ASSAY OF URINE CREATININE: CPT

## 2025-01-10 PROCEDURE — 36415 COLL VENOUS BLD VENIPUNCTURE: CPT

## 2025-01-11 LAB
EST. AVERAGE GLUCOSE BLD GHB EST-MCNC: 103 MG/DL
HBA1C MFR BLD: 5.2 %

## 2025-01-14 PROBLEM — Z3A.23 23 WEEKS GESTATION OF PREGNANCY: Status: ACTIVE | Noted: 2025-01-14

## 2025-01-14 PROBLEM — O24.410 DIET CONTROLLED GESTATIONAL DIABETES MELLITUS (GDM) IN SECOND TRIMESTER: Status: ACTIVE | Noted: 2025-01-14

## 2025-01-15 LAB
2ND TRIMESTER 4 SCREEN SERPL-IMP: NORMAL
AFP ADJ MOM SERPL: 1.13
AFP INTERP AMN-IMP: NORMAL
AFP INTERP SERPL-IMP: NORMAL
AFP INTERP SERPL-IMP: NORMAL
AFP SERPL-MCNC: 99.3 NG/ML
AGE AT DELIVERY: 34.4 YR
CITATION REF LAB TEST: NORMAL
CLINICAL INFO: NORMAL
ETHNIC BACKGROUND STATED: NORMAL
GA METHOD: NORMAL
GA: 22.1 WEEKS
GENE DIS ANL CARRIER INTERP-IMP: NORMAL
GENE MUT TESTED BLD/T: NORMAL
IDDM PATIENT QL: NO
LAB DIRECTOR NAME PROVIDER: NORMAL
MULTIPLE PREGNANCY: NO
NEURAL TUBE DEFECT RISK FETUS: 8106 %
REASON FOR REFERRAL (NARRATIVE): NORMAL
RECOMMENDATION PATIENT DOC-IMP: NORMAL
REF LAB TEST METHOD: NORMAL
SERVICE CMNT-IMP: NORMAL
SMN1 GENE MUT ANL BLD/T: NORMAL
SPECIMEN SOURCE: NORMAL

## 2025-01-17 ENCOUNTER — ROUTINE PRENATAL (OUTPATIENT)
Dept: PERINATAL CARE | Facility: OTHER | Age: 35
End: 2025-01-17
Payer: MEDICARE

## 2025-01-17 VITALS
BODY MASS INDEX: 20.28 KG/M2 | HEIGHT: 64 IN | DIASTOLIC BLOOD PRESSURE: 60 MMHG | SYSTOLIC BLOOD PRESSURE: 104 MMHG | HEART RATE: 111 BPM | WEIGHT: 118.8 LBS

## 2025-01-17 DIAGNOSIS — Z36.86 ENCOUNTER FOR ANTENATAL SCREENING FOR CERVICAL LENGTH: ICD-10-CM

## 2025-01-17 DIAGNOSIS — O09.899 HISTORY OF PRETERM DELIVERY, CURRENTLY PREGNANT: ICD-10-CM

## 2025-01-17 DIAGNOSIS — Z3A.23 23 WEEKS GESTATION OF PREGNANCY: Primary | ICD-10-CM

## 2025-01-17 PROCEDURE — 76815 OB US LIMITED FETUS(S): CPT | Performed by: OBSTETRICS & GYNECOLOGY

## 2025-01-17 PROCEDURE — 99213 OFFICE O/P EST LOW 20 MIN: CPT | Performed by: OBSTETRICS & GYNECOLOGY

## 2025-01-17 PROCEDURE — 76817 TRANSVAGINAL US OBSTETRIC: CPT | Performed by: OBSTETRICS & GYNECOLOGY

## 2025-01-17 NOTE — PROGRESS NOTES
Ultrasound Probe Disinfection    A transvaginal ultrasound was performed.   Prior to use, disinfection was performed with High Level Disinfection Process ([employer]).  Probe serial number [id number] was used.    Carolann LEGGETT, MARC  01/17/25  1:44 PM

## 2025-01-17 NOTE — PROGRESS NOTES
"[facility]: Christine Thomas was seen today for serial cervical length screening ultrasound.  See ultrasound report under \"OB Procedures\" tab.   The time spent on this established patient on the encounter date included 5 minutes previsit service time reviewing records and precharting, 5 minutes face-to-face service time counseling regarding results and coordinating care, and  5 minutes charting, totalling 15 minutes.  Please don't hesitate to contact our office with any concerns or questions.  -Gena Csatillo MD    "

## 2025-01-20 LAB
CFTR FULL MUT ANL BLD/T SEQ: NORMAL
CITATION REF LAB TEST: NORMAL
CITATION REF LAB TEST: NORMAL
CLINICAL INFO: NORMAL
CLINICAL INFO: NORMAL
ETHNIC BACKGROUND STATED: NORMAL
ETHNIC BACKGROUND STATED: NORMAL
FMR1 GENE CGG RPT BLD/T QL: NORMAL
GENE DIS ANL CARRIER INTERP-IMP: NORMAL
GENE DIS ANL CARRIER INTERP-IMP: NORMAL
INDICATION: NORMAL
INDICATION: NORMAL
LAB DIRECTOR NAME PROVIDER: NORMAL
LAB DIRECTOR NAME PROVIDER: NORMAL
RECOMMENDATION PATIENT DOC-IMP: NORMAL
RECOMMENDATION PATIENT DOC-IMP: NORMAL
REF LAB TEST METHOD: NORMAL
REF LAB TEST METHOD: NORMAL
SERVICE CMNT-IMP: NORMAL
SERVICE CMNT-IMP: NORMAL
SPECIMEN SOURCE: NORMAL
SPECIMEN SOURCE: NORMAL

## 2025-01-29 ENCOUNTER — PATIENT OUTREACH (OUTPATIENT)
Dept: OBGYN CLINIC | Facility: CLINIC | Age: 35
End: 2025-01-29

## 2025-01-30 ENCOUNTER — TELEPHONE (OUTPATIENT)
Dept: OBGYN CLINIC | Facility: CLINIC | Age: 35
End: 2025-01-30

## 2025-01-30 ENCOUNTER — HOSPITAL ENCOUNTER (EMERGENCY)
Facility: HOSPITAL | Age: 35
Discharge: HOME | End: 2025-01-30
Attending: OBSTETRICS & GYNECOLOGY | Admitting: OBSTETRICS & GYNECOLOGY
Payer: MEDICARE

## 2025-01-30 VITALS
HEART RATE: 94 BPM | TEMPERATURE: 98.5 F | DIASTOLIC BLOOD PRESSURE: 62 MMHG | OXYGEN SATURATION: 98 % | RESPIRATION RATE: 18 BRPM | SYSTOLIC BLOOD PRESSURE: 124 MMHG

## 2025-01-30 PROBLEM — N93.9 VAGINAL SPOTTING: Status: RESOLVED | Noted: 2025-01-30 | Resolved: 2025-01-30

## 2025-01-30 PROBLEM — Z3A.25 25 WEEKS GESTATION OF PREGNANCY: Status: ACTIVE | Noted: 2025-01-14

## 2025-01-30 PROBLEM — N93.9 VAGINAL SPOTTING: Status: ACTIVE | Noted: 2025-01-30

## 2025-01-30 PROCEDURE — NC001 PR NO CHARGE: Performed by: OBSTETRICS & GYNECOLOGY

## 2025-01-30 PROCEDURE — 99213 OFFICE O/P EST LOW 20 MIN: CPT

## 2025-01-30 NOTE — DISCHARGE INSTR - AVS FIRST PAGE
Vaginal spotting is normal after today's examination.  Please call or return if you experience a gush of watery fluid, heavy vaginal bleeding, abdominal pain or cramping, decreased fetal movement.  Please follow-up with your routinely scheduled prenatal appointments.

## 2025-01-30 NOTE — PROGRESS NOTES
L&D Triage Note - OB/GYN  Christine Thomas 34 y.o. female MRN: [id number]  Unit/Bed#:  311-01 Encounter: [id number]      ASSESSMENT:    Christine Thomas is a 34 y.o.  at 25w0d who was evaluated today in triage due to cramping, low back pain, and vaginal bleeding after sex. Cervical length wnl, SVE 0/0/-5, reassuring speculum exam with no pooling or bleeding or lacerations. Reassuring work up, the patient does not appear to be in  labor and it is safe to discharge home.     PLAN:    1) Vag Bleeding likely due to local cervical trauma. Self-resolving.  - Speculum exam: no abnormal discharge, no pooling, no bleeding  - Cervical length via TVUS: 4cm  - SVE: 0/0/-5    2) 25 weeks gestation of pregnancy  - Continue routine prenatal care  - Discharge from OB triage with  labor precautions    - Reviewed rupture of membranes, false vs true labor, decreased fetal movement, and vaginal bleeding   - Pt to call provider with any concerns and follow up at her next scheduled prenatal appointment    - Case discussed with Dr. Garcia    SUBJECTIVE:    Christine Thomas 34 y.o.  at 25w0d with an Estimated Date of Delivery: 5/15/25 presenting with abdominal pain. She states she was having sex today around noon when she felt wet and wet to the bathroom to check. She noticed blood on the sheets and in the bathroom. She also states that she had some abdominal cramps that has been resolving and low back pain since this incident. Denies any leakage of clear fluid or gushing, denies and urinary symptoms like itching or burning, denies foul smell. States she feels good fetal movement. Patient states that blood has been improving and she is only minimally spotting now. Denies and CP/SOB/HA.     Her past obstetrical history is significant for 3 prior vaginal deliveries and a  birth at 33 weeks. This pregnancy has been notable for gestational diabetes managed with diet..    Contractions: mild cramps and low back pain but  no contractions  Leakage of fluid: Denies  Vaginal Bleeding: as above   Fetal movement: present    OBJECTIVE:    Vitals:    01/30/25 1639   BP: 124/62   Pulse: 94   Resp: 18   Temp: 98.5 °F (36.9 °C)   SpO2: 98%       ROS:  Constitutional: Negative  Respiratory: Negative  Cardiovascular: Negative    Gastrointestinal: Negative    General Physical Exam:  General: Well appearing, no distress  Respiratory: Unlabored breathing  Cardiovascular: Regular rate.  Abdomen: Soft, gravid, nontender  Fundal Height: Appropriate for gestational age.  Extremities: Warm and well perfused.  Non tender.      Fetal monitoring:  Fetal heart rate: Baseline Rate (FHR): 145 bpm  Variability: Moderate  Accelerations: 10 x 10 (<32 weeks)  Decelerations: None  [address]: Contraction Frequency (minutes): x1  Contraction Duration (seconds): 50      Cervical Exam  Speculum: Cervical os is closed. No abnormal discharge, no bleeding, no lesions, no pooling      SVE: 0/0/-5      Imaging:       TVUS   - Cervical length    - 3.98 cm    - 4.45 cm            Dameon Bartlett DO  1/30/2025  6:44 PM

## 2025-01-30 NOTE — PROGRESS NOTES
ISRAEL FERNANDEZ outreached pt today to f/u on housing concerns, pt still at risk for foreclosure, she has called 2-1-1 but they were not able to help because she is not currently street homeless, she is currently in communication with Mayor Alexis Plummer in [place] about the issue. ISRAEL FERNANDEZ let her know that funding for the [place] [employer] is open and emailed her the link to their website.

## 2025-02-06 ENCOUNTER — ROUTINE PRENATAL (OUTPATIENT)
Dept: OBGYN CLINIC | Facility: CLINIC | Age: 35
End: 2025-02-06

## 2025-02-06 VITALS — DIASTOLIC BLOOD PRESSURE: 80 MMHG | WEIGHT: 120 LBS | SYSTOLIC BLOOD PRESSURE: 124 MMHG | BODY MASS INDEX: 20.6 KG/M2

## 2025-02-06 DIAGNOSIS — Z78.9 NOT IMMUNE TO HEPATITIS B VIRUS: ICD-10-CM

## 2025-02-06 DIAGNOSIS — O20.9 VAGINAL BLEEDING AFFECTING EARLY PREGNANCY: ICD-10-CM

## 2025-02-06 DIAGNOSIS — Z3A.26 26 WEEKS GESTATION OF PREGNANCY: Primary | ICD-10-CM

## 2025-02-06 DIAGNOSIS — O99.519 ASTHMA DURING PREGNANCY: ICD-10-CM

## 2025-02-06 DIAGNOSIS — Z34.92 PRENATAL CARE IN SECOND TRIMESTER: ICD-10-CM

## 2025-02-06 DIAGNOSIS — G35 MS (MULTIPLE SCLEROSIS) (HCC): ICD-10-CM

## 2025-02-06 DIAGNOSIS — O24.410 DIET CONTROLLED GESTATIONAL DIABETES MELLITUS (GDM) IN SECOND TRIMESTER: ICD-10-CM

## 2025-02-06 DIAGNOSIS — O09.899 HISTORY OF PRETERM DELIVERY, CURRENTLY PREGNANT: ICD-10-CM

## 2025-02-06 DIAGNOSIS — J45.909 ASTHMA DURING PREGNANCY: ICD-10-CM

## 2025-02-06 DIAGNOSIS — Z83.3 FAMILY HISTORY OF DIABETES MELLITUS IN FIRST DEGREE RELATIVE: ICD-10-CM

## 2025-02-06 DIAGNOSIS — N64.89 NIPPLE CRUSTING: ICD-10-CM

## 2025-02-06 PROCEDURE — 99214 OFFICE O/P EST MOD 30 MIN: CPT | Performed by: OBSTETRICS & GYNECOLOGY

## 2025-02-06 RX ORDER — LANOLIN
CREAM (ML) TOPICAL DAILY PRN
Qty: 50 ML | Refills: 2 | Status: SHIPPED | OUTPATIENT
Start: 2025-02-06

## 2025-02-06 NOTE — PATIENT INSTRUCTIONS
WARNING SIGNS DURING PREGNANCY  Call our office at [phone] for any of the followin. Vaginal bleeding  2. Sharp abdominal pain that does not go away  3. Fever (more than 100.4 and is not relieved by Tylenol)  4. Persistent vomiting lasting greater than 24 hours  5. Chest pain   6. Pain or burning when you urinate  7. Severe headache that doesn't resolve with Tylenol  8. Blurred vision or seeing spots in your vision  9. Sudden swelling of your face or hands  10. Redness, swelling or pain in a leg  11. A sudden weight gain in just a few days  12. Decrease in your baby's movement (after 28 weeks or the 6th month of pregnancy)  13. A loss of watery fluid from your vagina - can be a gush, a trickle or continuous wetness  14. After 20 weeks of pregnancy, rhythmic cramping (greater than 4 per hour) or menstrual like low/pelvic pain

## 2025-02-06 NOTE — PROGRESS NOTES
Christine Thoams presents today for routine OB visit at 26w0d.  Blood Pressure: 124/80  Wt=54.4 kg (120 lb); Body mass index is 20.6 kg/m².; TWG=8.165 kg (18 lb)  Fetal Heart Rate: 147;    Abdomen: gravid, soft, non-tender.  She reports nipple dryness.  Denies uterine contractions or persistent cramping.  Denies vaginal bleeding or leaking of fluid.  Reports fetal movement.  Scheduled for ultrasound .  Reviewed common discomforts of pregnancy in second trimester and warning signs.  Advised to continue medications and return in 2 weeks.      Current Outpatient Medications   Medication Instructions    albuterol (Ventolin HFA) 90 mcg/act inhaler 2 puffs, Inhalation, Every 6 hours PRN    Blood Glucose Monitoring Suppl (OneTouch Verio Reflect) w/Device KIT Dispense 1 kit per insurance formulary. GDM    doxylamine (UNISOM) 12.5 mg, Oral, Daily at bedtime PRN    glucose blood (OneTouch Verio) test strip Test 4 times a day. GDM.    Lancets (OneTouch Delica Plus [id number]) MISC Use 4 a day. GDM.    Medela Tender Care Lanolin CREA Apply externally, Daily PRN    Prenatal Vit-Fe Fumarate-FA (Prenatal Plus Vitamin/Mineral) 27-1 MG TABS 1 tablet, Oral, Daily    pyridoxine (B-6) 25 mg, Oral, Daily         G4 Problems (from 10/09/24 to present)       Problem Noted Diagnosed Resolved    26 weeks gestation of pregnancy 1/14/2025 by DANIEL Gonzales  No    Asthma during pregnancy 1/9/2025 by DANIEL Babcock  No    Overview Signed 1/9/2025  4:19 PM by DANIEL Babcock   Albuterol prn         Not immune to HBV 11/21/2024 by DANIEL Babcock  No    Overview Signed 11/21/2024 10:55 AM by DANIEL Babcock   Needs HBV vaccine booster         Family hx DM 11/16/2024 by DANIEL Babcock  No    Overview Addendum 11/21/2024  9:37 AM by DANIEL Babcock   Pt's father w/DM  Needs early GDM screen - done ABNORMAL         History of PTD 11/16/2024 by DANIEL Babcock  No    Overview  Addendum 1/9/2025  5:37 PM by DANIEL Babcock   Needs MFM consultation - done 11/21/24  Serial CL measurements - done WNL         Vaginal bleeding affecting early pregnancy 11/15/2024 by DANIEL Babcock  No    Overview Addendum 1/9/2025  5:32 PM by DANIEL Babcock   Needs MFM consultation/ultrasound - done 11/21/24 WNL         Multiple Sclerosis 1/29/2020 by Eryn Schuler MD  No    Overview Addendum 11/22/2024 12:03 PM by DANIEL Babcock   Needs MFM consultation - done 11/21/24

## 2025-02-18 ENCOUNTER — TELEPHONE (OUTPATIENT)
Dept: PERINATAL CARE | Facility: OTHER | Age: 35
End: 2025-02-18

## 2025-02-18 ENCOUNTER — PATIENT OUTREACH (OUTPATIENT)
Dept: OBGYN CLINIC | Facility: CLINIC | Age: 35
End: 2025-02-18

## 2025-02-18 NOTE — TELEPHONE ENCOUNTER
Left voicemail regarding scheduling Christine for Class 2 (GDM) , virtual appointment on 2/25 @ 1 PM with Cristina BLOUNT  I informed patient if she has to reschedule, please call our Maternal Fetal Medicine office at .  Also, I also informed patient, Cristina will be sending her a text to [phone] with a link to join appointment.

## 2025-02-20 ENCOUNTER — APPOINTMENT (OUTPATIENT)
Dept: LAB | Facility: HOSPITAL | Age: 35
End: 2025-02-20
Payer: MEDICARE

## 2025-02-20 ENCOUNTER — ULTRASOUND (OUTPATIENT)
Dept: PERINATAL CARE | Facility: OTHER | Age: 35
End: 2025-02-20
Payer: MEDICARE

## 2025-02-20 ENCOUNTER — ROUTINE PRENATAL (OUTPATIENT)
Dept: OBGYN CLINIC | Facility: CLINIC | Age: 35
End: 2025-02-20

## 2025-02-20 VITALS
BODY MASS INDEX: 21.1 KG/M2 | SYSTOLIC BLOOD PRESSURE: 132 MMHG | WEIGHT: 123.6 LBS | HEART RATE: 94 BPM | DIASTOLIC BLOOD PRESSURE: 72 MMHG | HEIGHT: 64 IN

## 2025-02-20 VITALS
SYSTOLIC BLOOD PRESSURE: 110 MMHG | DIASTOLIC BLOOD PRESSURE: 60 MMHG | BODY MASS INDEX: 20.76 KG/M2 | WEIGHT: 121.6 LBS | HEIGHT: 64 IN

## 2025-02-20 DIAGNOSIS — Z3A.28 28 WEEKS GESTATION OF PREGNANCY: ICD-10-CM

## 2025-02-20 DIAGNOSIS — Z36.89 ENCOUNTER FOR ULTRASOUND TO ASSESS FETAL GROWTH: ICD-10-CM

## 2025-02-20 DIAGNOSIS — G35 MS (MULTIPLE SCLEROSIS) (HCC): ICD-10-CM

## 2025-02-20 DIAGNOSIS — Z34.93 PRENATAL CARE IN THIRD TRIMESTER: ICD-10-CM

## 2025-02-20 DIAGNOSIS — Z34.93 PRENATAL CARE IN THIRD TRIMESTER: Primary | ICD-10-CM

## 2025-02-20 DIAGNOSIS — Z23 ENCOUNTER FOR IMMUNIZATION: ICD-10-CM

## 2025-02-20 DIAGNOSIS — Z13.220 SCREENING, LIPID: ICD-10-CM

## 2025-02-20 DIAGNOSIS — Z13.29 THYROID DISORDER SCREEN: ICD-10-CM

## 2025-02-20 DIAGNOSIS — O24.410 DIET CONTROLLED GESTATIONAL DIABETES MELLITUS (GDM) IN THIRD TRIMESTER: Primary | ICD-10-CM

## 2025-02-20 DIAGNOSIS — O09.899 HISTORY OF PRETERM DELIVERY, CURRENTLY PREGNANT: ICD-10-CM

## 2025-02-20 LAB
CHOLEST SERPL-MCNC: 246 MG/DL (ref ?–200)
ERYTHROCYTE [DISTWIDTH] IN BLOOD BY AUTOMATED COUNT: 12.8 % (ref 11.6–15.1)
EST. AVERAGE GLUCOSE BLD GHB EST-MCNC: 103 MG/DL
HBA1C MFR BLD: 5.2 %
HCT VFR BLD AUTO: 34.1 % (ref 34.8–46.1)
HDLC SERPL-MCNC: 62 MG/DL
HGB BLD-MCNC: 11 G/DL (ref 11.5–15.4)
LDLC SERPL CALC-MCNC: 132 MG/DL (ref 0–100)
MCH RBC QN AUTO: 31.4 PG (ref 26.8–34.3)
MCHC RBC AUTO-ENTMCNC: 32.3 G/DL (ref 31.4–37.4)
MCV RBC AUTO: 97 FL (ref 82–98)
PLATELET # BLD AUTO: 295 THOUSANDS/UL (ref 149–390)
PMV BLD AUTO: 9.8 FL (ref 8.9–12.7)
RBC # BLD AUTO: 3.5 MILLION/UL (ref 3.81–5.12)
TRIGL SERPL-MCNC: 259 MG/DL (ref ?–150)
TSH SERPL DL<=0.05 MIU/L-ACNC: 1.07 UIU/ML (ref 0.45–4.5)
WBC # BLD AUTO: 10.68 THOUSAND/UL (ref 4.31–10.16)

## 2025-02-20 PROCEDURE — 76816 OB US FOLLOW-UP PER FETUS: CPT | Performed by: OBSTETRICS & GYNECOLOGY

## 2025-02-20 PROCEDURE — 83036 HEMOGLOBIN GLYCOSYLATED A1C: CPT

## 2025-02-20 PROCEDURE — 99213 OFFICE O/P EST LOW 20 MIN: CPT | Performed by: NURSE PRACTITIONER

## 2025-02-20 PROCEDURE — 86780 TREPONEMA PALLIDUM: CPT

## 2025-02-20 PROCEDURE — 99213 OFFICE O/P EST LOW 20 MIN: CPT | Performed by: OBSTETRICS & GYNECOLOGY

## 2025-02-20 PROCEDURE — 80061 LIPID PANEL: CPT

## 2025-02-20 PROCEDURE — 90715 TDAP VACCINE 7 YRS/> IM: CPT | Performed by: NURSE PRACTITIONER

## 2025-02-20 PROCEDURE — 84443 ASSAY THYROID STIM HORMONE: CPT

## 2025-02-20 PROCEDURE — 85027 COMPLETE CBC AUTOMATED: CPT

## 2025-02-20 PROCEDURE — 36415 COLL VENOUS BLD VENIPUNCTURE: CPT

## 2025-02-20 PROCEDURE — 90471 IMMUNIZATION ADMIN: CPT | Performed by: NURSE PRACTITIONER

## 2025-02-20 NOTE — PATIENT INSTRUCTIONS
Thank you for your confidence in our team.   We appreciate you and welcome your feedback.   If you receive a survey from us, please take a few moments to let us know how we are doing.   Sincerely,  DANIEL Babcock       The Third Trimester  (28-42 weeks)  YOUR BABY   * your baby sucks its thumb now!   * your baby can hear voices and respond to touch…..so talk to him or her!!   * your baby’s brain grows and develops most in the last 2 months of pregnancy   * baby’s head and bones are soft and flexible so they can fit through the birth canal   * baby’s movements change towards the end of pregnancy because there is less room for kicking and stretching in your belly   * baby’s lungs are not fully developed and completely ready to breathe on their own until the last 3-4 weeks before your due date    YOUR BODY   * your belly is growing a lot now   * it may become more difficult to sleep well at night or to be as active as you usually are   * you may sweat more than usual   * you will become more off-balance……be careful not to fall!!   * you may develop hemorrhoids (which can be painful and make it difficult to sit down)   * the last two months of pregnancy can become very uncomfortable……with backaches, headaches, and heartburn   * you can start to have contractions…….as long as they are irregular and less than 5 per hour, this is a normal part of your body getting ready to have a baby   * your cervix may start to thin out and open up……to get ready for delivery   * you may find yourself needing to “pee” very often…….because baby is pressing on your bladder so much   * you may get out of breathe more quickly than usual      FETAL KICK COUNTS    In the third trimester (after 28 weeks gestation) you should be performing fetal kick counts every day.  Your baby should move at least 10 times in 2 hours during an active time, once a day.    Choose atime of day when your baby is most active.  Try to do this around the  same time each day.  Get into a comfortable position and then write down the time your baby first moves.  Count each movement until the baby moves 10 times.  These movements include kicks, punches, nudges, flutters, or rolls.  This can take anywhere from 5 minutes to 2 hours.  Write down the time you feel the baby's 10th movement.    If 2 hours has passed and your baby has not moved at least 10 times, you should CALL THE OFFICE RIGHT AWAY.  [phone].          PREMATURE LABOR     When to call [phone]:  * I need to call immediately if I have even a small amount of LIQUID leaking from my vagina, with or without contractions.   * I need to call if I am BLEEDING from my vagina.   * I need to call if I am feeling CRAMPING that continues after drinking 2-3 glasses of water and lying down on my side for one hour and that feels like I am having a period.   * I need to call if I feel CONTRACTIONS  more than 4 times in an hour that feels like the baby is “balling up” even after I try drinking 2-3 glasses of water and lying down on my side for an hour.   * I need to call if I notice a change in my vaginal DISCHARGE.   * I need to call if I am feeling PELVIC PRESSURE  that feels like the baby is pushing down into my vagina and lasts more than an hour.   * I need to call if I have LOW BACKACHE which is new and near my tailbone.  It may either come and go several times during an hour or stay there constantly.          PRE-ECLAMPSIA     What is it?   Pre-eclampsia is a serious disease that can occur during pregnancy related to high blood pressures.  It can happen to any woman.     Why should I care?   Women who develop pre-eclampsia have serious risks which can include seizures, stroke, organ damage, premature birth of their baby.  In the very worst cases, it can cause death of the mother and/or their baby.     What should I pay attention to?   Signs and symptoms of pre-eclampsia can include:   * Severe swelling of face or  hands    * A headache that will not go away even after you have taken Tylenol   * Seeing spots or changes in eyesight    * Pain in the upper abdomen or shoulder    * New nausea and vomiting (in the second half of pregnancy)    * Sudden weight gain    * Difficulty breathing     What should I do?   If you experience any of the above symptoms of pre-eclampsia, contact your OB provider.  Finding pre-eclampsia early is important for you and your baby.  Call us at [phone]..      BREASTFEEDING     BENEFITS FOR BABIES   * stronger immune systems (less allergies, eczema, asthma, and childhood cancers)   * less diarrhea and constipation or other GI diseases   * fewer colds and ear infections   * better vision and teeth (fewer cavities)   * improves IQ   * lower rates of diabetes and obesity in childhood     BENEFITS FOR MOMS   * promotes faster weight loss after delivery   * lower risk for postpartum depression   * lower risk for breast, uterine, and ovarian cancers   * lower risk for osteoporosis developing with age   * easier than formula - is always right with you, clean, and the right temperature   * less expensive than formula……it’s FREE !!!!     KEYS TO SUCCESSFUL BREASTFEEDING   * keep baby skin-to-skin until after first feeding event   * keep baby in your room with you during your hospital stay after delivery   * avoid any bottle feedings (unless medically necessary)   * limit the use of pacifiers and swaddling   * ask for help if you are having any issues……lactation consultants (who specialize in breastfeeding) are available to help you   * a healthy diet for mom……eating a variety of foods and portions in moderation    THINGS YOU SHOULD KNOW ABOUT BREASTFEEDING   * most medications are considered compatible with breastfeeding by the American Academy of Pediatrics, but you should check with your health care provider or lactation consultant prior to taking a new medication……just to be sure it is safe   * alcohol  (beer, wine, liquor) can be passed from mother to baby through breast milk……an occasional, social drink is deemed acceptable by the American Academy of Pediatrics…..more than that should be avoided   * breastfeeding is NOT an effective method of birth control   * nicotine (in cigarettes) can pass from mother to baby through breast milk…..however, for mothers who smoke, it is still healthier to breastfeed than use formula   * caffeine should be limited to 1-3 cups per day……includes coffee, soda, energy drinks         PERINEAL / VAGINAL MASSAGE    What can I do now to decrease my chances of tearing during delivery?  Massaging around the vaginal opening by you (or your partner), either antepartum (before birth) or during the second stage of labor, can reduce the likelihood of perineal tearing during childbirth.  Likewise, the use of warm packs held on the perineum during the pushing stage of labor can reduce the severity of your tear.  This will happen during the pushing stage of labor.  At home, you can also help reduce the chances of injury that may occur during the birth of your child through perineal massage.    When should I do this?  Starting around or shortly after 34 weeks of pregnancy, you or your partner should provide 5-10 minutes of vaginal massage 1-4 times per week.    How?  Use either almond, coconut, or olive oil and water mixture on 1 or 2 fingers (depending on comfort).  Insert finger(s) 3-5cm into the vagina.  Apply sweeping downward/sideward pressure from 3 to 9 o'clock for 5-10 minutes, 1-4 times per week.          WARNING SIGNS DURING PREGNANCY  Call our office at [phone] if you experience any of the followin. Vaginal bleeding  2. Sharp abdominal pain that does not go away  3. Fever (more than 100.4 and is not relieved by Tylenol)  4. Persistent vomiting lasting greater than 24 hours  5. Chest pain   6. Pain or burning when you urinate  7. Severe headache that doesn't resolve with  Tylenol  8. Blurred vision or seeing spots in your vision  9. Sudden swelling of your face or hands  10. Redness, swelling or pain in a leg  11. A sudden weight gain in just a few days  12. Decrease in your baby's movement (after 28 weeks or the 6th month of pregnancy)  13. A loss of watery fluid from your vagina - can be a gush, a trickle or continuous wetness  14. After 20 weeks of pregnancy, rhythmic cramping (greater than 4 per hour) or menstrual like low/pelvic pain          VACCINES IN PREGNANCY    TDAP  Whooping cough (or pertussis) can be serious for anyone, but for your , it can be life-threatning.  Up to 20 babies die each year in the [place] Due to whooping cough.  About half of babies younger than 1 year old who get whooping cough need treatment in the hospital.  The younger the baby is when he or she gets whooping cough, the more likely he or she will need to be treated in a hospital.  When you receive the whooping cough vaccine (Tdap) during your pregnancy, your body will create protective antibodies and pass some of them to your baby before birth.  These antibodies can help protect your baby from getting whooping cough until they are old enough to be vaccinated themselves (usually around 6 months of age).    INFLUENZA  Changes in your immune, heart, and lung functions during pregnancy make you more likely to get seriously ill from the flu.  Catching the flu also increases your chances for serious problems for your developing baby, including premature labor and delivery.  It is recommended that all women who are pregnant during flu season should receive an influenza vaccine.

## 2025-02-20 NOTE — PROGRESS NOTES
Christine Thomas presents today for routine OB visit at 28w0d.  Blood Pressure: 110/60  Wt=55.2 kg (121 lb 9.6 oz); Body mass index is 20.87 kg/m².; TWG=8.891 kg (19 lb 9.6 oz)  Fetal Heart Rate: 140; Fundal Height (cm): 29 cm  Abdomen: gravid, soft, non-tender.  She reports asthma well-controlled and has not required Albuterol since prior to pregnancy.  Reports rare mild uterine contractions.  Denies vaginal bleeding or leaking of fluid.  Reports adequate fetal movement of at least 10 movements in 2 hours once daily.  Scheduled for ultrasound later today.  Reviewed premature labor precautions and fetal kick counts.  Advised to continue medications and return in 2 weeks.      Current Outpatient Medications   Medication Instructions    albuterol (Ventolin HFA) 90 mcg/act inhaler 2 puffs, Inhalation, Every 6 hours PRN    Prenatal Vit-Fe Fumarate-FA (Prenatal Plus Vitamin/Mineral) 27-1 MG TABS 1 tablet, Oral, Daily       Laboratory workup: initial OB labs (done 24); 28 week labs (ordered 25)    Genetic Screening: NIPS negative, MSAFP negative    Vaccinations: influenza (given 11/15/24); Tdap (given 25); RSV (not indicated outside RSV season); COVID-19 (recommended 11/15/24 - patient desires to obtain)    Postpartum contraception: desires Depoprovera    Fetal Ultrasounds:  24 (7w0d) EDC confirmed  24 (13w4d) WNL  24 (15w0d) no previa, luz elena WNL w/missed views  24 (15w4d) CL=3.98cm  24 (18w0d) CL=3.49cm  25 (21w0d) CL=4.6cm, no previa, luz elena WNL & complete  25 (21w5d) CL=3.5cm, fetal echo WNL  25 (23w1d) CL=3.53cm      G4 Problems (from 10/09/24 to present)       Problem Noted Diagnosed Resolved    GDMA1 2025 by DANIEL Gonzales  No    Overview Addendum 2025 11:40 AM by DANIEL Babcock   Dx @15 weeks  Needs consultation w/ DM Management program - done 12/3/24  Monitor blood sugars 4x/day and report weekly to DM program - in  progress  Needs baseline PEC labs - done WNL  Needs hgbA1C - done   1/10/25=5.2   2/20/25=ordered  Needs fetal echo - done WNL  Serial growth scans - in progress         Asthma during pregnancy 1/9/2025 by DANIEL Babcock  No    Overview Signed 1/9/2025  4:19 PM by DANIEL Babcock   Albuterol prn         Not immune to HBV 11/21/2024 by DANIEL Babcock  No    Overview Signed 11/21/2024 10:55 AM by DANIEL Babcock   Needs HBV vaccine booster         Family hx DM 11/16/2024 by DANIEL Babcock  No    Overview Addendum 11/21/2024  9:37 AM by DANIEL Babcock   Pt's father w/DM  Needs early GDM screen - done ABNORMAL         History of PTD 11/16/2024 by DANIEL Babcock  No    Overview Addendum 1/9/2025  5:37 PM by DANIEL Babcock   Needs MFM consultation - done 11/21/24  Serial CL measurements - done WNL         Multiple Sclerosis 1/29/2020 by Eryn Schuler MD  No    Overview Addendum 11/22/2024 12:03 PM by DANIEL Babcock   Needs MFM consultation - done 11/21/24

## 2025-02-20 NOTE — PROGRESS NOTES
"[facility]: Christine Thomas was seen today for fetal growth assessment ultrasound.  See ultrasound report under \"OB Procedures\" tab.   Please don't hesitate to contact our office with any concerns or questions.  -Gena Castillo MD    "

## 2025-02-20 NOTE — PROGRESS NOTES
Tdap given to patient in left arm on 2/20/2025. Patient left without being given the 28 week teaching, will have to be given at next prenatal visit.    NDC: 88610-138-53  LOT: [id number]  EXP: 12/31/2026

## 2025-02-21 LAB — TREPONEMA PALLIDUM IGG+IGM AB [PRESENCE] IN SERUM OR PLASMA BY IMMUNOASSAY: NORMAL

## 2025-02-24 ENCOUNTER — PATIENT OUTREACH (OUTPATIENT)
Dept: OBGYN CLINIC | Facility: CLINIC | Age: 35
End: 2025-02-24

## 2025-02-24 ENCOUNTER — RESULTS FOLLOW-UP (OUTPATIENT)
Dept: FAMILY MEDICINE CLINIC | Facility: CLINIC | Age: 35
End: 2025-02-24

## 2025-02-24 NOTE — TELEPHONE ENCOUNTER
Called and left message for patient to return call to the office to schedule appointment to discuss results. Appointment may be virtual or in person.

## 2025-02-24 NOTE — RESULT ENCOUNTER NOTE
To Call patient and schedule appointment to discuss results of the blood work and proper management virtual versus in office

## 2025-02-24 NOTE — PROGRESS NOTES
ISRAEL FERNANDEZ outreached pt by phone today to check on the status of her housing situation. Pt reports that she did apply for assistance through the [place] [employer] and they were able to assist with all of the mortgage back owed and march's mortgage as well as utilities. Pt reports her next mortgage payment is due in April and it is like she is starting fresh with payments. Pt reports [employer] is local and will be assisting with making payments on the mortgage.     ISRAEL FERNANDEZ encouraged pt to outreach as needed for any additional resources.

## 2025-02-24 NOTE — TELEPHONE ENCOUNTER
----- Message from Bandar Maier MD sent at 2/24/2025  8:56 AM EST -----  To Call patient and schedule appointment to discuss results of the blood work and proper management virtual versus in office

## 2025-03-08 ENCOUNTER — TELEPHONE (OUTPATIENT)
Facility: HOSPITAL | Age: 35
End: 2025-03-08

## 2025-03-08 NOTE — TELEPHONE ENCOUNTER
Called Christine,   Blood sugars are under good control.  Wanted to let her know that she only needs to monitor her blood sugars 4 times a day which is usually fasting blood sugars and either 1 or 2-hour postprandials daily.  Fasting should be 95 or less and 1 hours 140 or less and 2 hours 120 or less. She does not need to monitor premeal blood sugars.  I also wanted to go over her chart to find out why she was mad at [place] and does not want to hear from our diabetic educators.  In review of the chart there were times when she did not have Internet service and it sounds like she was frustrated getting messages about requesting blood sugars.  With the number of gestational diabetics that our office follows is difficult to know all the reasons why patients do not report their blood sugars on a weekly basis so I was trying to call to apologize that we made her feel frustrated.  It sounds like she is back on the Internet now so while we review sugars weekly we can limit our messages to those that may require a change in management if she wants, like change in diet or if she needs to start insulin.  I left my phone number so that she can call back and let me know what she wants us to do.    Karishma Hanson MD

## 2025-03-10 PROBLEM — Z3A.30 30 WEEKS GESTATION OF PREGNANCY: Status: ACTIVE | Noted: 2025-01-14

## 2025-03-11 ENCOUNTER — ROUTINE PRENATAL (OUTPATIENT)
Dept: OBGYN CLINIC | Facility: CLINIC | Age: 35
End: 2025-03-11

## 2025-03-11 VITALS — SYSTOLIC BLOOD PRESSURE: 122 MMHG | DIASTOLIC BLOOD PRESSURE: 70 MMHG | BODY MASS INDEX: 20.8 KG/M2 | WEIGHT: 121.2 LBS

## 2025-03-11 DIAGNOSIS — G35 MS (MULTIPLE SCLEROSIS) (HCC): ICD-10-CM

## 2025-03-11 DIAGNOSIS — O09.899 HISTORY OF PRETERM DELIVERY, CURRENTLY PREGNANT: ICD-10-CM

## 2025-03-11 DIAGNOSIS — O24.410 GDM, CLASS A1: ICD-10-CM

## 2025-03-11 DIAGNOSIS — O99.519 ASTHMA DURING PREGNANCY: Primary | ICD-10-CM

## 2025-03-11 DIAGNOSIS — Z78.9 NOT IMMUNE TO HEPATITIS B VIRUS: ICD-10-CM

## 2025-03-11 DIAGNOSIS — J45.909 ASTHMA DURING PREGNANCY: Primary | ICD-10-CM

## 2025-03-11 DIAGNOSIS — Z3A.30 30 WEEKS GESTATION OF PREGNANCY: ICD-10-CM

## 2025-03-11 PROCEDURE — 99214 OFFICE O/P EST MOD 30 MIN: CPT | Performed by: OBSTETRICS & GYNECOLOGY

## 2025-03-11 NOTE — PATIENT INSTRUCTIONS
Robitussin for cough  Saline nasal spray for congestion  Tylenol for pain  Benadryl for itchy/runny nose  See your PCP for Covid/flu testing & follow up

## 2025-03-11 NOTE — PROGRESS NOTES
OB/GYN Prenatal Visit    SUBJECTIVE:  Christine Thomas is a 34 y.o.  at 30w5d here for prenatal visit.  She has no obstetric complaints and denies pelvic pain, cramping/contractions, vaginal bleeding, loss of fluid, or decreased fetal movement.  She is not taking her LDASA as prescribed (was advised to discontinue by another provider per patient).  She has had 2 days of cough, nasal congestion, and several episodes of post-tussive emesis.  She has used her albuterol prn.  She was encouraged to use OTC meds as needed including Robitussin, Benadryl, Tylenol, & saline nasal spray.  She was encouraged to see her PCP for this as well as Covid/flu testing.  She plans to get her Covid booster when she's no longer sick.  Delivery consent was signed following standard counseling.    OBJECTIVE:  Vitals:    25 1110   BP: 122/70     FHT: 135 bpm  Fundal height: 29 cm    Physical Exam  Constitutional:       General: She is not in acute distress.     Appearance: Normal appearance. She is not ill-appearing.   HENT:      Mouth/Throat:      Mouth: Mucous membranes are moist.   Eyes:      Extraocular Movements: Extraocular movements intact.   Cardiovascular:      Rate and Rhythm: Normal rate.   Pulmonary:      Effort: Pulmonary effort is normal.   Abdominal:      General: There is no distension.      Palpations: Abdomen is soft.      Tenderness: There is no abdominal tenderness. There is no guarding.      Comments: Supraumbilical hernia measuring 2-3 cm with mild tenderness with palpation or strain only   Musculoskeletal:         General: No swelling or tenderness.      Right lower leg: No edema.      Left lower leg: No edema.   Neurological:      General: No focal deficit present.      Mental Status: She is alert.   Skin:     General: Skin is warm and dry.      Coloration: Skin is not jaundiced or pale.   Psychiatric:         Mood and Affect: Mood normal.         Behavior: Behavior normal.         Thought Content: Thought  content normal.         Judgment: Judgment normal.       ASSESSMENT / PLAN:    Problem List          Respiratory    Asthma during pregnancy    Overview   Albuterol prn            Endocrine    GDMA1    Overview   Diagnosed at 15w  Following with diabetes education  Monitoring home sugars & reviewing weekly with diabetes education  Baseline preeclampsia labs wnl  Fetal echo wnl  HbA1c:  - 1/10/25: 5.2  - 25: 5.2    Continue following with diabetes education to review sugars  Undergoing serial growth scans            Nervous and Auditory    Multiple Sclerosis    Overview   Diagnosed about 4 years ago with manifestations of migraines, history of seizures when she was younger and leg weakness.  S/p MFM consult  Not currently on medications            Obstetrics/Gynecology    History of PTD    Overview   G2: 3/5/2014, 32-week  of a 3 pound 4 ounce female infant.  She went  into spontaneous  labor and delivered precipitously.  Her daughter  remained in the NICU for 1-1/2 months.    Completed cervical length screening         30 weeks gestation of pregnancy    Overview   Overview:  Labs  Pap smear NILM, HPV neg 2024; GC/CT neg  Prenatal panel notable for hep B nonimmunity & GDM  28w labs wnl  Vaccines:  Flu vaccine: 2024  Covid vaccine: deferring until she's not sick  Tdap vaccine: 2025  Genetic screening:  MSAFP screen neg  NIPS low risk  Contraception: Depo Provera  Feeding plan: breast  Birth plan:  with epidural  Delivery consent: signed 3/11/25  Ultrasounds:  Fetal echo wnl  Level II anatomy scan: normal  Level I US on 25 at 28w0d: growth wnl, vertex, anterior placenta  Recommend q4w growth scans         Not immune to HBV    Overview   Recommend vaccination through PCP while antepartum         Body mass index (BMI) 19.9 or less, adult    Overview   Pre-gravid BMI 17  Current BMI 21  TWG 9.798 kg (21 lb 9.6 oz)    Recommended weight gain 28-40 lbs                Lashay Morgan,  MD  3/11/2025  11:47 AM

## 2025-03-14 LAB
DME PARACHUTE DELIVERY DATE REQUESTED: NORMAL
DME PARACHUTE ITEM DESCRIPTION: NORMAL
DME PARACHUTE ORDER STATUS: NORMAL
DME PARACHUTE SUPPLIER NAME: NORMAL
DME PARACHUTE SUPPLIER PHONE: NORMAL

## 2025-03-20 ENCOUNTER — HOSPITAL ENCOUNTER (EMERGENCY)
Facility: HOSPITAL | Age: 35
End: 2025-03-20
Attending: EMERGENCY MEDICINE
Payer: MEDICARE

## 2025-03-20 ENCOUNTER — HOSPITAL ENCOUNTER (OUTPATIENT)
Facility: HOSPITAL | Age: 35
Discharge: HOME | End: 2025-03-20
Attending: OBSTETRICS & GYNECOLOGY | Admitting: OBSTETRICS & GYNECOLOGY
Payer: MEDICARE

## 2025-03-20 VITALS
SYSTOLIC BLOOD PRESSURE: 113 MMHG | DIASTOLIC BLOOD PRESSURE: 73 MMHG | TEMPERATURE: 97.9 F | HEART RATE: 81 BPM | OXYGEN SATURATION: 97 % | RESPIRATION RATE: 16 BRPM

## 2025-03-20 VITALS
HEART RATE: 107 BPM | WEIGHT: 121.69 LBS | TEMPERATURE: 98.3 F | DIASTOLIC BLOOD PRESSURE: 94 MMHG | SYSTOLIC BLOOD PRESSURE: 140 MMHG | OXYGEN SATURATION: 98 % | RESPIRATION RATE: 20 BRPM | BODY MASS INDEX: 20.89 KG/M2

## 2025-03-20 DIAGNOSIS — M54.50 LEFT LOW BACK PAIN: Primary | ICD-10-CM

## 2025-03-20 DIAGNOSIS — R10.9 ABDOMINAL PAIN IN PREGNANCY, THIRD TRIMESTER: ICD-10-CM

## 2025-03-20 DIAGNOSIS — O99.891 BACK PAIN AFFECTING PREGNANCY IN THIRD TRIMESTER: Primary | ICD-10-CM

## 2025-03-20 DIAGNOSIS — O26.893 ABDOMINAL PAIN IN PREGNANCY, THIRD TRIMESTER: ICD-10-CM

## 2025-03-20 DIAGNOSIS — M54.9 BACK PAIN AFFECTING PREGNANCY IN THIRD TRIMESTER: Primary | ICD-10-CM

## 2025-03-20 PROBLEM — Z3A.32 32 WEEKS GESTATION OF PREGNANCY: Status: ACTIVE | Noted: 2025-01-14

## 2025-03-20 LAB
BACTERIA UR QL AUTO: ABNORMAL /HPF
BILIRUB UR QL STRIP: NEGATIVE
CLARITY UR: CLEAR
COLOR UR: ABNORMAL
GLUCOSE UR STRIP-MCNC: NEGATIVE MG/DL
HGB UR QL STRIP.AUTO: NEGATIVE
KETONES UR STRIP-MCNC: NEGATIVE MG/DL
LEUKOCYTE ESTERASE UR QL STRIP: 100
MUCOUS THREADS UR QL AUTO: ABNORMAL
NITRITE UR QL STRIP: NEGATIVE
NON-SQ EPI CELLS URNS QL MICRO: ABNORMAL /HPF
PH UR STRIP.AUTO: 6.5 [PH]
PROT UR STRIP-MCNC: ABNORMAL MG/DL
RBC #/AREA URNS AUTO: ABNORMAL /HPF
SP GR UR STRIP.AUTO: 1.01 (ref 1–1.04)
UROBILINOGEN UA: 1 MG/DL
WBC #/AREA URNS AUTO: ABNORMAL /HPF

## 2025-03-20 PROCEDURE — 87086 URINE CULTURE/COLONY COUNT: CPT | Performed by: PHYSICIAN ASSISTANT

## 2025-03-20 PROCEDURE — 81001 URINALYSIS AUTO W/SCOPE: CPT | Performed by: PHYSICIAN ASSISTANT

## 2025-03-20 PROCEDURE — 99213 OFFICE O/P EST LOW 20 MIN: CPT

## 2025-03-20 PROCEDURE — 99283 EMERGENCY DEPT VISIT LOW MDM: CPT

## 2025-03-20 PROCEDURE — 99214 OFFICE O/P EST MOD 30 MIN: CPT | Performed by: OBSTETRICS & GYNECOLOGY

## 2025-03-20 PROCEDURE — 59025 FETAL NON-STRESS TEST: CPT

## 2025-03-20 PROCEDURE — 99284 EMERGENCY DEPT VISIT MOD MDM: CPT | Performed by: PHYSICIAN ASSISTANT

## 2025-03-20 PROCEDURE — 76817 TRANSVAGINAL US OBSTETRIC: CPT

## 2025-03-20 PROCEDURE — NC001 PR NO CHARGE: Performed by: OBSTETRICS & GYNECOLOGY

## 2025-03-20 PROCEDURE — 81003 URINALYSIS AUTO W/O SCOPE: CPT | Performed by: PHYSICIAN ASSISTANT

## 2025-03-20 RX ORDER — ACETAMINOPHEN 325 MG/1
650 TABLET ORAL ONCE
Status: COMPLETED | OUTPATIENT
Start: 2025-03-20 | End: 2025-03-20

## 2025-03-20 RX ORDER — CYCLOBENZAPRINE HCL 10 MG
10 TABLET ORAL ONCE
Status: COMPLETED | OUTPATIENT
Start: 2025-03-20 | End: 2025-03-20

## 2025-03-20 RX ORDER — CYCLOBENZAPRINE HCL 10 MG
10 TABLET ORAL 3 TIMES DAILY PRN
Qty: 21 TABLET | Refills: 0 | Status: SHIPPED | OUTPATIENT
Start: 2025-03-20 | End: 2025-04-02

## 2025-03-20 RX ORDER — LIDOCAINE 50 MG/G
1 PATCH TOPICAL ONCE
Status: DISCONTINUED | OUTPATIENT
Start: 2025-03-20 | End: 2025-03-20 | Stop reason: HOSPADM

## 2025-03-20 RX ADMIN — ACETAMINOPHEN 650 MG: 325 TABLET, FILM COATED ORAL at 09:40

## 2025-03-20 RX ADMIN — LIDOCAINE 5% 1 PATCH: 700 PATCH TOPICAL at 09:40

## 2025-03-20 RX ADMIN — CYCLOBENZAPRINE 10 MG: 10 TABLET, FILM COATED ORAL at 13:20

## 2025-03-20 NOTE — ED NOTES
Report given to [facility] at [facility] L&D     Harper Burrell RN  03/20/25 1013

## 2025-03-20 NOTE — PROGRESS NOTES
L&D Triage Note - OB/GYN  Chrisitne Thomas 34 y.o. female MRN: [id number]  Unit/Bed#:  TRIAGE 3 Encounter: [id number]      ASSESSMENT:    Christine Thomas is a 34 y.o.  at 32w0d with primarily musculoskeletal pain who does not appear to be in  labor, no rupture of membranes. Possible UTI component, equivocal history, little concern for pyelonephritis cause of back pain, treatment will be guided by urine culture results post-discharge.    PLAN:    1) 1:16 PM Patient signs and symptoms appear mostly musculosketal-related. Though dilated to 1cm, patient does not appear to be in  labor. Will treat pain empirically with flexeril, discussed pregnancy medication categories, risks and benefits with patient who is agreeable to treatment as lidocaine patch and tylenol did not help. Will reassess and discharge if improved. If UTI noted on urine culture in near future can treat with antibiotics.  1:46 PM Patient feels she would like to go home at this time, which is reasonable. Would recommend acetaminophen and conservative pain management at home.  -Continue routine prenatal care  Discharge from OB triage with  labor precautions    - Reviewed rupture of membranes, false vs true labor, decreased fetal movement, and vaginal bleeding   - Pt to call provider with any concerns and follow up at her next scheduled prenatal appointment   - Case discussed with Dr. Dangelo    SUBJECTIVE:    Christine Thomas 34 y.o.  at 32w0d with hx multiple sclerosis and family Hx diabetes, an Estimated Date of Delivery: 5/15/25. Presents to rule out labor due to left lower back pain.  Nontraumatic. No leakage of fluid, vaginal bleeding, close contractions or abnormal fetal movements. Patient was recently scooping water out of a bathtub with a bucket a few days ago and twisting to the left repeatedly.  She woke up 3 days ago with left-sided back pain and significant morning nausea vomiting nonbloody nonbilious fluid up to x  3 episodes in a row before stomach calmed down and then started to act up at night again.  She rarely gets pains radiating to the abdomen, stays mostly localized to the back.  Certain movements of the spine provoke her pain.  Over the past 3 days she has been having increased urinary frequency and urgency with a sensation of possibly incomplete emptying, no blood purulence or foul odor to urine, no dysuria.  Does wait until the nighttime to drink most of her fluids which may influence her nighttime awakenings. Tolerating PO. No diarrhea or constipation.  No fevers chills chest pain shortness of breath anxiety palpitations.  Recently had upper respiratory illness which has resolved per patient.  No calf pain or swelling, no other symptoms of concern.  She was seen earlier today at [facility] who transferred her here due to lack of equipment for ruling out labor there. Was noted to have UA concerning for UTI, urine culture pending.    Her current obstetrical history is significant for hep B non-immune, GDMA1 diet controlled A1c 5.2% Feb 2025.      Her past obstetrical history is significant for SAB x1, x2 spontaneous vaginal deliveries.    Contractions: far apart, >10 minutes, not progressing in intensity  Leakage of fluid: no  Vaginal Bleeding: no  Fetal movement: present, meeting kick counts    ROS:  Review of Systems   Constitutional:  Negative for chills and fever.   HENT:  Negative for ear pain and sore throat.    Eyes:  Negative for pain and visual disturbance.   Respiratory:  Negative for cough and shortness of breath.    Cardiovascular:  Negative for chest pain, palpitations and leg swelling.   Gastrointestinal:  Positive for nausea and vomiting. Negative for abdominal pain, constipation and diarrhea.   Genitourinary:  Positive for frequency and urgency. Negative for dysuria, flank pain, hematuria, vaginal bleeding, vaginal discharge and vaginal pain.   Musculoskeletal:  Positive for back pain.  "Negative for arthralgias.   Skin:  Negative for color change and rash.   Neurological:  Negative for dizziness and light-headedness.   All other systems reviewed and are negative.        OBJECTIVE:  Vitals:    03/20/25 1109   BP: 113/73   Pulse: 81   Resp: 16   Temp: 97.9 °F (36.6 °C)   SpO2: 97%       General Physical Exam:  Physical Exam  Vitals reviewed.   HENT:      Head: Normocephalic and atraumatic.      Nose: No rhinorrhea.   Eyes:      General: No scleral icterus.        Right eye: No discharge.         Left eye: No discharge.      Conjunctiva/sclera: Conjunctivae normal.   Cardiovascular:      Rate and Rhythm: Normal rate and regular rhythm.      Pulses: Normal pulses.      Heart sounds: Normal heart sounds. No murmur heard.     No friction rub. No gallop.   Pulmonary:      Effort: Pulmonary effort is normal. No respiratory distress.      Breath sounds: Normal breath sounds. No stridor. No wheezing, rhonchi or rales.   Abdominal:      General: Bowel sounds are normal. There is no distension.      Palpations: There is no mass.      Tenderness: There is no abdominal tenderness. There is no guarding or rebound.      Comments: Fundal height appropriate for gestational age   Genitourinary:     Comments: Exam performed by Dr. Morgan, see \"Cervical Exam\"  Musculoskeletal:         General: Tenderness (primarily left lateral abdominal wall, less over the left flank paraspinals, no significant point tenderness over midline C/T/L-spine) present. No swelling (no calf swelling, negative lenny's sign b/l).   Skin:     General: Skin is warm and dry.      Coloration: Skin is not jaundiced or pale.   Neurological:      Mental Status: She is alert.      Comments: No facial droop, slurred speech or gross focal deficits noted           Cervical Exam  Speculum: Cervical os is visualized, no bleeding or significant discharge, no pooling    SVE: 1 / 50% / -3       FHT:  Variability: Moderate  Accelerations: 15 x 15 or greater, " Present  Decelerations: None  FHR Category: Category I    TOCO:        Lab Results   Component Value Date    WBC 10.68 (H) 02/20/2025    HGB 11.0 (L) 02/20/2025    HCT 34.1 (L) 02/20/2025     02/20/2025     Lab Results   Component Value Date    K 3.1 (L) 11/20/2024     11/20/2024    CO2 23 11/20/2024    BUN 6 11/20/2024    CREATININE 0.50 (L) 11/20/2024    AST 28 11/20/2024    ALT 36 11/20/2024       Urine Dip    - 4-10 WBC, moderate bacteria, urine culture pending    Imaging:         TVUS   - Cervical length    - 3.05cm  JOEY deferred. Nitrazine and ferning deferred as no pooling or clear evidence of rupture of membranes.    Javi Pascual MD,  3/20/2025 12:07 PM

## 2025-03-20 NOTE — PROGRESS NOTES
Patient discharged to home by doctor. Discharge instructions/precautions reviewed with patient. Patient verbalizes understanding & denies questions at this time.

## 2025-03-20 NOTE — ED PROVIDER NOTES
"Time reflects when diagnosis was documented in both MDM as applicable and the Disposition within this note       Time User Action Codes Description Comment    3/20/2025 10:03 AM Mamie Perez Add [M54.50] Left low back pain     3/20/2025 10:03 AM Mamie Perez Add [O26.893,  R10.9] Abdominal pain in pregnancy, third trimester           ED Disposition       ED Disposition   Transfer to Another Facility-In Network    Condition   --    Date/Time   Thu Mar 20, 2025 10:03 AM    Comment   Christine Thomas should be transferred out to [facility] .               Assessment & Plan       Medical Decision Making  34-year-old female currently 32 weeks pregnant presenting for evaluation of left-sided low back pain extending into the abdomen at times with worsening Zachery Avendaño contractions she reports.  She states normal fetal movement, no vaginal gush of fluids or vaginal bleeding, low suspicion for labor however in the absence of the ability to evaluate nonstress testing, tocometry, ferning testing or pH testing to evaluate for  labor discussion for transfer with [facility] OB/GYN.    Differential diagnosis includes is not limited to musculoskeletal left-sided low back pain, exacerbation of chronic low back pain in the setting of reported herniated disc, lower suspicion for however consideration still required for uterine rupture versus placental abruption versus  labor versus other pregnancy related complication.     Tylenol and Lidoderm provided for pain relief and patient transferred to [facility] for OB/GYN consultation and management. Transferred via POV.    All imaging and/or lab testing discussed with patient. Patient and/or family members verbalizes understanding and agrees with plan for admission. Patient is stable for admission.     Portions of the record may have been created with voice recognition software. Occasional wrong word or \"sound a like\" substitutions may " have occurred due to the inherent limitations of voice recognition software. Read the chart carefully and recognize, using context, where substitutions have occurred.      Risk  OTC drugs.  Prescription drug management.             Medications   lidocaine (LIDODERM) 5 % patch 1 patch (1 patch Topical Medication Applied 3/20/25 0940)   acetaminophen (TYLENOL) tablet 650 mg (650 mg Oral Given 3/20/25 0940)       ED Risk Strat Scores                                                History of Present Illness       Chief Complaint   Patient presents with    Back Pain     States she is 32 weeks pregnant. States that she has been having left sided lower back pain for past 2 weeks. States that she has been having joey-gomez contractions recently which have braden making pain worse       Past Medical History:   Diagnosis Date    Asthma     Depression     Hypertension     Migraine     Multiple sclerosis (HCC) 2018    Neurogenic bladder     PTSD (post-traumatic stress disorder)     Seizure (HCC)     possibly r/t MS diagnosis, likely stress-related    Vertigo       Past Surgical History:   Procedure Laterality Date    CLOSED REDUCTION FINGER FRACTURE Left 2008    FL LUMBAR PUNCTURE DIAGNOSTIC  12/05/2019    TONSILLECTOMY Bilateral 1994    TRANSVAGINAL PREGNANCY  12/17/2024    UMBILICAL HERNIA REPAIR  2016      Family History   Problem Relation Age of Onset    Depression Mother     Cancer Mother         thyroid    Thyroid cancer Mother     Diabetes Father     Hypertension Father     Coronary artery disease Paternal Grandmother     Hypertension Paternal Grandfather         TYPE 2    Diabetes Paternal Grandfather     Cancer Paternal Grandfather         pancreatic, lung    Drug abuse Cousin     Breast cancer Neg Hx     Colon cancer Neg Hx     Ovarian cancer Neg Hx       Social History     Tobacco Use    Smoking status: Former     Types: Cigarettes     Passive exposure: Never    Smokeless tobacco: Never   Vaping Use    Vaping status:  Never Used   Substance Use Topics    Alcohol use: Not Currently     Comment: couple times/year, none since pregnant    Drug use: Not Currently      E-Cigarette/Vaping    E-Cigarette Use Never User       E-Cigarette/Vaping Substances    Nicotine No     THC No     CBD No     Flavoring No     Other No     Unknown No       I have reviewed and agree with the history as documented.     Patient is a 34-year-old female -1-1-2 currently at 32 weeks of gestation with an estimated delivery date of 5/15/2025 presenting for evaluation of left-sided low back pain and [place] Avendaño contractions which she reported have been making her back pain worse she reports that over the past 2 weeks she has been having abdominal and back pain.  She reports she recently bent over and had left-sided low back pain worsening which has also been in association with her [place] Avendaño contractions which she believes are causing the left-sided low back pain to worsen.  She denies any change to fetal movement, gush of fluids or vaginal bleeding.  She reports she has been compliant with her prenatal vitamin and has not taken any medication to alleviate her left-sided low back pain.  She denies any urinary symptoms, fevers, chills, hematuria, urinary frequency or bowel symptoms.  She reports a history of herniated disc however does not remember which disc.      Back Pain  Associated symptoms: abdominal pain (Patient reports Zachery Avendaño contractions which are causing her back pain to worsen)    Associated symptoms: no chest pain, no dysuria, no fever and no numbness        Review of Systems   Constitutional:  Negative for chills, fatigue and fever.   HENT:  Negative for congestion, ear pain, rhinorrhea and sore throat.    Eyes:  Negative for redness.   Respiratory:  Negative for chest tightness and shortness of breath.    Cardiovascular:  Negative for chest pain and palpitations.   Gastrointestinal:  Positive for abdominal pain (Patient reports  Zachery Avendaño contractions which are causing her back pain to worsen). Negative for nausea and vomiting.   Genitourinary:  Negative for dysuria and hematuria.   Musculoskeletal:  Positive for back pain.   Skin:  Negative for rash.   Neurological:  Negative for dizziness, syncope, light-headedness and numbness.           Objective       ED Triage Vitals   Temperature Pulse Blood Pressure Respirations SpO2 Patient Position - Orthostatic VS   03/20/25 0926 03/20/25 0926 03/20/25 0926 03/20/25 0926 03/20/25 0926 03/20/25 0926   98.3 °F (36.8 °C) (!) 107 140/94 20 98 % Sitting      Temp Source Heart Rate Source BP Location FiO2 (%) Pain Score    03/20/25 0926 03/20/25 0926 03/20/25 0926 -- 03/20/25 0940    Oral Monitor Left arm  8      Vitals      Date and Time Temp Pulse SpO2 Resp BP Pain Score FACES Pain Rating User   03/20/25 0940 -- -- -- -- -- 8 -- TW   03/20/25 0926 98.3 °F (36.8 °C) 107 98 % 20 140/94 -- -- MB            Physical Exam  Vitals and nursing note reviewed.   Constitutional:       Appearance: She is well-developed.   HENT:      Head: Normocephalic.   Eyes:      General: No scleral icterus.  Cardiovascular:      Rate and Rhythm: Normal rate and regular rhythm.   Pulmonary:      Effort: Pulmonary effort is normal.      Breath sounds: Normal breath sounds. No stridor.   Abdominal:      General: There is no distension.      Palpations: Abdomen is soft.      Tenderness: There is abdominal tenderness in the left lower quadrant.      Comments: Visibly gravid uterus. -Patient with tenderness on abdominal exam with fetal heart tones   Musculoskeletal:         General: Normal range of motion.        Back:       Comments: Patient was tenderness to palpation.  No midline spinal tenderness, deformities, crepitus, step-offs, overlying skin changes noted to the area of pain.   Skin:     General: Skin is warm and dry.      Capillary Refill: Capillary refill takes less than 2 seconds.   Neurological:      Mental  Status: She is alert and oriented to person, place, and time.         Results Reviewed       Procedure Component Value Units Date/Time    UA w Reflex to Microscopic w Reflex to Culture [[id number]]     Lab Status: No result Specimen: Urine             No orders to display       Procedures    ED Medication and Procedure Management   Prior to Admission Medications   Prescriptions Last Dose Informant Patient Reported? Taking?   Blood Glucose Monitoring Suppl (OneTouch Verio Reflect) w/Device KIT  Self No No   Sig: Dispense 1 kit per insurance formulary. GDM   Lancets (OneTouch Delica Plus [id number]) MISC  Self No No   Sig: Use 4 a day. GDM.   Prenatal Vit-Fe Fumarate-FA (Prenatal Plus Vitamin/Mineral) 27-1 MG TABS  Self No No   Sig: Take 1 tablet by mouth in the morning   albuterol (Ventolin HFA) 90 mcg/act inhaler  Self No No   Sig: Inhale 2 puffs every 6 (six) hours as needed for wheezing   glucose blood (OneTouch Verio) test strip  Self No No   Sig: Test 4 times a day. GDM.      Facility-Administered Medications: None     Patient's Medications   Discharge Prescriptions    No medications on file     No discharge procedures on file.  ED SEPSIS DOCUMENTATION   Time reflects when diagnosis was documented in both MDM as applicable and the Disposition within this note       Time User Action Codes Description Comment    3/20/2025 10:03 AM Mamie Perez [M54.50] Left low back pain     3/20/2025 10:03 AM Mamie Perez [O26.893,  R10.9] Abdominal pain in pregnancy, third trimester                  Mamie Perez PA-C  03/20/25 1009

## 2025-03-20 NOTE — EMTALA/ACUTE CARE TRANSFER
[facility] EMERGENCY DEPARTMENT  [address] [address]  [phone]  Dept: [phone]      EMTALA TRANSFER CONSENT    NAME Christine WATKINS 1990                              MRN [id number]    I have been informed of my rights regarding examination, treatment, and transfer   by Dr. Juan Palm MD    Benefits: Specialized equipment and/or services available at the receiving facility (Include comment)________________________ (OBGYN)    Risks:        Consent for Transfer:  I acknowledge that my medical condition has been evaluated and explained to me by the emergency department physician or other qualified medical person and/or my attending physician, who has recommended that I be transferred to the service of  Accepting Physician:  at Accepting Facility Name, City & State : [facility] D [facility], [place], [place]. The above potential benefits of such transfer, the potential risks associated with such transfer, and the probable risks of not being transferred have been explained to me, and I fully understand them.  The doctor has explained that, in my case, the benefits of transfer outweigh the risks.  I agree to be transferred.    I authorize the performance of emergency medical procedures and treatments upon me in both transit and upon arrival at the receiving facility.  Additionally, I authorize the release of any and all medical records to the receiving facility and request they be transported with me, if possible.  I understand that the safest mode of transportation during a medical emergency is an ambulance and that the Hospital advocates the use of this mode of transport. Risks of traveling to the receiving facility by car, including absence of medical control, life sustaining equipment, such as oxygen, and medical personnel has been explained to me and I fully understand them.    (LORRAINE CORRECT BOX BELOW)  [  ]  I consent  to the stated transfer and to be transported by ambulance/helicopter.  [  ]  I consent to the stated transfer, but refuse transportation by ambulance and accept full responsibility for my transportation by car.  I understand the risks of non-ambulance transfers and I exonerate the Hospital and its staff from any deterioration in my condition that results from this refusal.    X___________________________________________    DATE  25  TIME________  Signature of patient or legally responsible individual signing on patient behalf           RELATIONSHIP TO PATIENT_________________________          Provider Certification    NAME Christine Thomas                                         1990                              MRN [id number]    A medical screening exam was performed on the above named patient.  Based on the examination:    Condition Necessitating Transfer The primary encounter diagnosis was Left low back pain. A diagnosis of Abdominal pain in pregnancy, third trimester was also pertinent to this visit.    Patient Condition: The patient has been stabilized such that within reasonable medical probability, no material deterioration of the patient condition or the condition of the unborn child(alexander) is likely to result from the transfer    Reason for Transfer: Level of Care needed not available at this facility (OBN)    Transfer Requirements: [facility] [place]   Space available and qualified personnel available for treatment as acknowledged by Lupe [phone]  Agreed to accept transfer and to provide appropriate medical treatment as acknowledged by         Appropriate medical records of the examination and treatment of the patient are provided at the time of transfer   STAFF INITIAL WHEN COMPLETED _______  Transfer will be performed by qualified personnel from [employer]  and appropriate transfer equipment as required, including the use of necessary and  appropriate life support measures.    Provider Certification: I have examined the patient and explained the following risks and benefits of being transferred/refusing transfer to the patient/family:  General risk, such as traffic hazards, adverse weather conditions, rough terrain or turbulence, possible failure of equipment (including vehicle or aircraft), or consequences of actions of persons outside the control of the transport personnel      Based on these reasonable risks and benefits to the patient and/or the unborn child(alexander), and based upon the information available at the time of the patient’s examination, I certify that the medical benefits reasonably to be expected from the provision of appropriate medical treatments at another medical facility outweigh the increasing risks, if any, to the individual’s medical condition, and in the case of labor to the unborn child, from effecting the transfer.    X____________________________________________ DATE 03/20/25        TIME_______      ORIGINAL - SEND TO MEDICAL RECORDS   COPY - SEND WITH PATIENT DURING TRANSFER

## 2025-03-20 NOTE — DISCHARGE INSTRUCTIONS
Please go directly to labor and delivery triage at [facility].  We recommend you stay in close contact with your OB/GYN and ensure you are following up with your family care provider for your back pain.  Please keep in mind [facility] does not have OB/GYN and for pregnancy related concerns we recommend you follow-up with your OB/GYN or [facility].

## 2025-03-20 NOTE — PROCEDURES
Christine Thomas, a  at 32w0d with an FAIZA of 5/15/2025, by Ultrasound, was seen at [facility] LABOR AND DELIVERY for the following procedure(s): $Procedure Type: NST, US - Transvaginal]    Nonstress Test  Reason for NST: Routine  Variability: Moderate  Decelerations: None  Accelerations: Yes  Acoustic Stimulator: No  Baseline: 130 BPM  Uterine Irritability: No  Contractions: Not present              Ultrasound Other  Fetal Presentation: Vertex  Cervical Length: 3.05  Funnel: No  Debris: No  Placenta Previa: No  Vasa Previa: No    Interpretation  Nonstress Test Interpretation: Reactive  Overall Impression: Reassuring    Ultrasound Probe Disinfection    A transvaginal ultrasound was performed.   Prior to use, disinfection was performed with High Level Disinfection Process (Trophon)      Wesley Morgan MD  25  1:16 PM

## 2025-03-20 NOTE — Clinical Note
David Floyd accompanied Christine Thomas to the emergency department on 3/20/2025.    Return date if applicable:         If you have any questions or concerns, please don't hesitate to call.      Mamie Perez PA-C

## 2025-03-21 ENCOUNTER — RESULTS FOLLOW-UP (OUTPATIENT)
Dept: EMERGENCY DEPT | Facility: HOSPITAL | Age: 35
End: 2025-03-21

## 2025-03-21 LAB
BACTERIA UR CULT: NORMAL
DME PARACHUTE DELIVERY DATE ACTUAL: NORMAL
DME PARACHUTE DELIVERY DATE REQUESTED: NORMAL
DME PARACHUTE ITEM DESCRIPTION: NORMAL
DME PARACHUTE ORDER STATUS: NORMAL
DME PARACHUTE SUPPLIER NAME: NORMAL
DME PARACHUTE SUPPLIER PHONE: NORMAL

## 2025-03-27 ENCOUNTER — TELEPHONE (OUTPATIENT)
Dept: OBGYN CLINIC | Facility: CLINIC | Age: 35
End: 2025-03-27

## 2025-03-27 ENCOUNTER — ULTRASOUND (OUTPATIENT)
Dept: PERINATAL CARE | Facility: OTHER | Age: 35
End: 2025-03-27
Payer: MEDICARE

## 2025-03-27 VITALS
WEIGHT: 121.2 LBS | DIASTOLIC BLOOD PRESSURE: 64 MMHG | SYSTOLIC BLOOD PRESSURE: 112 MMHG | HEART RATE: 108 BPM | HEIGHT: 64 IN | BODY MASS INDEX: 20.69 KG/M2

## 2025-03-27 DIAGNOSIS — Z36.89 ENCOUNTER FOR ULTRASOUND TO CHECK FETAL GROWTH: ICD-10-CM

## 2025-03-27 DIAGNOSIS — O24.410 GDM, CLASS A1: ICD-10-CM

## 2025-03-27 DIAGNOSIS — G35 MS (MULTIPLE SCLEROSIS) (HCC): ICD-10-CM

## 2025-03-27 DIAGNOSIS — Z3A.33 33 WEEKS GESTATION OF PREGNANCY: Primary | ICD-10-CM

## 2025-03-27 DIAGNOSIS — O09.899 HISTORY OF PRETERM DELIVERY, CURRENTLY PREGNANT: ICD-10-CM

## 2025-03-27 PROCEDURE — 99213 OFFICE O/P EST LOW 20 MIN: CPT | Performed by: STUDENT IN AN ORGANIZED HEALTH CARE EDUCATION/TRAINING PROGRAM

## 2025-03-27 PROCEDURE — 76816 OB US FOLLOW-UP PER FETUS: CPT | Performed by: STUDENT IN AN ORGANIZED HEALTH CARE EDUCATION/TRAINING PROGRAM

## 2025-03-27 NOTE — PROGRESS NOTES
"[facility]: Ms. Thomas was seen today for fetal growth assessment ultrasound.  See ultrasound report under \"OB Procedures\" tab.      MDM:   I. Diagnoses/Problems addressed:  Low  II.  Data: Limited  III.  Risk of morbidity: Low    Please don't hesitate to contact our office with any concerns or questions.  -Maggie Ziegler MD  "

## 2025-04-02 ENCOUNTER — ROUTINE PRENATAL (OUTPATIENT)
Dept: OBGYN CLINIC | Facility: CLINIC | Age: 35
End: 2025-04-02

## 2025-04-02 VITALS — SYSTOLIC BLOOD PRESSURE: 120 MMHG | DIASTOLIC BLOOD PRESSURE: 80 MMHG | WEIGHT: 120 LBS | BODY MASS INDEX: 20.6 KG/M2

## 2025-04-02 DIAGNOSIS — Z34.83 PRENATAL CARE, SUBSEQUENT PREGNANCY IN THIRD TRIMESTER: Primary | ICD-10-CM

## 2025-04-02 DIAGNOSIS — Z3A.33 33 WEEKS GESTATION OF PREGNANCY: ICD-10-CM

## 2025-04-02 PROCEDURE — 99213 OFFICE O/P EST LOW 20 MIN: CPT | Performed by: NURSE PRACTITIONER

## 2025-04-02 NOTE — PATIENT INSTRUCTIONS
Thank you for your confidence in our team.   We appreciate you and welcome your feedback.   If you receive a survey from us, please take a few moments to let us know how we are doing.   Sincerely,  DANIEL Babcock       The Third Trimester  (28-42 weeks)  YOUR BABY   * your baby sucks its thumb now!   * your baby can hear voices and respond to touch…..so talk to him or her!!   * your baby’s brain grows and develops most in the last 2 months of pregnancy   * baby’s head and bones are soft and flexible so they can fit through the birth canal   * baby’s movements change towards the end of pregnancy because there is less room for kicking and stretching in your belly   * baby’s lungs are not fully developed and completely ready to breathe on their own until the last 3-4 weeks before your due date    YOUR BODY   * your belly is growing a lot now   * it may become more difficult to sleep well at night or to be as active as you usually are   * you may sweat more than usual   * you will become more off-balance……be careful not to fall!!   * you may develop hemorrhoids (which can be painful and make it difficult to sit down)   * the last two months of pregnancy can become very uncomfortable……with backaches, headaches, and heartburn   * you can start to have contractions…….as long as they are irregular and less than 5 per hour, this is a normal part of your body getting ready to have a baby   * your cervix may start to thin out and open up……to get ready for delivery   * you may find yourself needing to “pee” very often…….because baby is pressing on your bladder so much   * you may get out of breathe more quickly than usual      FETAL KICK COUNTS    In the third trimester (after 28 weeks gestation) you should be performing fetal kick counts every day.  Your baby should move at least 10 times in 2 hours during an active time, once a day.    Choose atime of day when your baby is most active.  Try to do this around the  same time each day.  Get into a comfortable position and then write down the time your baby first moves.  Count each movement until the baby moves 10 times.  These movements include kicks, punches, nudges, flutters, or rolls.  This can take anywhere from 5 minutes to 2 hours.  Write down the time you feel the baby's 10th movement.    If 2 hours has passed and your baby has not moved at least 10 times, you should CALL THE OFFICE RIGHT AWAY.  [phone].          PREMATURE LABOR     When to call [phone]:  * I need to call immediately if I have even a small amount of LIQUID leaking from my vagina, with or without contractions.   * I need to call if I am BLEEDING from my vagina.   * I need to call if I am feeling CRAMPING that continues after drinking 2-3 glasses of water and lying down on my side for one hour and that feels like I am having a period.   * I need to call if I feel CONTRACTIONS  more than 4 times in an hour that feels like the baby is “balling up” even after I try drinking 2-3 glasses of water and lying down on my side for an hour.   * I need to call if I notice a change in my vaginal DISCHARGE.   * I need to call if I am feeling PELVIC PRESSURE  that feels like the baby is pushing down into my vagina and lasts more than an hour.   * I need to call if I have LOW BACKACHE which is new and near my tailbone.  It may either come and go several times during an hour or stay there constantly.          PRE-ECLAMPSIA     What is it?   Pre-eclampsia is a serious disease that can occur during pregnancy related to high blood pressures.  It can happen to any woman.     Why should I care?   Women who develop pre-eclampsia have serious risks which can include seizures, stroke, organ damage, premature birth of their baby.  In the very worst cases, it can cause death of the mother and/or their baby.     What should I pay attention to?   Signs and symptoms of pre-eclampsia can include:   * Severe swelling of face or  hands    * A headache that will not go away even after you have taken Tylenol   * Seeing spots or changes in eyesight    * Pain in the upper abdomen or shoulder    * New nausea and vomiting (in the second half of pregnancy)    * Sudden weight gain    * Difficulty breathing     What should I do?   If you experience any of the above symptoms of pre-eclampsia, contact your OB provider.  Finding pre-eclampsia early is important for you and your baby.  Call us at [phone]..      BREASTFEEDING     BENEFITS FOR BABIES   * stronger immune systems (less allergies, eczema, asthma, and childhood cancers)   * less diarrhea and constipation or other GI diseases   * fewer colds and ear infections   * better vision and teeth (fewer cavities)   * improves IQ   * lower rates of diabetes and obesity in childhood     BENEFITS FOR MOMS   * promotes faster weight loss after delivery   * lower risk for postpartum depression   * lower risk for breast, uterine, and ovarian cancers   * lower risk for osteoporosis developing with age   * easier than formula - is always right with you, clean, and the right temperature   * less expensive than formula……it’s FREE !!!!     KEYS TO SUCCESSFUL BREASTFEEDING   * keep baby skin-to-skin until after first feeding event   * keep baby in your room with you during your hospital stay after delivery   * avoid any bottle feedings (unless medically necessary)   * limit the use of pacifiers and swaddling   * ask for help if you are having any issues……lactation consultants (who specialize in breastfeeding) are available to help you   * a healthy diet for mom……eating a variety of foods and portions in moderation    THINGS YOU SHOULD KNOW ABOUT BREASTFEEDING   * most medications are considered compatible with breastfeeding by the American Academy of Pediatrics, but you should check with your health care provider or lactation consultant prior to taking a new medication……just to be sure it is safe   * alcohol  (beer, wine, liquor) can be passed from mother to baby through breast milk……an occasional, social drink is deemed acceptable by the American Academy of Pediatrics…..more than that should be avoided   * breastfeeding is NOT an effective method of birth control   * nicotine (in cigarettes) can pass from mother to baby through breast milk…..however, for mothers who smoke, it is still healthier to breastfeed than use formula   * caffeine should be limited to 1-3 cups per day……includes coffee, soda, energy drinks         PERINEAL / VAGINAL MASSAGE    What can I do now to decrease my chances of tearing during delivery?  Massaging around the vaginal opening by you (or your partner), either antepartum (before birth) or during the second stage of labor, can reduce the likelihood of perineal tearing during childbirth.  Likewise, the use of warm packs held on the perineum during the pushing stage of labor can reduce the severity of your tear.  This will happen during the pushing stage of labor.  At home, you can also help reduce the chances of injury that may occur during the birth of your child through perineal massage.    When should I do this?  Starting around or shortly after 34 weeks of pregnancy, you or your partner should provide 5-10 minutes of vaginal massage 1-4 times per week.    How?  Use either almond, coconut, or olive oil and water mixture on 1 or 2 fingers (depending on comfort).  Insert finger(s) 3-5cm into the vagina.  Apply sweeping downward/sideward pressure from 3 to 9 o'clock for 5-10 minutes, 1-4 times per week.          WARNING SIGNS DURING PREGNANCY  Call our office at [phone] if you experience any of the followin. Vaginal bleeding  2. Sharp abdominal pain that does not go away  3. Fever (more than 100.4 and is not relieved by Tylenol)  4. Persistent vomiting lasting greater than 24 hours  5. Chest pain   6. Pain or burning when you urinate  7. Severe headache that doesn't resolve with  Tylenol  8. Blurred vision or seeing spots in your vision  9. Sudden swelling of your face or hands  10. Redness, swelling or pain in a leg  11. A sudden weight gain in just a few days  12. Decrease in your baby's movement (after 28 weeks or the 6th month of pregnancy)  13. A loss of watery fluid from your vagina - can be a gush, a trickle or continuous wetness  14. After 20 weeks of pregnancy, rhythmic cramping (greater than 4 per hour) or menstrual like low/pelvic pain          VACCINES IN PREGNANCY    TDAP  Whooping cough (or pertussis) can be serious for anyone, but for your , it can be life-threatning.  Up to 20 babies die each year in the [place] Due to whooping cough.  About half of babies younger than 1 year old who get whooping cough need treatment in the hospital.  The younger the baby is when he or she gets whooping cough, the more likely he or she will need to be treated in a hospital.  When you receive the whooping cough vaccine (Tdap) during your pregnancy, your body will create protective antibodies and pass some of them to your baby before birth.  These antibodies can help protect your baby from getting whooping cough until they are old enough to be vaccinated themselves (usually around 6 months of age).    INFLUENZA  Changes in your immune, heart, and lung functions during pregnancy make you more likely to get seriously ill from the flu.  Catching the flu also increases your chances for serious problems for your developing baby, including premature labor and delivery.  It is recommended that all women who are pregnant during flu season should receive an influenza vaccine.

## 2025-04-02 NOTE — PROGRESS NOTES
Christine Thomas presents today for routine OB visit at 33w6d.  Blood Pressure: 120/80  Wt=54.4 kg (120 lb); Body mass index is 20.6 kg/m².; TWG=8.165 kg (18 lb)  Fetal Heart Rate: 134; Fundal Height (cm): 34 cm  Abdomen: gravid, soft, non-tender.  She reports asthma well-controlled and has not required Albuterol since prior to pregnancy.  Reports occasional uterine contractions - mostly at night.  Denies vaginal bleeding or leaking of fluid.  Reports adequate fetal movement of at least 10 movements in 2 hours once daily.  Scheduled for ultrasound 4/28/25.  Reviewed premature labor precautions and fetal kick counts.  Advised to continue medications and return in 2 weeks.      Current Outpatient Medications   Medication Instructions    albuterol (Ventolin HFA) 90 mcg/act inhaler 2 puffs, Inhalation, Every 6 hours PRN    Blood Glucose Monitoring Suppl (OneTouch Verio Reflect) w/Device KIT Dispense 1 kit per insurance formulary. GDM    glucose blood (OneTouch Verio) test strip Test 4 times a day. GDM.    Lancets (OneTouch Delica Plus [id number]) MISC Use 4 a day. GDM.    Prenatal Vit-Fe Fumarate-FA (Prenatal Plus Vitamin/Mineral) 27-1 MG TABS 1 tablet, Oral, Daily       Laboratory workup: initial OB labs (done 11/20/24); 28 week labs (done 2/20/25)    Genetic Screening: NIPS negative, MSAFP negative    Vaccinations: influenza (given 11/15/24); Tdap (given 2/20/25); RSV (not indicated outside RSV season); COVID-19 (recommended 11/15/24 - patient desires to obtain)    Postpartum contraception: desires Depoprovera    Fetal Ultrasounds:  6/26/24 (7w0d) EDC confirmed  11/11/24 (13w4d) WNL  11/21/24 (15w0d) no previa, luz elena WNL w/missed views  11/25/24 (15w4d) CL=3.98cm  12/12/24 (18w0d) CL=3.49cm  1/2/25 (21w0d) CL=4.6cm, no previa, luz elena WNL & complete  1/7/25 (21w5d) CL=3.5cm, fetal echo WNL  1/17/25 (23w1d) CL=3.53cm  2/20/25 (28w0d) EFW=62%, AC=75%, JOEY=22.2cm, vertex  3/27/25 (33w0d) EFW=57%, AC=73%, JOEY=20.1cm,  vertex      G4 Problems (from 10/09/24 to present)       Problem Noted Diagnosed Resolved    GDMA1 2025 by DANIEL Gonzales  No    Overview Addendum 2025 11:53 AM by DANIEL Babcock   Dx @15 weeks  Needs consultation w/ DM Management program - done 12/3/24  Monitor blood sugars 4x/day and report weekly to DM program - in progress  Needs baseline PEC labs - done WNL  Needs hgbA1C - done     1/10/25=5.2     25=5.2  Needs fetal echo - done WNL  Serial growth scans - in progress         Asthma during pregnancy 2025 by DANIEL Babcock  No    Overview Addendum 2025 11:53 AM by DANIEL Babcock   Albuterol prn         Not immune to HBV 2024 by DANIEL Babcock  No    Overview Addendum 2025 11:50 AM by DANIEL Babcock   Needs HBV vaccine booster         Family hx DM 2024 by DANIEL Babcock  No    Overview Addendum 2024  9:37 AM by DANIEL Babcock   Pt's father w/DM  Needs early GDM screen - done ABNORMAL         History of PTD 2024 by DANIEL Babcock  No    Overview Addendum 2025 11:50 AM by DANIEL Babcock   Needs MFM consultation - done 24  Serial CL measurements - done WNL         Multiple Sclerosis 2020 by Eryn Schuler MD  No    Overview Addendum 2025 11:52 AM by DANIEL Babcock   Dx approx 4 years ago with manifestations of migraines, history of seizures when she was younger and leg weakness  Needs MFM consultation - done 24   No meds

## 2025-04-14 PROBLEM — Z3A.35 35 WEEKS GESTATION OF PREGNANCY: Status: ACTIVE | Noted: 2025-01-14

## 2025-04-15 ENCOUNTER — ROUTINE PRENATAL (OUTPATIENT)
Dept: OBGYN CLINIC | Facility: CLINIC | Age: 35
End: 2025-04-15

## 2025-04-15 VITALS — DIASTOLIC BLOOD PRESSURE: 80 MMHG | WEIGHT: 121.4 LBS | BODY MASS INDEX: 20.84 KG/M2 | SYSTOLIC BLOOD PRESSURE: 114 MMHG

## 2025-04-15 DIAGNOSIS — Z3A.35 35 WEEKS GESTATION OF PREGNANCY: Primary | ICD-10-CM

## 2025-04-15 DIAGNOSIS — N76.0 BV (BACTERIAL VAGINOSIS): ICD-10-CM

## 2025-04-15 DIAGNOSIS — Z34.83 PRENATAL CARE, SUBSEQUENT PREGNANCY IN THIRD TRIMESTER: ICD-10-CM

## 2025-04-15 DIAGNOSIS — B96.89 BV (BACTERIAL VAGINOSIS): ICD-10-CM

## 2025-04-15 LAB
BV WHIFF TEST VAG QL: POSITIVE
CLUE CELLS SPEC QL WET PREP: POSITIVE
PH SMN: 5 [PH]
SL AMB POCT WET MOUNT: ABNORMAL
T VAGINALIS VAG QL WET PREP: NEGATIVE
YEAST VAG QL WET PREP: NEGATIVE

## 2025-04-15 PROCEDURE — 87210 SMEAR WET MOUNT SALINE/INK: CPT | Performed by: OBSTETRICS & GYNECOLOGY

## 2025-04-15 PROCEDURE — 99213 OFFICE O/P EST LOW 20 MIN: CPT | Performed by: OBSTETRICS & GYNECOLOGY

## 2025-04-15 PROCEDURE — 87150 DNA/RNA AMPLIFIED PROBE: CPT

## 2025-04-15 RX ORDER — METRONIDAZOLE 500 MG/1
500 TABLET ORAL EVERY 12 HOURS SCHEDULED
Qty: 14 TABLET | Refills: 0 | Status: SHIPPED | OUTPATIENT
Start: 2025-04-15 | End: 2025-04-22

## 2025-04-15 NOTE — PROGRESS NOTES
[facility]   PRENATAL VISIT  Name: Christine Thomas  MRN: [id number]  : 1990      ASSESSMENT/PLAN:  Assessment & Plan  35 weeks gestation of pregnancy  Overview:  Labs  Pap smear NILM, HPV neg 2024; GC/CT neg  Prenatal panel notable for hep B nonimmunity & GDM  28w labs with elevated glucola at 187 mg/dL  Vaccines:  Flu vaccine: 2024  Tdap vaccine: 2025  Genetic screening:  MSAFP screen neg  NIPS low risk  Contraception: Depo Provera  Feeding plan: breast  Birth plan:  with epidural  Delivery consent: signed 3/11/25  Ultrasounds:  Fetal echo wnl  Level II anatomy scan: normal  Level I US on 25 at 28w0d: growth wnl, vertex, anterior placenta  Interval growth US on 3/27 at 33w0d: EFW 57% AC 73% BPD 69%, cephalic, anterior placenta  Interval growth scan scheduled for     Plan:  GBS collected  Encouraged to resume POCG checks and follow-up with diabetic education  RTO in 1w for routine PNV  Orders:    Strep B DNA probe, amplification    BV (bacterial vaginosis)  2-day history of increased vaginal discharge and odor  Positive Amsel criteria (/)  Script sent for 7-day course of PO Flagyl  Orders:    metroNIDAZOLE (FLAGYL) 500 mg tablet; Take 1 tablet (500 mg total) by mouth every 12 (twelve) hours for 7 days    POCT wet mount    Prenatal care, subsequent pregnancy in third trimester             SUBJECTIVE 34 y.o.  at 35w5d here for PN visit. She reports intermittent [place]endaño contractions. She denies leakage of fluid and vaginal bleeding. She has good fetal movement. Christine reports a 2-day history of increased vaginal discharge and odor that worsens after sex. She feels that this is another episode of BV, congruent with exam findings. We also reviewed her glucose monitoring. Christine has not checked her sugars since last week. Encouraged continued surveillance and follow-up with diabetic education. All questions answered.    OBJECTIVE:  Vitals:    04/15/25 1048   BP:  114/80     Fundal Height (cm): 36 cm  Fetal Heart Rate: 145    Physical Exam  Vitals and nursing note reviewed. Exam conducted with a chaperone present.   Constitutional:       General: She is not in acute distress.  HENT:      Head: Normocephalic.      Right Ear: External ear normal.      Left Ear: External ear normal.   Eyes:      General: No scleral icterus.        Right eye: No discharge.         Left eye: No discharge.      Conjunctiva/sclera: Conjunctivae normal.   Cardiovascular:      Rate and Rhythm: Normal rate and regular rhythm.      Pulses: Normal pulses.      Heart sounds: Normal heart sounds.   Pulmonary:      Effort: Pulmonary effort is normal. No respiratory distress.      Breath sounds: Normal breath sounds.   Abdominal:      Palpations: Abdomen is soft.      Tenderness: There is no abdominal tenderness. There is no guarding.      Comments: Gravid uterus   Genitourinary:     General: Normal vulva.      Vagina: Vaginal discharge present.      Comments: Speculum exam with thin white discharge and odor  Musculoskeletal:         General: No swelling or tenderness. Normal range of motion.      Cervical back: Normal range of motion.      Right lower leg: No edema.      Left lower leg: No edema.   Skin:     General: Skin is warm and dry.      Capillary Refill: Capillary refill takes less than 2 seconds.   Neurological:      Mental Status: She is alert and oriented to person, place, and time. Mental status is at baseline.   Psychiatric:         Mood and Affect: Mood normal.         Behavior: Behavior normal.           Future Appointments   Date Time Provider Department Center   2025  2:00 PM DANIEL Flaherty [facility]   2025  9:30 AM DANIEL Babcock [facility]   2025 11:30 AM  [address]   2025  8:30 AM MD [facility] PCP DALIA Morgan MD  OB/GYN PGY-4  4/15/2025  11:19 AM

## 2025-04-15 NOTE — ASSESSMENT & PLAN NOTE
Overview:  Labs  Pap smear NILM, HPV neg 2024; GC/CT neg  Prenatal panel notable for hep B nonimmunity & GDM  28w labs with elevated glucola at 187 mg/dL  Vaccines:  Flu vaccine: 2024  Tdap vaccine: 2025  Genetic screening:  MSAFP screen neg  NIPS low risk  Contraception: Depo Provera  Feeding plan: breast  Birth plan:  with epidural  Delivery consent: signed 3/11/25  Ultrasounds:  Fetal echo wnl  Level II anatomy scan: normal  Level I US on 25 at 28w0d: growth wnl, vertex, anterior placenta  Interval growth US on 3/27 at 33w0d: EFW 57% AC 73% BPD 69%, cephalic, anterior placenta  Interval growth scan scheduled for     Plan:  GBS collected  Encouraged to resume POCG checks and follow-up with diabetic education  RTO in 1w for routine PNV  Orders:    Strep B DNA probe, amplification

## 2025-04-17 ENCOUNTER — RESULTS FOLLOW-UP (OUTPATIENT)
Dept: OBGYN CLINIC | Facility: CLINIC | Age: 35
End: 2025-04-17

## 2025-04-17 LAB — GP B STREP DNA SPEC QL NAA+PROBE: NEGATIVE

## 2025-04-23 ENCOUNTER — ROUTINE PRENATAL (OUTPATIENT)
Dept: OBGYN CLINIC | Facility: CLINIC | Age: 35
End: 2025-04-23

## 2025-04-23 VITALS — DIASTOLIC BLOOD PRESSURE: 80 MMHG | BODY MASS INDEX: 21.49 KG/M2 | SYSTOLIC BLOOD PRESSURE: 124 MMHG | WEIGHT: 125.2 LBS

## 2025-04-23 DIAGNOSIS — Z3A.36 36 WEEKS GESTATION OF PREGNANCY: Primary | ICD-10-CM

## 2025-04-23 DIAGNOSIS — O24.410 GDM, CLASS A1: ICD-10-CM

## 2025-04-23 DIAGNOSIS — Z34.83 PRENATAL CARE, SUBSEQUENT PREGNANCY IN THIRD TRIMESTER: ICD-10-CM

## 2025-04-23 NOTE — PROGRESS NOTES
Assessment & Plan  34 y.o.  at 36w6d presenting for routine prenatal visit.   Hx of GDM, pt states she -has no time to regularly check her blood sugars  Pt states whe she does check  FBS are <90 and 2 hour postpartum are < 120  Pt states she continues contact with the diabetes educator  GBS was negative 4/15/2025    Problem List Items Addressed This Visit          Endocrine    GDMA1       Obstetrics/Gynecology    36 weeks gestation of pregnancy - Primary     Other Visit Diagnoses         Prenatal care, subsequent pregnancy in third trimester              ____________________________________________________________  Subjective  She is without complaint.   She denies contractions, loss of fluid, or vaginal bleeding.   She feels regular fetal movements.     WNL respiratory effort, negative cough or GREG    Objective  /80 (BP Location: Left arm, Patient Position: Sitting, Cuff Size: Standard)   Wt 56.8 kg (125 lb 3.2 oz)   LMP 2024 (Approximate)   BMI 21.49 kg/m²          Patient's Active Problem List  Patient Active Problem List   Diagnosis    Multiple Sclerosis    Family hx DM    History of PTD    Not immune to HBV    Asthma during pregnancy    36 weeks gestation of pregnancy    GDMA1     Plan  Continue contact with diabetes educator  To call with vaginal bleeding, loss of fluid, regular contractions, decreased fetal movement, other needs or concerns.  Return in 1 week  Pt verbalized understanding of all discussed.

## 2025-04-23 NOTE — PATIENT INSTRUCTIONS
LABOR PRECAUTIONS  Call our office at [phone] for any of the following:    * I need to call immediately I if I have even a small amount of LIQUID  leaking from my vagina, with or without contractions.   * I need to call if I am BLEEDING an amount equal to or more than a period.  A small amount of bloody vaginal discharge is normal at the end of the pregnancy.   * I need to call if I am having CONTRACTIONS  every five minutes for at least an hour.  I will need a watch in order to time them.  I should time them from the beginning of one contraction until the beginning of the next one.   * I need to call BEFORE  I go to the hospital.   * I need to have a plan for TRANSPORTATION  to get to the hospital when I am in labor.  Labor is generally not an emergency which requires an ambulance.          FETAL KICK COUNTS    In the third trimester (after 28 weeks gestation) you should be performing fetal kick counts every day.  Your baby should move at least 10 times in 2 hours during an active time, once a day.    Choose atime of day when your baby is most active.  Try to do this around the same time each day.  Get into a comfortable position and then write down the time your baby first moves.  Count each movement until the baby moves 10 times.  These movements include kicks, punches, nudges, flutters, or rolls.  This can take anywhere from 5 minutes to 2 hours.  Write down the time you feel the baby's 10th movement.    If 2 hours has passed and your baby has not moved at least 10 times, you should CALL THE OFFICE RIGHT AWAY.  [phone]        PERINEAL / VAGINAL MASSAGE    What can I do now to decrease my chances of tearing during delivery?  Massaging around the vaginal opening by you (or your partner), either antepartum (before birth) or during the second stage of labor, can reduce the likelihood of perineal tearing during childbirth.  Likewise, the use of warm packs held on the perineum during the pushing stage of  labor can reduce the severity of your tear.  This will happen during the pushing stage of labor.  At home, you can also help reduce the chances of injury that may occur during the birth of your child through perineal massage.    When should I do this?  Starting around or shortly after 34 weeks of pregnancy, you or your partner should provide 5-10 minutes of vaginal massage 1-4 times per week.    How?  Use either almond, coconut, or olive oil and water mixture on 1 or 2 fingers (depending on comfort).  Insert finger(s) 3-5cm into the vagina.  Apply sweeping downward/sideward pressure from 3 to 9 o'clock for 5-10 minutes, 1-4 times per week.        GROUP B STREP    Group B Strep (GBS) is a common vaginal bacteria.  Approximately 25% of women normally have GBS bacteria present in the vagina.  It is NOT a sexually-transmitted infection.  In fact, it is not an infection AT ALL!  It is just a normal vaginal bacteria for many women.    However, the GBS bacteria can be dangerous to a  baby if the baby is exposed to that particular bacteria during labor and birth AND develops an infection from it.  The likelihood of a  GBS infection for a woman who has GBS bacteria in the vagina is about 1%-2%.  But if it does occur, a baby could become severely ill.    For this reason, we do a vaginal culture (Q-tip swab of the vagina and rectum) for ALL pregnant women at approximately 36 weeks of pregnancy.  If the culture shows that there is GBS bacteria present, it is NOT a reason to panic!  Because in this situation we will give this woman antibiotics through her IV while she is in labor.  When a mother is treated with antibiotics during labor and delivery, her baby ALMOST NEVER becomes infected with GBS bacteria.    Continue contact with the diabetes educator  Keep follow with Maternal Medicine  Return in 1 week

## 2025-04-28 ENCOUNTER — ULTRASOUND (OUTPATIENT)
Dept: PERINATAL CARE | Facility: OTHER | Age: 35
End: 2025-04-28
Payer: MEDICARE

## 2025-04-28 VITALS
BODY MASS INDEX: 21.27 KG/M2 | WEIGHT: 124.6 LBS | HEIGHT: 64 IN | HEART RATE: 91 BPM | SYSTOLIC BLOOD PRESSURE: 132 MMHG | DIASTOLIC BLOOD PRESSURE: 80 MMHG

## 2025-04-28 DIAGNOSIS — O24.410 GDM, CLASS A1: Primary | ICD-10-CM

## 2025-04-28 DIAGNOSIS — Z3A.37 37 WEEKS GESTATION OF PREGNANCY: ICD-10-CM

## 2025-04-28 DIAGNOSIS — Z36.89 ENCOUNTER FOR ULTRASOUND TO ASSESS FETAL GROWTH: ICD-10-CM

## 2025-04-28 DIAGNOSIS — O09.899 HISTORY OF PRETERM DELIVERY, CURRENTLY PREGNANT: ICD-10-CM

## 2025-04-28 PROCEDURE — 76816 OB US FOLLOW-UP PER FETUS: CPT | Performed by: OBSTETRICS & GYNECOLOGY

## 2025-04-28 PROCEDURE — 99213 OFFICE O/P EST LOW 20 MIN: CPT | Performed by: PHYSICIAN ASSISTANT

## 2025-04-28 NOTE — PROGRESS NOTES
Ms. Thomas is here today for evaluation of fetal weight and amniotic fluid.  This pregnancy is notable for diet-controlled gestational diabetes.  She denies any obstetric complaints today.  She reports active fetal movement.    A viable tsang intrauterine pregnancy is seen. Estimated fetal weight and amniotic fluid are within normal limits for gestational age.  No fetal anomalies are visualized on limited survey. Placenta is within normal limits.       Our recommendations are as follows:     1.  We discussed the appropriate for gestational age growth at the 33rd percentile.  Polyhydramnios is present today.  No anatomic abnormality is visualized by ultrasound to explain the increased amniotic fluid.  We discussed the status of her gestational diabetes.  She has not had access to her phone lately, therefore was unable to submit her glucose log in [employer].  She has been closely monitoring her blood sugars and reports that her average fasting glucose is 86-88.  She reports that her highest postprandial glucose was 122 on one occasion. Fetal aneuploidy can be associated with polyhydramnios, however NIPT was previously negative.  Most of the cases of polyhydramnios are idiopathic and there is no evident adverse  outcome. If it progresses, there is an increased risk for  labor/birth and abruptio placenta as well as postpartum hemorrhage. She will return for JOEY in 2 weeks.

## 2025-04-30 NOTE — PROGRESS NOTES
[facility] - [facility]   PRENATAL VISIT  Christine Thomas  [id number]  1990        ASSESSMENT/PLAN:  Problem List       Multiple Sclerosis    Overview   Dx approx 4 years ago with manifestations of migraines, history of seizures when she was younger and leg weakness  Needs MFM consultation - done 24   No meds         Family hx DM    Overview   Pt's father w/DM  Needs early GDM screen - done ABNORMAL         History of PTD    Overview   Needs MFM consultation - done 24  Serial CL measurements - done WNL         Not immune to HBV    Overview   Needs HBV vaccine booster         Asthma during pregnancy    Overview   Albuterol prn         36 weeks gestation of pregnancy    GDMA1    Overview   Dx @15 weeks  Needs consultation w/ DM Management program - done 12/3/24  Monitor blood sugars 4x/day and report weekly to DM program - in progress  Needs baseline PEC labs - done WNL  Needs hgbA1C - done     1/10/25=5.2     25=5.2  Needs fetal echo - done WNL  Serial growth scans - in progress          Subjective:  Christine Thomas presents today for routine OB visit at 38w0d.  Blood Pressure: 120/80  Wt=56.4 kg (124 lb 6.4 oz); Body mass index is 21.35 kg/m².; TWG=10.2 kg (22 lb 6.4 oz)  Fetal Heart Rate: 134; Fundal Height (cm): 39 cm  SVE today: Cervical Dilation: 1 /Cervical Effacement: 30 /Fetal Station: Floating  Abdomen: gravid, soft, non-tender.  Reports contractions every 20-30 minutes, for a few hours for the past 2 days.  Denies vaginal bleeding or leaking of fluid.  Reports adequate fetal movement of at least 10 movements in 2 hours once daily.  Reviewed labor precautions and fetal kick counts as well as pre-eclampsia warning signs.  Reviewed perineal massage for decreasing risk of perineal lacerations during delivery.  Advised to continue medications and return in 1 week.    BG- 80s before , then 120s after meals   Current Outpatient Medications   Medication Instructions     albuterol (Ventolin HFA) 90 mcg/act inhaler 2 puffs, Inhalation, Every 6 hours PRN    Blood Glucose Monitoring Suppl (OneTouch Verio Reflect) w/Device KIT Dispense 1 kit per insurance formulary. GDM    glucose blood (OneTouch Verio) test strip Test 4 times a day. GDM.    Lancets (OneTouch Delica Plus [id number]) MISC Use 4 a day. GDM.    Prenatal Vit-Fe Fumarate-FA (Prenatal Plus Vitamin/Mineral) 27-1 MG TABS 1 tablet, Oral, Daily       Objective:  Pre-Flo Vitals      Flowsheet Row Most Recent Value   Prenatal Assessment    Fetal Heart Rate 134   Fundal Height (cm) 39 cm   Prenatal Vitals    Blood Pressure 120/80   Weight - Scale 56.4 kg (124 lb 6.4 oz)   Urine Albumin/Glucose    Dilation/Effacement/Station    Cervical Dilation 1   Cervical Effacement 30   Fetal Station -5   Vaginal Drainage    Edema    LLE Edema None   RLE Edema None   Facial Edema None             Pregnancy Plan:  Pregnancy: Tavarez  Fetal sex: Male     Delivery Plans  Deliver by GA (weeks): 42  Planned delivery method: Vaginal  Planned delivery location: [facility] L&D  Planned anesthesia: Epidural  Acceptable blood products: All     Post-Delivery Plans  Feeding intentions: Breast Milk and Non-human milk substitute  Circumcision requested: No  Planned birth control: Injection      General: Well appearing, no distress  Respiratory: Unlabored breathing  Cardiovascular: Regular rate.  Abdomen: Soft, gravid, nontender  Fundal Height: Appropriate for gestational age.  Extremities: Warm and well perfused.  Non tender.        D/w Dr. Ida Long MD  PGY- 2

## 2025-05-02 ENCOUNTER — ROUTINE PRENATAL (OUTPATIENT)
Dept: OBGYN CLINIC | Facility: CLINIC | Age: 35
End: 2025-05-02

## 2025-05-02 VITALS
BODY MASS INDEX: 21.24 KG/M2 | SYSTOLIC BLOOD PRESSURE: 120 MMHG | HEIGHT: 64 IN | DIASTOLIC BLOOD PRESSURE: 80 MMHG | WEIGHT: 124.4 LBS

## 2025-05-02 DIAGNOSIS — Z3A.38 38 WEEKS GESTATION OF PREGNANCY: Primary | ICD-10-CM

## 2025-05-02 NOTE — PATIENT INSTRUCTIONS
WARNING SIGNS DURING PREGNANCY  Call our office at [phone] for any of the followin. Vaginal bleeding  2. Sharp abdominal pain that does not go away  3. Fever (more than 100.4 and is not relieved by Tylenol)  4. Persistent vomiting lasting greater than 24 hours  5. Chest pain   6. Pain or burning when you urinate  7. Severe headache that doesn't resolve with Tylenol  8. Blurred vision or seeing spots in your vision  9. Sudden swelling of your face or hands  10. Redness, swelling or pain in a leg  11. A sudden weight gain in just a few days  12. Decrease in your baby's movement (after 28 weeks or the 6th month of pregnancy)  13. A loss of watery fluid from your vagina - can be a gush, a trickle or continuous wetness  14. After 20 weeks of pregnancy, rhythmic cramping (greater than 4 per hour) or menstrual like low/pelvic pain

## 2025-05-04 PROBLEM — Z3A.38 38 WEEKS GESTATION OF PREGNANCY: Status: ACTIVE | Noted: 2025-01-14

## 2025-05-12 ENCOUNTER — ROUTINE PRENATAL (OUTPATIENT)
Dept: OBGYN CLINIC | Facility: CLINIC | Age: 35
End: 2025-05-12

## 2025-05-12 ENCOUNTER — ROUTINE PRENATAL (OUTPATIENT)
Dept: PERINATAL CARE | Facility: OTHER | Age: 35
End: 2025-05-12
Payer: MEDICARE

## 2025-05-12 VITALS
WEIGHT: 124.2 LBS | DIASTOLIC BLOOD PRESSURE: 82 MMHG | HEIGHT: 64 IN | HEART RATE: 97 BPM | BODY MASS INDEX: 21.21 KG/M2 | SYSTOLIC BLOOD PRESSURE: 120 MMHG

## 2025-05-12 VITALS — DIASTOLIC BLOOD PRESSURE: 80 MMHG | SYSTOLIC BLOOD PRESSURE: 120 MMHG | WEIGHT: 124 LBS | BODY MASS INDEX: 21.28 KG/M2

## 2025-05-12 DIAGNOSIS — O40.3XX0 POLYHYDRAMNIOS IN THIRD TRIMESTER COMPLICATION, SINGLE OR UNSPECIFIED FETUS: ICD-10-CM

## 2025-05-12 DIAGNOSIS — Z3A.39 39 WEEKS GESTATION OF PREGNANCY: ICD-10-CM

## 2025-05-12 DIAGNOSIS — J45.909 ASTHMA DURING PREGNANCY: Primary | ICD-10-CM

## 2025-05-12 DIAGNOSIS — O09.899 HISTORY OF PRETERM DELIVERY, CURRENTLY PREGNANT: ICD-10-CM

## 2025-05-12 DIAGNOSIS — Z03.71: Primary | ICD-10-CM

## 2025-05-12 DIAGNOSIS — Z78.9 NOT IMMUNE TO HEPATITIS B VIRUS: ICD-10-CM

## 2025-05-12 DIAGNOSIS — O40.9XX0 POLYHYDRAMNIOS AFFECTING PREGNANCY: ICD-10-CM

## 2025-05-12 DIAGNOSIS — O99.519 ASTHMA DURING PREGNANCY: Primary | ICD-10-CM

## 2025-05-12 DIAGNOSIS — G35 MS (MULTIPLE SCLEROSIS) (HCC): ICD-10-CM

## 2025-05-12 PROCEDURE — 76815 OB US LIMITED FETUS(S): CPT | Performed by: PHYSICIAN ASSISTANT

## 2025-05-12 NOTE — PROGRESS NOTES
Christine Thomas presents today for routine OB visit at 39w4d.  Blood Pressure: 120/80  Wt=56.2 kg (124 lb); Body mass index is 21.28 kg/m².; TWG=9.979 kg (22 lb)  Fetal Heart Rate: 142;    SVE today: Cervical Dilation: 5 /Cervical Effacement: 60 /Fetal Station: -1  Abdomen: gravid, soft, non-tender.  She reports .  Denies uterine contractions.  Denies vaginal bleeding or leaking of fluid.  Reports adequate fetal movement of at least 10 movements in 2 hours once daily.  Reviewed labor precautions and fetal kick counts as well as pre-eclampsia warning signs.  Reviewed perineal massage for decreasing risk of perineal lacerations during delivery.  Advised to continue medications.  IOL 5/13/25 at 0600      Current Outpatient Medications   Medication Instructions    albuterol (Ventolin HFA) 90 mcg/act inhaler 2 puffs, Inhalation, Every 6 hours PRN    Blood Glucose Monitoring Suppl (OneTouch Verio Reflect) w/Device KIT Dispense 1 kit per insurance formulary. GDM    glucose blood (OneTouch Verio) test strip Test 4 times a day. GDM.    Lancets (OneTouch Delica Plus [id number]) MISC Use 4 a day. GDM.    Prenatal Vit-Fe Fumarate-FA (Prenatal Plus Vitamin/Mineral) 27-1 MG TABS 1 tablet, Oral, Daily         G4 Problems (from 10/09/24 to present)       Problem Noted Diagnosed Resolved    Polyhydramnios in third trimester 5/12/2025 by Yardlie Toussaint-Foster,   No    Overview Signed 5/12/2025 11:43 AM by Yardlie Toussaint-Foster, DO   IOL 5/13/25 at 0600         39 weeks gestation of pregnancy 1/14/2025 by DANIEL Gonzales  No    Overview Addendum 5/4/2025  3:52 PM by Lucinda Long MD   Labs  Pap smear 11/15/24 - NILM, GC/CT neg  Prenatal panel WNL  28w labs, mild anemia otherwise WNL   1 hour GTT was 187 , 3-hour GTT not completed   Vaccines:  Flu vaccine: 11/24  Covid vaccine: not completed   Tdap vaccine: 2/25  Genetic screening: negative   Contraception: injection   Breastfeeding: breast and bottle   Birth plan:  vaginal   Delivery consent: 3/11/25  Induction consent signed not completed   Level I EWF 33% 25 , recommended that FU  2025 if still pregnant for amniotic fluid volume          GDMA1 2025 by DANIEL Gonzales  No    Overview Addendum 2025 11:53 AM by DANIEL Babcock   Dx @15 weeks  Needs consultation w/ DM Management program - done 12/3/24  Monitor blood sugars 4x/day and report weekly to DM program - in progress  Needs baseline PEC labs - done WNL  Needs hgbA1C - done     1/10/25=5.2     25=5.2  Needs fetal echo - done WNL  Serial growth scans - in progress         Asthma during pregnancy 2025 by DANIEL Babcock  No    Overview Addendum 2025 11:53 AM by DANIEL Babcock   Albuterol prn         Not immune to HBV 2024 by DANIEL Babcock  No    Overview Addendum 2025 11:50 AM by DANIEL Babcock   Needs HBV vaccine booster         Family hx DM 2024 by DANIEL Babcock  No    Overview Addendum 2024  9:37 AM by DANIEL Babcock   Pt's father w/DM  Needs early GDM screen - done ABNORMAL         History of PTD 2024 by DANIEL Babcock  No    Overview Addendum 2025 11:50 AM by DANIEL Babcock   Needs MFM consultation - done 24  Serial CL measurements - done WNL         Multiple Sclerosis 2020 by Eryn Schuler MD  No    Overview Addendum 2025 11:52 AM by DANIEL Babcock   Dx approx 4 years ago with manifestations of migraines, history of seizures when she was younger and leg weakness  Needs MFM consultation - done 24   No meds

## 2025-05-12 NOTE — PROGRESS NOTES
[facility]  [place]: Ms. Thomas was seen today at 39w4d gestational age for JOEY (found under the OB procedures).     JOEY was within normal limits. She is scheduled for induction on .     Daly Ferguson PA-C

## 2025-05-12 NOTE — PATIENT INSTRUCTIONS
WARNING SIGNS DURING PREGNANCY  Call our office at [phone] for any of the followin. Vaginal bleeding  2. Sharp abdominal pain that does not go away  3. Fever (more than 100.4 and is not relieved by Tylenol)  4. Persistent vomiting lasting greater than 24 hours  5. Chest pain   6. Pain or burning when you urinate  7. Severe headache that doesn't resolve with Tylenol  8. Blurred vision or seeing spots in your vision  9. Sudden swelling of your face or hands  10. Redness, swelling or pain in a leg  11. A sudden weight gain in just a few days  12. Decrease in your baby's movement (after 28 weeks or the 6th month of pregnancy)  13. A loss of watery fluid from your vagina - can be a gush, a trickle or continuous wetness  14. After 20 weeks of pregnancy, rhythmic cramping (greater than 4 per hour) or menstrual like low/pelvic pain

## 2025-05-13 ENCOUNTER — ANESTHESIA (INPATIENT)
Dept: ANESTHESIOLOGY | Facility: HOSPITAL | Age: 35
End: 2025-05-13
Payer: MEDICARE

## 2025-05-13 ENCOUNTER — ANESTHESIA EVENT (INPATIENT)
Dept: ANESTHESIOLOGY | Facility: HOSPITAL | Age: 35
End: 2025-05-13
Payer: MEDICARE

## 2025-05-13 ENCOUNTER — HOSPITAL ENCOUNTER (INPATIENT)
Facility: HOSPITAL | Age: 35
LOS: 2 days | Discharge: HOME | End: 2025-05-15
Attending: OBSTETRICS & GYNECOLOGY | Admitting: OBSTETRICS & GYNECOLOGY
Payer: MEDICARE

## 2025-05-13 ENCOUNTER — HOSPITAL ENCOUNTER (OUTPATIENT)
Dept: LABOR AND DELIVERY | Facility: HOSPITAL | Age: 35
Discharge: HOME | End: 2025-05-13
Payer: MEDICARE

## 2025-05-13 DIAGNOSIS — Z3A.39 39 WEEKS GESTATION OF PREGNANCY: ICD-10-CM

## 2025-05-13 DIAGNOSIS — I10 CHRONIC HYPERTENSION: ICD-10-CM

## 2025-05-13 LAB
ABO GROUP BLD: NORMAL
ALBUMIN SERPL BCG-MCNC: 3.4 G/DL (ref 3.5–5)
ALP SERPL-CCNC: 200 U/L (ref 34–104)
ALT SERPL W P-5'-P-CCNC: 8 U/L (ref 7–52)
ANION GAP SERPL CALCULATED.3IONS-SCNC: 9 MMOL/L (ref 4–13)
AST SERPL W P-5'-P-CCNC: 10 U/L (ref 13–39)
BASE EXCESS BLDCOA CALC-SCNC: -8.8 MMOL/L (ref 3–11)
BASE EXCESS BLDCOV CALC-SCNC: -8.7 MMOL/L (ref 1–9)
BILIRUB SERPL-MCNC: 0.4 MG/DL (ref 0.2–1)
BLD GP AB SCN SERPL QL: NEGATIVE
BUN SERPL-MCNC: 10 MG/DL (ref 5–25)
CALCIUM ALBUM COR SERPL-MCNC: 9.3 MG/DL (ref 8.3–10.1)
CALCIUM SERPL-MCNC: 8.8 MG/DL (ref 8.4–10.2)
CHLORIDE SERPL-SCNC: 106 MMOL/L (ref 96–108)
CO2 SERPL-SCNC: 20 MMOL/L (ref 21–32)
CREAT SERPL-MCNC: 0.58 MG/DL (ref 0.6–1.3)
ERYTHROCYTE [DISTWIDTH] IN BLOOD BY AUTOMATED COUNT: 12.9 % (ref 11.6–15.1)
GFR SERPL CREATININE-BSD FRML MDRD: 120 ML/MIN/1.73SQ M
GLUCOSE SERPL-MCNC: 73 MG/DL (ref 65–140)
GLUCOSE SERPL-MCNC: 77 MG/DL (ref 65–140)
GLUCOSE SERPL-MCNC: 83 MG/DL (ref 65–140)
HCO3 BLDCOA-SCNC: 20.8 MMOL/L (ref 17.3–27.3)
HCO3 BLDCOV-SCNC: 20.6 MMOL/L (ref 12.2–28.6)
HCT VFR BLD AUTO: 33.2 % (ref 34.8–46.1)
HGB BLD-MCNC: 11.2 G/DL (ref 11.5–15.4)
HOLD SPECIMEN: YES
MCH RBC QN AUTO: 30.2 PG (ref 26.8–34.3)
MCHC RBC AUTO-ENTMCNC: 33.7 G/DL (ref 31.4–37.4)
MCV RBC AUTO: 90 FL (ref 82–98)
O2 CT VFR BLDCOA CALC: 7.6 ML/DL
OXYHGB MFR BLDCOA: 35.2 %
OXYHGB MFR BLDCOV: 34.1 %
PCO2 BLDCOA: 60.3 MM[HG] (ref 30–60)
PCO2 BLDCOV: 58.4 MM HG (ref 27–43)
PH BLDCOA: 7.16 [PH] (ref 7.23–7.43)
PH BLDCOV: 7.17 [PH] (ref 7.19–7.49)
PLATELET # BLD AUTO: 329 THOUSANDS/UL (ref 149–390)
PMV BLD AUTO: 9.8 FL (ref 8.9–12.7)
PO2 BLDCOA: 19 MM HG (ref 5–25)
PO2 BLDCOV: 19.8 MM HG (ref 15–45)
POTASSIUM SERPL-SCNC: 3.4 MMOL/L (ref 3.5–5.3)
PROT SERPL-MCNC: 6.6 G/DL (ref 6.4–8.4)
RBC # BLD AUTO: 3.71 MILLION/UL (ref 3.81–5.12)
RH BLD: POSITIVE
SAO2 % BLDCOV: 7.2 ML/DL
SODIUM SERPL-SCNC: 135 MMOL/L (ref 135–147)
SPECIMEN EXPIRATION DATE: NORMAL
TREPONEMA PALLIDUM IGG+IGM AB [PRESENCE] IN SERUM OR PLASMA BY IMMUNOASSAY: NORMAL
WBC # BLD AUTO: 13.6 THOUSAND/UL (ref 4.31–10.16)

## 2025-05-13 PROCEDURE — 80053 COMPREHEN METABOLIC PANEL: CPT

## 2025-05-13 PROCEDURE — NC001 PR NO CHARGE: Performed by: OBSTETRICS & GYNECOLOGY

## 2025-05-13 PROCEDURE — 82948 REAGENT STRIP/BLOOD GLUCOSE: CPT

## 2025-05-13 PROCEDURE — 4A1HXCZ MONITORING OF PRODUCTS OF CONCEPTION, CARDIAC RATE, EXTERNAL APPROACH: ICD-10-PCS | Performed by: OBSTETRICS & GYNECOLOGY

## 2025-05-13 PROCEDURE — 3E0R3BZ INTRODUCTION OF ANESTHETIC AGENT INTO SPINAL CANAL, PERCUTANEOUS APPROACH: ICD-10-PCS | Performed by: ANESTHESIOLOGY

## 2025-05-13 PROCEDURE — 86850 RBC ANTIBODY SCREEN: CPT

## 2025-05-13 PROCEDURE — 86780 TREPONEMA PALLIDUM: CPT

## 2025-05-13 PROCEDURE — 85027 COMPLETE CBC AUTOMATED: CPT

## 2025-05-13 PROCEDURE — 86900 BLOOD TYPING SEROLOGIC ABO: CPT

## 2025-05-13 PROCEDURE — 59409 OBSTETRICAL CARE: CPT | Performed by: OBSTETRICS & GYNECOLOGY

## 2025-05-13 PROCEDURE — 86901 BLOOD TYPING SEROLOGIC RH(D): CPT

## 2025-05-13 PROCEDURE — 82805 BLOOD GASES W/O2 SATURATION: CPT | Performed by: OBSTETRICS & GYNECOLOGY

## 2025-05-13 RX ORDER — OXYTOCIN/RINGER'S LACTATE 30/500 ML
1-30 PLASTIC BAG, INJECTION (ML) INTRAVENOUS
Status: DISCONTINUED | OUTPATIENT
Start: 2025-05-13 | End: 2025-05-13

## 2025-05-13 RX ORDER — SIMETHICONE 80 MG
80 TABLET,CHEWABLE ORAL 4 TIMES DAILY PRN
Status: DISCONTINUED | OUTPATIENT
Start: 2025-05-13 | End: 2025-05-15 | Stop reason: HOSPADM

## 2025-05-13 RX ORDER — ALBUTEROL SULFATE 90 UG/1
2 INHALANT RESPIRATORY (INHALATION) EVERY 6 HOURS PRN
Status: DISCONTINUED | OUTPATIENT
Start: 2025-05-13 | End: 2025-05-15 | Stop reason: HOSPADM

## 2025-05-13 RX ORDER — ONDANSETRON 2 MG/ML
4 INJECTION INTRAMUSCULAR; INTRAVENOUS EVERY 8 HOURS PRN
Status: DISCONTINUED | OUTPATIENT
Start: 2025-05-13 | End: 2025-05-15 | Stop reason: HOSPADM

## 2025-05-13 RX ORDER — CALCIUM CARBONATE 500 MG/1
1000 TABLET, CHEWABLE ORAL DAILY PRN
Status: DISCONTINUED | OUTPATIENT
Start: 2025-05-13 | End: 2025-05-15 | Stop reason: HOSPADM

## 2025-05-13 RX ORDER — ACETAMINOPHEN 325 MG/1
650 TABLET ORAL EVERY 4 HOURS PRN
Status: DISCONTINUED | OUTPATIENT
Start: 2025-05-13 | End: 2025-05-15 | Stop reason: HOSPADM

## 2025-05-13 RX ORDER — LIDOCAINE HYDROCHLORIDE AND EPINEPHRINE 15; 5 MG/ML; UG/ML
INJECTION, SOLUTION EPIDURAL
Status: COMPLETED | OUTPATIENT
Start: 2025-05-13 | End: 2025-05-13

## 2025-05-13 RX ORDER — ROPIVACAINE HYDROCHLORIDE 2 MG/ML
INJECTION, SOLUTION EPIDURAL; INFILTRATION; PERINEURAL CONTINUOUS PRN
Status: DISCONTINUED | OUTPATIENT
Start: 2025-05-13 | End: 2025-05-13 | Stop reason: HOSPADM

## 2025-05-13 RX ORDER — IBUPROFEN 600 MG/1
600 TABLET, FILM COATED ORAL EVERY 6 HOURS
Status: DISCONTINUED | OUTPATIENT
Start: 2025-05-13 | End: 2025-05-15 | Stop reason: HOSPADM

## 2025-05-13 RX ORDER — OXYTOCIN/RINGER'S LACTATE 30/500 ML
250 PLASTIC BAG, INJECTION (ML) INTRAVENOUS ONCE
Status: COMPLETED | OUTPATIENT
Start: 2025-05-13 | End: 2025-05-13

## 2025-05-13 RX ORDER — DIPHENHYDRAMINE HCL 25 MG
25 TABLET ORAL EVERY 6 HOURS PRN
Status: DISCONTINUED | OUTPATIENT
Start: 2025-05-13 | End: 2025-05-15 | Stop reason: HOSPADM

## 2025-05-13 RX ORDER — ROPIVACAINE HYDROCHLORIDE 5 MG/ML
INJECTION, SOLUTION EPIDURAL; INFILTRATION; PERINEURAL AS NEEDED
Status: DISCONTINUED | OUTPATIENT
Start: 2025-05-13 | End: 2025-05-13 | Stop reason: HOSPADM

## 2025-05-13 RX ORDER — BUPIVACAINE HYDROCHLORIDE 2.5 MG/ML
30 INJECTION, SOLUTION EPIDURAL; INFILTRATION; INTRACAUDAL; PERINEURAL ONCE AS NEEDED
Status: DISCONTINUED | OUTPATIENT
Start: 2025-05-13 | End: 2025-05-13

## 2025-05-13 RX ORDER — BENZOCAINE/MENTHOL 6 MG-10 MG
1 LOZENGE MUCOUS MEMBRANE DAILY PRN
Status: DISCONTINUED | OUTPATIENT
Start: 2025-05-13 | End: 2025-05-15 | Stop reason: HOSPADM

## 2025-05-13 RX ORDER — ONDANSETRON 2 MG/ML
4 INJECTION INTRAMUSCULAR; INTRAVENOUS EVERY 6 HOURS PRN
Status: DISCONTINUED | OUTPATIENT
Start: 2025-05-13 | End: 2025-05-13

## 2025-05-13 RX ORDER — SODIUM CHLORIDE, SODIUM LACTATE, POTASSIUM CHLORIDE, CALCIUM CHLORIDE 600; 310; 30; 20 MG/100ML; MG/100ML; MG/100ML; MG/100ML
125 INJECTION, SOLUTION INTRAVENOUS CONTINUOUS
Status: DISCONTINUED | OUTPATIENT
Start: 2025-05-13 | End: 2025-05-15 | Stop reason: HOSPADM

## 2025-05-13 RX ORDER — DOCUSATE SODIUM 100 MG/1
100 CAPSULE, LIQUID FILLED ORAL 2 TIMES DAILY
Status: DISCONTINUED | OUTPATIENT
Start: 2025-05-13 | End: 2025-05-15

## 2025-05-13 RX ORDER — SODIUM CHLORIDE, SODIUM LACTATE, POTASSIUM CHLORIDE, CALCIUM CHLORIDE 600; 310; 30; 20 MG/100ML; MG/100ML; MG/100ML; MG/100ML
125 INJECTION, SOLUTION INTRAVENOUS CONTINUOUS
Status: DISCONTINUED | OUTPATIENT
Start: 2025-05-13 | End: 2025-05-13

## 2025-05-13 RX ADMIN — ACETAMINOPHEN 650 MG: 325 TABLET, FILM COATED ORAL at 14:22

## 2025-05-13 RX ADMIN — ROPIVACAINE HYDROCHLORIDE 10 ML/HR: 2 INJECTION EPIDURAL; INFILTRATION; PERINEURAL at 09:12

## 2025-05-13 RX ADMIN — Medication 250 MILLI-UNITS/MIN: at 12:38

## 2025-05-13 RX ADMIN — DOCUSATE SODIUM 100 MG: 100 CAPSULE, LIQUID FILLED ORAL at 13:39

## 2025-05-13 RX ADMIN — PRENATAL VIT W/ FE FUMARATE-FA TAB 27-0.8 MG 1 TABLET: 27-0.8 TAB at 13:39

## 2025-05-13 RX ADMIN — BENZOCAINE AND LEVOMENTHOL 1 APPLICATION: 200; 5 SPRAY TOPICAL at 14:20

## 2025-05-13 RX ADMIN — ONDANSETRON 4 MG: 2 INJECTION INTRAMUSCULAR; INTRAVENOUS at 10:36

## 2025-05-13 RX ADMIN — IBUPROFEN 600 MG: 600 TABLET ORAL at 13:39

## 2025-05-13 RX ADMIN — IBUPROFEN 600 MG: 600 TABLET ORAL at 19:44

## 2025-05-13 RX ADMIN — SODIUM CHLORIDE, SODIUM LACTATE, POTASSIUM CHLORIDE, AND CALCIUM CHLORIDE 125 ML/HR: .6; .31; .03; .02 INJECTION, SOLUTION INTRAVENOUS at 07:07

## 2025-05-13 RX ADMIN — Medication 2 MILLI-UNITS/MIN: at 08:14

## 2025-05-13 RX ADMIN — SODIUM CHLORIDE, SODIUM LACTATE, POTASSIUM CHLORIDE, AND CALCIUM CHLORIDE 999 ML/HR: .6; .31; .03; .02 INJECTION, SOLUTION INTRAVENOUS at 08:14

## 2025-05-13 RX ADMIN — LIDOCAINE HYDROCHLORIDE AND EPINEPHRINE 3 ML: 15; 5 INJECTION, SOLUTION EPIDURAL; INFILTRATION; INTRACAUDAL; PERINEURAL at 09:08

## 2025-05-13 RX ADMIN — ROPIVACAINE HYDROCHLORIDE 3 ML: 5 INJECTION, SOLUTION EPIDURAL; INFILTRATION; PERINEURAL at 09:12

## 2025-05-13 RX ADMIN — SODIUM CHLORIDE, SODIUM LACTATE, POTASSIUM CHLORIDE, AND CALCIUM CHLORIDE 125 ML/HR: .6; .31; .03; .02 INJECTION, SOLUTION INTRAVENOUS at 09:05

## 2025-05-13 RX ADMIN — WITCH HAZEL 1 PAD: 500 SOLUTION RECTAL; TOPICAL at 14:20

## 2025-05-13 RX ADMIN — ROPIVACAINE HYDROCHLORIDE: 2 INJECTION, SOLUTION EPIDURAL; INFILTRATION at 09:16

## 2025-05-13 NOTE — ASSESSMENT & PLAN NOTE
- Pain well controlled with oral analgesics  - Voiding spontaneously  - Tolerating PO fluids and solids  - Ambulating without difficulty  - Contraception: Depo  - Anticipate discharge PPD#2

## 2025-05-13 NOTE — ANESTHESIA PROCEDURE NOTES
Epidural Block    Patient location during procedure: OB/L&D  Start time: 5/13/2025 9:07 AM  Reason for block: primary anesthetic  Staffing  Performed by: Lubna Gong MD  Authorized by: Lubna Gong MD    Preanesthetic Checklist  Completed: patient identified, IV checked, site marked, risks and benefits discussed, surgical consent, monitors and equipment checked, pre-op evaluation and timeout performed  Epidural  Patient position: sitting  Prep: Betadine  Sedation Level: no sedation  Patient monitoring: continuous pulse oximetry, frequent blood pressure checks and heart rate  Approach: midline  Location: lumbar, L3-4  Injection technique: ZEE saline  Needle  Needle type: Tuohy   Needle gauge: 18 G  Needle insertion depth: 5 cm  Catheter type: multi-orifice  Catheter size: 20 G  Catheter at skin depth: 10 cm  Catheter securement method: clear occlusive dressing, stabilization device and tape  Test dose: negativelidocaine-epinephrine (XYLOCAINE-MPF/EPINEPHRINE) 1.5 %-1:200,000 injection 3 mL - Epidural   3 mL - 5/13/2025 9:08:00 AM  Assessment  Sensory level: T10  Number of attempts: 1negative aspiration for CSF, negative aspiration for heme and no paresthesia on injection  patient tolerated the procedure well with no immediate complications

## 2025-05-13 NOTE — ANESTHESIA PREPROCEDURE EVALUATION
Procedure:  LABOR ANALGESIA    Relevant Problems   GYN   (+) 39 weeks gestation of pregnancy      PULMONARY   (+) Asthma during pregnancy      Behavioral Health   (+) Tobacco abuse (Resolved)      Obstetrics/Gynecology   (+) GDMA1   (+) Polyhydramnios in third trimester      Neurology/Sleep   (+) Multiple Sclerosis        Physical Exam    Airway    Mallampati score: II         Dental   No notable dental hx     Cardiovascular  Cardiovascular exam normal    Pulmonary  Pulmonary exam normal     Other Findings  post-pubertal.      Anesthesia Plan  ASA Score- 3     Anesthesia Type- epidural with ASA Monitors.         Additional Monitors:     Airway Plan:            Plan Factors-Exercise tolerance (METS): >4 METS.    Chart reviewed.   Existing labs reviewed.                   Induction-     Postoperative Plan-     Perioperative Resuscitation Plan - Level 1 - Full Code.       Informed Consent- Anesthetic plan and risks discussed with patient.        NPO Status:  No vitals data found for the desired time range.

## 2025-05-13 NOTE — ASSESSMENT & PLAN NOTE
- Dx approx 4 years ago with manifestations of migraines, history of seizures when she was younger and leg weakness  - Last hospitalization 2024 prior to pregnancy  - Not on meds

## 2025-05-13 NOTE — ANESTHESIA POSTPROCEDURE EVALUATION
Post-Op Assessment Note    CV Status:  Stable    Pain management: adequate      Post-op block assessment: catheter intact and no complications   Mental Status:  Awake   Hydration Status:  Stable   PONV Controlled:  Controlled   Airway Patency:  Patent     Post Op Vitals Reviewed: Yes    No anethesia notable event occurred.    Staff: Anesthesiologist           Last Filed PACU Vitals:  Vitals Value Taken Time   Temp     Pulse 117 05/13/25 1249   /81 05/13/25 1249   Resp     SpO2

## 2025-05-13 NOTE — OB LABOR/OXYTOCIN SAFETY PROGRESS
Oxytocin Safety Progress Check Note - Christine Thomas 34 y.o. female MRN: [id number]    Unit/Bed#: -01 Encounter: [id number]    Dose (lisa-units/min) Oxytocin: 6 lisa-units/min  Contraction Frequency (minutes): 6  Contraction Intensity: Mild     Cervical Dilation: 5        Cervical Effacement: 70  Fetal Station: -2  Baseline Rate (FHR): 130 bpm     FHR Category: 1               Vital Signs:   Vitals:    05/13/25 1004   BP: 100/78   Pulse: (!) 107   SpO2:        Notes/comments:   SVE deferred at this time. Tracing cat 1. Ctx regular q3.5-4 mins on pit of 6. Plan to continue uptitration. Plan to recheck in 1 hour and or earlier when clinically indicated.       Mayco Sanches MD 5/13/2025 10:24 AM

## 2025-05-13 NOTE — DISCHARGE SUMMARY
Obstetrics Discharge Summary  Christine Thomas 34 y.o. female MRN: [id number]  Unit/Bed#: -01 Encounter: [id number]    Admission Date: 2025     Discharge Date: 5/15/2025    Admitting Attending: Dr. Haro  Delivery Attending: Dr. Haro  Discharging Attending:  Dr. Dangelo    Admitting Diagnoses:   Pregnancy at 39 weeks and 5 days gestation   Multiple sclerosis  Polyhydramnios   Asthma  Hepatitis B non-immune status    Discharge Diagnoses:   Same, delivered  Delivery of term male   Chronic hypertension    Procedures: Spontaneous vaginal delivery    Anesthesia: epidural    Hospital course: Christine Thomas is now a 34 y.o.  who was admitted for elective induction of labor. She was induced with pitocin. She spontaneously ruptured to clear fluid. She progressed to complete cervical dilation and began pushing.    Delivery Findings:  After pushing for 7 minutes, Christine delivered a viable male  on 2025 12:13 PM via Vaginal, Spontaneous. The delivery was uncomplicated.  She sustained no lacerations during delivery. Patient tolerated the procedure well.  was admitted to the  nursery.    Baby's Weight: 3190 g (7 lb 0.5 oz); 112.52    Apgar scores: 8  and 9  at 1 and 5 minutes, respectively  QBL: Non-Surgical QBL (mL): 509        Her post-delivery course was complicated by chronic hypertension. On further chart review, multiple elevated BP's noted on multiple prior ED visits prior to pregnancy. She was started on Procadia XL 30mg on PPD#1. Her blood pressures on this regimen were stable. Her postpartum pain was well controlled with oral analgesics. Maternal blood type is A positive so RhoGAM was not indicated. For contraception, she received Depo-Provera.    On day of discharge, she was ambulating and able to reasonably perform all ADLs. She was voiding and had appropriate bowel function. Pain was well controlled. She was discharged home on postpartum day #2 without complications.  Patient was instructed to follow up with her OBGYN as an outpatient and was given appropriate warnings to call provider if she develops signs of infection or uncontrolled pain.    Complications: none apparent    Condition at discharge: Good    Disposition: Home    Planned Readmission: No    Discharge Medications:   Please see AVS for a complete list of discharge medications.    Discharge instructions :   -Do not place anything (no partner, tampons or douche) in your vagina for 6 weeks  -You may walk for exercise for the first 6 weeks then gradually return to your usual activities   -Please do not drive for 1 week if you have no stitches and for 2 weeks if you have stitches    -You may take baths or shower per your preference   -Please examine your breasts in the mirror daily and call your doctor for redness or tenderness or increased warmth    -Please call your doctor's office if temperature > 100.4*F or 38* C, worsening pain or a foul discharge.    Follow Up:  - Follow up in 1 week for blood pressure check and 3 weeks for postpartum visit    Mayco Sanches MD  Obstetrics & Gynecology PGY-1  5/15/2025  8:53 AM

## 2025-05-13 NOTE — OB LABOR/OXYTOCIN SAFETY PROGRESS
Oxytocin Safety Progress Check Note - Christine Thomas 34 y.o. female MRN: [id number]    Unit/Bed#: -01 Encounter: [id number]    Dose (lisa-units/min) Oxytocin: 6 lisa-units/min  Contraction Frequency (minutes): 6  Contraction Intensity: Mild     Cervical Dilation: 5-6        Cervical Effacement: 70  Fetal Station: -2  Baseline Rate (FHR): 130 bpm     FHR Category: 1               Vital Signs:   Vitals:    05/13/25 1031   BP:    Pulse:    Temp: 97.8 °F (36.6 °C)   SpO2:        Notes/comments:   SVE unchanged. Tracing cat 1 with moderate variability. SROM'd to clear fluid at 1040. Ctx q2-4 mins on pit of 6. Dr. Haro made aware.       Mayco Sanches MD 5/13/2025 10:43 AM

## 2025-05-13 NOTE — H&P
"H & P- Obstetrics   Christine Thomas 34 y.o. female MRN: [id number]  Unit/Bed#: -01 Encounter: [id number]    Assessment: 34 y.o.  at 39w5d admitted for elective IOL.  SVE: /-2  FHT: reactive  Clinical EFW: 8 lbs; Cephalic confirmed by TAUS  GBS status: negative   Postpartum contraception plan: DP    Plan:   Polyhydramnios in third trimester  Assessment & Plan  - JOEY on  22.5 with LVP 8.9  - Resolved     GDMA1  Assessment & Plan  Lab Results   Component Value Date    HGBA1C 5.2 2025       Recent Labs     25  0729   POCGLU 83       Blood Sugar Average: Last 72 hrs:  (P) 83    - POC glucose in labor: q2 in latent labor, q1 in active labor    39 weeks gestation of pregnancy  Assessment & Plan  - PNL wnl   - A pos  - GBS neg  - EFW 2969 grams - 6 lbs 9 oz (33%) at 37w4d     Not immune to HBV  Assessment & Plan  - Offer vaccine in postpartum period    Multiple Sclerosis  Assessment & Plan  - Dx approx 4 years ago with manifestations of migraines, history of seizures when she was younger and leg weakness  - Last hospitalization  prior to pregnancy  - Not on meds      Discussed case and plan w/ Dr. Haro.    Chief Complaint: \"I'm here for my induction.\"     HPI: Christine Thomas is a 34 y.o.  with an FAIZA of 5/15/2025, by Ultrasound at 39w5d who is being admitted for elective IOL. She denies having uterine contractions, has no LOF, and reports no VB. She states she has felt good FM.    Patient Active Problem List   Diagnosis    Multiple Sclerosis    Family hx DM    History of PTD    Not immune to HBV    Asthma during pregnancy    39 weeks gestation of pregnancy    GDMA1    Polyhydramnios in third trimester       Baby complications/comments: none    Review of Systems   Constitutional:  Negative for chills and fever.   Respiratory:  Negative for cough and shortness of breath.    Gastrointestinal:  Negative for diarrhea, nausea and vomiting.   Genitourinary:  Negative for dysuria and " hematuria.   Skin:  Negative for color change and rash.   Neurological:  Negative for dizziness, syncope and headaches.     OB Hx:  OB History    Para Term  AB Living   4 2 1 1 1 2   SAB IAB Ectopic Multiple Live Births   1 0 0 0 2      # Outcome Date GA Lbr Juan J/2nd Weight Sex Type Anes PTL Lv   4 Current            3 SAB 2023           2   32w0d   F Vag-Spont None  JD   1 Term     F Vag-Spont EPI  JD       Past Medical Hx:  Past Medical History:   Diagnosis Date    Asthma     Depression     Hypertension     Migraine     Multiple sclerosis (HCC) 2018    Neurogenic bladder     PTSD (post-traumatic stress disorder)     Seizure ([facility])     possibly r/t MS diagnosis, likely stress-related    Vertigo        Past Surgical hx:  Past Surgical History:   Procedure Laterality Date    CLOSED REDUCTION FINGER FRACTURE Left     FL LUMBAR PUNCTURE DIAGNOSTIC  2019    TONSILLECTOMY Bilateral 1994    TRANSVAGINAL PREGNANCY  2024    UMBILICAL HERNIA REPAIR  2016       Social Hx:  Alcohol use: denies  Tobacco use: denies  Other substance use: denies    Allergies   Allergen Reactions    Clindamycin Throat Swelling    Onion - Food Allergy Anaphylaxis and Swelling     Swelling of throat.      Gabapentin Diarrhea, Hives and Itching    Sumatriptan Swelling         Medications Prior to Admission:     Blood Glucose Monitoring Suppl (OneTouch Verio Reflect) w/Device KIT    glucose blood (OneTouch Verio) test strip    Lancets (OneTouch Delica Plus [id number]) MISC    Prenatal Vit-Fe Fumarate-FA (Prenatal Plus Vitamin/Mineral) 27-1 MG TABS    albuterol (Ventolin HFA) 90 mcg/act inhaler    Objective:  HR:  [97] 97  BP: (120)/(80-82) 120/82  There is no height or weight on file to calculate BMI.     Physical Exam:  Physical Exam  Constitutional:       Appearance: Normal appearance.   Genitourinary:      Vulva normal.   Cardiovascular:      Rate and Rhythm: Normal rate.   Pulmonary:      Effort:  Pulmonary effort is normal.   Abdominal:      Palpations: Abdomen is soft.   Neurological:      General: No focal deficit present.      Mental Status: She is alert and oriented to person, place, and time.   Skin:     General: Skin is dry.   Psychiatric:         Mood and Affect: Mood normal.        FHT:  Baseline Rate (FHR): 130 bpm  FHR Category: Category I    TOCO:   Contraction Frequency (minutes): 6  Contraction Duration (seconds): 60  Contraction Intensity: Mild    Lab Results   Component Value Date    WBC 13.60 (H) 05/13/2025    HGB 11.2 (L) 05/13/2025    HCT 33.2 (L) 05/13/2025     05/13/2025     Lab Results   Component Value Date    K 3.1 (L) 11/20/2024     11/20/2024    CO2 23 11/20/2024    BUN 6 11/20/2024    CREATININE 0.50 (L) 11/20/2024    AST 28 11/20/2024    ALT 36 11/20/2024     Prenatal Labs: Reviewed      Blood type: A positive  Antibody: negative  GBS: negative  HIV: non-reactive  Rubella: immune  Syphilis IgM/IgG: non-reactive  HBsAg: non-reactive  HCAb: non-reactive  Chlamydia: negative  Gonorrhea: negative  Diabetes 1 hour screen: 187 mg/dL, c/w GDM  3 hour glucose: n/a  Platelets: 329 thousands/uL  Hgb: 11.2 g/dL  >2 Midnights  INPATIENT     Signature/Title: Mamie Vasquez MD  Date: 5/13/2025  Time: 7:43 AM

## 2025-05-13 NOTE — OB LABOR/OXYTOCIN SAFETY PROGRESS
Oxytocin Safety Progress Check Note - Christine Thomas 34 y.o. female MRN: [id number]    Unit/Bed#: -01 Encounter: [id number]    Dose (lisa-units/min) Oxytocin: 6 lisa-units/min  Contraction Frequency (minutes): 2-3  Contraction Intensity: Mild/Moderate  Uterine Activity Characteristics: Regular  Cervical Dilation: 10  Dilation Complete Date: 25  Dilation Complete Time: 1156  Cervical Effacement: 100  Fetal Station: 2  Baseline Rate (FHR): 125 bpm     FHR Category: 1               Vital Signs:   Vitals:    25 1135   BP: 121/80   Pulse: 91   Temp:    SpO2:        Notes/comments:   Complete dilation/+2 station. Anticipate       Mayco Sanches MD 2025 11:58 AM

## 2025-05-13 NOTE — L&D DELIVERY NOTE
Vaginal Delivery Summary - OB/GYN   Christine Thomas 34 y.o. female MRN: [id number]  Unit/Bed#: -01 Encounter: [id number]    Pre-delivery Diagnosis:   Pregnancy at 39 weeks and 5 days gestation   Multiple sclerosis  Polyhydramnios   Asthma  Hepatitis B non-immune status    Post-delivery Diagnosis:   Same, delivered  Delivery of term male   Elevated blood pressure without diagnosis of hypertension    Procedure: Spontaneous Vaginal Delivery    Attending Physician: Dr. Haro  Resident Physician: Dr. Mayco Sanches    Anesthesia: Epidural    QBL: 509 cc  Admission H.2 g/dL  Admission platelets: 329 thousands/uL    Complications: none apparent    Specimens:   1. Arterial and venous cord gases  2. Cord blood  3. Segment of umbilical cord  4. Placenta to storage     Findings:  1. Viable male on 2025 at 12:13 PM, with APGARS of 8  and 9  at 1 and 5 minutes respectively. Weight pending at time of dictation for skin to skin bonding.  2. Spontaneous delivery of intact placenta at 1217    Gases:  Umbilical Artery  Recent Labs     25  1214   PHCART 7.156*   BECART -8.8*       Umbilical Vein  Recent Labs     25  1214   PHCVEN 7.165*   BECVEN -8.7*         Brief history and labor course:  Christine Thomas is now a 34 y.o. [id number] 39w5d who presented to labor and delivery for elective induction of labor. Her pregnancy was complicated by multiple sclerosis (no meds), A1GDM, polyhydramnios, and Hepatitis B non-immune status. On exam, she was noted to be 5/70/-2.  She was induced with pitocin. She received an epidural and spontaneously ruptured for clear fluid. She progressed to complete cervical dilation and began pushing.    Description of delivery:    After pushing for 7 minutes, Christine delivered a viable male , wt pending as mother is doing skin to skin bonding. The fetal vertex delivered OA position, and restituted RIVER spontaneously. There was no nuchal cord. The anterior shoulder delivered  atraumatically with maternal expulsive forces and the assistance of gentle downward traction. The posterior shoulder delivered with maternal expulsive forces and the assistance of gentle upward traction. The remainder of the fetus delivered spontaneously.     Upon delivery, the infant was placed on the mothers abdomen and the cord was doubly clamped and cut. Delayed cord clamping was achieved.The infant was noted to cry spontaneously and was moving all extremities appropriately. There was no evidence for injury. Nurse resuscitators evaluating the . Arterial and venous cord blood gases and cord blood was collected for analysis. These were promptly sent to the lab. In the immediate post-partum, active management of the 3rd stage of labor was performed with massage, the administration of 30 units of IV pitocin, and gentle traction on the umbilical cord. The placenta delivered spontaneously and was noted to have a marginally inserted 3 vessel cord.  The placenta was sent to storage.    The vagina, cervix, perineum, and rectum were inspected and there was noted to be no lacerations.    Bimanual exam revealed minimal clots and good uterine tone.  The fundus was firm and at the level of the umbilicus.  At the conclusion of the procedure, all needle, sponge, and instrument counts were noted to be correct. Patient tolerated the procedure well and was allowed to recover in labor and delivery room with family and  before being transferred to the post-partum floor. Dr. Haro was present and participated in all key portions of the case.    Disposition:  The patient and the  both tolerated the procedure well and are recovering in labor and delivery room       Mayco Sanches MD  OB/GYN PGY-1  2025  5:10 PM

## 2025-05-13 NOTE — DISCHARGE INSTR - AVS FIRST PAGE
"Hypertension:    Please monitor your blood pressure twice daily following discharge to home and write them all down to bring to your doctor appointment.  Call the office for any top numbers over 150 or bottom numbers greater than 100.  Please call if you have a headache unresolved with medication, vision changes, right side abdominal pain or increased swelling.  Call and make an appointment to see your doctor within 1 week of delivery to check your blood pressures.       Vaginal birth - Discharge instructions    The Basics  Written by the doctors and editors at [facility]  What are discharge instructions?  Discharge instructions are information about how to take care of yourself after getting medical care.  What is a vaginal birth?  This is when the baby and placenta come out through the vagina.  Sometimes, giving birth vaginally can cause tearing around the vagina and perineum. (The perineum is the area between the opening of the vagina and the anus.) Doctors and midwives sometimes cut the perineum just before the baby comes out. This is called an \"episiotomy.\" A large tear or episiotomy is closed with stitches.  Most people go home from the hospital about 2 days after a vaginal birth. But it can take longer to heal completely.  How do I care for myself at home?  Ask the doctor or nurse what you should do when you go home. Make sure you understand exactly what you need to do to care for yourself. Ask questions if there is anything you do not understand.  You should also:  ? Expect to have bleeding from your vagina - You should wear a pad, and not use tampons.  ? The blood will change color with time. At first, it can be red or red-brown and might have small clots. It should get lighter and more watery after a few days, and might look pink or brown. After a few weeks, it will become a yellow-white discharge.  ? The bleeding might get heavier when you:  ? Are more active  ? Stand up after lying down for some time  ? " Breastfeed  ? Take care of your vulva and perineum. If you have pain or swelling, it might help to:  ? Take sitz baths - This is a shallow, warm bath that helps clean and heal the area. Do this at least twice a day for 5 to 10 minutes each. You can do it more frequently if you find it soothing. Do not add soap or any medicines to the water unless your doctor recommends this. Pat the area dry with a soft towel. Do not rub.  ? Use a kristopher-bottle after going to the bathroom - This is a small squeeze bottle you fill with warm water and then use to clean yourself. Use it to rinse the area after you urinate or have a bowel movement. Do this instead of wiping with toilet paper. Pat the area dry with a soft towel.  ? Apply cold - Putting cold packs on the area can help with pain and swelling. You can do this several times a day for 15 to 20 minutes at a time. Do not put these directly on your skin. Use a thin towel or piece of cloth between the cold pack and your skin.  ? Drink plenty of water, and eat foods with fiber. This can help prevent constipation. Your doctor might suggest taking a stool softener.  ? Take non-prescription medicines to relieve pain if needed, such as acetaminophen (sample brand name: Tylenol), ibuprofen (sample brand names: Advil, Motrin), or naproxen (sample brand name: Aleve).  ? Increase your activity slowly - You can return to your normal activities as you feel ready. Avoid things that make you very tired or cause pain.  ? Avoid sex for at least 2 weeks - After that, you can have sex when you feel ready. If you had vaginal tearing, wait until you have healed.  ? Talk to your doctor or nurse if you have questions about birth control. It is possible to get pregnant again, even soon after giving birth.  ? Giving birth can cause a lot of physical and emotional changes. These can include pain, vaginal dryness, and feeling very tired. Many people do not feel ready to have sex again for 6 weeks or longer  after giving birth. But some people feel ready sooner.  ? You might want to physically connect with your partner, even if you do not want to have sex. Some options include holding hands, cuddling, and giving each other massages.  ? If you are breastfeeding, your breasts might leak milk during sex. This is a normal response to the hormones your body releases from sex and orgasm.  ? Get help from your partner, family members, or friends when possible. Try to get rest when you can.  What follow-up care do I need?  Your doctor, nurse, or midwife will want to see you again after giving birth. This often happens around 6 weeks, but they will tell you when to schedule the appointment. At this visit, they will do a physical exam, check how you are healing, and make sure you are able to care for yourself and your baby.  When should I call the doctor?  Call your doctor or nurse right away if:  ? You have shortness of breath, a headache that is very bad or will not go away, chest pain, or leg pain or swelling.  ? Your vaginal bleeding:  ? Soaks more than 1 pad in an hour  ? Has clots that are bigger than a quarter  ? Gets heavier, and you feel lightheaded  ? You have signs of infection, such as a fever higher than 100.4°F (38°C), chills, fast heartbeat, cold or clammy skin, or dizziness.  ? Your pain gets worse, or you have new severe pain.  ? You feel depressed or are having trouble coping.  Call for advice if you:  ? Have pain when urinating or during sex  ? Are having trouble breastfeeding or have breast symptoms that worry you  ? Have vaginal discharge that smells bad      Preeclampsia    The Basics  Written by the doctors and editors at [facility]  What is preeclampsia?  This is a dangerous condition some people get when they are pregnant. It usually happens during the second half of pregnancy (after 20 weeks). It can also happen during labor or soon after the baby is born.  People with preeclampsia have high blood  "pressure. They might also have too much protein in their urine, or problems with organs like the liver, kidneys, brain, eyes, or placenta. (The placenta is the organ that brings the baby nutrients and oxygen and carries away waste.) Plus, the baby might not grow well and be smaller than normal.  What are the symptoms of preeclampsia?  Most people do not feel any different than usual. Preeclampsia usually does not cause symptoms unless it is severe. Signs and symptoms of severe preeclampsia include:  ? Bad headache  ? Changes in vision, like blurry vision, flashes of light, or spots  ? Belly pain, especially in the upper belly  If you have any of these symptoms, tell your doctor or nurse. You might not have preeclampsia, because these symptoms can also happen in normal pregnancies. But it's important that your doctor knows about them.  How might preeclampsia affect my baby?  Preeclampsia can:  ? Slow the growth of the baby  ? Decrease the amount of amniotic fluid around the baby (this is the liquid that surrounds and protects the baby in the uterus) (figure 1)  Is there a test for preeclampsia?  Yes. To test for preeclampsia, your doctor or nurse will:  ? Take your blood pressure  ? Check your urine for protein during pregnancy  They will also do blood tests to make sure that your organs are working as they should.  When your doctor or nurse tells you your blood pressure, they will say 2 numbers. For instance, your doctor or nurse might say that your blood pressure is \"140 over 90.\" To be diagnosed with preeclampsia, your top number must be 140 or higher, or your bottom number must be 90 or higher. Plus, you must have too much protein in your urine or certain problems with 1 or more of your organs.  It is possible to have high blood pressure (above 140/90) during pregnancy without having high protein in the urine or other problems. That is not preeclampsia. Still, if you develop high blood pressure, your doctor will " "watch you closely. You could develop preeclampsia or other problems related to high blood pressure.  How is preeclampsia treated?  For preeclampsia that develops during pregnancy, the only cure is to give birth. Your doctor or nurse will talk with you about whether it is better for you to have your baby right away, or to wait. The best decision depends on how severe your preeclampsia is and how many weeks pregnant you are. While giving birth to your baby will cure your preeclampsia, it's also important to give the baby as much time as possible to grow and develop.  If your preeclampsia is not severe:  ? If you are less than 34 weeks pregnant, your doctor will probably suggest waiting.  ? If you are between 34 and 37 weeks pregnant, your doctor will talk to you about your options and help you decide what to do.  ? If you are more than 37 weeks pregnant, your doctor will probably suggest having your baby.  If the decision is to wait, the doctor will check you and your baby often for any problems. You might need to stay in the hospital until it is time to give birth.  When it is time to have your baby, you will probably get medicine to start contractions. This is called \"inducing labor.\" Most people can have a vaginal birth. But in some cases, the doctor will need to do surgery used to get the baby out of the uterus. This is called \" birth.\"  If your preeclampsia is severe, you will probably need to have your baby as soon as possible. You will also get medicine to lower your blood pressure, if it is very high. This is to keep you from having a stroke.  Rarely, people with preeclampsia have seizures. Your doctor or nurse might give you medicine during labor to prevent this.  What can I do to prevent preeclampsia?  You can't do anything to keep from getting preeclampsia. The most important thing you can do is to go to all of the appointments you have with your doctor, nurse, or midwife. That way, they can find " out as soon as possible if your blood pressure goes up, or if you have too much protein in your urine or any other problems.  If you are at high risk for preeclampsia, your doctor might tell you to take low-dose aspirin during your second and third trimesters of pregnancy (after 12 weeks). You are at high risk if:  ? You had preeclampsia before, and your baby was born early.  ? You are pregnant with twins.  ? You have high blood pressure even when you are not pregnant.  There are also other conditions that increase your risk. Your doctor can tell you if you are at high risk.  Do not take aspirin or other medicines unless your doctor or nurse tells you that it's safe.  Can preeclampsia cause other health problems?  If you had preeclampsia, you have a higher chance of getting high blood pressure and heart disease later in life. Tell your primary care doctor or nurse that you had preeclampsia. They can talk to you about how you can lower your chance of getting these health problems in the future. This might include not smoking, eating healthy foods, keeping a healthy weight, and being active.  When should I call the doctor?  Call your doctor or nurse right away if you have any symptoms of severe preeclampsia, like:  ? Bad headache  ? Changes in vision  ? Belly pain  ? New shortness of breath

## 2025-05-14 DIAGNOSIS — Z13.1 DIABETES MELLITUS SCREENING: Primary | ICD-10-CM

## 2025-05-14 DIAGNOSIS — Z86.32 HISTORY OF GESTATIONAL DIABETES MELLITUS (GDM), NOT CURRENTLY PREGNANT: ICD-10-CM

## 2025-05-14 PROBLEM — I10 CHRONIC HYPERTENSION: Status: ACTIVE | Noted: 2025-05-14

## 2025-05-14 PROCEDURE — 99024 POSTOP FOLLOW-UP VISIT: CPT | Performed by: OBSTETRICS & GYNECOLOGY

## 2025-05-14 RX ORDER — MEDROXYPROGESTERONE ACETATE 150 MG/ML
150 INJECTION, SUSPENSION INTRAMUSCULAR ONCE
Status: COMPLETED | OUTPATIENT
Start: 2025-05-14 | End: 2025-05-14

## 2025-05-14 RX ORDER — NIFEDIPINE 30 MG/1
30 TABLET, EXTENDED RELEASE ORAL DAILY
Status: DISCONTINUED | OUTPATIENT
Start: 2025-05-14 | End: 2025-05-15 | Stop reason: HOSPADM

## 2025-05-14 RX ADMIN — IBUPROFEN 600 MG: 600 TABLET ORAL at 02:10

## 2025-05-14 RX ADMIN — DOCUSATE SODIUM 100 MG: 100 CAPSULE, LIQUID FILLED ORAL at 09:19

## 2025-05-14 RX ADMIN — IBUPROFEN 600 MG: 600 TABLET ORAL at 19:58

## 2025-05-14 RX ADMIN — DOCUSATE SODIUM 100 MG: 100 CAPSULE, LIQUID FILLED ORAL at 16:58

## 2025-05-14 RX ADMIN — SODIUM CHLORIDE 500 ML: 0.9 INJECTION, SOLUTION INTRAVENOUS at 00:15

## 2025-05-14 RX ADMIN — NIFEDIPINE 30 MG: 30 TABLET, FILM COATED, EXTENDED RELEASE ORAL at 11:50

## 2025-05-14 RX ADMIN — PRENATAL VIT W/ FE FUMARATE-FA TAB 27-0.8 MG 1 TABLET: 27-0.8 TAB at 09:19

## 2025-05-14 RX ADMIN — MEDROXYPROGESTERONE ACETATE 150 MG: 150 INJECTION, SUSPENSION, EXTENDED RELEASE INTRAMUSCULAR at 09:19

## 2025-05-14 RX ADMIN — IBUPROFEN 600 MG: 600 TABLET ORAL at 09:19

## 2025-05-14 NOTE — PROGRESS NOTES
Today's Date: 5/14/2025  Pt's Preferred Lab: [facility] ([facility])  [address] [facility] [address]  Phone: [phone]  Fax: [phone]  Ambulatory Order    Lab Ordered: 2hr (75GM) GTT   Reason: to screen for T2DM s/p delivery r/t H/O GDM  Time-Frame to be collected: 4-12 weeks postpartum    Patient Instructions: Fasting = Do NOT eat or drink anything except WATER for at least 8 hours before the test.    *For Critical access hospital, please call [phone] to schedule.   *To request lab be sent to alternative lab, Call: [phone] or Message Diabetes Team via [employer] Message to DANIEL Grossman   Diabetes Educator  The Diabetes and Pregnancy Program  [facility]   Dept: [phone]  Dept Fax: [phone]

## 2025-05-14 NOTE — ASSESSMENT & PLAN NOTE
Blood pressure noted to be 148/89 taken in the ED, on arrival to L&D noted to be 118/78  Noted BP of 141/88 in ED on 10/31/24 and again on 24 in ED BP of 152/103, 3/1/2024 in ED BP of 155/104    CBC/CMP wnl  Monitor BP's and signs/symptoms of preeclampsia  Consider Procardia XL 30mg qD for persistently elevated BP's overnight     Systolic (12hrs), Av , Min:135 , Max:145   Diastolic (12hrs), Av, Min:93, Max:100

## 2025-05-14 NOTE — PROGRESS NOTES
Progress Note - OB/GYN   Name: Christine Thomas 34 y.o. female I MRN: [id number]  Unit/Bed#: -01 I Date of Admission: 2025   Date of Service: 2025 I Hospital Day: 1     Assessment & Plan   (spontaneous vaginal delivery)  - Pain well controlled with oral analgesics  - Voiding spontaneously  - Tolerating PO fluids and solids  - Ambulating without difficulty  - Contraception: Depo  - Anticipate discharge PPD#2    Multiple Sclerosis  - Dx approx 4 years ago with manifestations of migraines, history of seizures when she was younger and leg weakness  - Last hospitalization  prior to pregnancy  - Not on meds  Not immune to HBV  - Offer vaccine in postpartum period  Asthma during pregnancy  - Has not used inhaler in 2 months   - Albuterol PRN  39 weeks gestation of pregnancy    GDMA1  2hr 6w GTT  Polyhydramnios in third trimester    Chronic hypertension  Blood pressure noted to be 148/89 taken in the ED, on arrival to L&D noted to be 118/78  Noted BP of 141/88 in ED on 10/31/24 and again on 24 in ED BP of 152/103, 3/1/2024 in ED BP of 155/104    CBC/CMP wnl  Monitor BP's and signs/symptoms of preeclampsia  Consider Procardia XL 30mg qD for persistently elevated BP's overnight     Systolic (12hrs), Av , Min:135 , Max:145   Diastolic (12hrs), Av, Min:93, Max:100          OB Post-Partum Progress Note  Subjective   Post delivery. Patient is doing well. Lochia WNL. Pain well controlled. She denies headaches, vision changes, RUQ tenderness.     Pain: yes, cramping, improved with meds  Tolerating PO: yes  Voiding: yes  Flatus: yes  BM: no  Ambulating: yes  Breastfeeding:  no  Chest pain: no  Shortness of breath: no  Leg pain: no  Lochia: WNL    Objective :  Temp:  [97.8 °F (36.6 °C)-98.9 °F (37.2 °C)] 98.3 °F (36.8 °C)  HR:  [] 86  BP: (100-159)/() 135/93  Resp:  [16-20] 20  SpO2:  [96 %-98 %] 98 %  O2 Device: None (Room air)    Physical Exam  Vitals reviewed.   Constitutional:        Appearance: Normal appearance.   HENT:      Head: Normocephalic and atraumatic.     Eyes:      Extraocular Movements: Extraocular movements intact.       Cardiovascular:      Rate and Rhythm: Normal rate and regular rhythm.      Heart sounds: Normal heart sounds.   Pulmonary:      Effort: Pulmonary effort is normal.      Breath sounds: Normal breath sounds.   Abdominal:      General: Abdomen is flat. There is no distension.      Palpations: Abdomen is soft.      Tenderness: There is no abdominal tenderness.      Comments: Fundus firm and non-tender above umbilicus     Musculoskeletal:         General: Normal range of motion.      Cervical back: Normal range of motion.     Skin:     General: Skin is warm and dry.     Neurological:      General: No focal deficit present.      Mental Status: She is alert. Mental status is at baseline.     Psychiatric:         Mood and Affect: Mood normal.         Behavior: Behavior normal.           Lab Results: I have reviewed the following results:  Lab Results   Component Value Date    WBC 13.60 (H) 05/13/2025    HGB 11.2 (L) 05/13/2025    HCT 33.2 (L) 05/13/2025    MCV 90 05/13/2025     05/13/2025       Mayco Sanches MD  Obstetrics & Gynecology PGY-1  5/14/2025  6:12 AM

## 2025-05-15 VITALS
BODY MASS INDEX: 21.21 KG/M2 | SYSTOLIC BLOOD PRESSURE: 129 MMHG | TEMPERATURE: 98 F | DIASTOLIC BLOOD PRESSURE: 97 MMHG | RESPIRATION RATE: 18 BRPM | WEIGHT: 124.2 LBS | HEIGHT: 64 IN | OXYGEN SATURATION: 99 % | HEART RATE: 85 BPM

## 2025-05-15 PROCEDURE — 90746 HEPB VACCINE 3 DOSE ADULT IM: CPT

## 2025-05-15 PROCEDURE — 99024 POSTOP FOLLOW-UP VISIT: CPT | Performed by: OBSTETRICS & GYNECOLOGY

## 2025-05-15 PROCEDURE — NC001 PR NO CHARGE: Performed by: OBSTETRICS & GYNECOLOGY

## 2025-05-15 RX ORDER — POLYETHYLENE GLYCOL 3350 17 G/17G
17 POWDER, FOR SOLUTION ORAL DAILY PRN
Status: DISCONTINUED | OUTPATIENT
Start: 2025-05-15 | End: 2025-05-15 | Stop reason: HOSPADM

## 2025-05-15 RX ORDER — POLYETHYLENE GLYCOL 3350 17 G/17G
17 POWDER, FOR SOLUTION ORAL DAILY PRN
Start: 2025-05-15

## 2025-05-15 RX ORDER — ACETAMINOPHEN 325 MG/1
650 TABLET ORAL EVERY 4 HOURS PRN
Qty: 30 TABLET | Refills: 0 | Status: SHIPPED | OUTPATIENT
Start: 2025-05-15

## 2025-05-15 RX ORDER — MEDROXYPROGESTERONE ACETATE 150 MG/ML
150 INJECTION, SUSPENSION INTRAMUSCULAR ONCE
Status: DISCONTINUED | OUTPATIENT
Start: 2025-05-15 | End: 2025-05-15

## 2025-05-15 RX ORDER — BENZOCAINE/MENTHOL 6 MG-10 MG
1 LOZENGE MUCOUS MEMBRANE DAILY PRN
Start: 2025-05-15

## 2025-05-15 RX ORDER — IBUPROFEN 600 MG/1
600 TABLET, FILM COATED ORAL EVERY 6 HOURS SCHEDULED
Qty: 30 TABLET | Refills: 0 | Status: SHIPPED | OUTPATIENT
Start: 2025-05-15

## 2025-05-15 RX ORDER — NIFEDIPINE 30 MG
30 TABLET, EXTENDED RELEASE ORAL DAILY
Qty: 60 TABLET | Refills: 0 | Status: SHIPPED | OUTPATIENT
Start: 2025-05-15

## 2025-05-15 RX ADMIN — IBUPROFEN 600 MG: 600 TABLET ORAL at 07:34

## 2025-05-15 RX ADMIN — NIFEDIPINE 30 MG: 30 TABLET, FILM COATED, EXTENDED RELEASE ORAL at 09:28

## 2025-05-15 RX ADMIN — IBUPROFEN 600 MG: 600 TABLET ORAL at 01:12

## 2025-05-15 RX ADMIN — POLYETHYLENE GLYCOL 3350 17 G: 17 POWDER, FOR SOLUTION ORAL at 09:33

## 2025-05-15 RX ADMIN — HEPATITIS B VACCINE (RECOMBINANT) 20 MCG: 20 INJECTION, SUSPENSION INTRAMUSCULAR at 11:27

## 2025-05-15 RX ADMIN — PRENATAL VIT W/ FE FUMARATE-FA TAB 27-0.8 MG 1 TABLET: 27-0.8 TAB at 09:28

## 2025-05-15 NOTE — ASSESSMENT & PLAN NOTE
Blood pressure noted to be 148/89 taken in the ED, on arrival to L&D noted to be 118/78  Noted BP of 141/88 in ED on 10/31/24 and again on 24 in ED BP of 152/103, 3/1/2024 in ED BP of 155/104    CBC/CMP wnl  Monitor BP's and signs/symptoms of preeclampsia  Continue Procardia XL 30mg qD   Advised to check BP twice daily    Systolic (12hrs), Av , Min:138 , Max:138   Diastolic (12hrs), Av, Min:93, Max:93

## 2025-05-15 NOTE — PROGRESS NOTES
Pt states she has bp cuff at home.aware to monitor bp twice a day. Pt aware procardia prescription sent to [facility] pharmacy in [place]. Will  on way out per pt. Save your life magnet given and reviewed.

## 2025-05-15 NOTE — PROGRESS NOTES
Progress Note - OB/GYN   Name: Christine Thomas 34 y.o. female I MRN: [id number]  Unit/Bed#: -01 I Date of Admission: 2025   Date of Service: 5/15/2025 I Hospital Day: 2     Assessment & Plan   (spontaneous vaginal delivery)  - Pain well controlled with oral analgesics  - Voiding spontaneously  - Tolerating PO fluids and solids  - Ambulating without difficulty  - Contraception: Depo  - Anticipate discharge PPD#2    Multiple Sclerosis  - Dx approx 4 years ago with manifestations of migraines, history of seizures when she was younger and leg weakness  - Last hospitalization  prior to pregnancy  - Not on meds  Not immune to HBV  - Ordered  Asthma during pregnancy  - Has not used inhaler in 2 months   - Albuterol PRN  GDMA1  2hr 6w GTT  Chronic hypertension  Blood pressure noted to be 148/89 taken in the ED, on arrival to L&D noted to be 118/78  Noted BP of 141/88 in ED on 10/31/24 and again on 24 in ED BP of 152/103, 3/1/2024 in ED BP of 155/104    CBC/CMP wnl  Monitor BP's and signs/symptoms of preeclampsia  Continue Procardia XL 30mg qD   Advised to check BP twice daily    Systolic (12hrs), Av , Min:138 , Max:138   Diastolic (12hrs), Av, Min:93, Max:93          OB Post-Partum Progress Note  Subjective   Post delivery. Patient is doing well. Lochia WNL. Pain well controlled. She denies headaches, vision changes, RUQ tenderness. Feels ready for dsischarge.    Pain: yes, cramping, improved with meds  Tolerating PO: yes  Voiding: yes  Flatus: yes  BM: no  Ambulating: yes  Breastfeeding:  no  Chest pain: no  Shortness of breath: no  Leg pain: no  Lochia: WNL    Objective :  Temp:  [98 °F (36.7 °C)-98.6 °F (37 °C)] 98.4 °F (36.9 °C)  HR:  [72-85] 80  BP: (133-142)/(89-99) 138/93  Resp:  [18-20] 18  SpO2:  [98 %-99 %] 99 %  O2 Device: None (Room air)    Physical Exam  Vitals reviewed.   Constitutional:       Appearance: Normal appearance.   HENT:      Head: Normocephalic and atraumatic.      Eyes:      Extraocular Movements: Extraocular movements intact.       Cardiovascular:      Rate and Rhythm: Normal rate and regular rhythm.      Heart sounds: Normal heart sounds.   Pulmonary:      Effort: Pulmonary effort is normal.      Breath sounds: Normal breath sounds.   Abdominal:      General: Abdomen is flat. There is no distension.      Palpations: Abdomen is soft.      Tenderness: There is no abdominal tenderness.      Comments: Fundus firm and non-tender at umbilicus     Musculoskeletal:         General: Normal range of motion.      Cervical back: Normal range of motion.     Skin:     General: Skin is warm and dry.     Neurological:      General: No focal deficit present.      Mental Status: She is alert. Mental status is at baseline.     Psychiatric:         Mood and Affect: Mood normal.         Behavior: Behavior normal.           Lab Results: I have reviewed the following results:  Lab Results   Component Value Date    WBC 13.60 (H) 05/13/2025    HGB 11.2 (L) 05/13/2025    HCT 33.2 (L) 05/13/2025    MCV 90 05/13/2025     05/13/2025       Mayco Sanches MD  Obstetrics & Gynecology PGY-1  5/15/2025  7:04 AM    I saw and examined Christine after Dr. Sanches on 5/15/2025. I agree with his documentation.  Discharge to home per patient request.  Verbal instructions provided.  Follow up in office in 1 week for BP check.  Advised to monitor BP twice daily at home.  All questions answered.       Nannette Dangelo MD  OB/GYN  5/15/2025 9:06 AM

## 2025-05-15 NOTE — NURSING NOTE
Discharge education provided to patient. POST BIRTH magnet reviewed as well as other educational handouts. Patient does not have any questions at this time and encouraged to ask if any questions arise.    FREDRICK Dempsey

## 2025-05-16 NOTE — UTILIZATION REVIEW
"  Mother and baby discharged 05/15/2025       NOTIFICATION OF INPATIENT ADMISSION   MATERNITY/DELIVERY AUTHORIZATION REQUEST   SERVICING FACILITY:   [facility] - L&D, [place], NICU  [address] [place] [address]  Tax ID: [id number]  NPI: [id number] ATTENDING PROVIDER:  Attending Name and NPI#: Nannette Dangelo Md [[id number]]  Address: [address] [place] [address]  Phone: [phone]     ADMISSION INFORMATION:  Place of Service: Inpatient [facility]  Place of Service Code: 21  Inpatient Admission Date/Time: 25  6:45 AM  Discharge Date/Time: 5/15/2025 11:50 AM  Admitting Diagnosis Code/Description:  Encounter for full-term uncomplicated delivery [O80]   Mother: Christine Thomas 1990 Estimated Date of Delivery: 5/15/25  Delivering clinician: Brent Haro   OB History          4    Para   3    Term   2       1    AB   1    Living   3         SAB   1    IAB   0    Ectopic   0    Multiple   0    Live Births   3               [place] Name & MRN:   Information for the patient's :  William, Baby Boy (Christine) [[id number]]   [place] Delivery Information:  Sex: male  Delivered 2025 12:13 PM by Vaginal, Spontaneous; Gestational Age: 39w5d     Measurements:  Weight: 7 lb 0.5 oz (3190 g);  Height: 20\"    APGAR 1 minute 5 minutes 10 minutes   Totals: 8 9       UTILIZATION REVIEW CONTACT:  Yasmin Vizcarra, Utilization   Network Utilization Review Department  Phone: [phone]  Fax [phone]  Email: [email or web address]  Contact for approvals/pending authorizations, clinical reviews, and discharge.   PHYSICIAN ADVISORY SERVICES:  Medical Necessity Denial & Qxre-lm-Sfqx Review  Phone: [phone]  Fax: [phone]  Email: [email or web address]   DISCHARGE SUPPORT TEAM:  For Patients Discharge Needs & Updates  Phone: [phone] opt. 2 Fax: [phone]  Email: " [email or web address]

## 2025-05-21 LAB — PLACENTA IN STORAGE: NORMAL

## 2025-06-03 ENCOUNTER — PATIENT OUTREACH (OUTPATIENT)
Dept: OBGYN CLINIC | Facility: CLINIC | Age: 35
End: 2025-06-03

## 2025-06-03 ENCOUNTER — POSTPARTUM VISIT (OUTPATIENT)
Dept: OBGYN CLINIC | Facility: CLINIC | Age: 35
End: 2025-06-03

## 2025-06-03 VITALS
WEIGHT: 106.2 LBS | BODY MASS INDEX: 18.13 KG/M2 | DIASTOLIC BLOOD PRESSURE: 70 MMHG | SYSTOLIC BLOOD PRESSURE: 108 MMHG | HEIGHT: 64 IN

## 2025-06-03 DIAGNOSIS — Z30.09 UNWANTED FERTILITY: ICD-10-CM

## 2025-06-03 PROBLEM — O99.519 ASTHMA DURING PREGNANCY: Status: RESOLVED | Noted: 2025-01-09 | Resolved: 2025-06-03

## 2025-06-03 PROBLEM — I10 CHRONIC HYPERTENSION: Status: RESOLVED | Noted: 2025-05-14 | Resolved: 2025-06-03

## 2025-06-03 PROBLEM — O24.410 GDM, CLASS A1: Status: RESOLVED | Noted: 2025-01-14 | Resolved: 2025-06-03

## 2025-06-03 PROBLEM — Z83.3 FAMILY HISTORY OF DIABETES MELLITUS IN FIRST DEGREE RELATIVE: Status: RESOLVED | Noted: 2024-11-16 | Resolved: 2025-06-03

## 2025-06-03 PROBLEM — J45.909 ASTHMA DURING PREGNANCY: Status: RESOLVED | Noted: 2025-01-09 | Resolved: 2025-06-03

## 2025-06-03 PROBLEM — Z78.9 NOT IMMUNE TO HEPATITIS B VIRUS: Status: RESOLVED | Noted: 2024-11-21 | Resolved: 2025-06-03

## 2025-06-03 PROBLEM — Z3A.39 39 WEEKS GESTATION OF PREGNANCY: Status: RESOLVED | Noted: 2025-01-14 | Resolved: 2025-06-03

## 2025-06-03 PROBLEM — G35 MS (MULTIPLE SCLEROSIS) (HCC): Status: RESOLVED | Noted: 2020-01-29 | Resolved: 2025-06-03

## 2025-06-03 PROBLEM — O40.3XX0 POLYHYDRAMNIOS IN THIRD TRIMESTER: Status: RESOLVED | Noted: 2025-05-12 | Resolved: 2025-06-03

## 2025-06-03 PROBLEM — O09.899 HISTORY OF PRETERM DELIVERY, CURRENTLY PREGNANT: Status: RESOLVED | Noted: 2024-11-16 | Resolved: 2025-06-03

## 2025-06-03 RX ORDER — MEDROXYPROGESTERONE ACETATE 150 MG/ML
150 INJECTION, SUSPENSION INTRAMUSCULAR
Qty: 1 ML | Refills: 3 | Status: SHIPPED | OUTPATIENT
Start: 2025-06-03

## 2025-06-03 NOTE — PROGRESS NOTES
"POSTPARTUM VISIT    Christine Thomas presents today for postpartum visit.  She had a vaginal delivery on 5/13/2025.  Complications included CHTN for which she was discharged home on Procardia XL 30mg daily.  She is bottlefeeding her infant and reports no issues with such.  She desires Depoprovera for contraception and she received first injection 5/15/2025 prior to hospital discharge.  She was provided with [employer] Depression Screening tool and her score was 16.    Review of Systems:   -Constitutional: denies issues, reports pain from umbilical hernia at times   -Breasts: denies tenderness   -Gynecologic: lochia light   -Urinary: denies issues urinating   -GI: stools WNL, denies issues    Physical Exam:   -Vitals:   Vitals:    06/03/25 0941   BP: 108/70   BP Location: Left arm   Patient Position: Sitting   Weight: 48.2 kg (106 lb 3.2 oz)   Height: 5' 4\" (1.626 m)      -General: A&Ox3, no acute distress noted   -Abdomen: soft, non-tender, umbilical hernia soft and easily reducible   -Extremities: nontender, no edema noted   -Breasts: deferred   -Pelvic exam: deferred    Assessment/Plan:  1. Normal postpartum exam.  2. Depression screening positive.  Social Service consultation ordered.  3. Referral ordered for postpartum Physical Therapy consultation.  4. Last pap smear was done 11/15/2024 and result was NILM with negative HPV.  Advise return for next annual GYN exam in 6 months.  5. Contraception: Depoprovera injection every 3 months.  6. Encouraged to completed 2h GTT after 6 weeks postpartum.  7. Advised follow up with PCP within next 2 months regarding ongoing management of HTN and follow up for umbilical hernia.      "

## 2025-06-03 NOTE — PROGRESS NOTES
ISRAEL FERNANDEZ met with the pt in the office today for EPDS score of 16, no SI/HI. Pt reports a limited amount of support at home. Her partner is living with her but she is doing the majority of the house work and  in the home. Pt denies a history of PPD with her other pregnancies, she reports receiving better support after those deliveries. Pt has NFP, her RN is Elena, she was agreeable to me reaching out to Elena to see if she can receive services through the [facility] program. ISRAEL FERNANDEZ texted elena via teams today to inquire. ISRAEL FERNANDEZ will f/u in one week.

## 2025-06-03 NOTE — PATIENT INSTRUCTIONS
Thank you for your confidence in our team.   We appreciate you and welcome your feedback.   If you receive a survey from us, please take a few moments to let us know how we are doing.   Sincerely,  DANIEL Babcock       POSTPARTUM    You should contact your OBGYN provider if you experience any of the followin. Bleeding that soaks a pad every hour for 2 hours.   2. Foul odor coming from your vagina.   3. Fever of 100.4 or higher.   4. Incision or abdominal pain that will not go away despite prescribed pain medications.   5. Swelling, redness, discharge or bleeding from your  incision or perineal tear site.   6. Your incision begins to separate.   7. Problems urinating including inability to urinate, burning while urinating or extremely dark urine.   8. No bowel movement within 4 days of giving birth, or difficulty with bowel movements after that.   9. Any type of visual disturbance (double vision, blurring, etc).   10. Severe headache.   11. Flu-like symptoms.   12. Pain or redness in one or both of your breasts.   13. Pain, warmth, tenderness or swelling in your legs, especially the calf area.   14. Frequent nausea and vomiting.   15. Signs of depression or anxiety.   16. If you experience chest pains or have problems breathing, call 911 immediately.    In general you may resume sexual vaginal intercourse after 6 weeks of delivery.  It is important to continue changing your sanitary pads frequently and clean the perineum at least 2-3 times daily.

## 2025-06-13 ENCOUNTER — PATIENT OUTREACH (OUTPATIENT)
Dept: OBGYN CLINIC | Facility: CLINIC | Age: 35
End: 2025-06-13

## 2025-06-13 NOTE — PROGRESS NOTES
ISRAEL FERNANDEZ attempted to outreach pt today to check in and see how she is feeling and if NFP was planning to connect her with [facility] for therapy. ISRAEL FERNANDEZ left a  for a return call.

## 2025-06-20 ENCOUNTER — PATIENT OUTREACH (OUTPATIENT)
Dept: OBGYN CLINIC | Facility: CLINIC | Age: 35
End: 2025-06-20

## 2025-06-20 NOTE — PROGRESS NOTES
ISRAEL FERNANDEZ attempted to outreach the pt today to check in and see how she has been feeling emotionally since her last appointment. There was no answer, no ability to leave a VM.

## 2025-06-27 ENCOUNTER — PATIENT OUTREACH (OUTPATIENT)
Dept: OBGYN CLINIC | Facility: CLINIC | Age: 35
End: 2025-06-27

## 2025-06-27 NOTE — PROGRESS NOTES
ISRAEL FERNANDEZ attempted to outreach via phone today, however, phone is not accepting calls at this time. ISRAEL FERNANDEZ sent a [facility] message to patient.

## 2025-07-07 ENCOUNTER — PATIENT OUTREACH (OUTPATIENT)
Dept: OBGYN CLINIC | Facility: CLINIC | Age: 35
End: 2025-07-07

## 2025-07-07 NOTE — LETTER
[address] [place] [address]  TELLO CERVANTES [address]  [phone]    Re: resources   7/7/2025       Dear Christine,    If you need any resources for food, financial, transportation, housing utility, baby supply or mental health needs, please contact me at Dept: [phone].    If you have other questions, please do not hesitate to contact me about those as well.  If I do not have an answer I will assist you in finding the appropriate agency or individual who can help.    Sincerely,         KASIA Juarez

## 2025-07-07 NOTE — PROGRESS NOTES
ISRAEL FERNANDEZ attempted to outreach the pt today but was unable to get ahold of her SW CM left a VM. ISRAEL FERNANDEZ did have contact with pts NFP provider, Anisha COSTA who reports pt is doing better, she is not connected to [facility] at this time but it is available to her if she is interested in the future.     ISRAEL FERNANDEZ will close this encounter at this time, will remain available as needed.

## 2025-07-15 ENCOUNTER — APPOINTMENT (EMERGENCY)
Dept: RADIOLOGY | Facility: HOSPITAL | Age: 35
End: 2025-07-15
Payer: MEDICARE

## 2025-07-15 ENCOUNTER — HOSPITAL ENCOUNTER (EMERGENCY)
Facility: HOSPITAL | Age: 35
Discharge: HOME | End: 2025-07-15
Attending: EMERGENCY MEDICINE | Admitting: EMERGENCY MEDICINE
Payer: MEDICARE

## 2025-07-15 VITALS
BODY MASS INDEX: 17.92 KG/M2 | DIASTOLIC BLOOD PRESSURE: 100 MMHG | SYSTOLIC BLOOD PRESSURE: 144 MMHG | RESPIRATION RATE: 18 BRPM | TEMPERATURE: 98.5 F | HEART RATE: 105 BPM | WEIGHT: 104.4 LBS | OXYGEN SATURATION: 98 %

## 2025-07-15 DIAGNOSIS — L08.9 FOOT INFECTION: Primary | ICD-10-CM

## 2025-07-15 LAB
ALBUMIN SERPL BCG-MCNC: 4.7 G/DL (ref 3.5–5)
ALP SERPL-CCNC: 63 U/L (ref 34–104)
ALT SERPL W P-5'-P-CCNC: 14 U/L (ref 7–52)
ANION GAP SERPL CALCULATED.3IONS-SCNC: 10 MMOL/L (ref 4–13)
AST SERPL W P-5'-P-CCNC: 19 U/L (ref 13–39)
BASOPHILS # BLD AUTO: 0.06 THOUSANDS/ÂΜL (ref 0–0.1)
BASOPHILS NFR BLD AUTO: 1 % (ref 0–1)
BILIRUB SERPL-MCNC: 0.32 MG/DL (ref 0.2–1)
BUN SERPL-MCNC: 12 MG/DL (ref 5–25)
CALCIUM SERPL-MCNC: 9.6 MG/DL (ref 8.4–10.2)
CHLORIDE SERPL-SCNC: 107 MMOL/L (ref 96–108)
CO2 SERPL-SCNC: 22 MMOL/L (ref 21–32)
CREAT SERPL-MCNC: 0.77 MG/DL (ref 0.6–1.3)
EOSINOPHIL # BLD AUTO: 0.27 THOUSAND/ÂΜL (ref 0–0.61)
EOSINOPHIL NFR BLD AUTO: 3 % (ref 0–6)
ERYTHROCYTE [DISTWIDTH] IN BLOOD BY AUTOMATED COUNT: 14.5 % (ref 11.6–15.1)
GFR SERPL CREATININE-BSD FRML MDRD: 101 ML/MIN/1.73SQ M
GLUCOSE SERPL-MCNC: 95 MG/DL (ref 65–140)
HCT VFR BLD AUTO: 38.9 % (ref 34.8–46.1)
HGB BLD-MCNC: 12.5 G/DL (ref 11.5–15.4)
IMM GRANULOCYTES # BLD AUTO: 0.02 THOUSAND/UL (ref 0–0.2)
IMM GRANULOCYTES NFR BLD AUTO: 0 % (ref 0–2)
LACTATE SERPL-SCNC: 0.7 MMOL/L (ref 0.5–2)
LYMPHOCYTES # BLD AUTO: 2.13 THOUSANDS/ÂΜL (ref 0.6–4.47)
LYMPHOCYTES NFR BLD AUTO: 23 % (ref 14–44)
MCH RBC QN AUTO: 28.7 PG (ref 26.8–34.3)
MCHC RBC AUTO-ENTMCNC: 32.1 G/DL (ref 31.4–37.4)
MCV RBC AUTO: 89 FL (ref 82–98)
MONOCYTES # BLD AUTO: 0.71 THOUSAND/ÂΜL (ref 0.17–1.22)
MONOCYTES NFR BLD AUTO: 8 % (ref 4–12)
NEUTROPHILS # BLD AUTO: 6.02 THOUSANDS/ÂΜL (ref 1.85–7.62)
NEUTS SEG NFR BLD AUTO: 65 % (ref 43–75)
NRBC BLD AUTO-RTO: 0 /100 WBCS
PLATELET # BLD AUTO: 263 THOUSANDS/UL (ref 149–390)
PMV BLD AUTO: 9.5 FL (ref 8.9–12.7)
POTASSIUM SERPL-SCNC: 4.2 MMOL/L (ref 3.5–5.3)
PROT SERPL-MCNC: 7.8 G/DL (ref 6.4–8.4)
RBC # BLD AUTO: 4.35 MILLION/UL (ref 3.81–5.12)
SODIUM SERPL-SCNC: 139 MMOL/L (ref 135–147)
WBC # BLD AUTO: 9.21 THOUSAND/UL (ref 4.31–10.16)

## 2025-07-15 PROCEDURE — 96375 TX/PRO/DX INJ NEW DRUG ADDON: CPT

## 2025-07-15 PROCEDURE — 96365 THER/PROPH/DIAG IV INF INIT: CPT

## 2025-07-15 PROCEDURE — 80053 COMPREHEN METABOLIC PANEL: CPT | Performed by: EMERGENCY MEDICINE

## 2025-07-15 PROCEDURE — 87040 BLOOD CULTURE FOR BACTERIA: CPT | Performed by: EMERGENCY MEDICINE

## 2025-07-15 PROCEDURE — 99283 EMERGENCY DEPT VISIT LOW MDM: CPT

## 2025-07-15 PROCEDURE — 85025 COMPLETE CBC W/AUTO DIFF WBC: CPT | Performed by: EMERGENCY MEDICINE

## 2025-07-15 PROCEDURE — 99285 EMERGENCY DEPT VISIT HI MDM: CPT | Performed by: EMERGENCY MEDICINE

## 2025-07-15 PROCEDURE — 73630 X-RAY EXAM OF FOOT: CPT

## 2025-07-15 PROCEDURE — 83605 ASSAY OF LACTIC ACID: CPT | Performed by: EMERGENCY MEDICINE

## 2025-07-15 PROCEDURE — 36415 COLL VENOUS BLD VENIPUNCTURE: CPT | Performed by: EMERGENCY MEDICINE

## 2025-07-15 RX ORDER — KETOROLAC TROMETHAMINE 30 MG/ML
15 INJECTION, SOLUTION INTRAMUSCULAR; INTRAVENOUS ONCE
Status: COMPLETED | OUTPATIENT
Start: 2025-07-15 | End: 2025-07-15

## 2025-07-15 RX ORDER — CEPHALEXIN 500 MG/1
500 CAPSULE ORAL EVERY 12 HOURS SCHEDULED
Qty: 14 CAPSULE | Refills: 0 | Status: SHIPPED | OUTPATIENT
Start: 2025-07-15 | End: 2025-07-22

## 2025-07-15 RX ORDER — CEPHALEXIN 500 MG/1
500 CAPSULE ORAL EVERY 12 HOURS SCHEDULED
Qty: 14 CAPSULE | Refills: 0 | Status: SHIPPED | OUTPATIENT
Start: 2025-07-15 | End: 2025-07-15

## 2025-07-15 RX ORDER — ACETAMINOPHEN 325 MG/1
650 TABLET ORAL ONCE
Status: COMPLETED | OUTPATIENT
Start: 2025-07-15 | End: 2025-07-15

## 2025-07-15 RX ORDER — SULFAMETHOXAZOLE AND TRIMETHOPRIM 800; 160 MG/1; MG/1
1 TABLET ORAL 2 TIMES DAILY
Qty: 14 TABLET | Refills: 0 | Status: SHIPPED | OUTPATIENT
Start: 2025-07-15 | End: 2025-07-22

## 2025-07-15 RX ORDER — SULFAMETHOXAZOLE AND TRIMETHOPRIM 800; 160 MG/1; MG/1
1 TABLET ORAL 2 TIMES DAILY
Qty: 14 TABLET | Refills: 0 | Status: SHIPPED | OUTPATIENT
Start: 2025-07-15 | End: 2025-07-15

## 2025-07-15 RX ORDER — CEFTRIAXONE 1 G/50ML
1000 INJECTION, SOLUTION INTRAVENOUS ONCE
Status: COMPLETED | OUTPATIENT
Start: 2025-07-15 | End: 2025-07-15

## 2025-07-15 RX ADMIN — KETOROLAC TROMETHAMINE 15 MG: 30 INJECTION, SOLUTION INTRAMUSCULAR; INTRAVENOUS at 18:52

## 2025-07-15 RX ADMIN — ACETAMINOPHEN 650 MG: 325 TABLET, FILM COATED ORAL at 18:52

## 2025-07-15 RX ADMIN — SODIUM CHLORIDE 1000 ML: 0.9 INJECTION, SOLUTION INTRAVENOUS at 17:51

## 2025-07-15 RX ADMIN — CEFTRIAXONE 1000 MG: 1 INJECTION, SOLUTION INTRAVENOUS at 17:56

## 2025-07-16 ENCOUNTER — VBI (OUTPATIENT)
Dept: FAMILY MEDICINE CLINIC | Facility: CLINIC | Age: 35
End: 2025-07-16

## 2025-07-16 NOTE — TELEPHONE ENCOUNTER
07/16/25 11:52 AM    Patient contacted post ED visit, first outreach attempt made. Message was left for patient to return a call to the VBI Department at [facility]: Phone [phone].    Thank you.  [facility] Brody MA  [facility]

## 2025-07-16 NOTE — LETTER
[facility]  [address]  [address]  [place] [address] [phone]    Date: 07/18/25    Christine Thomas  [address] [place] [address]    Dear Christine:                                                                                                                                Thank you for choosing [facility] emergency department for care.  Your primary care provider wants to make sure that your ongoing medical care is being addressed. If you require follow up care as a result of your emergency department visit, there are a few things the practice would like you to know.                As part of the network's continuing commitment to caring for our patients, we have added more same day appointments and have extended office hours to meet your medical needs. After hours, on-call physicians are available via your primary care provider's main office line.               We encourage you to contact our office prior to seeking treatment to discuss your symptoms with the medical staff.  Together, we can determine the correct course of action.  A majority of non-emergent conditions such as: common cold, flu-like symptoms, fevers, strains/sprains, dislocations, minor burns, cuts and animal bites can be treated at [facility] facilities. Diagnostic testing is available at some sites.               Of course, if you are experiencing a life threatening medical emergency call 911 or proceed directly to the nearest emergency room.    Your nearest [facility] facility is conveniently located at:    [facility]  [address] [place] [address] [address]  [place] [address]  [phone]  SKIP THE WAIT  Conveniently offered at most [facility] locations  [place] your spot online at [email or web address] or on the [facility]    Sincerely,    [facility]  Dept: [phone]

## 2025-07-17 NOTE — TELEPHONE ENCOUNTER
07/17/25 9:41 AM    Patient contacted post ED visit, second outreach attempt made. Message was left for patient to return a call to the VBI Department at [facility]: Phone [phone].    Thank you.  [facility] Brody MA  [facility]

## 2025-07-18 NOTE — TELEPHONE ENCOUNTER
07/18/25 10:02 AM    Patient contacted post ED visit, third outreach attempt made. Message was left for patient to return a call to either the VBI Department at [facility]: Phone [phone] or the PCP office.     Thank you.  [facility] Brody MA  [facility]    07/18/25 10:03 AM    Patient contacted post ED visit, phone outreaches were unsuccessful and a MyChart letter has been sent to the patient as follow-up.    Thank you.  [facility] Brody MA  [facility]

## 2025-07-20 LAB
BACTERIA BLD CULT: NORMAL
BACTERIA BLD CULT: NORMAL

## 2025-07-28 ENCOUNTER — OFFICE VISIT (OUTPATIENT)
Dept: PODIATRY | Facility: CLINIC | Age: 35
End: 2025-07-28
Payer: MEDICARE

## 2025-07-28 VITALS — BODY MASS INDEX: 18.1 KG/M2 | WEIGHT: 106 LBS | HEIGHT: 64 IN

## 2025-07-28 DIAGNOSIS — L03.116 CELLULITIS OF LEFT FOOT: Primary | ICD-10-CM

## 2025-07-28 DIAGNOSIS — L08.9 FOOT INFECTION: ICD-10-CM

## 2025-07-28 PROCEDURE — 99204 OFFICE O/P NEW MOD 45 MIN: CPT | Performed by: PODIATRIST

## 2025-07-31 ENCOUNTER — CLINICAL SUPPORT (OUTPATIENT)
Dept: OBGYN CLINIC | Facility: CLINIC | Age: 35
End: 2025-07-31

## 2025-07-31 VITALS — SYSTOLIC BLOOD PRESSURE: 130 MMHG | DIASTOLIC BLOOD PRESSURE: 80 MMHG

## 2025-07-31 DIAGNOSIS — Z30.42 DEPO-PROVERA CONTRACEPTIVE STATUS: Primary | ICD-10-CM

## 2025-07-31 DIAGNOSIS — Z30.42 ENCOUNTER FOR MANAGEMENT AND INJECTION OF DEPO-PROVERA: ICD-10-CM

## 2025-07-31 PROCEDURE — 96372 THER/PROPH/DIAG INJ SC/IM: CPT

## 2025-07-31 RX ADMIN — MEDROXYPROGESTERONE ACETATE 150 MG: 150 INJECTION, SUSPENSION INTRAMUSCULAR at 13:51

## 2025-08-02 ENCOUNTER — HOSPITAL ENCOUNTER (EMERGENCY)
Facility: HOSPITAL | Age: 35
Discharge: HOME | End: 2025-08-02
Attending: EMERGENCY MEDICINE
Payer: MEDICARE

## 2025-08-02 VITALS
SYSTOLIC BLOOD PRESSURE: 151 MMHG | TEMPERATURE: 98.2 F | DIASTOLIC BLOOD PRESSURE: 94 MMHG | HEART RATE: 83 BPM | WEIGHT: 104.28 LBS | BODY MASS INDEX: 17.9 KG/M2 | OXYGEN SATURATION: 98 % | RESPIRATION RATE: 18 BRPM

## 2025-08-02 DIAGNOSIS — M79.89 SWELLING OF LEFT FOOT: Primary | ICD-10-CM

## 2025-08-02 DIAGNOSIS — S91.332A PUNCTURE WOUND OF LEFT FOOT, INITIAL ENCOUNTER: ICD-10-CM

## 2025-08-02 PROCEDURE — 99284 EMERGENCY DEPT VISIT MOD MDM: CPT | Performed by: EMERGENCY MEDICINE

## 2025-08-02 PROCEDURE — 99282 EMERGENCY DEPT VISIT SF MDM: CPT

## 2025-08-02 RX ORDER — NAPROXEN 500 MG/1
500 TABLET ORAL 2 TIMES DAILY WITH MEALS
Qty: 30 TABLET | Refills: 0 | Status: SHIPPED | OUTPATIENT
Start: 2025-08-02

## 2025-08-02 RX ORDER — NAPROXEN 500 MG/1
500 TABLET ORAL ONCE
Status: COMPLETED | OUTPATIENT
Start: 2025-08-02 | End: 2025-08-02

## 2025-08-02 RX ADMIN — NAPROXEN 500 MG: 500 TABLET ORAL at 17:33

## 2025-08-04 ENCOUNTER — VBI (OUTPATIENT)
Dept: FAMILY MEDICINE CLINIC | Facility: CLINIC | Age: 35
End: 2025-08-04

## 2025-08-11 RX ORDER — MEDROXYPROGESTERONE ACETATE 150 MG/ML
150 INJECTION, SUSPENSION INTRAMUSCULAR ONCE
Status: COMPLETED | OUTPATIENT
Start: 2025-08-11 | End: 2025-07-31

## 2025-08-15 ENCOUNTER — TELEPHONE (OUTPATIENT)
Dept: FAMILY MEDICINE CLINIC | Facility: CLINIC | Age: 35
End: 2025-08-15